# Patient Record
Sex: FEMALE | Race: OTHER | NOT HISPANIC OR LATINO | Employment: FULL TIME | URBAN - METROPOLITAN AREA
[De-identification: names, ages, dates, MRNs, and addresses within clinical notes are randomized per-mention and may not be internally consistent; named-entity substitution may affect disease eponyms.]

---

## 2017-01-19 ENCOUNTER — HOSPITAL ENCOUNTER (EMERGENCY)
Facility: HOSPITAL | Age: 37
Discharge: HOME | End: 2017-01-19
Payer: COMMERCIAL

## 2017-01-19 VITALS
HEIGHT: 60 IN | WEIGHT: 203 LBS | SYSTOLIC BLOOD PRESSURE: 165 MMHG | HEART RATE: 96 BPM | OXYGEN SATURATION: 100 % | BODY MASS INDEX: 39.85 KG/M2 | DIASTOLIC BLOOD PRESSURE: 96 MMHG | RESPIRATION RATE: 18 BRPM | TEMPERATURE: 98 F

## 2017-01-19 DIAGNOSIS — M25.562 ACUTE PAIN OF LEFT KNEE: Primary | ICD-10-CM

## 2017-01-19 PROCEDURE — 25000003 PHARM REV CODE 250: Performed by: PHYSICIAN ASSISTANT

## 2017-01-19 PROCEDURE — 99283 EMERGENCY DEPT VISIT LOW MDM: CPT | Mod: 25

## 2017-01-19 PROCEDURE — 29505 APPLICATION LONG LEG SPLINT: CPT | Mod: LT

## 2017-01-19 RX ORDER — TRAMADOL HYDROCHLORIDE 50 MG/1
50 TABLET ORAL EVERY 6 HOURS PRN
Qty: 15 TABLET | Refills: 0 | Status: SHIPPED | OUTPATIENT
Start: 2017-01-19 | End: 2018-03-01

## 2017-01-19 RX ORDER — TRAMADOL HYDROCHLORIDE 50 MG/1
50 TABLET ORAL
Status: COMPLETED | OUTPATIENT
Start: 2017-01-19 | End: 2017-01-19

## 2017-01-19 RX ORDER — METFORMIN HYDROCHLORIDE 500 MG/1
500 TABLET ORAL 2 TIMES DAILY WITH MEALS
COMMUNITY
End: 2018-03-01

## 2017-01-19 RX ADMIN — TRAMADOL HYDROCHLORIDE 50 MG: 50 TABLET, COATED ORAL at 04:01

## 2017-01-19 NOTE — ED PROVIDER NOTES
"SCRIBE #1 NOTE: I, Eden Ming, am scribing for, and in the presence of, MINA Finney. I have scribed the entire note.      History      Chief Complaint   Patient presents with    Knee Pain     bilateral knee pain       Review of patient's allergies indicates:   Allergen Reactions    Neurontin [gabapentin] Itching    Percocet [oxycodone-acetaminophen] Itching        HPI   HPI    1/19/2017, 3:39 PM   History obtained from the patient      History of Present Illness: Jeanna Rod is a 36 y.o. female patient who presents to the Emergency Department for knee pain which onset gradually 2 days ago. Symptoms are constant and moderate in severity. Pain is located to L knee. Pt reports that she twisted her knee while she was at work two days ago. Pt states that she has hx of chronic knee pain and is s/p ACL and meniscus repair to the same knee years ago. Pt reports that she started feeling a burning sensation in her L knee yesterday. Pt states that she is starting to feel a "pulling" sensation in her L knee. No mitigating or exacerbating factors reported. No associated sxs reported. Patient denies any increased warmth, swelling, back pain, and all other sxs at this time. No further complaints or concerns at this time.         Arrival mode: Personal vehicle    PCP: Jarek Crespo MD       Past Medical History:  Past Medical History   Diagnosis Date    Back pain     Diabetes mellitus     Left knee pain     Miscarriage      x2       Past Surgical History:  Past Surgical History   Procedure Laterality Date    Lumbar fusion      Anterior cruciate ligament repair Left     Tibia fracture surgery Right     Dilation and curettage of uterus       x2         Family History:  History reviewed. No pertinent family history.    Social History:  Social History     Social History Main Topics    Smoking status: Current Every Day Smoker    Smokeless tobacco: Not on file    Alcohol use No    Drug use: No    Sexual " activity: Not on file       ROS   Review of Systems   Constitutional: Negative for fever.   HENT: Negative for sore throat.    Respiratory: Negative for shortness of breath.    Cardiovascular: Negative for chest pain.   Gastrointestinal: Negative for nausea.   Genitourinary: Negative for dysuria.   Musculoskeletal: Positive for arthralgias (knee pain). Negative for back pain.   Skin: Negative for rash.   Neurological: Negative for weakness.   Hematological: Does not bruise/bleed easily.       Physical Exam    Initial Vitals   BP Pulse Resp Temp SpO2   01/19/17 1401 01/19/17 1401 01/19/17 1401 01/19/17 1401 01/19/17 1401   165/96 96 18 98.1 °F (36.7 °C) 100 %      Physical Exam  Nursing Notes and Vital Signs Reviewed.  Constitutional: Patient is in mild distress. Antalgic gait. Awake and alert. Well-developed and well-nourished.  Head:  Normocephalic.  Eyes: PERRL. EOM intact.   ENT: Mucous membranes are moist.   Cardiovascular: Regular rate. Distal pulses are 2+ and symmetric.  Pulmonary/Chest: No respiratory distress.   Abdominal: Soft and non-distended.  There is no tenderness.    Musculoskeletal: Moves all extremities. No obvious deformities. No edema. No calf tenderness.  Left Knee:  No obvious deformity. There is no swelling.  No increased warmth, erythema, induration or fluctuance.Negative anterior/posterior drawer test, normal ACL and PCL. DP and PT pulses are 2+.  Normal capillary refill.  Distal sensation is intact. TTP lateral aspect of L knee. Slight laxity to LCL.  Skin: Warm and dry.  Neurological:  Alert, awake, and appropriate.  Normal speech.  No acute focal neurological deficits are appreciated.  Psychiatric: Normal affect. Good eye contact. Appropriate in content.    ED Course    Splint Application  Date/Time: 1/19/2017 3:57 PM  Performed by: PRITI MATT  Authorized by: JOHN FRAGOSO   Location details: left knee  Splint type: immobilizer.  Post-procedure: The splinted body part  was neurovascularly unchanged following the procedure.  Patient tolerance: Patient tolerated the procedure well with no immediate complications  Comments: Pt will also be given crutches.        ED Vital Signs:  Vitals:    01/19/17 1401   BP: (!) 165/96   Pulse: 96   Resp: 18   Temp: 98.1 °F (36.7 °C)   TempSrc: Oral   SpO2: 100%   Weight: 92.1 kg (203 lb)   Height: 5' (1.524 m)       Abnormal Lab Results:  Labs Reviewed - No data to display     All Lab Results:  None     Imaging Results:  Imaging Results     None                  The Emergency Provider reviewed the vital signs and test results, which are outlined above.    ED Discussion     3:58 PM: Discussed with pt all pertinent ED information and results. Discussed pt dx and plan of tx. Gave pt all f/u and return to the ED instructions. All questions and concerns were addressed at this time. Pt expresses understanding of information and instructions, and is comfortable with plan to discharge. Pt is stable for discharge.    I discussed with patient and/or family/caretaker that evaluation in the ED does not suggest any emergent or life threatening medical conditions requiring immediate intervention beyond what was provided in the ED, and I believe patient is safe for discharge.  Regardless, an unremarkable evaluation in the ED does not preclude the development or presence of a serious of life threatening condition. As such, patient was instructed to return immediately for any worsening or change in current symptoms.      ED Medication(s):  Medications   tramadol tablet 50 mg (not administered)       New Prescriptions    TRAMADOL (ULTRAM) 50 MG TABLET    Take 1 tablet (50 mg total) by mouth every 6 (six) hours as needed for Pain.       Follow-up Information     Follow up with Jarek Crespo MD. Schedule an appointment as soon as possible for a visit in 2 days.    Specialty:  Family Medicine    Why:  Follow up with your primary care physician in the next 2 days for  re-evaluation and further management.     Contact information:    [address] [facility] [address]  [phone]          Follow up with [facility]. Schedule an appointment as soon as possible for a visit in 2 days.    Specialty:  Orthopedics    Why:  Follow up with orthopedic clinic in the next 2-3 days for re-evaluation and further management.     Contact information:    [address]  [facility] [address] [phone]    Additional information:    (off [facility]) 2nd floor        Follow up with [facility] .    Specialty:  Emergency Medicine    Why:  If symptoms worsen    Contact information:    [address]  [facility] [address] [phone]            Medical Decision Making    Medical Decision Making:   Initial Assessment:   Pt here c/o increased pain and swelling to left knee after twisting it 2 days ago. She has had surgery on the knee in the past. She was previously in pain management for her back and prescribed Ultram.   ED Management:  Knee immobilizer  Crutches   Rx for Ultram  Referral to orthopedics            Scribe Attestation:   Scribe #1: I performed the above scribed service and the documentation accurately describes the services I performed. I attest to the accuracy of the note.    Attending:   Physician Attestation Statement for Scribe #1: I, MINA Finney, personally performed the services described in this documentation, as scribed by Eden Lai, in my presence, and it is both accurate and complete.          Clinical Impression       ICD-10-CM ICD-9-CM   1. Acute pain of left knee M25.562 719.46       Disposition:   Disposition: Discharged  Condition: Stable         MINA Loredo-ТАТЬЯНА  01/19/17 8318

## 2017-01-19 NOTE — ED AVS SNAPSHOT
[facility]   [address] [place] [address]               Jeanna Rod   2017  3:09 PM   ED    Description:  Female : 1980   Department:  [facility]            Your Care was Coordinated By:     Provider Role From To    Wade Mishra PA-C Physician Assistant [phone] --      Reason for Visit     Knee Pain           Diagnoses this Visit        Comments    Acute pain of left knee    -  Primary       ED Disposition     ED Disposition Condition Comment    Discharge             To Do List           Follow-up Information     Follow up with Jarek Crespo MD. Schedule an appointment as soon as possible for a visit in 2 days.    Specialty:  Family Medicine    Why:  Follow up with your primary care physician in the next 2 days for re-evaluation and further management.     Contact information:    [address] [facility] [id number] [phone]          Follow up with [facility]. Schedule an appointment as soon as possible for a visit in 2 days.    Specialty:  Orthopedics    Why:  Follow up with orthopedic clinic in the next 2-3 days for re-evaluation and further management.     Contact information:    [address] [facility] [address] [phone]    Additional information:    (off [facility]) 2nd floor        Follow up with [facility].    Specialty:  Emergency Medicine    Why:  If symptoms worsen    Contact information:    [address] [place] [id number] [phone]       These Medications        Disp Refills Start End    tramadol (ULTRAM) 50 mg tablet 15 tablet 0 2017     Take 1 tablet (50 mg total) by mouth every 6 (six) hours as needed for Pain. - Oral      [facility]     [facility] Care [facility] -  Assistance  Registered nurses in the [facility] provide clinical advisement, health education, appointment  booking, and other advisory services.  Call for this free service at [phone].             Medications           Message regarding Medications     Verify the changes and/or additions to your medication regime listed below are the same as discussed with your clinician today.  If any of these changes or additions are incorrect, please notify your healthcare provider.        START taking these NEW medications        Refills    tramadol (ULTRAM) 50 mg tablet 0    Sig: Take 1 tablet (50 mg total) by mouth every 6 (six) hours as needed for Pain.    Class: Print    Route: Oral      These medications were administered today        Dose Freq    tramadol tablet 50 mg 50 mg ED 1 Time    Sig: Take 1 tablet (50 mg total) by mouth ED 1 Time.    Class: Normal    Route: Oral    Cosign for Ordering: Accepted by Nehemias Yao MD on 1/19/2017  3:58 PM           Verify that the below list of medications is an accurate representation of the medications you are currently taking.  If none reported, the list may be blank. If incorrect, please contact your healthcare provider. Carry this list with you in case of emergency.           Current Medications     metformin (GLUCOPHAGE) 500 MG tablet Take 500 mg by mouth 2 (two) times daily with meals.    tramadol (ULTRAM) 50 mg tablet Take 1 tablet (50 mg total) by mouth every 6 (six) hours as needed for Pain.    tramadol tablet 50 mg Take 1 tablet (50 mg total) by mouth ED 1 Time.           Clinical Reference Information           Your Vitals Were     BP Pulse Temp Resp Height Weight    165/96 (BP Location: Right arm, Patient Position: Sitting) 96 98.1 °F (36.7 °C) (Oral) 18 5' (1.524 m) 92.1 kg (203 lb)    Last Period SpO2 BMI          12/23/2016 (Exact Date) 100% 39.65 kg/m2        Allergies as of 1/19/2017        Reactions    Neurontin [Gabapentin] Itching    Percocet [Oxycodone-acetaminophen] Itching      Immunizations Administered on Date of Encounter - 1/19/2017     None       ED Micro, Lab, POCT     None      ED Imaging Orders     None      Discharge References/Attachments     KNEE PAIN AND SWELLING, REDUCING (ENGLISH)    KNEE PAIN OF UNCERTAIN CAUSE (ENGLISH)    KNEE SPRAIN OF THE COLLATERAL LIGAMENTS (ENGLISH)    KNEE IMMOBILIZER (ENGLISH)    CRUTCH WALKING (ENGLISH)      [facility] Sign-Up     Activating your [facility] account is as easy as 1-2-3!     1) Visit [email or web address], select Sign Up Now, enter this activation code and your date of birth, then select Next.  [email or web address]  Expires: 3/5/2017  4:10 PM      2) Create a username and password to use when you visit [facility] in the future and select a security question in case you lose your password and select Next.    3) Enter your e-mail address and click Sign Up!    Additional Information  If you have questions, please e-mail [email or web address] or call [phone] to talk to our [facility] staff. Remember, [facility] is NOT to be used for urgent needs. For medical emergencies, dial 911.         Smoking Cessation     If you would like to quit smoking:   You may be eligible for free services if you are a [place] resident and started smoking cigarettes before September 1, 1988.  Call the Smoking Cessation Trust ([facility]) toll free at (249) 395-5837 or (985) 495-7782.   Call 6-203-QUIT-NOW if you do not meet the above criteria.             [facility] complies with applicable Federal civil rights laws and does not discriminate on the basis of race, color, national origin, age, disability, or sex.        Language Assistance Services     ATTENTION: Language assistance services are available, free of charge. Please call [phone].      ATENCIÓN: Si habla español, tiene a del angel disposición servicios gratuitos de asistencia lingüística. Llame al [phone].     CHÚ Ý: N?u b?n nói Ti?ng Vi?t, có các d?ch v? h? tr? ngôn ng? mi?n phí dành cho b?n. G?i s? [phone].

## 2017-03-27 ENCOUNTER — HOSPITAL ENCOUNTER (EMERGENCY)
Facility: HOSPITAL | Age: 37
Discharge: HOME | End: 2017-03-27
Payer: COMMERCIAL

## 2017-03-27 VITALS
TEMPERATURE: 99 F | BODY MASS INDEX: 39.66 KG/M2 | WEIGHT: 202 LBS | HEIGHT: 60 IN | HEART RATE: 106 BPM | DIASTOLIC BLOOD PRESSURE: 112 MMHG | RESPIRATION RATE: 18 BRPM | SYSTOLIC BLOOD PRESSURE: 160 MMHG | OXYGEN SATURATION: 98 %

## 2017-03-27 DIAGNOSIS — L02.31 ABSCESS OF RIGHT BUTTOCK: Primary | ICD-10-CM

## 2017-03-27 DIAGNOSIS — J01.10 ACUTE NON-RECURRENT FRONTAL SINUSITIS: ICD-10-CM

## 2017-03-27 DIAGNOSIS — Z71.6 TOBACCO ABUSE COUNSELING: ICD-10-CM

## 2017-03-27 PROCEDURE — 99283 EMERGENCY DEPT VISIT LOW MDM: CPT

## 2017-03-27 RX ORDER — FLUTICASONE PROPIONATE 50 MCG
1 SPRAY, SUSPENSION (ML) NASAL 2 TIMES DAILY PRN
Qty: 15 G | Refills: 0 | Status: SHIPPED | OUTPATIENT
Start: 2017-03-27 | End: 2018-03-01

## 2017-03-27 RX ORDER — AMOXICILLIN AND CLAVULANATE POTASSIUM 875; 125 MG/1; MG/1
1 TABLET, FILM COATED ORAL 2 TIMES DAILY
Qty: 14 TABLET | Refills: 0 | Status: SHIPPED | OUTPATIENT
Start: 2017-03-27 | End: 2018-03-01

## 2017-03-27 RX ORDER — DEXAMETHASONE 4 MG/1
4 TABLET ORAL DAILY
Qty: 5 TABLET | Refills: 0 | Status: SHIPPED | OUTPATIENT
Start: 2017-03-27 | End: 2017-04-01

## 2017-03-27 RX ORDER — MUPIROCIN 20 MG/G
OINTMENT TOPICAL 3 TIMES DAILY
Qty: 1 TUBE | Refills: 0 | Status: SHIPPED | OUTPATIENT
Start: 2017-03-27 | End: 2017-04-06

## 2017-03-27 NOTE — ED AVS SNAPSHOT
[facility]  [address]  [place] [address]               Jeanna Rod   3/27/2017  6:53 PM   ED    Description:  Female : 1980   Department:  [facility]            Your Care was Coordinated By:     Provider Role From To    Willie Sotomayor NP Nurse Practitioner [phone] --      Reason for Visit     URI     Abscess           Diagnoses this Visit        Comments    Abscess of right buttock    -  Primary     Acute non-recurrent frontal sinusitis         Tobacco abuse counseling           ED Disposition     None           To Do List           Follow-up Information     Follow up with Jarek Crespo MD In 2 days.    Specialty:  Family Medicine    Contact information:    [address] [facility]  [place] [address]  [phone]         These Medications        Disp Refills Start End    dexamethasone (DECADRON) 4 MG Tab 5 tablet 0 3/27/2017 2017    Take 1 tablet (4 mg total) by mouth once daily. - Oral    amoxicillin-clavulanate 875-125mg (AUGMENTIN) 875-125 mg per tablet 14 tablet 0 3/27/2017     Take 1 tablet by mouth 2 (two) times daily. - Oral    mupirocin (BACTROBAN) 2 % ointment 1 Tube 0 3/27/2017 2017    Apply topically 3 (three) times daily. - Topical (Top)    fluticasone (FLONASE) 50 mcg/actuation nasal spray 15 g 0 3/27/2017     1 spray by Each Nare route 2 (two) times daily as needed. - Each Nare      [facility] On [facility]     [facility] Care [facility] -  Assistance  Registered nurses in the [facility] provide clinical advisement, health education, appointment booking, and other advisory services.  Call for this free service at [phone].             Medications           Message regarding Medications     Verify the changes and/or additions to your medication regime listed below are the same as discussed with your clinician today.  If any of these changes or additions are incorrect, please notify your  healthcare provider.        START taking these NEW medications        Refills    dexamethasone (DECADRON) 4 MG Tab 0    Sig: Take 1 tablet (4 mg total) by mouth once daily.    Class: Print    Route: Oral    amoxicillin-clavulanate 875-125mg (AUGMENTIN) 875-125 mg per tablet 0    Sig: Take 1 tablet by mouth 2 (two) times daily.    Class: Print    Route: Oral    mupirocin (BACTROBAN) 2 % ointment 0    Sig: Apply topically 3 (three) times daily.    Class: Print    Route: Topical (Top)    fluticasone (FLONASE) 50 mcg/actuation nasal spray 0    Si spray by Each Nare route 2 (two) times daily as needed.    Class: Print    Route: Each Nare           Verify that the below list of medications is an accurate representation of the medications you are currently taking.  If none reported, the list may be blank. If incorrect, please contact your healthcare provider. Carry this list with you in case of emergency.           Current Medications     amoxicillin-clavulanate 875-125mg (AUGMENTIN) 875-125 mg per tablet Take 1 tablet by mouth 2 (two) times daily.    dexamethasone (DECADRON) 4 MG Tab Take 1 tablet (4 mg total) by mouth once daily.    fluticasone (FLONASE) 50 mcg/actuation nasal spray 1 spray by Each Nare route 2 (two) times daily as needed.    metformin (GLUCOPHAGE) 500 MG tablet Take 500 mg by mouth 2 (two) times daily with meals.    mupirocin (BACTROBAN) 2 % ointment Apply topically 3 (three) times daily.    tramadol (ULTRAM) 50 mg tablet Take 1 tablet (50 mg total) by mouth every 6 (six) hours as needed for Pain.           Clinical Reference Information           Your Vitals Were     BP Pulse Temp Resp Height Weight    160/112 (BP Location: Left arm, Patient Position: Sitting) 106 98.6 °F (37 °C) (Oral) 18 5' (1.524 m) 91.6 kg (202 lb)    SpO2 BMI             98% 39.45 kg/m2         Allergies as of 3/27/2017        Reactions    Neurontin [Gabapentin] Itching    Percocet [Oxycodone-acetaminophen] Itching       Immunizations Administered on Date of Encounter - 3/27/2017     None      ED Micro, Lab, POCT     None      ED Imaging Orders     None        Discharge Instructions         Abscess (Antibiotic Treatment Only)  An abscess (sometimes called a boil) happens when bacteria get trapped under the skin and start to grow. Pus forms inside the abscess as the body responds to the bacteria. An abscess can happen with an insect bite, ingrown hair, blocked oil gland, pimple, cyst, or puncture wound.  In the early stages, your wound may be red and tender. For this stage, you may get antibiotics. If the abscess does not get better with antibiotics, it will need to be drained with a small cut.  Home care  These tips will help you care for your abscess at home:  · Soak the wound in hot water or apply hot packs (small towel soaked in hot water) to the area for 20 minutes at a time. Do this 3 to 4 times a day.  · Do not cut, squeeze, or pop the boil yourself.  · Apply antibiotic cream or ointment to the skin 3 to 4 times a day, unless something else was prescribed. Some ointments include an antibiotic plus a pain reliever.  · If your doctor prescribed antibiotics, do not stop taking them until you have finished the medicine or the doctor tells you to stop.  · You may use an over-the-counter pain medicine to control pain, unless another pain medicine was prescribed. If you have chronic liver or kidney disease or ever had a stomach ulcer or gastrointestinal bleeding, talk with your doctor before using these any of these.  Follow-up care  Follow up with your healthcare provider, or as advised. Check your wound each day for the signs of worsening infection listed below.  When to seek medical advice  Get prompt medical attention if any of these occur:  · An increase in redness or swelling  · Red streaks in the skin leading away from the abscess  · An increase in local pain or swelling  · Fever of 100.4ºF (38ºC) or higher, or as directed  by your healthcare provider  · Pus or fluid coming from the abscess  · Boil returns after getting better  Date Last Reviewed: 9/1/2016  © 2302-2590 [employer]. [address] [place] [address], [place] [address]. All rights reserved. This information is not intended as a substitute for professional medical care. Always follow your healthcare professional's instructions.          Acute Sinusitis    Acute sinusitis is irritation and swelling of the sinuses. It is usually caused by a viral infection after a common cold. Your doctor can help you find relief.  What is acute sinusitis?  Sinuses are air-filled spaces in the skull behind the face. They are kept moist and clean by a lining of mucosa. Things such as pollen, smoke, and chemical fumes can irritate the mucosa. It can then swell up. As a response to irritation, the mucosa makes more mucus and other fluids. Tiny hairlike cilia cover the mucosa. Cilia help carry mucus toward the opening of the sinus. Too much mucus may cause the cilia to stop working. This blocks the sinus opening. A buildup of fluid in the sinuses then causes pain and pressure. It can also encourage bacteria to grow in the sinuses.  Common symptoms of acute sinusitis  You may have:  · Facial soreness pain  · Headache  · Fever  · Fluid draining in the back of the throat (postnasal drip)  · Congestion  · Drainage that is thick and colored, instead of clear  · Cough  Diagnosing acute sinusitis  Your doctor will ask about your symptoms and health history. He or she will look at your ear, nose, and throat. You usually won't need to have X-rays taken.    The doctor may take a sample of mucus to check for bacteria. If you have sinusitis that keeps coming back, you may need imaging tests such as X-rays or CAT scans. This will help your doctor check for a structural problem that may be causing the infection.  Treating acute sinusitis  Treatment is aimed at unblocking the sinus opening and helping the  cilia work again. You may need to take antihistamine and decongestant medicine. These can reduce inflammation and decrease the amount of fluid your sinuses make. If you have a bacterial infection, you will need to take antibiotic medicine for 10 to 14 days. Take this medicine until it is gone, even if you feel better.  Date Last Reviewed: 10/1/2016  © 8641-8911 [employer]. [address] [place] [address], [place] [address]. All rights reserved. This information is not intended as a substitute for professional medical care. Always follow your healthcare professional's instructions.          [facility] Sign-Up     Activating your [facility] account is as easy as 1-2-3!     1) Visit [email or web address], select Sign Up Now, enter this activation code and your date of birth, then select Next.  [email or web address]  Expires: 5/11/2017  7:11 PM      2) Create a username and password to use when you visit [facility] in the future and select a security question in case you lose your password and select Next.    3) Enter your e-mail address and click Sign Up!    Additional Information  If you have questions, please e-mail [email or web address] or call [phone] to talk to our [facility] staff. Remember, [facility] is NOT to be used for urgent needs. For medical emergencies, dial 911.         Smoking Cessation     If you would like to quit smoking:   You may be eligible for free services if you are a [place] resident and started smoking cigarettes before September 1, 1988.  Call the Smoking Cessation Trust ([facility]) toll free at (330) 302-5163 or (391) 300-0631.   Call 1-529-QUIT-NOW if you do not meet the above criteria.             [facility] complies with applicable Federal civil rights laws and does not discriminate on the basis of race, color, national origin, age, disability, or sex.        Language Assistance Services     ATTENTION: Language assistance services are available, free of charge. Please call  [phone].      ATENCIÓN: Si habla español, tiene a del angel disposición servicios gratuitos de asistencia lingüística. Llame al [phone].     CHÚ Ý: N?u b?n nói Ti?ng Vi?t, có các d?ch v? h? tr? ngôn ng? mi?n phí dành cho b?n. G?i s? [phone].

## 2017-03-28 NOTE — ED PROVIDER NOTES
SCRIBE #1 NOTE: I, Chung Morton, am scribing for, and in the presence of, Willie Sotomayor NP. I have scribed the entire note.      History      Chief Complaint   Patient presents with    URI     cough congestion body aches and runny nose    Abscess     inner buttocks       Review of patient's allergies indicates:   Allergen Reactions    Neurontin [gabapentin] Itching    Percocet [oxycodone-acetaminophen] Itching        HPI   HPI    3/27/2017, 7:09 PM   History obtained from the patient      History of Present Illness: Jeanna Rod is a 37 y.o. female patient who presents to the Emergency Department for an evaluation of an abscess to the inner buttock which onset gradually a few days ago. Symptoms are constant and moderate in severity. Exacerbated by nothing and relieved by nothing. Associated sxs include congestion, cough, body aches, and rhinorrhea. Patient denies any fever, chills, diaphoresis, HA, weakness, ear pain, sore throat, SOB, CP, nausea, vomiting, and all other sxs at this time. Pt reports she takes 44 units of Levemir every night for her DM.  No further complaints or concerns at this time.     Arrival mode: Personal vehicle    PCP: Jarek Crespo MD       Past Medical History:  Past Medical History:   Diagnosis Date    Back pain     Diabetes mellitus     Left knee pain     Miscarriage     x2       Past Surgical History:  Past Surgical History:   Procedure Laterality Date    ANTERIOR CRUCIATE LIGAMENT REPAIR Left     DILATION AND CURETTAGE OF UTERUS      x2    LUMBAR FUSION      TIBIA FRACTURE SURGERY Right        Family History:  Family history reviewed not relevant    Social History:  Social History     Social History Main Topics    Smoking status: Current Every Day Smoker    Smokeless tobacco: Unknown    Alcohol use No    Drug use: No    Sexual activity: Unknown       ROS   Review of Systems   Constitutional: Negative for chills, diaphoresis and fever.   HENT: Positive for congestion  and rhinorrhea. Negative for ear pain, sore throat and trouble swallowing.    Respiratory: Positive for cough. Negative for chest tightness and shortness of breath.    Cardiovascular: Negative for chest pain.   Gastrointestinal: Negative for abdominal pain, nausea and vomiting.   Genitourinary: Negative for decreased urine volume, difficulty urinating, dysuria and hematuria.   Musculoskeletal: Positive for myalgias (Generalized). Negative for back pain, neck pain and neck stiffness.   Skin: Negative for rash.        (+) Abscess to right buttock  (-) warmth  (-) drainage   Neurological: Negative for dizziness, weakness, light-headedness and headaches.   Hematological: Does not bruise/bleed easily.     Physical Exam    Initial Vitals   BP Pulse Resp Temp SpO2   03/27/17 1654 03/27/17 1654 03/27/17 1654 03/27/17 1654 03/27/17 1654   160/112 106 18 98.6 °F (37 °C) 98 %      Physical Exam  Nursing Notes and Vital Signs Reviewed.  Constitutional: Patient is in no acute distress. Awake and alert. Well-developed and well-nourished.  Head: Atraumatic. Normocephalic.  Eyes: PERRL. EOM intact. Conjunctivae are not pale. No scleral icterus.  ENT: Mucous membranes are moist. Oropharynx is clear and symmetric. Frontal sinus tenderness noted. Nasal mucosal edema noted. Bilateral middle ear effusions  Neck: Supple. Full ROM. No lymphadenopathy.  Cardiovascular: Tachycardia.  Pulmonary/Chest: No respiratory distress. Clear to auscultation bilaterally. No wheezing, rales, or rhonchi.  Abdominal: Soft and non-distended.  There is no tenderness.  No rebound, guarding, or rigidity. Good bowel sounds.  Musculoskeletal: Moves all extremities. No obvious deformities. No edema. No calf tenderness.  Skin: Warm and dry. 1cm area of redness and induration to right buttock, no fluctuance felt at this time.   Neurological:  Alert, awake, and appropriate.  Normal speech.  No acute focal neurological deficits are appreciated.  Psychiatric: Normal  affect. Good eye contact. Appropriate in content.    ED Course    Procedures  ED Vital Signs:  Vitals:    03/27/17 1654   BP: (!) 160/112   Pulse: 106   Resp: 18   Temp: 98.6 °F (37 °C)   TempSrc: Oral   SpO2: 98%   Weight: 91.6 kg (202 lb)   Height: 5' (1.524 m)              The Emergency Provider reviewed the vital signs and test results, which are outlined above.    ED Discussion      7:22 PM: Reassessed pt at this time. Pt is awake, alert, and in NAD. Discussed with pt all pertinent ED information. Discussed pt dx of abscess of right buttock and plan of tx. Pt was prescribed abx for her abscess. Gave pt all f/u and return to the ED instructions. All questions and concerns were addressed at this time. Pt expresses understanding of information and instructions, and is comfortable with plan to discharge. Pt is stable for discharge.    I discussed with patient and/or family/caretaker that evaluation in the ED does not suggest any emergent or life threatening medical conditions requiring immediate intervention beyond what was provided in the ED, and I believe patient is safe for discharge.  Regardless, an unremarkable evaluation in the ED does not preclude the development or presence of a serious of life threatening condition. As such, patient was instructed to return immediately for any worsening or change in current symptoms.    Pre-hypertension/Hypertension: The pt has been informed that they may have pre-hypertension or hypertension based on a blood pressure reading in the ED. I recommend that the pt call the PCP listed on their discharge instructions or a physician of their choice this week to arrange f/u for further evaluation of possible pre-hypertension or hypertension.         ED Medication(s):  Medications - No data to display    Discharge Medication List as of 3/27/2017  7:11 PM      START taking these medications    Details   amoxicillin-clavulanate 875-125mg (AUGMENTIN) 875-125 mg per tablet Take 1  tablet by mouth 2 (two) times daily., Starting 3/27/2017, Until Discontinued, Print      dexamethasone (DECADRON) 4 MG Tab Take 1 tablet (4 mg total) by mouth once daily., Starting 3/27/2017, Until Sat 4/1/17, Print      fluticasone (FLONASE) 50 mcg/actuation nasal spray 1 spray by Each Nare route 2 (two) times daily as needed., Starting 3/27/2017, Until Discontinued, Print      mupirocin (BACTROBAN) 2 % ointment Apply topically 3 (three) times daily., Starting 3/27/2017, Until Thu 4/6/17, Print             Follow-up Information     Follow up with Jarek Crespo MD In 2 days.    Specialty:  Family Medicine    Contact information:    [address] [facility] [id number] [phone]              Medical Decision Making          Smoking Cessation: The patient is a smoker. The patient was counseled on smoking cessation for: 3 minutes. The patient was counseled on tobacco related health complications. Appropriate patient literature was given to the patient concerning tobacco cessation.       Scribe Attestation:   Scribe #1: I performed the above scribed service and the documentation accurately describes the services I performed. I attest to the accuracy of the note.    Attending:   Physician Attestation Statement for Scribe #1: I, Willie Sotomayor NP, personally performed the services described in this documentation, as scribed by Chung Morton, in my presence, and it is both accurate and complete.          Clinical Impression       ICD-10-CM ICD-9-CM   1. Abscess of right buttock L02.31 682.5   2. Acute non-recurrent frontal sinusitis J01.10 461.1   3. Tobacco abuse counseling Z71.6 V65.42     305.1       Disposition:   Disposition: Discharged  Condition: Stable         Willie Sotomaoyr NP  03/28/17 4300

## 2017-03-28 NOTE — DISCHARGE INSTRUCTIONS
Abscess (Antibiotic Treatment Only)  An abscess (sometimes called a boil) happens when bacteria get trapped under the skin and start to grow. Pus forms inside the abscess as the body responds to the bacteria. An abscess can happen with an insect bite, ingrown hair, blocked oil gland, pimple, cyst, or puncture wound.  In the early stages, your wound may be red and tender. For this stage, you may get antibiotics. If the abscess does not get better with antibiotics, it will need to be drained with a small cut.  Home care  These tips will help you care for your abscess at home:  · Soak the wound in hot water or apply hot packs (small towel soaked in hot water) to the area for 20 minutes at a time. Do this 3 to 4 times a day.  · Do not cut, squeeze, or pop the boil yourself.  · Apply antibiotic cream or ointment to the skin 3 to 4 times a day, unless something else was prescribed. Some ointments include an antibiotic plus a pain reliever.  · If your doctor prescribed antibiotics, do not stop taking them until you have finished the medicine or the doctor tells you to stop.  · You may use an over-the-counter pain medicine to control pain, unless another pain medicine was prescribed. If you have chronic liver or kidney disease or ever had a stomach ulcer or gastrointestinal bleeding, talk with your doctor before using these any of these.  Follow-up care  Follow up with your healthcare provider, or as advised. Check your wound each day for the signs of worsening infection listed below.  When to seek medical advice  Get prompt medical attention if any of these occur:  · An increase in redness or swelling  · Red streaks in the skin leading away from the abscess  · An increase in local pain or swelling  · Fever of 100.4ºF (38ºC) or higher, or as directed by your healthcare provider  · Pus or fluid coming from the abscess  · Boil returns after getting better  Date Last Reviewed: 9/1/2016  © 1744-7130 [employer] [address] [place] [address]. All rights reserved. This information is not intended as a substitute for professional medical care. Always follow your healthcare professional's instructions.          Acute Sinusitis    Acute sinusitis is irritation and swelling of the sinuses. It is usually caused by a viral infection after a common cold. Your doctor can help you find relief.  What is acute sinusitis?  Sinuses are air-filled spaces in the skull behind the face. They are kept moist and clean by a lining of mucosa. Things such as pollen, smoke, and chemical fumes can irritate the mucosa. It can then swell up. As a response to irritation, the mucosa makes more mucus and other fluids. Tiny hairlike cilia cover the mucosa. Cilia help carry mucus toward the opening of the sinus. Too much mucus may cause the cilia to stop working. This blocks the sinus opening. A buildup of fluid in the sinuses then causes pain and pressure. It can also encourage bacteria to grow in the sinuses.  Common symptoms of acute sinusitis  You may have:  · Facial soreness pain  · Headache  · Fever  · Fluid draining in the back of the throat (postnasal drip)  · Congestion  · Drainage that is thick and colored, instead of clear  · Cough  Diagnosing acute sinusitis  Your doctor will ask about your symptoms and health history. He or she will look at your ear, nose, and throat. You usually won't need to have X-rays taken.    The doctor may take a sample of mucus to check for bacteria. If you have sinusitis that keeps coming back, you may need imaging tests such as X-rays or CAT scans. This will help your doctor check for a structural problem that may be causing the infection.  Treating acute sinusitis  Treatment is aimed at unblocking the sinus opening and helping the cilia work again. You may need to take antihistamine and decongestant medicine. These can reduce inflammation and decrease the amount of fluid your sinuses make. If you  have a bacterial infection, you will need to take antibiotic medicine for 10 to 14 days. Take this medicine until it is gone, even if you feel better.  Date Last Reviewed: 10/1/2016  © 5910-8246 [employer]. [address] [place] [address], [place] [address]. All rights reserved. This information is not intended as a substitute for professional medical care. Always follow your healthcare professional's instructions.

## 2018-03-01 ENCOUNTER — HOSPITAL ENCOUNTER (EMERGENCY)
Facility: HOSPITAL | Age: 38
Discharge: HOME | End: 2018-03-01
Attending: EMERGENCY MEDICINE
Payer: COMMERCIAL

## 2018-03-01 VITALS
SYSTOLIC BLOOD PRESSURE: 169 MMHG | BODY MASS INDEX: 39.66 KG/M2 | OXYGEN SATURATION: 98 % | DIASTOLIC BLOOD PRESSURE: 99 MMHG | WEIGHT: 202 LBS | HEART RATE: 84 BPM | TEMPERATURE: 99 F | RESPIRATION RATE: 20 BRPM | HEIGHT: 60 IN

## 2018-03-01 DIAGNOSIS — N76.2 CELLULITIS OF LABIA MAJORA: Primary | ICD-10-CM

## 2018-03-01 PROCEDURE — 99283 EMERGENCY DEPT VISIT LOW MDM: CPT

## 2018-03-01 RX ORDER — LISINOPRIL 40 MG/1
40 TABLET ORAL DAILY
COMMUNITY
End: 2023-08-24 | Stop reason: SDUPTHER

## 2018-03-01 RX ORDER — NAPROXEN 500 MG/1
500 TABLET ORAL 2 TIMES DAILY
COMMUNITY
End: 2018-03-01 | Stop reason: ALTCHOICE

## 2018-03-01 RX ORDER — SULFAMETHOXAZOLE AND TRIMETHOPRIM 800; 160 MG/1; MG/1
1 TABLET ORAL 2 TIMES DAILY
Qty: 14 TABLET | Refills: 0 | Status: SHIPPED | OUTPATIENT
Start: 2018-03-01 | End: 2018-03-08

## 2018-03-01 RX ORDER — CYCLOBENZAPRINE HCL 5 MG
5 TABLET ORAL 3 TIMES DAILY PRN
COMMUNITY
End: 2022-02-10

## 2018-03-01 RX ORDER — NAPROXEN 500 MG/1
500 TABLET ORAL 2 TIMES DAILY WITH MEALS
Qty: 20 TABLET | Refills: 0 | Status: SHIPPED | OUTPATIENT
Start: 2018-03-01 | End: 2022-02-10

## 2018-03-01 RX ORDER — SERTRALINE HYDROCHLORIDE 50 MG/1
50 TABLET, FILM COATED ORAL DAILY
COMMUNITY
End: 2022-02-10

## 2018-03-01 NOTE — ED PROVIDER NOTES
"SCRIBE #1 NOTE: I, Diego Dominguez, am scribing for, and in the presence of, Jaime Landaverde Jr., MD. I have scribed the entire note.      History      Chief Complaint   Patient presents with    Abscess     Pt states, "I have a boil of something on my left side of my vagina."       Review of patient's allergies indicates:   Allergen Reactions    Neurontin [gabapentin] Itching    Percocet [oxycodone-acetaminophen] Itching        HPI   HPI    3/1/2018, 7:27 AM   History obtained from the patient      History of Present Illness: Jeanna Rod is a 38 y.o. female patient who presents to the Emergency Department for abscess to her L labia majora which onset gradually 3 days ago. Symptoms are constant and moderate in severity. Sx are exacerbated by nothing and relieved by nothing. Associated sxs include pain to abscess and "clear/pink drainage." Pt reports Hx of DM and states she has had abscess' in the past. No other sxs reported. Patient denies any fever, N/V/D, chills, streaking of erythema, genital sore and all other sxs at this time. No further complaints or concerns at this time.     Arrival mode: Personal vehicle    PCP: Jarek Crespo MD       Past Medical History:  Past Medical History:   Diagnosis Date    Back pain     Diabetes mellitus     Left knee pain     Miscarriage     x2       Past Surgical History:  Past Surgical History:   Procedure Laterality Date    ANTERIOR CRUCIATE LIGAMENT REPAIR Left     DILATION AND CURETTAGE OF UTERUS      x2    LUMBAR FUSION      TIBIA FRACTURE SURGERY Right          Family History:  History reviewed. No pertinent family history.    Social History:  Social History     Social History Main Topics    Smoking status: Current Every Day Smoker    Smokeless tobacco: Not on file    Alcohol use No    Drug use: No    Sexual activity: Not on file       ROS   Review of Systems   Constitutional: Negative for chills and fever.   HENT: Negative for congestion and sore throat.  "   Respiratory: Negative for chest tightness and shortness of breath.    Cardiovascular: Negative for chest pain.   Gastrointestinal: Negative for abdominal pain, nausea and vomiting.   Genitourinary: Negative for difficulty urinating and dysuria.   Musculoskeletal: Negative for back pain and neck pain.   Skin: Positive for color change (erythema, no palpable fluctuance). Negative for rash.        (+)pain to pustule with drainage, no palpable fluctuance     Neurological: Negative for dizziness, weakness, light-headedness, numbness and headaches.   Psychiatric/Behavioral: Negative for agitation and confusion.   All other systems reviewed and are negative.      Physical Exam      Initial Vitals [03/01/18 0717]   BP Pulse Resp Temp SpO2   (!) 148/100 80 20 98.8 °F (37.1 °C) 97 %      MAP       116          Physical Exam  Nursing Notes and Vital Signs Reviewed.  Constitutional: Patient is in no apparent distress. Well-developed and well-nourished.  Head: Atraumatic. Normocephalic.  Eyes: PERRL. EOM intact. Conjunctivae are not pale. No scleral icterus.  ENT: Mucous membranes are moist.   Neck: Supple. Full ROM. No lymphadenopathy.  Cardiovascular: Regular rate. Regular rhythm. No murmurs, rubs, or gallops. Distal pulses are 2+ and symmetric.  Pulmonary/Chest: No respiratory distress. Clear to auscultation bilaterally. No wheezing or rales.  Abdominal: Soft and non-distended.    Musculoskeletal: Moves all extremities. No obvious deformities. No edema.   Pelvic: A female chaperone was present for this examination. Erythema and edema noted to the L labia majora, no palpable fluctuance appreciated. There is a small unroofed pustule to area with serosanguinous drainage present.   Skin: Warm and dry.   no plapable fluctuance small area of putstuke unroofed serosangous fluid   Neurological:  Alert, awake, and appropriate.  Normal speech.  No acute focal neurological deficits are appreciated.  Psychiatric: Normal affect. Good  eye contact. Appropriate in content.    ED Course    Procedures  ED Vital Signs:  Vitals:    03/01/18 0717 03/01/18 0730   BP: (!) 148/100 (!) 169/99   Pulse: 80 84   Resp: 20    Temp: 98.8 °F (37.1 °C)    TempSrc: Oral    SpO2: 97% 98%   Weight: 91.6 kg (202 lb)    Height: 5' (1.524 m)        Abnormal Lab Results:  Labs Reviewed - No data to display     All Lab Results:  None    Imaging Results:  Imaging Results    None                 The Emergency Provider reviewed the vital signs and test results, which are outlined above.    ED Discussion     7:50 AM: Pt is laying comfortably in ED bed and in NAD. Pt is awake, alert, and oriented. Discussed with pt all pertinent ED information and results. Discussed pt dx and plan of tx. Gave pt all f/u and return to the ED instructions. All questions and concerns were addressed at this time. Pt expresses understanding of information and instructions, and is comfortable with plan to discharge. Pt is stable for discharge.    For  CELLULITIS, I advised patient to: keep area clean and dry; apply antibiotic ointment as prescribed; refrain from squeezing or irritating site; apply moist heat to help with pain and abscess drainage; and to take antibiotics as prescribed. Patient was instructed to follow up with primary care provider, urgent care facility, or the emergency room if symptoms worsen and they develop a fever greater than 102.5 °F, have pain unrelieved by OTC or prescription medications, and excessive swelling. Also instructed patient to follow up with provider in 24-48 hours for wound re-check.    ED Medication(s):  Medications - No data to display    Discharge Medication List as of 3/1/2018  7:49 AM      START taking these medications    Details   naproxen (NAPROSYN) 500 MG tablet Take 1 tablet (500 mg total) by mouth 2 (two) times daily with meals., Starting Thu 3/1/2018, Print      sulfamethoxazole-trimethoprim 800-160mg (BACTRIM DS) 800-160 mg Tab Take 1 tablet by mouth  2 (two) times daily., Starting Thu 3/1/2018, Until Thu 3/8/2018, Print             Follow-up Information     Jarek Crespo MD In 1 day.    Specialty:  Family Medicine  Why:  For wound re-check  Contact information:  [address] [facility] [address]  [phone]             [facility] In 1 day.    Specialty:  Emergency Medicine  Why:  As needed, If symptoms worsen, For wound re-check  Contact information:  [address] [facility] [address]  [place] [id number] [phone]                   Medical Decision Making              Scribe Attestation:   Scribe #1: I performed the above scribed service and the documentation accurately describes the services I performed. I attest to the accuracy of the note.    Attending:   Physician Attestation Statement for Scribe #1: I, Jaime Landaverde Jr., MD, personally performed the services described in this documentation, as scribed by Diego Dominguez, in my presence, and it is both accurate and complete.          Clinical Impression       ICD-10-CM ICD-9-CM   1. Cellulitis of labia majora N76.2 616.10       Disposition:   Disposition: Discharged  Condition: Stable         Jaime Landaverde Jr., MD  03/01/18 0809

## 2018-03-03 ENCOUNTER — HOSPITAL ENCOUNTER (EMERGENCY)
Facility: HOSPITAL | Age: 38
Discharge: HOME | End: 2018-03-03
Attending: EMERGENCY MEDICINE
Payer: COMMERCIAL

## 2018-03-03 ENCOUNTER — HOSPITAL ENCOUNTER (EMERGENCY)
Facility: HOSPITAL | Age: 38
Discharge: HOME | End: 2018-03-03
Payer: COMMERCIAL

## 2018-03-03 VITALS
DIASTOLIC BLOOD PRESSURE: 104 MMHG | SYSTOLIC BLOOD PRESSURE: 183 MMHG | BODY MASS INDEX: 39.06 KG/M2 | HEART RATE: 86 BPM | RESPIRATION RATE: 18 BRPM | OXYGEN SATURATION: 97 % | WEIGHT: 200 LBS | TEMPERATURE: 99 F

## 2018-03-03 VITALS
TEMPERATURE: 99 F | BODY MASS INDEX: 39.73 KG/M2 | HEIGHT: 60 IN | WEIGHT: 202.38 LBS | RESPIRATION RATE: 18 BRPM | DIASTOLIC BLOOD PRESSURE: 73 MMHG | HEART RATE: 68 BPM | SYSTOLIC BLOOD PRESSURE: 138 MMHG | OXYGEN SATURATION: 97 %

## 2018-03-03 DIAGNOSIS — I10 HYPERTENSION, UNSPECIFIED TYPE: ICD-10-CM

## 2018-03-03 DIAGNOSIS — Z51.89 WOUND CHECK, ABSCESS: Primary | ICD-10-CM

## 2018-03-03 DIAGNOSIS — R07.9 CHEST PAIN: ICD-10-CM

## 2018-03-03 LAB
ALBUMIN SERPL BCP-MCNC: 3.7 G/DL
ALP SERPL-CCNC: 83 U/L
ALT SERPL W/O P-5'-P-CCNC: 14 U/L
ANION GAP SERPL CALC-SCNC: 9 MMOL/L
AST SERPL-CCNC: 10 U/L
BASOPHILS # BLD AUTO: 0.02 K/UL
BASOPHILS NFR BLD: 0.2 %
BILIRUB SERPL-MCNC: 0.2 MG/DL
BNP SERPL-MCNC: 19 PG/ML
BUN SERPL-MCNC: 13 MG/DL
CALCIUM SERPL-MCNC: 9.5 MG/DL
CHLORIDE SERPL-SCNC: 105 MMOL/L
CO2 SERPL-SCNC: 23 MMOL/L
CREAT SERPL-MCNC: 0.8 MG/DL
DIFFERENTIAL METHOD: ABNORMAL
EOSINOPHIL # BLD AUTO: 0.3 K/UL
EOSINOPHIL NFR BLD: 2.2 %
ERYTHROCYTE [DISTWIDTH] IN BLOOD BY AUTOMATED COUNT: 14.4 %
EST. GFR  (AFRICAN AMERICAN): >60 ML/MIN/1.73 M^2
EST. GFR  (NON AFRICAN AMERICAN): >60 ML/MIN/1.73 M^2
GLUCOSE SERPL-MCNC: 132 MG/DL
HCT VFR BLD AUTO: 43.7 %
HGB BLD-MCNC: 14.4 G/DL
LYMPHOCYTES # BLD AUTO: 2.4 K/UL
LYMPHOCYTES NFR BLD: 19 %
MCH RBC QN AUTO: 25.1 PG
MCHC RBC AUTO-ENTMCNC: 33 G/DL
MCV RBC AUTO: 76 FL
MONOCYTES # BLD AUTO: 0.7 K/UL
MONOCYTES NFR BLD: 5.2 %
NEUTROPHILS # BLD AUTO: 9.4 K/UL
NEUTROPHILS NFR BLD: 73.4 %
PLATELET # BLD AUTO: 345 K/UL
PMV BLD AUTO: 9.1 FL
POTASSIUM SERPL-SCNC: 4.4 MMOL/L
PROT SERPL-MCNC: 7.8 G/DL
RBC # BLD AUTO: 5.73 M/UL
SODIUM SERPL-SCNC: 137 MMOL/L
TROPONIN I SERPL DL<=0.01 NG/ML-MCNC: 0.01 NG/ML
TROPONIN I SERPL DL<=0.01 NG/ML-MCNC: <0.006 NG/ML
TROPONIN I SERPL DL<=0.01 NG/ML-MCNC: <0.006 NG/ML
WBC # BLD AUTO: 12.79 K/UL

## 2018-03-03 PROCEDURE — 93010 ELECTROCARDIOGRAM REPORT: CPT | Mod: ,,, | Performed by: INTERNAL MEDICINE

## 2018-03-03 PROCEDURE — 25000003 PHARM REV CODE 250: Performed by: EMERGENCY MEDICINE

## 2018-03-03 PROCEDURE — 87529 HSV DNA AMP PROBE: CPT

## 2018-03-03 PROCEDURE — 99283 EMERGENCY DEPT VISIT LOW MDM: CPT

## 2018-03-03 PROCEDURE — 80053 COMPREHEN METABOLIC PANEL: CPT

## 2018-03-03 PROCEDURE — 84484 ASSAY OF TROPONIN QUANT: CPT

## 2018-03-03 PROCEDURE — 85025 COMPLETE CBC W/AUTO DIFF WBC: CPT

## 2018-03-03 PROCEDURE — 83880 ASSAY OF NATRIURETIC PEPTIDE: CPT

## 2018-03-03 PROCEDURE — 99284 EMERGENCY DEPT VISIT MOD MDM: CPT | Mod: 27

## 2018-03-03 RX ORDER — ASPIRIN 325 MG
325 TABLET ORAL
Status: COMPLETED | OUTPATIENT
Start: 2018-03-03 | End: 2018-03-03

## 2018-03-03 RX ORDER — PREGABALIN 75 MG/1
75 CAPSULE ORAL 2 TIMES DAILY
COMMUNITY
End: 2022-02-10

## 2018-03-03 RX ORDER — OMEPRAZOLE 20 MG/1
20 CAPSULE, DELAYED RELEASE ORAL DAILY
Qty: 30 CAPSULE | Refills: 11 | Status: SHIPPED | OUTPATIENT
Start: 2018-03-03 | End: 2022-02-10

## 2018-03-03 RX ADMIN — ASPIRIN 325 MG ORAL TABLET 325 MG: 325 PILL ORAL at 01:03

## 2018-03-03 NOTE — ED PROVIDER NOTES
SCRIBE #1 NOTE: IEden am scribing for, and in the presence of, Rich Pitts MD. I have scribed the HPI, ROS, and PEx.    SCRIBE #2 NOTE: I, Kathie Valenzuela am scribing for, and in the presence of,  Rich Pitts MD. I have scribed the remaining portions of the note not scribed by Scribe #1.   SCRIBE #3 NOTE: IKathie am scribing for, and in the presence of, Bishop Wright MD.   History      Chief Complaint   Patient presents with    Chest Pain     left arm and left side of face tingling; began one hour ago; denies SOB       Review of patient's allergies indicates:   Allergen Reactions    Neurontin [gabapentin] Itching    Percocet [oxycodone-acetaminophen] Itching        HPI   HPI    3/3/2018, 12:58 PM   History obtained from the patient      History of Present Illness: Jeanna Rod is a 38 y.o. female patient with PMHx of HTN and DM  presents to the Emergency Department for chest discomfort which onset gradually 1 hour ago. Symptoms are waxing/waning and moderate in severity. Pt reports that when she got home from leaving the ED, she began experiencing a tingling sensation to the L side of her chest, LUE, and L side of her face. No mitigating or exacerbating factors reported. No associated sxs reported. Patient denies any n/v, diaphoresis, SOB, HA, visual disturbance, extremity weakness, wheezing, recent strain, and all other sxs at this time. No further complaints or concerns at this time.     Arrival mode: Personal vehicle    PCP: Jarek Crespo MD       Past Medical History:  Past Medical History:   Diagnosis Date    Back pain     Diabetes mellitus     Left knee pain     Miscarriage     x2       Past Surgical History:  Past Surgical History:   Procedure Laterality Date    ANTERIOR CRUCIATE LIGAMENT REPAIR Left     DILATION AND CURETTAGE OF UTERUS      x2    LUMBAR FUSION      TIBIA FRACTURE SURGERY Right          Family History:  No family history given.    Social History:  Social  History     Social History Main Topics    Smoking status: Current Every Day Smoker    Smokeless tobacco: Not on file    Alcohol use No    Drug use: No    Sexual activity: Not given     ROS   Review of Systems   Constitutional: Negative for diaphoresis and fever.   HENT: Negative for sore throat.    Eyes: Negative for visual disturbance.   Respiratory: Negative for cough, shortness of breath and wheezing.    Cardiovascular: Positive for chest pain.   Gastrointestinal: Negative for abdominal pain, nausea and vomiting.   Genitourinary: Negative for dysuria.   Musculoskeletal: Negative for back pain and myalgias.   Skin: Negative for rash.   Neurological: Negative for dizziness, weakness and numbness.   Hematological: Does not bruise/bleed easily.       Physical Exam      Initial Vitals [03/03/18 1222]   BP Pulse Resp Temp SpO2   (!) 163/107 89 16 98.8 °F (37.1 °C) 98 %      MAP       125.67          Physical Exam  Nursing Notes and Vital Signs Reviewed.  Constitutional: Patient is in no apparent distress. Well-developed and well-nourished.  Head: Atraumatic. Normocephalic.  Eyes: PERRL. EOM intact. Conjunctivae are not pale. No scleral icterus.  ENT: Mucous membranes are moist. Oropharynx is clear and symmetric.    Neck: Supple. Full ROM. No lymphadenopathy.  Cardiovascular: Regular rate. Regular rhythm. No murmurs, rubs, or gallops. Distal pulses are 2+ and symmetric.  Pulmonary/Chest: No respiratory distress. Clear to auscultation bilaterally. No wheezing or rales.  Abdominal: Soft and non-distended.  There is no tenderness.  No rebound, guarding, or rigidity. Good bowel sounds.  Genitourinary: No CVA tenderness  Musculoskeletal: Moves all extremities. No obvious deformities. No edema. No calf tenderness.  Skin: Warm and dry.  Neurological:  Alert, awake, and appropriate.  Normal speech.  No acute focal neurological deficits are appreciated.  Psychiatric: Normal affect. Good eye contact. Appropriate in  content.    ED Course    Procedures  ED Vital Signs:  Vitals:    03/03/18 1222 03/03/18 1302 03/03/18 1402 03/03/18 1539   BP: (!) 163/107 137/83 138/81 126/81   Pulse: 89 75 64 60   Resp: 16 18 20 20   Temp: 98.8 °F (37.1 °C)      TempSrc: Oral      SpO2: 98% 96% (!) 94% (!) 73%   Weight: 91.8 kg (202 lb 6.1 oz)      Height: 5' (1.524 m)       03/03/18 1603 03/03/18 1803   BP: (!) 154/94 138/73   Pulse: 62 68   Resp: 16 18   Temp:     TempSrc:     SpO2: 98% 97%   Weight:     Height:         Abnormal Lab Results:  Labs Reviewed   CBC W/ AUTO DIFFERENTIAL - Abnormal; Notable for the following:        Result Value    WBC 12.79 (*)     RBC 5.73 (*)     MCV 76 (*)     MCH 25.1 (*)     MPV 9.1 (*)     Gran # (ANC) 9.4 (*)     Gran% 73.4 (*)     All other components within normal limits   COMPREHENSIVE METABOLIC PANEL - Abnormal; Notable for the following:     Glucose 132 (*)     All other components within normal limits   TROPONIN I   TROPONIN I   B-TYPE NATRIURETIC PEPTIDE   TROPONIN I        All Lab Results:  Results for orders placed or performed during the hospital encounter of 03/03/18   CBC auto differential   Result Value Ref Range    WBC 12.79 (H) 3.90 - 12.70 K/uL    RBC 5.73 (H) 4.00 - 5.40 M/uL    Hemoglobin 14.4 12.0 - 16.0 g/dL    Hematocrit 43.7 37.0 - 48.5 %    MCV 76 (L) 82 - 98 fL    MCH 25.1 (L) 27.0 - 31.0 pg    MCHC 33.0 32.0 - 36.0 g/dL    RDW 14.4 11.5 - 14.5 %    Platelets 345 150 - 350 K/uL    MPV 9.1 (L) 9.2 - 12.9 fL    Gran # (ANC) 9.4 (H) 1.8 - 7.7 K/uL    Lymph # 2.4 1.0 - 4.8 K/uL    Mono # 0.7 0.3 - 1.0 K/uL    Eos # 0.3 0.0 - 0.5 K/uL    Baso # 0.02 0.00 - 0.20 K/uL    Gran% 73.4 (H) 38.0 - 73.0 %    Lymph% 19.0 18.0 - 48.0 %    Mono% 5.2 4.0 - 15.0 %    Eosinophil% 2.2 0.0 - 8.0 %    Basophil% 0.2 0.0 - 1.9 %    Differential Method Automated    Comprehensive metabolic panel   Result Value Ref Range    Sodium 137 136 - 145 mmol/L    Potassium 4.4 3.5 - 5.1 mmol/L    Chloride 105 95 - 110  mmol/L    CO2 23 23 - 29 mmol/L    Glucose 132 (H) 70 - 110 mg/dL    BUN, Bld 13 6 - 20 mg/dL    Creatinine 0.8 0.5 - 1.4 mg/dL    Calcium 9.5 8.7 - 10.5 mg/dL    Total Protein 7.8 6.0 - 8.4 g/dL    Albumin 3.7 3.5 - 5.2 g/dL    Total Bilirubin 0.2 0.1 - 1.0 mg/dL    Alkaline Phosphatase 83 55 - 135 U/L    AST 10 10 - 40 U/L    ALT 14 10 - 44 U/L    Anion Gap 9 8 - 16 mmol/L    eGFR if African American >60 >60 mL/min/1.73 m^2    eGFR if non African American >60 >60 mL/min/1.73 m^2   Troponin I #1   Result Value Ref Range    Troponin I <0.006 0.000 - 0.026 ng/mL   Troponin I #2   Result Value Ref Range    Troponin I 0.014 0.000 - 0.026 ng/mL   B-Type natriuretic peptide (BNP)   Result Value Ref Range    BNP 19 0 - 99 pg/mL   Troponin I   Result Value Ref Range    Troponin I <0.006 0.000 - 0.026 ng/mL       Imaging Results:  Imaging Results          X-Ray Chest AP Portable (Final result)  Result time 03/03/18 13:20:28    Final result by Pete Alvarado MD (03/03/18 13:20:28)                 Impression:       No acute process seen.      Electronically signed by: PETE ALVARADO MD  Date:     03/03/18  Time:    13:20              Narrative:    Clinical Data:Chest pain    Comparison:  none    Findings:  Single view of the chest.      Cardiac silhouette is normal.  The lungs demonstrate no evidence of active disease.  No evidence of pleural effusion or pneumothorax.  Bones appear intact.                             The EKG was ordered, reviewed, and independently interpreted by the ED provider.  Interpretation time: 12:42  Rate: 77 BPM  Rhythm: normal sinus rhythm with sinus arrhythmia   Interpretation: No acute ST changes. No STEMI.             The Emergency Provider reviewed the vital signs and test results, which are outlined above.    ED Discussion   5:57 PM: Dr. Pitts transfers care of pt to Dr. Wright, pending lab results.    6:47 PM: Reassessed pt at this time. Discussed with pt all pertinent ED information and  results. Discussed pt diagnosis and plan of treatment. Gave pt all f/u and return to the ED instructions. All questions and concerns were addressed at this time. Pt expresses understanding of information and instructions, and is comfortable with plan to discharge. Pt is stable for discharge.    I discussed with patient and/or family/caretaker that evaluation in the ED does not suggest any emergent or life threatening medical conditions requiring immediate intervention beyond what was provided in the ED, and I believe patient is safe for discharge.  Regardless, an unremarkable evaluation in the ED does not preclude the development or presence of a serious of life threatening condition. As such, patient was instructed to return immediately for any worsening or change in current symptoms.        ED Medication(s):  Medications   aspirin tablet 325 mg (325 mg Oral Given 3/3/18 1308)       Discharge Medication List as of 3/3/2018  6:47 PM      START taking these medications    Details   omeprazole (PRILOSEC) 20 MG capsule Take 1 capsule (20 mg total) by mouth once daily., Starting Sat 3/3/2018, Until Sun 3/3/2019, Print             Follow-up Information     Jarek Crespo MD In 2 days.    Specialty:  Family Medicine  Contact information:  [id number] [facility] [id number] [phone]                     Medical Decision Making    Medical Decision Making:   Clinical Tests:   Lab Tests: Reviewed and Ordered  Radiological Study: Reviewed and Ordered  Medical Tests: Reviewed and Ordered           Scribe Attestation:   Scribe #1: I performed the above scribed service and the documentation accurately describes the services I performed. I attest to the accuracy of the note.    Attending:   Physician Attestation Statement for Scribe #1: I, Rich Pitts MD, personally performed the services described in this documentation, as scribed by Eden Lai, in my presence, and it is both accurate and complete.       Scribe  Attestation:   Scribe #2: I performed the above scribed service and the documentation accurately describes the services I performed. I attest to the accuracy of the note.    Attending Attestation:           Physician Attestation for Scribe:    Physician Attestation Statement for Scribe #2: I, Rich Pitts MD, reviewed documentation, as scribed by Kathie Valenzuela MD in my presence, and it is both accurate and complete. I also acknowledge and confirm the content of the note done by Scribe #1.          Clinical Impression       ICD-10-CM ICD-9-CM   1. Chest pain R07.9 786.50       Disposition:   Disposition: Discharged  Condition: Stable           Bishop Wright MD  03/03/18 1324

## 2018-03-03 NOTE — ED PROVIDER NOTES
Encounter Date: 3/3/2018       History     Chief Complaint   Patient presents with    Recheck     abscess to groin recheck     The history is provided by the patient.   Abscess    This is a new problem. The current episode started several days ago. The problem occurs occasionally. The problem has been unchanged. The abscess is present on the groin. The pain is at a severity of 2/10. The abscess is characterized by redness, painfulness, swelling and draining. Pertinent negatives include no fever, no diarrhea, no vomiting, no sore throat and no cough.     Review of patient's allergies indicates:   Allergen Reactions    Neurontin [gabapentin] Itching    Percocet [oxycodone-acetaminophen] Itching     Past Medical History:   Diagnosis Date    Back pain     Diabetes mellitus     Left knee pain     Miscarriage     x2     Past Surgical History:   Procedure Laterality Date    ANTERIOR CRUCIATE LIGAMENT REPAIR Left     DILATION AND CURETTAGE OF UTERUS      x2    LUMBAR FUSION      TIBIA FRACTURE SURGERY Right      No family history on file.  Social History   Substance Use Topics    Smoking status: Current Every Day Smoker    Smokeless tobacco: Not on file    Alcohol use No     Review of Systems   Constitutional: Negative for chills and fever.   HENT: Negative for sore throat.    Eyes: Negative for photophobia and redness.   Respiratory: Negative for cough and shortness of breath.    Cardiovascular: Negative for chest pain.   Gastrointestinal: Negative for abdominal pain, diarrhea, nausea and vomiting.   Endocrine: Negative for polydipsia and polyphagia.   Genitourinary: Negative for dysuria.   Musculoskeletal: Negative for arthralgias, back pain and myalgias.   Skin: Negative for rash.        Groin abscess   Neurological: Negative for weakness and headaches.   Hematological: Does not bruise/bleed easily.   Psychiatric/Behavioral: The patient is not nervous/anxious.    All other systems reviewed and are  negative.      Physical Exam     Initial Vitals [03/03/18 0814]   BP Pulse Resp Temp SpO2   (!) 183/104 86 18 99.2 °F (37.3 °C) 97 %      MAP       130.33         Physical Exam    Nursing note and vitals reviewed.  Constitutional: Vital signs are normal. She appears well-developed and well-nourished. No distress.   HENT:   Head: Normocephalic and atraumatic.   Right Ear: External ear normal.   Left Ear: External ear normal.   Nose: Nose normal.   Mouth/Throat: Oropharynx is clear and moist.   Eyes: Conjunctivae, EOM and lids are normal. Pupils are equal, round, and reactive to light.   Neck: Normal range of motion and full passive range of motion without pain. Neck supple.   Cardiovascular: Normal rate, regular rhythm, S1 normal, S2 normal, normal heart sounds, intact distal pulses and normal pulses.   Pulmonary/Chest: Breath sounds normal. No respiratory distress. She has no wheezes. She has no rales.   Abdominal: Soft. Normal appearance and bowel sounds are normal. She exhibits no distension. There is no tenderness.   Genitourinary:       There is lesion (3 mm tender ulcerated lesion to left labia majora;  no induration or fluctuance or drainage noted) on the left labia.   Musculoskeletal: Normal range of motion.   Lymphadenopathy:     She has no cervical adenopathy.   Neurological: She is alert and oriented to person, place, and time. She has normal strength. No cranial nerve deficit or sensory deficit. Coordination and gait normal.   Skin: Skin is warm, dry and intact.   Psychiatric: She has a normal mood and affect. Her speech is normal and behavior is normal. Judgment and thought content normal. Cognition and memory are normal.         ED Course   Procedures  Labs Reviewed   HERPES SIMPLEX (HSV) BY RAPID PCR, NON-BLOOD             Medical Decision Making:   Initial Assessment:   Wound check left labial abscess  Differential Diagnosis:   Abscess  HSV 2  ED Management:  Pt seen two days ago for draining lesion  to left labia majora and was given bactrim.  Today the lesion appears ulcerated.  I will test for HSV to r/o herpetic lesion.    Additional MDM:   Hypertension: The patient has hypertension (no treatment required at this time). The patient's condition was felt to be stable.   Smoking Cessation: The patient was counseled on tobacco related  health complications. Appropriate patient literature was given to the patient concerning tobacco cessation.                    Clinical Impression:   The primary encounter diagnosis was Wound check, abscess. A diagnosis of Hypertension, unspecified type was also pertinent to this visit.    Disposition:   Disposition: Discharged  Condition: Stable                        MINA Bliss  03/03/18 0844       Rich Pitts MD  03/03/18 1128

## 2018-03-06 LAB
HSV1 DNA SPEC QL NAA+PROBE: NEGATIVE
HSV2 DNA SPEC QL NAA+PROBE: POSITIVE
SPECIMEN SOURCE: ABNORMAL

## 2018-03-07 ENCOUNTER — TELEPHONE (OUTPATIENT)
Dept: EMERGENCY MEDICINE | Facility: HOSPITAL | Age: 38
End: 2018-03-07

## 2018-03-07 NOTE — TELEPHONE ENCOUNTER
----- Message from Rich Pitts MD sent at 3/7/2018  5:56 AM CST -----  Call in Valtrex 500 mg BID x 7 days.

## 2022-02-10 ENCOUNTER — HOSPITAL ENCOUNTER (EMERGENCY)
Facility: HOSPITAL | Age: 42
Discharge: HOME | End: 2022-02-10
Attending: EMERGENCY MEDICINE

## 2022-02-10 VITALS
HEIGHT: 60 IN | DIASTOLIC BLOOD PRESSURE: 98 MMHG | SYSTOLIC BLOOD PRESSURE: 176 MMHG | HEART RATE: 75 BPM | OXYGEN SATURATION: 100 % | BODY MASS INDEX: 39.17 KG/M2 | WEIGHT: 199.5 LBS | TEMPERATURE: 99 F | RESPIRATION RATE: 18 BRPM

## 2022-02-10 DIAGNOSIS — N93.8 DYSFUNCTIONAL UTERINE BLEEDING: ICD-10-CM

## 2022-02-10 DIAGNOSIS — N39.0 ACUTE LOWER UTI: Primary | ICD-10-CM

## 2022-02-10 LAB
ALBUMIN SERPL BCP-MCNC: 3.6 G/DL (ref 3.5–5.2)
ALP SERPL-CCNC: 85 U/L (ref 55–135)
ALT SERPL W/O P-5'-P-CCNC: 15 U/L (ref 10–44)
ANION GAP SERPL CALC-SCNC: 8 MMOL/L (ref 8–16)
AST SERPL-CCNC: 11 U/L (ref 10–40)
B-HCG UR QL: NEGATIVE
BACTERIA #/AREA URNS HPF: ABNORMAL /HPF
BASOPHILS # BLD AUTO: 0.03 K/UL (ref 0–0.2)
BASOPHILS NFR BLD: 0.2 % (ref 0–1.9)
BILIRUB SERPL-MCNC: 0.3 MG/DL (ref 0.1–1)
BILIRUB UR QL STRIP: NEGATIVE
BUN SERPL-MCNC: 12 MG/DL (ref 6–20)
CALCIUM SERPL-MCNC: 9.4 MG/DL (ref 8.7–10.5)
CHLORIDE SERPL-SCNC: 106 MMOL/L (ref 95–110)
CLARITY UR: CLEAR
CO2 SERPL-SCNC: 26 MMOL/L (ref 23–29)
COLOR UR: YELLOW
CREAT SERPL-MCNC: 0.7 MG/DL (ref 0.5–1.4)
DIFFERENTIAL METHOD: ABNORMAL
EOSINOPHIL # BLD AUTO: 0.2 K/UL (ref 0–0.5)
EOSINOPHIL NFR BLD: 1.7 % (ref 0–8)
ERYTHROCYTE [DISTWIDTH] IN BLOOD BY AUTOMATED COUNT: 14.3 % (ref 11.5–14.5)
EST. GFR  (AFRICAN AMERICAN): >60 ML/MIN/1.73 M^2
EST. GFR  (NON AFRICAN AMERICAN): >60 ML/MIN/1.73 M^2
GLUCOSE SERPL-MCNC: 145 MG/DL (ref 70–110)
GLUCOSE UR QL STRIP: NEGATIVE
HCT VFR BLD AUTO: 44.2 % (ref 37–48.5)
HGB BLD-MCNC: 13.9 G/DL (ref 12–16)
HGB UR QL STRIP: ABNORMAL
HYALINE CASTS #/AREA URNS LPF: 0 /LPF
IMM GRANULOCYTES # BLD AUTO: 0.12 K/UL (ref 0–0.04)
IMM GRANULOCYTES NFR BLD AUTO: 0.9 % (ref 0–0.5)
KETONES UR QL STRIP: NEGATIVE
LEUKOCYTE ESTERASE UR QL STRIP: NEGATIVE
LYMPHOCYTES # BLD AUTO: 2.8 K/UL (ref 1–4.8)
LYMPHOCYTES NFR BLD: 21.5 % (ref 18–48)
MCH RBC QN AUTO: 24.2 PG (ref 27–31)
MCHC RBC AUTO-ENTMCNC: 31.4 G/DL (ref 32–36)
MCV RBC AUTO: 77 FL (ref 82–98)
MICROSCOPIC COMMENT: ABNORMAL
MONOCYTES # BLD AUTO: 0.6 K/UL (ref 0.3–1)
MONOCYTES NFR BLD: 4.5 % (ref 4–15)
NEUTROPHILS # BLD AUTO: 9.4 K/UL (ref 1.8–7.7)
NEUTROPHILS NFR BLD: 71.2 % (ref 38–73)
NITRITE UR QL STRIP: POSITIVE
NRBC BLD-RTO: 0 /100 WBC
PH UR STRIP: 6 [PH] (ref 5–8)
PLATELET # BLD AUTO: 392 K/UL (ref 150–450)
PMV BLD AUTO: 8.6 FL (ref 9.2–12.9)
POTASSIUM SERPL-SCNC: 4.9 MMOL/L (ref 3.5–5.1)
PROT SERPL-MCNC: 7.6 G/DL (ref 6–8.4)
PROT UR QL STRIP: ABNORMAL
RBC # BLD AUTO: 5.74 M/UL (ref 4–5.4)
RBC #/AREA URNS HPF: >100 /HPF (ref 0–4)
SODIUM SERPL-SCNC: 140 MMOL/L (ref 136–145)
SP GR UR STRIP: >=1.03 (ref 1–1.03)
URN SPEC COLLECT METH UR: ABNORMAL
UROBILINOGEN UR STRIP-ACNC: NEGATIVE EU/DL
WBC # BLD AUTO: 13.19 K/UL (ref 3.9–12.7)
WBC #/AREA URNS HPF: 3 /HPF (ref 0–5)

## 2022-02-10 PROCEDURE — 85025 COMPLETE CBC W/AUTO DIFF WBC: CPT | Performed by: EMERGENCY MEDICINE

## 2022-02-10 PROCEDURE — 81000 URINALYSIS NONAUTO W/SCOPE: CPT | Performed by: EMERGENCY MEDICINE

## 2022-02-10 PROCEDURE — 80053 COMPREHEN METABOLIC PANEL: CPT | Performed by: EMERGENCY MEDICINE

## 2022-02-10 PROCEDURE — 81025 URINE PREGNANCY TEST: CPT | Performed by: EMERGENCY MEDICINE

## 2022-02-10 PROCEDURE — 99284 EMERGENCY DEPT VISIT MOD MDM: CPT

## 2022-02-10 RX ORDER — CEPHALEXIN 500 MG/1
500 CAPSULE ORAL 4 TIMES DAILY
Qty: 28 CAPSULE | Refills: 0 | Status: SHIPPED | OUTPATIENT
Start: 2022-02-10 | End: 2022-02-17

## 2022-02-10 RX ORDER — MEDROXYPROGESTERONE ACETATE 10 MG/1
10 TABLET ORAL DAILY
Qty: 5 TABLET | Refills: 0 | Status: SHIPPED | OUTPATIENT
Start: 2022-02-10 | End: 2022-02-15

## 2022-02-10 NOTE — DISCHARGE INSTRUCTIONS
Keflex as prescribed for her bladder infection.  I would recommend following up with your doctor in the next 2-3 days for re-evaluation.  For.  Continues he may consider Provera to stop her cycle.  Return as needed for any worsening symptoms, problems, questions or concerns.

## 2022-02-10 NOTE — PHARMACY MED REC
"  Admission Medication History     The home medication history was taken by Abhijeet Sanches.    You may go to "Admission" then "Reconcile Home Medications" tabs to review and/or act upon these items.      The home medication list has been updated by the Pharmacy department.    Please read ALL comments highlighted in yellow.    Please address this information as you see fit.     Feel free to contact us if you have any questions or require assistance.      The medications listed below were removed from the home medication list. Please reorder if appropriate:  Patient reports no longer taking the following medication(s):   CYCLOBENZAPRINE 5 MG TABLET   INSULIN DETEMIR U-100 (LEVEMIR) 100 UINT/mL INJECTION   NAPROXEN 500 MG TABLET   OMEPRAZOLE 20 MG CAPSULE   PREGABALIN 75 MG CAPSULE   SERTRALINE 50 MG TABLET    Medications listed below were obtained from: Patient/family, Analytic software- [employer] and Medical records  (Not in a hospital admission)      Potential issues to be addressed PRIOR TO DISCHARGE  Please discuss with the patient barriers to adherence with medication treatment plans  Patient requires education regarding drug therapies     Abhijeet Sanches, CPhT  [employer] [phone]      Current Outpatient Medications on File Prior to Encounter   Medication Sig Dispense Refill Last Dose    lisinopril (PRINIVIL,ZESTRIL) 40 MG tablet Take 40 mg by mouth once daily.   Past Week at Unknown time    [DISCONTINUED] cyclobenzaprine (FLEXERIL) 5 MG tablet Take 5 mg by mouth 3 (three) times daily as needed for Muscle spasms.       [DISCONTINUED] insulin detemir U-100 (LEVEMIR) 100 unit/mL injection Inject 44 Units into the skin once daily.       [DISCONTINUED] naproxen (NAPROSYN) 500 MG tablet Take 1 tablet (500 mg total) by mouth 2 (two) times daily with meals. 20 tablet 0     [DISCONTINUED] omeprazole (PRILOSEC) 20 MG capsule Take 1 capsule (20 mg total) by mouth once daily. 30 capsule 11     " [DISCONTINUED] pregabalin (LYRICA) 75 MG capsule Take 75 mg by mouth 2 (two) times daily.       [DISCONTINUED] sertraline (ZOLOFT) 50 MG tablet Take 50 mg by mouth once daily.                              .

## 2022-02-10 NOTE — ED NOTES
Patient complains of heavy menstrual bleeding x 6 days and low back pain. Pt denies abd pain n/v. Pt reports having chills a few days ago. Denies fever.     Level of Consciousness: The patient is awake, alert, and oriented with appropropriate affect and speech; oriented to person, place and time.  Appearance: Sitting up in ED stretcher with no acute distress noted. Clothing and hygiene are clean and worn appropriately.  Skin: Skin is intact; color consistent with ethnicity.    Musculoskeletal: Moves all extremities well in full range of motion. No obvious deformities or swelling noted.  Respiratory: Airway open and patent, respirations spontaneous, even and unlabored. No accessory muscles in use.   Cardiac: Regular rate, no peripheral edema noted..  Abdomen:  No distention noted.  Neurologic: PERRLA, face exhibits symmetrical expression, reports normal sensation to all extremities and face.    Patient verbalized understanding of status and plan of care.

## 2022-02-10 NOTE — ED PROVIDER NOTES
SCRIBE #1 NOTE: I, Yared Hernandes, am scribing for, and in the presence of, Jonas Lucio Jr., MD. I have scribed the entire note.      History     Chief Complaint   Patient presents with    Vaginal Bleeding     On menstrual cycle since Friday. States it is heavier than normal with back pain and never lasts this long      Review of patient's allergies indicates:   Allergen Reactions    Neurontin [gabapentin] Itching and Blisters    Percocet [oxycodone-acetaminophen] Itching and Blisters         History of Present Illness     HPI    2/10/2022, 8:29 AM  History obtained from the patient      History of Present Illness: Jeanna Helm is a 42 y.o. female patient with a PMHx of diabetes mellitus who presents to the Emergency Department for evaluation of heavy vaginal bleeding which onset gradually yesterday. Patient notes that cycle is heavier than normal and lasted longer than normal. Symptoms are constant and moderate in severity. No mitigating or exacerbating factors reported. Associated sxs include lower back pain. Patient denies any N/V, and all other sxs at this time. No prior Tx. No further complaints or concerns at this time.       Arrival mode: Personal vehicle     PCP: Jarek Crespo MD        Past Medical History:  Past Medical History:   Diagnosis Date    Back pain     Diabetes mellitus     Left knee pain     Miscarriage     x2       Past Surgical History:  Past Surgical History:   Procedure Laterality Date    ANTERIOR CRUCIATE LIGAMENT REPAIR Left     DILATION AND CURETTAGE OF UTERUS      x2    LUMBAR FUSION      TIBIA FRACTURE SURGERY Right          Family History:  No family history on file.    Social History:  Social History     Tobacco Use    Smoking status: Current Every Day Smoker    Smokeless tobacco: Not on file   Substance and Sexual Activity    Alcohol use: No    Drug use: No    Sexual activity: Not on file        Review of Systems     Review of Systems   Constitutional: Negative  for fever.   HENT: Negative for sore throat.    Respiratory: Negative for shortness of breath.    Cardiovascular: Negative for chest pain.   Gastrointestinal: Negative for abdominal pain, nausea and vomiting.   Genitourinary: Positive for vaginal bleeding (heavier cycle than normal). Negative for dysuria.   Musculoskeletal: Positive for back pain (lower).   Skin: Negative for rash.   Neurological: Negative for weakness.   Hematological: Does not bruise/bleed easily.   All other systems reviewed and are negative.     Physical Exam     Initial Vitals   BP Pulse Resp Temp SpO2   02/10/22 0819 02/10/22 0818 02/10/22 0818 02/10/22 0818 02/10/22 0818   (S) (!) 218/112 91 18 98.9 °F (37.2 °C) 96 %      MAP       --                 Physical Exam  Nursing Notes and Vital Signs Reviewed.  Constitutional: Patient is in no acute distress. Well-developed and well-nourished.  Head: Atraumatic. Normocephalic.  Eyes: EOM intact. Conjunctivae are not pale. No scleral icterus.  ENT: Mucous membranes are moist. Oropharynx is clear and symmetric.    Neck: Supple. Full ROM. No lymphadenopathy.  Cardiovascular: Regular rate. Regular rhythm. No murmurs, rubs, or gallops. Distal pulses are 2+ and symmetric.  Pulmonary/Chest: No respiratory distress. Clear to auscultation bilaterally. No wheezing or rales.  Abdominal: Soft and non-distended.  There is no tenderness.  No rebound, guarding, or rigidity.   Genitourinary: No CVA tenderness  Musculoskeletal: Moves all extremities. No obvious deformities. No edema. No calf tenderness.  Skin: Warm and dry.  Neurological:  Alert, awake, and appropriate.  Normal speech.  No acute focal neurological deficits are appreciated.  Psychiatric: Normal affect. Good eye contact. Appropriate in content.     ED Course   Procedures  ED Vital Signs:  Vitals:    02/10/22 0818 02/10/22 0819 02/10/22 0859 02/10/22 0922   BP:  (S) (!) 218/112  (S) (!) 189/100   Pulse: 91   84   Resp: 18   18   Temp: 98.9 °F (37.2  °C)      TempSrc: Oral      SpO2: 96%   100%   Weight: 90.5 kg (199 lb 8.3 oz)      Height:   5' (1.524 m)     02/10/22 1022   BP: (!) 176/98   Pulse: 75   Resp:    Temp:    TempSrc:    SpO2: 100%   Weight:    Height:        Abnormal Lab Results:  Labs Reviewed   COMPREHENSIVE METABOLIC PANEL - Abnormal; Notable for the following components:       Result Value    Glucose 145 (*)     All other components within normal limits   CBC W/ AUTO DIFFERENTIAL - Abnormal; Notable for the following components:    WBC 13.19 (*)     RBC 5.74 (*)     MCV 77 (*)     MCH 24.2 (*)     MCHC 31.4 (*)     MPV 8.6 (*)     Immature Granulocytes 0.9 (*)     Gran # (ANC) 9.4 (*)     Immature Grans (Abs) 0.12 (*)     All other components within normal limits   URINALYSIS, REFLEX TO URINE CULTURE - Abnormal; Notable for the following components:    Specific Gravity, UA >=1.030 (*)     Protein, UA 2+ (*)     Occult Blood UA 3+ (*)     Nitrite, UA Positive (*)     All other components within normal limits    Narrative:     Specimen Source->Urine   URINALYSIS MICROSCOPIC - Abnormal; Notable for the following components:    RBC, UA >100 (*)     All other components within normal limits    Narrative:     Specimen Source->Urine   PREGNANCY TEST, URINE RAPID    Narrative:     Specimen Source->Urine        All Lab Results:  Results for orders placed or performed during the hospital encounter of 02/10/22   Comprehensive metabolic panel   Result Value Ref Range    Sodium 140 136 - 145 mmol/L    Potassium 4.9 3.5 - 5.1 mmol/L    Chloride 106 95 - 110 mmol/L    CO2 26 23 - 29 mmol/L    Glucose 145 (H) 70 - 110 mg/dL    BUN 12 6 - 20 mg/dL    Creatinine 0.7 0.5 - 1.4 mg/dL    Calcium 9.4 8.7 - 10.5 mg/dL    Total Protein 7.6 6.0 - 8.4 g/dL    Albumin 3.6 3.5 - 5.2 g/dL    Total Bilirubin 0.3 0.1 - 1.0 mg/dL    Alkaline Phosphatase 85 55 - 135 U/L    AST 11 10 - 40 U/L    ALT 15 10 - 44 U/L    Anion Gap 8 8 - 16 mmol/L    eGFR if African American >60 >60  mL/min/1.73 m^2    eGFR if non African American >60 >60 mL/min/1.73 m^2   CBC auto differential   Result Value Ref Range    WBC 13.19 (H) 3.90 - 12.70 K/uL    RBC 5.74 (H) 4.00 - 5.40 M/uL    Hemoglobin 13.9 12.0 - 16.0 g/dL    Hematocrit 44.2 37.0 - 48.5 %    MCV 77 (L) 82 - 98 fL    MCH 24.2 (L) 27.0 - 31.0 pg    MCHC 31.4 (L) 32.0 - 36.0 g/dL    RDW 14.3 11.5 - 14.5 %    Platelets 392 150 - 450 K/uL    MPV 8.6 (L) 9.2 - 12.9 fL    Immature Granulocytes 0.9 (H) 0.0 - 0.5 %    Gran # (ANC) 9.4 (H) 1.8 - 7.7 K/uL    Immature Grans (Abs) 0.12 (H) 0.00 - 0.04 K/uL    Lymph # 2.8 1.0 - 4.8 K/uL    Mono # 0.6 0.3 - 1.0 K/uL    Eos # 0.2 0.0 - 0.5 K/uL    Baso # 0.03 0.00 - 0.20 K/uL    nRBC 0 0 /100 WBC    Gran % 71.2 38.0 - 73.0 %    Lymph % 21.5 18.0 - 48.0 %    Mono % 4.5 4.0 - 15.0 %    Eosinophil % 1.7 0.0 - 8.0 %    Basophil % 0.2 0.0 - 1.9 %    Differential Method Automated    Urinalysis, Reflex to Urine Culture Urine, Clean Catch    Specimen: Urine   Result Value Ref Range    Specimen UA Urine, Clean Catch     Color, UA Yellow Yellow, Straw, [place]    Appearance, UA Clear Clear    pH, UA 6.0 5.0 - 8.0    Specific Gravity, UA >=1.030 (A) 1.005 - 1.030    Protein, UA 2+ (A) Negative    Glucose, UA Negative Negative    Ketones, UA Negative Negative    Bilirubin (UA) Negative Negative    Occult Blood UA 3+ (A) Negative    Nitrite, UA Positive (A) Negative    Urobilinogen, UA Negative <2.0 EU/dL    Leukocytes, UA Negative Negative   Pregnancy, urine rapid   Result Value Ref Range    Preg Test, Ur Negative    Urinalysis Microscopic   Result Value Ref Range    RBC, UA >100 (H) 0 - 4 /hpf    WBC, UA 3 0 - 5 /hpf    Bacteria Occasional None-Occ /hpf    Hyaline Casts, UA 0 0-1/lpf /lpf    Microscopic Comment SEE COMMENT             The Emergency Provider reviewed the vital signs and test results, which are outlined above.     ED Discussion     10:43 AM: Reassessed pt at this time. Discussed with pt all pertinent ED  information and results. Discussed pt dx and plan of tx. Gave pt all f/u and return to the ED instructions. All questions and concerns were addressed at this time. Pt expresses understanding of information and instructions, and is comfortable with plan to discharge. Pt is stable for discharge.    I discussed with patient and/or family/caretaker that evaluation in the ED does not suggest any emergent or life threatening medical conditions requiring immediate intervention beyond what was provided in the ED, and I believe patient is safe for discharge.  Regardless, an unremarkable evaluation in the ED does not preclude the development or presence of a serious of life threatening condition. As such, patient was instructed to return immediately for any worsening or change in current symptoms.     10:45 AM  Patient is stable nontoxic.  Patient has verbalized understanding room with all instructions seems reliable.  She is safe for discharge.  She has what appears to be a heavier period than usual lasting a bit longer.  I had not advised Provera at this time recommending she follow up with her doctor in 1-2 days to see if this stops.  If her symptoms worsen she is to return follow up with her doctor for possible Provera.  Will start Keflex for UTI.  She verbalized agreement understanding with all seems reliable.  She is safe for discharge in my opinion.    Medical Decision Making:   Clinical Tests:   Lab Tests: Ordered and Reviewed           ED Medication(s):  Medications - No data to display    New Prescriptions    No medications on file        Follow-up Information     Jarek Crespo MD.    Specialty: Family Medicine  Contact information:  [id number] [facility] [id number] [phone]                             Scribe Attestation:   Scribe #1: I performed the above scribed service and the documentation accurately describes the services I performed. I attest to the accuracy of the note.     Attending:    Physician Attestation Statement for Scribe #1: I, Jonas Lucio Jr., MD, personally performed the services described in this documentation, as scribed by Yared Hernandes, in my presence, and it is both accurate and complete.           Clinical Impression       ICD-10-CM ICD-9-CM   1. Acute lower UTI  N39.0 599.0   2. Dysfunctional uterine bleeding  N93.8 626.8       Disposition:   Disposition: Discharged  Condition: Stable         Jonas Lucio Jr., MD  02/10/22 1045

## 2022-10-03 ENCOUNTER — HOSPITAL ENCOUNTER (EMERGENCY)
Facility: HOSPITAL | Age: 42
Discharge: HOME | End: 2022-10-03
Attending: EMERGENCY MEDICINE

## 2022-10-03 VITALS
DIASTOLIC BLOOD PRESSURE: 106 MMHG | TEMPERATURE: 99 F | SYSTOLIC BLOOD PRESSURE: 202 MMHG | BODY MASS INDEX: 37.63 KG/M2 | HEART RATE: 79 BPM | HEIGHT: 60 IN | OXYGEN SATURATION: 98 % | WEIGHT: 191.69 LBS | RESPIRATION RATE: 17 BRPM

## 2022-10-03 DIAGNOSIS — R20.2 PARESTHESIA OF RIGHT LEG: Primary | ICD-10-CM

## 2022-10-03 DIAGNOSIS — I10 HYPERTENSION: ICD-10-CM

## 2022-10-03 DIAGNOSIS — I16.0 HYPERTENSIVE URGENCY: ICD-10-CM

## 2022-10-03 LAB
ALBUMIN SERPL BCP-MCNC: 3.8 G/DL (ref 3.5–5.2)
ALP SERPL-CCNC: 86 U/L (ref 55–135)
ALT SERPL W/O P-5'-P-CCNC: 10 U/L (ref 10–44)
AMPHET+METHAMPHET UR QL: NEGATIVE
ANION GAP SERPL CALC-SCNC: 8 MMOL/L (ref 8–16)
AST SERPL-CCNC: 9 U/L (ref 10–40)
B-HCG UR QL: NEGATIVE
BACTERIA #/AREA URNS HPF: ABNORMAL /HPF
BARBITURATES UR QL SCN>200 NG/ML: NEGATIVE
BASOPHILS # BLD AUTO: 0.03 K/UL (ref 0–0.2)
BASOPHILS NFR BLD: 0.2 % (ref 0–1.9)
BENZODIAZ UR QL SCN>200 NG/ML: NEGATIVE
BILIRUB SERPL-MCNC: 0.3 MG/DL (ref 0.1–1)
BILIRUB UR QL STRIP: NEGATIVE
BNP SERPL-MCNC: 33 PG/ML (ref 0–99)
BUN SERPL-MCNC: 11 MG/DL (ref 6–20)
BZE UR QL SCN: NEGATIVE
CALCIUM SERPL-MCNC: 9.7 MG/DL (ref 8.7–10.5)
CANNABINOIDS UR QL SCN: NEGATIVE
CHLORIDE SERPL-SCNC: 105 MMOL/L (ref 95–110)
CLARITY UR: CLEAR
CO2 SERPL-SCNC: 24 MMOL/L (ref 23–29)
COLOR UR: YELLOW
CREAT SERPL-MCNC: 0.7 MG/DL (ref 0.5–1.4)
CREAT UR-MCNC: 79.2 MG/DL (ref 15–325)
DIFFERENTIAL METHOD: ABNORMAL
EOSINOPHIL # BLD AUTO: 0.1 K/UL (ref 0–0.5)
EOSINOPHIL NFR BLD: 0.7 % (ref 0–8)
ERYTHROCYTE [DISTWIDTH] IN BLOOD BY AUTOMATED COUNT: 16.2 % (ref 11.5–14.5)
EST. GFR  (NO RACE VARIABLE): >60 ML/MIN/1.73 M^2
GLUCOSE SERPL-MCNC: 107 MG/DL (ref 70–110)
GLUCOSE UR QL STRIP: NEGATIVE
HCT VFR BLD AUTO: 46.8 % (ref 37–48.5)
HGB BLD-MCNC: 14.4 G/DL (ref 12–16)
HGB UR QL STRIP: NEGATIVE
HYALINE CASTS #/AREA URNS LPF: 0 /LPF
IMM GRANULOCYTES # BLD AUTO: 0.05 K/UL (ref 0–0.04)
IMM GRANULOCYTES NFR BLD AUTO: 0.4 % (ref 0–0.5)
KETONES UR QL STRIP: NEGATIVE
LEUKOCYTE ESTERASE UR QL STRIP: NEGATIVE
LYMPHOCYTES # BLD AUTO: 2.3 K/UL (ref 1–4.8)
LYMPHOCYTES NFR BLD: 18.2 % (ref 18–48)
MCH RBC QN AUTO: 23 PG (ref 27–31)
MCHC RBC AUTO-ENTMCNC: 30.8 G/DL (ref 32–36)
MCV RBC AUTO: 75 FL (ref 82–98)
METHADONE UR QL SCN>300 NG/ML: NEGATIVE
MICROSCOPIC COMMENT: ABNORMAL
MONOCYTES # BLD AUTO: 0.5 K/UL (ref 0.3–1)
MONOCYTES NFR BLD: 4.3 % (ref 4–15)
NEUTROPHILS # BLD AUTO: 9.4 K/UL (ref 1.8–7.7)
NEUTROPHILS NFR BLD: 76.2 % (ref 38–73)
NITRITE UR QL STRIP: NEGATIVE
NRBC BLD-RTO: 0 /100 WBC
OPIATES UR QL SCN: NEGATIVE
PCP UR QL SCN>25 NG/ML: NEGATIVE
PH UR STRIP: 8 [PH] (ref 5–8)
PLATELET # BLD AUTO: 376 K/UL (ref 150–450)
PMV BLD AUTO: 8.7 FL (ref 9.2–12.9)
POCT GLUCOSE: 117 MG/DL (ref 70–110)
POTASSIUM SERPL-SCNC: 4.4 MMOL/L (ref 3.5–5.1)
PROT SERPL-MCNC: 8.5 G/DL (ref 6–8.4)
PROT UR QL STRIP: ABNORMAL
RBC # BLD AUTO: 6.25 M/UL (ref 4–5.4)
RBC #/AREA URNS HPF: 3 /HPF (ref 0–4)
SODIUM SERPL-SCNC: 137 MMOL/L (ref 136–145)
SP GR UR STRIP: 1.02 (ref 1–1.03)
TOXICOLOGY INFORMATION: NORMAL
TROPONIN I SERPL DL<=0.01 NG/ML-MCNC: <0.006 NG/ML (ref 0–0.03)
URN SPEC COLLECT METH UR: ABNORMAL
UROBILINOGEN UR STRIP-ACNC: NEGATIVE EU/DL
WBC # BLD AUTO: 12.34 K/UL (ref 3.9–12.7)
WBC #/AREA URNS HPF: 15 /HPF (ref 0–5)

## 2022-10-03 PROCEDURE — 93005 ELECTROCARDIOGRAM TRACING: CPT

## 2022-10-03 PROCEDURE — 96376 TX/PRO/DX INJ SAME DRUG ADON: CPT

## 2022-10-03 PROCEDURE — 87077 CULTURE AEROBIC IDENTIFY: CPT | Performed by: EMERGENCY MEDICINE

## 2022-10-03 PROCEDURE — 87088 URINE BACTERIA CULTURE: CPT | Performed by: EMERGENCY MEDICINE

## 2022-10-03 PROCEDURE — 84484 ASSAY OF TROPONIN QUANT: CPT | Performed by: EMERGENCY MEDICINE

## 2022-10-03 PROCEDURE — 80053 COMPREHEN METABOLIC PANEL: CPT | Performed by: EMERGENCY MEDICINE

## 2022-10-03 PROCEDURE — 81000 URINALYSIS NONAUTO W/SCOPE: CPT | Mod: 59 | Performed by: EMERGENCY MEDICINE

## 2022-10-03 PROCEDURE — 83880 ASSAY OF NATRIURETIC PEPTIDE: CPT | Performed by: EMERGENCY MEDICINE

## 2022-10-03 PROCEDURE — 96374 THER/PROPH/DIAG INJ IV PUSH: CPT

## 2022-10-03 PROCEDURE — 81025 URINE PREGNANCY TEST: CPT | Performed by: EMERGENCY MEDICINE

## 2022-10-03 PROCEDURE — 63600175 PHARM REV CODE 636 W HCPCS: Performed by: EMERGENCY MEDICINE

## 2022-10-03 PROCEDURE — 93010 ELECTROCARDIOGRAM REPORT: CPT | Mod: ,,, | Performed by: INTERNAL MEDICINE

## 2022-10-03 PROCEDURE — 96375 TX/PRO/DX INJ NEW DRUG ADDON: CPT

## 2022-10-03 PROCEDURE — 87186 SC STD MICRODIL/AGAR DIL: CPT | Performed by: EMERGENCY MEDICINE

## 2022-10-03 PROCEDURE — 82962 GLUCOSE BLOOD TEST: CPT

## 2022-10-03 PROCEDURE — 25000003 PHARM REV CODE 250: Performed by: EMERGENCY MEDICINE

## 2022-10-03 PROCEDURE — 87086 URINE CULTURE/COLONY COUNT: CPT | Performed by: EMERGENCY MEDICINE

## 2022-10-03 PROCEDURE — 85025 COMPLETE CBC W/AUTO DIFF WBC: CPT | Performed by: EMERGENCY MEDICINE

## 2022-10-03 PROCEDURE — 80307 DRUG TEST PRSMV CHEM ANLYZR: CPT | Performed by: EMERGENCY MEDICINE

## 2022-10-03 PROCEDURE — 99285 EMERGENCY DEPT VISIT HI MDM: CPT | Mod: 25

## 2022-10-03 PROCEDURE — 93010 EKG 12-LEAD: ICD-10-PCS | Mod: ,,, | Performed by: INTERNAL MEDICINE

## 2022-10-03 RX ORDER — AMLODIPINE BESYLATE 10 MG/1
10 TABLET ORAL DAILY
Qty: 30 TABLET | Refills: 1 | Status: SHIPPED | OUTPATIENT
Start: 2022-10-03 | End: 2023-03-20

## 2022-10-03 RX ORDER — ACETAMINOPHEN 500 MG
500 TABLET ORAL
Status: COMPLETED | OUTPATIENT
Start: 2022-10-03 | End: 2022-10-03

## 2022-10-03 RX ORDER — AMLODIPINE BESYLATE 10 MG/1
10 TABLET ORAL DAILY
Qty: 30 TABLET | Refills: 1 | Status: SHIPPED | OUTPATIENT
Start: 2022-10-03 | End: 2022-10-03 | Stop reason: SDUPTHER

## 2022-10-03 RX ORDER — BUTALBITAL, ACETAMINOPHEN AND CAFFEINE 50; 325; 40 MG/1; MG/1; MG/1
1 TABLET ORAL EVERY 6 HOURS PRN
Qty: 16 TABLET | Refills: 0 | Status: SHIPPED | OUTPATIENT
Start: 2022-10-03 | End: 2022-10-19

## 2022-10-03 RX ORDER — LABETALOL HYDROCHLORIDE 5 MG/ML
10 INJECTION, SOLUTION INTRAVENOUS
Status: COMPLETED | OUTPATIENT
Start: 2022-10-03 | End: 2022-10-03

## 2022-10-03 RX ORDER — LORAZEPAM 2 MG/ML
1 INJECTION INTRAMUSCULAR
Status: COMPLETED | OUTPATIENT
Start: 2022-10-03 | End: 2022-10-03

## 2022-10-03 RX ORDER — HYDRALAZINE HYDROCHLORIDE 20 MG/ML
10 INJECTION INTRAMUSCULAR; INTRAVENOUS
Status: COMPLETED | OUTPATIENT
Start: 2022-10-03 | End: 2022-10-03

## 2022-10-03 RX ORDER — BUTALBITAL, ACETAMINOPHEN AND CAFFEINE 50; 325; 40 MG/1; MG/1; MG/1
1 TABLET ORAL EVERY 6 HOURS PRN
Qty: 16 TABLET | Refills: 0 | Status: SHIPPED | OUTPATIENT
Start: 2022-10-03 | End: 2022-10-03 | Stop reason: SDUPTHER

## 2022-10-03 RX ORDER — BUTALBITAL, ACETAMINOPHEN AND CAFFEINE 50; 325; 40 MG/1; MG/1; MG/1
1 TABLET ORAL
Status: COMPLETED | OUTPATIENT
Start: 2022-10-03 | End: 2022-10-03

## 2022-10-03 RX ORDER — AMLODIPINE BESYLATE 5 MG/1
10 TABLET ORAL
Status: COMPLETED | OUTPATIENT
Start: 2022-10-03 | End: 2022-10-03

## 2022-10-03 RX ADMIN — LORAZEPAM 1 MG: 2 INJECTION INTRAMUSCULAR; INTRAVENOUS at 02:10

## 2022-10-03 RX ADMIN — LABETALOL HYDROCHLORIDE 10 MG: 5 INJECTION INTRAVENOUS at 12:10

## 2022-10-03 RX ADMIN — BUTALBITAL, ACETAMINOPHEN AND CAFFEINE 1 TABLET: 50; 325; 40 TABLET ORAL at 05:10

## 2022-10-03 RX ADMIN — LABETALOL HYDROCHLORIDE 10 MG: 5 INJECTION INTRAVENOUS at 05:10

## 2022-10-03 RX ADMIN — HYDRALAZINE HYDROCHLORIDE 10 MG: 20 INJECTION, SOLUTION INTRAMUSCULAR; INTRAVENOUS at 01:10

## 2022-10-03 RX ADMIN — HYDRALAZINE HYDROCHLORIDE 10 MG: 20 INJECTION, SOLUTION INTRAMUSCULAR; INTRAVENOUS at 04:10

## 2022-10-03 RX ADMIN — AMLODIPINE BESYLATE 10 MG: 5 TABLET ORAL at 06:10

## 2022-10-03 RX ADMIN — ACETAMINOPHEN 500 MG: 500 TABLET ORAL at 01:10

## 2022-10-03 NOTE — ED PROVIDER NOTES
"SCRIBE #1 NOTE: I, Quynh Geronimo, am scribing for, and in the presence of, Violetta Mathis, DO. I have scribed the entire note.      History      Chief Complaint   Patient presents with    Fatigue     Started yesterday morning, worse today, weakness, fatigue, tingling to skin, headache, disoriented.       Review of patient's allergies indicates:   Allergen Reactions    Neurontin [gabapentin] Itching and Blisters    Percocet [oxycodone-acetaminophen] Itching and Blisters        HPI   HPI    10/3/2022, 12:01 PM   History obtained from the patient      History of Present Illness: Jeanna Helm is a 42 y.o. female patient with a PMHx of DM who presents to the Emergency Department for generalized weakness and R-sided paresthesias which onset gradually yesterday morning at 0730. The pt reports that she picked up her son at 0730 yesterday morning and started to feel light-headed, generally weak, off-balanced, and had RLE paresthesias from the mid thigh down to the foot, but the sxs stopped when she got home, so she was not concerned about them. Then, at 1830 yesterday, she started to feel "weird." This morning, the pt started to experience generalized weakness, RLE numbness, and RLE paresthesias again. While she was driving later this morning, she started to have sxs of a panic attack and started to experience RLE paresthesias, R cheek paresthesias, and RUE paresthesias. When she got home, she started to have a headache described as a pressure headache, so she came to the ER for an evaluation. Symptoms are episodic and moderate in severity. No mitigating or exacerbating factors reported. Associated sxs include off-balanced gait, paresthesias to the RLE, RUE, and R cheek, RLE numbness, fatigue, R eye pain, a headache, anxiety, and light-headedness. Patient denies any speech difficulty, SOB, CP, trouble swallowing, abdominal pain, dysuria, N/V/D, blood in stool, constipation, and all other sxs at this time. No prior Tx " reported. Pt reports that she is a diabetic, but is noncompliant with insulin. No further complaints or concerns at this time.         Arrival mode: Personal vehicle      PCP: Jarek Crespo MD       Past Medical History:  Past Medical History:   Diagnosis Date    Back pain     Diabetes mellitus     Left knee pain     Miscarriage     x2       Past Surgical History:  Past Surgical History:   Procedure Laterality Date    ANTERIOR CRUCIATE LIGAMENT REPAIR Left     DILATION AND CURETTAGE OF UTERUS      x2    LUMBAR FUSION      TIBIA FRACTURE SURGERY Right          Family History:  No family history on file.    Social History:  Social History     Tobacco Use    Smoking status: Every Day    Smokeless tobacco: Not on file   Substance and Sexual Activity    Alcohol use: No    Drug use: No    Sexual activity: Not on file       ROS   Review of Systems   Constitutional:  Positive for fatigue. Negative for fever.   HENT:  Negative for sore throat and trouble swallowing.    Eyes:  Positive for pain (R).   Respiratory:  Negative for shortness of breath.    Cardiovascular:  Negative for chest pain.   Gastrointestinal:  Negative for abdominal pain, blood in stool, constipation, diarrhea, nausea and vomiting.   Genitourinary:  Negative for dysuria.   Musculoskeletal:  Positive for gait problem (off-balanced gait). Negative for back pain.   Skin:  Negative for rash.   Neurological:  Positive for weakness (generalized), light-headedness, numbness (RLE) and headaches. Negative for speech difficulty.        (+) paresthesias to the RLE, RUE, and R cheek   Hematological:  Does not bruise/bleed easily.   Psychiatric/Behavioral:  The patient is nervous/anxious.    All other systems reviewed and are negative.    Physical Exam      Initial Vitals [10/03/22 1041]   BP Pulse Resp Temp SpO2   (!) 218/131 88 20 98.6 °F (37 °C) 98 %      MAP       --          Physical Exam  Nursing Notes and Vital Signs Reviewed.  Constitutional: Patient is in  no acute distress. Well-developed and well-nourished.  Head: Atraumatic. Normocephalic.  Eyes: PERRL. EOM intact. Conjunctivae are not pale. No scleral icterus.  ENT: Mucous membranes are moist. Oropharynx is clear and symmetric.    Neck: Supple. Full ROM. No lymphadenopathy.  Cardiovascular: Regular rate. Regular rhythm. No murmurs, rubs, or gallops. Distal pulses are 2+ and symmetric.  Pulmonary/Chest: No respiratory distress. Clear to auscultation bilaterally. No wheezing or rales.  Abdominal: Soft and non-distended.  There is no tenderness.  No rebound, guarding, or rigidity.   Musculoskeletal: Moves all extremities. No obvious deformities. No edema.  Skin: Warm and dry.  Neurological: Patient is alert and oriented to person, place and time. Pupils ERRL and EOM normal. Cranial nerves II-XII are intact. Strength is full bilaterally; it is equal and 5/5 in bilateral upper and lower extremities. There is no pronator drift of outstretched arms. Light touch sense is intact. Speech is clear and normal. No acute focal neurological deficits noted.  Psychiatric: Normal affect. Good eye contact. Appropriate in content.    ED Course    Procedures  ED Vital Signs:  Vitals:    10/03/22 1041 10/03/22 1100 10/03/22 1214 10/03/22 1325   BP: (!) 218/131  (!) 211/123 (!) 190/105   Pulse: 88 76     Resp: 20      Temp: 98.6 °F (37 °C)      TempSrc: Oral      SpO2: 98%      Weight: 87 kg (191 lb 11 oz)      Height: 5' (1.524 m)       10/03/22 1335 10/03/22 1415 10/03/22 1430 10/03/22 1630   BP: (!) 175/92 (!) 160/76 (!) 165/95 (!) 186/107   Pulse: 78 71 75 79   Resp: 20 (!) 24 (!) 21 17   Temp:    98.6 °F (37 °C)   TempSrc:    Oral   SpO2: 97%   98%   Weight:       Height:        10/03/22 1736   BP: (!) 202/106   Pulse:    Resp:    Temp:    TempSrc:    SpO2:    Weight:    Height:        Abnormal Lab Results:  Labs Reviewed   CBC W/ AUTO DIFFERENTIAL - Abnormal; Notable for the following components:       Result Value    RBC 6.25 (*)      MCV 75 (*)     MCH 23.0 (*)     MCHC 30.8 (*)     RDW 16.2 (*)     MPV 8.7 (*)     Gran # (ANC) 9.4 (*)     Immature Grans (Abs) 0.05 (*)     Gran % 76.2 (*)     All other components within normal limits   COMPREHENSIVE METABOLIC PANEL - Abnormal; Notable for the following components:    Total Protein 8.5 (*)     AST 9 (*)     All other components within normal limits   URINALYSIS, REFLEX TO URINE CULTURE - Abnormal; Notable for the following components:    Protein, UA 2+ (*)     All other components within normal limits    Narrative:     Specimen Source->Urine   URINALYSIS MICROSCOPIC - Abnormal; Notable for the following components:    WBC, UA 15 (*)     Bacteria Few (*)     All other components within normal limits    Narrative:     Specimen Source->Urine   POCT GLUCOSE - Abnormal; Notable for the following components:    POCT Glucose 117 (*)     All other components within normal limits   CULTURE, URINE   TROPONIN I   B-TYPE NATRIURETIC PEPTIDE   PREGNANCY TEST, URINE RAPID    Narrative:     Specimen Source->Urine   DRUG SCREEN PANEL, URINE EMERGENCY    Narrative:     Specimen Source->Urine        All Lab Results:  Results for orders placed or performed during the hospital encounter of 10/03/22   CBC auto differential   Result Value Ref Range    WBC 12.34 3.90 - 12.70 K/uL    RBC 6.25 (H) 4.00 - 5.40 M/uL    Hemoglobin 14.4 12.0 - 16.0 g/dL    Hematocrit 46.8 37.0 - 48.5 %    MCV 75 (L) 82 - 98 fL    MCH 23.0 (L) 27.0 - 31.0 pg    MCHC 30.8 (L) 32.0 - 36.0 g/dL    RDW 16.2 (H) 11.5 - 14.5 %    Platelets 376 150 - 450 K/uL    MPV 8.7 (L) 9.2 - 12.9 fL    Immature Granulocytes 0.4 0.0 - 0.5 %    Gran # (ANC) 9.4 (H) 1.8 - 7.7 K/uL    Immature Grans (Abs) 0.05 (H) 0.00 - 0.04 K/uL    Lymph # 2.3 1.0 - 4.8 K/uL    Mono # 0.5 0.3 - 1.0 K/uL    Eos # 0.1 0.0 - 0.5 K/uL    Baso # 0.03 0.00 - 0.20 K/uL    nRBC 0 0 /100 WBC    Gran % 76.2 (H) 38.0 - 73.0 %    Lymph % 18.2 18.0 - 48.0 %    Mono % 4.3 4.0 - 15.0 %     Eosinophil % 0.7 0.0 - 8.0 %    Basophil % 0.2 0.0 - 1.9 %    Differential Method Automated    Comprehensive metabolic panel   Result Value Ref Range    Sodium 137 136 - 145 mmol/L    Potassium 4.4 3.5 - 5.1 mmol/L    Chloride 105 95 - 110 mmol/L    CO2 24 23 - 29 mmol/L    Glucose 107 70 - 110 mg/dL    BUN 11 6 - 20 mg/dL    Creatinine 0.7 0.5 - 1.4 mg/dL    Calcium 9.7 8.7 - 10.5 mg/dL    Total Protein 8.5 (H) 6.0 - 8.4 g/dL    Albumin 3.8 3.5 - 5.2 g/dL    Total Bilirubin 0.3 0.1 - 1.0 mg/dL    Alkaline Phosphatase 86 55 - 135 U/L    AST 9 (L) 10 - 40 U/L    ALT 10 10 - 44 U/L    Anion Gap 8 8 - 16 mmol/L    eGFR >60 >60 mL/min/1.73 m^2   Troponin I #1   Result Value Ref Range    Troponin I <0.006 0.000 - 0.026 ng/mL   BNP   Result Value Ref Range    BNP 33 0 - 99 pg/mL   Urinalysis, Reflex to Urine Culture Urine, Clean Catch    Specimen: Urine   Result Value Ref Range    Specimen UA Urine, Clean Catch     Color, UA Yellow Yellow, Straw, [place]    Appearance, UA Clear Clear    pH, UA 8.0 5.0 - 8.0    Specific Gravity, UA 1.025 1.005 - 1.030    Protein, UA 2+ (A) Negative    Glucose, UA Negative Negative    Ketones, UA Negative Negative    Bilirubin (UA) Negative Negative    Occult Blood UA Negative Negative    Nitrite, UA Negative Negative    Urobilinogen, UA Negative <2.0 EU/dL    Leukocytes, UA Negative Negative   Rapid Pregnancy, Urine   Result Value Ref Range    Preg Test, Ur Negative    Drug screen panel, emergency   Result Value Ref Range    Benzodiazepines Negative Negative    Methadone metabolites Negative Negative    Cocaine (Metab.) Negative Negative    Opiate Scrn, Ur Negative Negative    Barbiturate Screen, Ur Negative Negative    Amphetamine Screen, Ur Negative Negative    THC Negative Negative    Phencyclidine Negative Negative    Creatinine, Urine 79.2 15.0 - 325.0 mg/dL    Toxicology Information SEE COMMENT    Urinalysis Microscopic   Result Value Ref Range    RBC, UA 3 0 - 4 /hpf    WBC, UA 15 (H)  0 - 5 /hpf    Bacteria Few (A) None-Occ /hpf    Hyaline Casts, UA 0 0-1/lpf /lpf    Microscopic Comment SEE COMMENT    POCT glucose   Result Value Ref Range    POCT Glucose 117 (H) 70 - 110 mg/dL         Imaging Results:  Imaging Results              MRI Brain Without Contrast (Final result)  Result time 10/03/22 16:15:52      Final result by Stevan Cruz MD (10/03/22 16:15:52)                   Impression:      No acute findings.  Probable small chronic 4 mm left posterior basal ganglia lacunar infarct.  Otherwise normal.      Electronically signed by: Stevan Cruz MD  Date:    10/03/2022  Time:    16:15               Narrative:    EXAMINATION:  MRI BRAIN WITHOUT CONTRAST    CLINICAL HISTORY:  Transient ischemic attack (TIA);    TECHNIQUE:  Standard multiplanar noncontrast sequences of the brain.    COMPARISON:  CT brain 10/03/2022    FINDINGS:  The ventricles are non-dilated. No definite edema, hemorrhage or mass effect is seen. No extra-axial fluid collection.    There is a small chronic left posterior basal ganglia lacunar type infarct.    Skull base appears normal.    The diffusion sequence is negative.                                       X-Ray Chest AP Portable (Final result)  Result time 10/03/22 13:34:12      Final result by CHAN Cano Sr., MD (10/03/22 13:34:12)                   Impression:      Normal study.      Electronically signed by: Peter Cano MD  Date:    10/03/2022  Time:    13:34               Narrative:    EXAMINATION:  XR CHEST AP PORTABLE    CLINICAL HISTORY:  hypertension;    COMPARISON:  03/03/2018    FINDINGS:  The size of the heart is normal. The lungs are clear. There is no pneumothorax.  The costophrenic angles are sharp.                                       CT Head Without Contrast (Final result)  Result time 10/03/22 11:22:57      Final result by Homero Horvath III, MD (10/03/22 11:22:57)                   Impression:      No acute intracranial disease  identified.      Electronically signed by: Homero Horvath MD  Date:    10/03/2022  Time:    11:22               Narrative:    EXAMINATION:  CT HEAD WITHOUT CONTRAST    CLINICAL HISTORY:  Hypertensive urgencyheadache and high blood pressure;    TECHNIQUE:  Routine noncontrast axial head CT. All CT scans at this facility are performed  using dose modulation techniques as appropriate to performed exam including the following:  automated exposure control; adjustment of mA and/or kV according to the patients size (this includes techniques or standardized protocols for targeted exams where dose is matched to indication/reason for exam: i.e. extremities or head);  iterative reconstruction technique.    COMPARISON:  none    FINDINGS:  There is no CT evidence of acute infarct or cerebral edema. There is no evidence of intracranial hemorrhage, mass-effect, hydrocephalus or other acute disease. The visualized paranasal sinuses and mastoid air cells are clear. No calvarial fracture is identified.                                     The EKG was ordered, reviewed, and independently interpreted by the ED provider.  Interpretation time: 11:27  Rate: 79 BPM  Rhythm: normal sinus rhythm  Interpretation: No acute ST changes. No STEMI.           The Emergency Provider reviewed the vital signs and test results, which are outlined above.    ED Discussion     1:20 PM: Re-evaluated pt. Pt is awake, alert, and oriented. Pt c/o a headache and tingling sensation.  D/w pt all pertinent results. D/w pt any concerns expressed at this time. Answered all questions. Pt expresses understanding at this time.    6:08 PM: Reassessed pt at this time.  Patient is awake, alert, oriented.  Cranial nerves 2-12 intact.  NIH stroke scale is 0.  Patient was provided prescription for blood pressure medication.  We discussed in detail indications to return to the emergency department.  MRI does not show acute stroke.  Pt is awake, alert, and oriented. No focal  deficits. Discussed with pt all pertinent ED information and results. Discussed pt dx and plan of tx. Gave pt all f/u and return to the ED instructions. All questions and concerns were addressed at this time. Pt expresses understanding of information and instructions, and is comfortable with plan to discharge. Pt is stable for discharge.    I discussed with patient and/or family/caretaker that evaluation in the ED does not suggest any emergent or life threatening medical conditions requiring immediate intervention beyond what was provided in the ED, and I believe patient is safe for discharge.  Regardless, an unremarkable evaluation in the ED does not preclude the development or presence of a serious of life threatening condition. As such, patient was instructed to return immediately for any worsening or change in current symptoms.      ED Course as of 10/03/22 2051   Mon Oct 03, 2022   1804 I discussed recommendations for monitoring blood pressure.  She remains awake alert oriented in the emergency department.  Will start blood pressure medication for her.  She was provided a card to help with the costs,  Also discussed the treatment of headache with Fioricet.  Patient denies allergy to the medicine [LB]      ED Course User Index  [LB] Violetta Mathis, DO       ED Medication(s):  Medications   labetaloL injection 10 mg (10 mg Intravenous Given 10/3/22 1214)   hydrALAZINE injection 10 mg (10 mg Intravenous Given 10/3/22 1325)   acetaminophen tablet 500 mg (500 mg Oral Given 10/3/22 1333)   lorazepam injection 1 mg (1 mg Intravenous Given 10/3/22 1458)   hydrALAZINE injection 10 mg (10 mg Intravenous Given 10/3/22 1633)   labetaloL injection 10 mg (10 mg Intravenous Given 10/3/22 1736)   butalbital-acetaminophen-caffeine -40 mg per tablet 1 tablet (1 tablet Oral Given 10/3/22 1737)   amLODIPine tablet 10 mg (10 mg Oral Given 10/3/22 1809)        Follow-up Information       Jarek Crespo MD In 2 days.     Specialty: Family Medicine  Why: Return to emergency department for: Slurred speech, chest pain, chest pressure, difficulty breathing, numbness or weakness to 1 side, loss of vision, or other concerns  Contact information:  [id number] [facility]  [place] [id number] [phone]                             Discharge Medication List as of 10/3/2022  6:18 PM            Medical Decision Making    Medical Decision Making:   Clinical Tests:   Lab Tests: Ordered and Reviewed  Radiological Study: Ordered and Reviewed  Medical Tests: Ordered and Reviewed   Additional MDM:     NIH Stroke Scale:   Interval = baseline (upon arrival/admit)  Level of consciousness = 0 - alert  LOC questions = 0 - answers both correctly  LOC commands = 0 - performs both correctly  Best gaze = 0 - normal  Visual = 0 - no visual loss  Facial palsy = 0 - normal  Motor left arm =  0 - no drift  Motor right arm =  0 - no drift  Motor left leg = 0 - no drift  Motor right leg =  0 - no drift  Limb ataxia = 0 - absent  Sensory = 0 - normal  Best language = 0 - no aphasia  Dysarthria = 0 - normal articulation  Extinction and inattention = 0 - no neglect  NIH Stroke Scale Total = 0     Scribe Attestation:   Scribe #1: I performed the above scribed service and the documentation accurately describes the services I performed. I attest to the accuracy of the note.    Attending:   Physician Attestation Statement for Scribe #1: I, Violetta Mathis DO, personally performed the services described in this documentation, as scribed by Quynh Geronimo, in my presence, and it is both accurate and complete.          Clinical Impression       ICD-10-CM ICD-9-CM   1. Paresthesia of right leg  R20.2 782.0   2. Hypertension  I10 401.9   3. Hypertensive urgency  I16.0 401.9       Disposition:   Disposition: Discharged  Condition: Stable       Violetta Mathis DO  10/03/22 2052

## 2022-10-04 ENCOUNTER — HOSPITAL ENCOUNTER (EMERGENCY)
Facility: HOSPITAL | Age: 42
Discharge: HOME | End: 2022-10-04
Attending: EMERGENCY MEDICINE

## 2022-10-04 VITALS
HEART RATE: 82 BPM | RESPIRATION RATE: 18 BRPM | OXYGEN SATURATION: 98 % | DIASTOLIC BLOOD PRESSURE: 100 MMHG | TEMPERATURE: 98 F | SYSTOLIC BLOOD PRESSURE: 169 MMHG | WEIGHT: 199.63 LBS | BODY MASS INDEX: 38.98 KG/M2

## 2022-10-04 DIAGNOSIS — I10 HYPERTENSION, UNSPECIFIED TYPE: Primary | ICD-10-CM

## 2022-10-04 DIAGNOSIS — R51.9 NONINTRACTABLE HEADACHE, UNSPECIFIED CHRONICITY PATTERN, UNSPECIFIED HEADACHE TYPE: ICD-10-CM

## 2022-10-04 DIAGNOSIS — R53.1 GENERAL WEAKNESS: ICD-10-CM

## 2022-10-04 DIAGNOSIS — R52 PAIN: ICD-10-CM

## 2022-10-04 DIAGNOSIS — R07.9 CHEST PAIN: ICD-10-CM

## 2022-10-04 LAB
ALBUMIN SERPL BCP-MCNC: 3.4 G/DL (ref 3.5–5.2)
ALP SERPL-CCNC: 81 U/L (ref 55–135)
ALT SERPL W/O P-5'-P-CCNC: 10 U/L (ref 10–44)
ANION GAP SERPL CALC-SCNC: 11 MMOL/L (ref 8–16)
AST SERPL-CCNC: 15 U/L (ref 10–40)
BASOPHILS # BLD AUTO: 0.03 K/UL (ref 0–0.2)
BASOPHILS NFR BLD: 0.2 % (ref 0–1.9)
BILIRUB SERPL-MCNC: 0.4 MG/DL (ref 0.1–1)
BUN SERPL-MCNC: 8 MG/DL (ref 6–20)
CALCIUM SERPL-MCNC: 9 MG/DL (ref 8.7–10.5)
CHLORIDE SERPL-SCNC: 107 MMOL/L (ref 95–110)
CO2 SERPL-SCNC: 15 MMOL/L (ref 23–29)
CREAT SERPL-MCNC: 0.7 MG/DL (ref 0.5–1.4)
DIFFERENTIAL METHOD: ABNORMAL
EOSINOPHIL # BLD AUTO: 0.1 K/UL (ref 0–0.5)
EOSINOPHIL NFR BLD: 1 % (ref 0–8)
ERYTHROCYTE [DISTWIDTH] IN BLOOD BY AUTOMATED COUNT: 15.5 % (ref 11.5–14.5)
EST. GFR  (NO RACE VARIABLE): >60 ML/MIN/1.73 M^2
GLUCOSE SERPL-MCNC: 145 MG/DL (ref 70–110)
HCT VFR BLD AUTO: 43.8 % (ref 37–48.5)
HGB BLD-MCNC: 13.5 G/DL (ref 12–16)
IMM GRANULOCYTES # BLD AUTO: 0.04 K/UL (ref 0–0.04)
IMM GRANULOCYTES NFR BLD AUTO: 0.3 % (ref 0–0.5)
INR PPP: 1 (ref 0.8–1.2)
LYMPHOCYTES # BLD AUTO: 1.6 K/UL (ref 1–4.8)
LYMPHOCYTES NFR BLD: 12.7 % (ref 18–48)
MCH RBC QN AUTO: 22.9 PG (ref 27–31)
MCHC RBC AUTO-ENTMCNC: 30.8 G/DL (ref 32–36)
MCV RBC AUTO: 74 FL (ref 82–98)
MONOCYTES # BLD AUTO: 0.6 K/UL (ref 0.3–1)
MONOCYTES NFR BLD: 4.7 % (ref 4–15)
NEUTROPHILS # BLD AUTO: 10.3 K/UL (ref 1.8–7.7)
NEUTROPHILS NFR BLD: 81.1 % (ref 38–73)
NRBC BLD-RTO: 0 /100 WBC
PLATELET # BLD AUTO: 335 K/UL (ref 150–450)
PMV BLD AUTO: 9 FL (ref 9.2–12.9)
POTASSIUM SERPL-SCNC: 5 MMOL/L (ref 3.5–5.1)
PROT SERPL-MCNC: 7.8 G/DL (ref 6–8.4)
PROTHROMBIN TIME: 10.9 SEC (ref 9–12.5)
RBC # BLD AUTO: 5.9 M/UL (ref 4–5.4)
SODIUM SERPL-SCNC: 133 MMOL/L (ref 136–145)
TROPONIN I SERPL DL<=0.01 NG/ML-MCNC: <0.006 NG/ML (ref 0–0.03)
WBC # BLD AUTO: 12.67 K/UL (ref 3.9–12.7)

## 2022-10-04 PROCEDURE — 93005 ELECTROCARDIOGRAM TRACING: CPT

## 2022-10-04 PROCEDURE — 99285 EMERGENCY DEPT VISIT HI MDM: CPT | Mod: 25

## 2022-10-04 PROCEDURE — 93010 EKG 12-LEAD: ICD-10-PCS | Mod: ,,, | Performed by: INTERNAL MEDICINE

## 2022-10-04 PROCEDURE — 25000003 PHARM REV CODE 250: Performed by: EMERGENCY MEDICINE

## 2022-10-04 PROCEDURE — 80053 COMPREHEN METABOLIC PANEL: CPT | Performed by: EMERGENCY MEDICINE

## 2022-10-04 PROCEDURE — 85025 COMPLETE CBC W/AUTO DIFF WBC: CPT | Performed by: EMERGENCY MEDICINE

## 2022-10-04 PROCEDURE — 93010 ELECTROCARDIOGRAM REPORT: CPT | Mod: ,,, | Performed by: INTERNAL MEDICINE

## 2022-10-04 PROCEDURE — 84484 ASSAY OF TROPONIN QUANT: CPT | Performed by: EMERGENCY MEDICINE

## 2022-10-04 PROCEDURE — 85610 PROTHROMBIN TIME: CPT | Performed by: EMERGENCY MEDICINE

## 2022-10-04 RX ORDER — ACETAMINOPHEN 500 MG
1000 TABLET ORAL
Status: COMPLETED | OUTPATIENT
Start: 2022-10-04 | End: 2022-10-04

## 2022-10-04 RX ORDER — HYDROCHLOROTHIAZIDE 25 MG/1
25 TABLET ORAL DAILY
Qty: 30 TABLET | Refills: 0 | Status: SHIPPED | OUTPATIENT
Start: 2022-10-04 | End: 2023-03-20 | Stop reason: CLARIF

## 2022-10-04 RX ORDER — HYDROCHLOROTHIAZIDE 25 MG/1
25 TABLET ORAL DAILY
Status: DISCONTINUED | OUTPATIENT
Start: 2022-10-04 | End: 2022-10-04 | Stop reason: HOSPADM

## 2022-10-04 RX ORDER — AMLODIPINE BESYLATE 5 MG/1
10 TABLET ORAL
Status: COMPLETED | OUTPATIENT
Start: 2022-10-04 | End: 2022-10-04

## 2022-10-04 RX ADMIN — AMLODIPINE BESYLATE 10 MG: 5 TABLET ORAL at 08:10

## 2022-10-04 RX ADMIN — HYDROCHLOROTHIAZIDE 25 MG: 25 TABLET ORAL at 11:10

## 2022-10-04 RX ADMIN — ACETAMINOPHEN 1000 MG: 500 TABLET ORAL at 11:10

## 2022-10-04 NOTE — ED PROVIDER NOTES
SCRIBE #1 NOTE: IManjit, am scribing for, and in the presence of, Inocente Thapa Jr., MD. I have scribed the entire note.      History      Chief Complaint   Patient presents with    unilateral weakness     Pt reports numbness to L. Side of face, L. Arm, and L. Leg. Pt also reports L. Anterior shoulder tightness. LKW was 1 hour ago.        Review of patient's allergies indicates:   Allergen Reactions    Neurontin [gabapentin] Itching and Blisters    Percocet [oxycodone-acetaminophen] Itching and Blisters        HPI   HPI    10/4/2022, 8:22 AM   History obtained from the patient      History of Present Illness: Jeanna Helm is a 42 y.o. female patient with a PMHx of DM who presents to the Emergency Department for L-sided chest pain, onset this morning. Pt presented to the ED yesterday for R-sided weakness/numbness, and was discharged following a negative stroke workup. Symptoms are constant and moderate in severity. No mitigating or exacerbating factors reported. No associated sxs reported. Patient denies any fever, chills, n/v/d, SOB, weakness, numbness, dizziness, headache, and all other sxs at this time. No prior Tx reported. Pt states that she has been dx with HTN in the past, but is not currently on any antihypertensive medication. No further complaints or concerns at this time.     Arrival mode: Personal vehicle    PCP: Jarek Crespo MD       Past Medical History:  Past Medical History:   Diagnosis Date    Back pain     Diabetes mellitus     Left knee pain     Miscarriage     x2       Past Surgical History:  Past Surgical History:   Procedure Laterality Date    ANTERIOR CRUCIATE LIGAMENT REPAIR Left     DILATION AND CURETTAGE OF UTERUS      x2    LUMBAR FUSION      TIBIA FRACTURE SURGERY Right          Family History:  History reviewed. No pertinent family history.    Social History:  Social History     Tobacco Use    Smoking status: Every Day    Smokeless tobacco: Not on file   Substance and Sexual  Activity    Alcohol use: No    Drug use: No    Sexual activity: Not on file       ROS   Review of Systems   Constitutional:  Negative for chills and fever.   HENT:  Negative for sore throat.    Respiratory:  Negative for shortness of breath.    Cardiovascular:  Positive for chest pain (L).   Gastrointestinal:  Negative for diarrhea, nausea and vomiting.   Genitourinary:  Negative for dysuria.   Musculoskeletal:  Negative for back pain.   Skin:  Negative for rash.   Neurological:  Negative for dizziness, weakness, light-headedness, numbness and headaches.   Hematological:  Does not bruise/bleed easily.   All other systems reviewed and are negative.    Physical Exam      Initial Vitals [10/04/22 0805]   BP Pulse Resp Temp SpO2   (!) 183/117 92 20 98.4 °F (36.9 °C) 100 %      MAP       --          Physical Exam  Nursing Notes and Vital Signs Reviewed.  Constitutional: Patient is in no acute distress. Well-developed and well-nourished.  Head: Atraumatic. Normocephalic.  Eyes: PERRL. EOM intact. Conjunctivae are not pale. No scleral icterus.  ENT: Mucous membranes are moist. Oropharynx is clear and symmetric.    Neck: Supple. Full ROM.   Cardiovascular: Regular rate. Regular rhythm. No murmurs, rubs, or gallops. Distal pulses are 2+ and symmetric.  Pulmonary/Chest: No respiratory distress. Clear to auscultation bilaterally. No wheezing or rales.  Abdominal: Soft and non-distended.  There is no tenderness.  No rebound, guarding, or rigidity.   Musculoskeletal: Moves all extremities. No obvious deformities. No edema.  Skin: Warm and dry.  Neurological:  Alert, awake, and appropriate.  Normal speech.  No acute focal neurological deficits are appreciated.  Psychiatric: Normal affect. Good eye contact. Appropriate in content.    ED Course    Procedures  ED Vital Signs:  Vitals:    10/04/22 0805 10/04/22 0823 10/04/22 0832 10/04/22 0900   BP: (!) 183/117  (!) 193/124    Pulse: 92  83    Resp: 20  20 20   Temp: 98.4 °F (36.9  °C)      TempSrc: Oral      SpO2: 100%  98%    Weight:  90.5 kg (199 lb 9.6 oz)      10/04/22 0903 10/04/22 1142 10/04/22 1203   BP: (!) 156/95 (!) 170/96 (!) 169/100   Pulse: 78  82   Resp: 20  18   Temp:   98.4 °F (36.9 °C)   TempSrc:   Oral   SpO2: 98%  98%   Weight:          Abnormal Lab Results:  Labs Reviewed   CBC W/ AUTO DIFFERENTIAL - Abnormal; Notable for the following components:       Result Value    RBC 5.90 (*)     MCV 74 (*)     MCH 22.9 (*)     MCHC 30.8 (*)     RDW 15.5 (*)     MPV 9.0 (*)     Gran # (ANC) 10.3 (*)     Gran % 81.1 (*)     Lymph % 12.7 (*)     All other components within normal limits   COMPREHENSIVE METABOLIC PANEL - Abnormal; Notable for the following components:    Sodium 133 (*)     CO2 15 (*)     Glucose 145 (*)     Albumin 3.4 (*)     All other components within normal limits   PROTIME-INR   TROPONIN I        All Lab Results:  Results for orders placed or performed during the hospital encounter of 10/04/22   CBC auto differential   Result Value Ref Range    WBC 12.67 3.90 - 12.70 K/uL    RBC 5.90 (H) 4.00 - 5.40 M/uL    Hemoglobin 13.5 12.0 - 16.0 g/dL    Hematocrit 43.8 37.0 - 48.5 %    MCV 74 (L) 82 - 98 fL    MCH 22.9 (L) 27.0 - 31.0 pg    MCHC 30.8 (L) 32.0 - 36.0 g/dL    RDW 15.5 (H) 11.5 - 14.5 %    Platelets 335 150 - 450 K/uL    MPV 9.0 (L) 9.2 - 12.9 fL    Immature Granulocytes 0.3 0.0 - 0.5 %    Gran # (ANC) 10.3 (H) 1.8 - 7.7 K/uL    Immature Grans (Abs) 0.04 0.00 - 0.04 K/uL    Lymph # 1.6 1.0 - 4.8 K/uL    Mono # 0.6 0.3 - 1.0 K/uL    Eos # 0.1 0.0 - 0.5 K/uL    Baso # 0.03 0.00 - 0.20 K/uL    nRBC 0 0 /100 WBC    Gran % 81.1 (H) 38.0 - 73.0 %    Lymph % 12.7 (L) 18.0 - 48.0 %    Mono % 4.7 4.0 - 15.0 %    Eosinophil % 1.0 0.0 - 8.0 %    Basophil % 0.2 0.0 - 1.9 %    Differential Method Automated    Comprehensive metabolic panel   Result Value Ref Range    Sodium 133 (L) 136 - 145 mmol/L    Potassium 5.0 3.5 - 5.1 mmol/L    Chloride 107 95 - 110 mmol/L    CO2 15  (L) 23 - 29 mmol/L    Glucose 145 (H) 70 - 110 mg/dL    BUN 8 6 - 20 mg/dL    Creatinine 0.7 0.5 - 1.4 mg/dL    Calcium 9.0 8.7 - 10.5 mg/dL    Total Protein 7.8 6.0 - 8.4 g/dL    Albumin 3.4 (L) 3.5 - 5.2 g/dL    Total Bilirubin 0.4 0.1 - 1.0 mg/dL    Alkaline Phosphatase 81 55 - 135 U/L    AST 15 10 - 40 U/L    ALT 10 10 - 44 U/L    Anion Gap 11 8 - 16 mmol/L    eGFR >60 >60 mL/min/1.73 m^2   Protime-INR   Result Value Ref Range    Prothrombin Time 10.9 9.0 - 12.5 sec    INR 1.0 0.8 - 1.2   Troponin I   Result Value Ref Range    Troponin I <0.006 0.000 - 0.026 ng/mL     Imaging Results:  Imaging Results              CT Head Without Contrast (Final result)  Result time 10/04/22 09:40:23      Final result by Elgin Ta MD (10/04/22 09:40:23)                   Impression:      1.  Negative for acute intracranial process. Negative for hemorrhage, or skull fracture.  Negative for interval change.    All CT scans at this facility are performed  using dose modulation techniques as appropriate to performed exam including the following:  automated exposure control; adjustment of mA and/or kV according to the patients size (this includes techniques or standardized protocols for targeted exams where dose is matched to indication/reason for exam: i.e. extremities or head);  iterative reconstruction technique.      Electronically signed by: Elgin Ta MD  Date:    10/04/2022  Time:    09:40               Narrative:    EXAMINATION:  CT HEAD WITHOUT CONTRAST    CLINICAL HISTORY:  Neuro deficit, acute, stroke suspected;    TECHNIQUE:  Axial images through the brain and posterior fossa were obtained without the use of IV contrast.  Sagittal and coronal reconstructions are provided for review.    COMPARISON:  October 3, 2022    FINDINGS:  The ventricles are midline and the CSF spaces are normal for age.  The gray-white matter junction is well preserved. Negative for intracranial vascular abnormalities. Negative for mass,  mass effect, cerebral edema, hemorrhage or abnormal fluid collections.  The possible left lateral basal ganglia lacunar infarct is unchanged in appearance.    The skull and scalp are  intact.    The   paranasal sinuses, mastoid air cells, middle ears and ear canals are clear. The globes are intact.                                       X-Ray Chest 1 View (Final result)  Result time 10/04/22 09:08:26      Final result by Elgin Ta MD (10/04/22 09:08:26)                   Impression:      1.  Negative for acute process involving the chest.    2.  Stable findings as noted above.      Electronically signed by: Elgin Ta MD  Date:    10/04/2022  Time:    09:08               Narrative:    EXAMINATION:  XR CHEST 1 VIEW    CLINICAL HISTORY:  Pain, unspecified    COMPARISON:  Studies dating back to March 3, 2018    FINDINGS:  Two frontal views were obtained.  Both are lordotic in position.  EKG leads overlie.  The lungs are clear. The cardiac silhouette size is on the upper limits of normal.  The trachea is midline and the mediastinal width is normal. Negative for focal infiltrate, effusion or pneumothorax. Pulmonary vasculature is normal. Negative for osseous abnormalities. Tortuous aorta.  Cardiophrenic fat pads.                                     The EKG was ordered, reviewed, and independently interpreted by the ED provider.  Interpretation time: 08:38  Rate: 77 BPM  Rhythm: normal sinus rhythm  Interpretation: Right atrial enlargement. No STEMI.           The Emergency Provider reviewed the vital signs and test results, which are outlined above.    ED Discussion     11:29 AM: Reassessed pt at this time. Discussed with pt all pertinent ED information and results. Discussed pt dx and plan of tx. Gave pt all f/u and return to the ED instructions. All questions and concerns were addressed at this time. Pt expresses understanding of information and instructions, and is comfortable with plan to discharge. Pt is  stable for discharge.    I discussed with patient and/or family/caretaker that evaluation in the ED does not suggest any emergent or life threatening medical conditions requiring immediate intervention beyond what was provided in the ED, and I believe patient is safe for discharge.  Regardless, an unremarkable evaluation in the ED does not preclude the development or presence of a serious of life threatening condition. As such, patient was instructed to return immediately for any worsening or change in current symptoms.         ED Medication(s):  Medications   amLODIPine tablet 10 mg (10 mg Oral Given 10/4/22 0834)   acetaminophen tablet 1,000 mg (1,000 mg Oral Given 10/4/22 1142)        Follow-up Information       Jarek Crespo MD.    Specialty: Family Medicine  Contact information:  [address] [facility] [address]  [phone]               [facility]    Specialty: Emergency Medicine  Why: If symptoms worsen  Contact information:  [address]  [facility] [id number] [phone]                         Discharge Medication List as of 10/4/2022 11:31 AM        START taking these medications    Details   hydroCHLOROthiazide (HYDRODIURIL) 25 MG tablet Take 1 tablet (25 mg total) by mouth once daily., Starting Tue 10/4/2022, Until Wed 10/4/2023, Print               Medical Decision Making    Medical Decision Making:   Clinical Tests:   Lab Tests: Ordered and Reviewed  Radiological Study: Ordered and Reviewed  Medical Tests: Ordered and Reviewed         Scribe Attestation:   Scribe #1: I performed the above scribed service and the documentation accurately describes the services I performed. I attest to the accuracy of the note.    Attending:   Physician Attestation Statement for Scribe #1: I, Inocente Thapa Jr., MD, personally performed the services described in this documentation, as scribed by Manjit Torres, in my presence, and it is both accurate and complete.           Clinical Impression       ICD-10-CM ICD-9-CM   1. Hypertension, unspecified type  I10 401.9   2. Chest pain  R07.9 786.50   3. Pain  R52 780.96   4. Nonintractable headache, unspecified chronicity pattern, unspecified headache type  R51.9 784.0   5. General weakness  R53.1 780.79       Disposition:   Disposition: Discharged  Condition: Stable       Inocente Thapa Jr., MD  10/04/22 1438

## 2022-10-05 LAB — BACTERIA UR CULT: ABNORMAL

## 2022-10-10 ENCOUNTER — TELEPHONE (OUTPATIENT)
Dept: EMERGENCY MEDICINE | Facility: HOSPITAL | Age: 42
End: 2022-10-10

## 2022-10-10 NOTE — TELEPHONE ENCOUNTER
----- Message from Bishop Wright MD sent at 10/8/2022  6:34 AM CDT -----  Patient with UTI.  Don't see any antibiotics.  Please call in ceftin 500 mg po bid for 7 days, if not on any abx.  Thank you,

## 2022-10-11 ENCOUNTER — TELEPHONE (OUTPATIENT)
Dept: EMERGENCY MEDICINE | Facility: HOSPITAL | Age: 42
End: 2022-10-11

## 2022-12-28 ENCOUNTER — HOSPITAL ENCOUNTER (EMERGENCY)
Facility: HOSPITAL | Age: 42
Discharge: LEFT BEFORE DISCHARGE | End: 2022-12-28

## 2022-12-28 VITALS
DIASTOLIC BLOOD PRESSURE: 81 MMHG | SYSTOLIC BLOOD PRESSURE: 125 MMHG | WEIGHT: 197.06 LBS | BODY MASS INDEX: 38.49 KG/M2 | HEART RATE: 83 BPM | OXYGEN SATURATION: 100 % | RESPIRATION RATE: 20 BRPM | TEMPERATURE: 98 F

## 2022-12-28 DIAGNOSIS — R07.9 CHEST PAIN: ICD-10-CM

## 2022-12-28 PROCEDURE — 93010 ELECTROCARDIOGRAM REPORT: CPT | Mod: ,,, | Performed by: INTERNAL MEDICINE

## 2022-12-28 PROCEDURE — 93005 ELECTROCARDIOGRAM TRACING: CPT

## 2022-12-28 PROCEDURE — 99284 EMERGENCY DEPT VISIT MOD MDM: CPT | Mod: 25

## 2022-12-28 PROCEDURE — 93010 EKG 12-LEAD: ICD-10-PCS | Mod: ,,, | Performed by: INTERNAL MEDICINE

## 2022-12-28 NOTE — FIRST PROVIDER EVALUATION
Medical screening examination initiated.  I have conducted a focused provider triage encounter, findings are as follows:    Brief history of present illness:  Patient presents to ER for chest pain with left arm tingling/numbness.    There were no vitals filed for this visit.    Pertinent physical exam:  no acute distress  Brief workup plan:  workup    Preliminary workup initiated; this workup will be continued and followed by the physician or advanced practice provider that is assigned to the patient when roomed.

## 2023-01-01 ENCOUNTER — HOSPITAL ENCOUNTER (EMERGENCY)
Facility: HOSPITAL | Age: 43
Discharge: HOME | End: 2023-01-01
Attending: EMERGENCY MEDICINE

## 2023-01-01 VITALS
RESPIRATION RATE: 18 BRPM | SYSTOLIC BLOOD PRESSURE: 145 MMHG | DIASTOLIC BLOOD PRESSURE: 80 MMHG | BODY MASS INDEX: 38.09 KG/M2 | OXYGEN SATURATION: 100 % | HEART RATE: 96 BPM | WEIGHT: 194 LBS | TEMPERATURE: 99 F | HEIGHT: 60 IN

## 2023-01-01 DIAGNOSIS — M54.10 RADICULOPATHY, UNSPECIFIED SPINAL REGION: Primary | ICD-10-CM

## 2023-01-01 PROCEDURE — 99282 EMERGENCY DEPT VISIT SF MDM: CPT

## 2023-01-01 NOTE — ED PROVIDER NOTES
Encounter Date: 1/1/2023       History     Chief Complaint   Patient presents with    Neck Pain       Complains of pain to neck with left arm numbness for the past few days. Also complains of numbness to right foot.      42-year-old female with complaint of left-sided neck pain with radiation left arm for the past week.  Patient reports pain worsening movement.  Patient also reports pain in right lower back with radiation right leg.  Patient also reports that her skin color has turned orange over the past couple days.      Review of patient's allergies indicates:   Allergen Reactions    Neurontin [gabapentin] Itching and Blisters    Percocet [oxycodone-acetaminophen] Itching and Blisters     Past Medical History:   Diagnosis Date    Back pain     Diabetes mellitus     Left knee pain     Miscarriage     x2     Past Surgical History:   Procedure Laterality Date    ANTERIOR CRUCIATE LIGAMENT REPAIR Left     DILATION AND CURETTAGE OF UTERUS      x2    LUMBAR FUSION      TIBIA FRACTURE SURGERY Right      History reviewed. No pertinent family history.  Social History     Tobacco Use    Smoking status: Every Day   Substance Use Topics    Alcohol use: No    Drug use: No     Review of Systems   Constitutional:  Negative for fever.   HENT:  Negative for sore throat.    Respiratory:  Negative for shortness of breath.    Cardiovascular:  Negative for chest pain.   Gastrointestinal:  Negative for nausea.   Genitourinary:  Negative for dysuria.   Musculoskeletal:  Positive for back pain.        Neck pain    Skin:  Negative for rash.   Neurological:  Negative for weakness.   Hematological:  Does not bruise/bleed easily.     Physical Exam     Initial Vitals [01/01/23 1015]   BP Pulse Resp Temp SpO2   (!) 145/80 96 18 98.6 °F (37 °C) 100 %      MAP       --         Physical Exam    Nursing note and vitals reviewed.  Constitutional: She appears well-developed and well-nourished.   HENT:   Head: Normocephalic and atraumatic.   Eyes:  Conjunctivae and EOM are normal. Pupils are equal, round, and reactive to light.   Neck: Neck supple.   Normal range of motion.  Cardiovascular:  Normal rate, regular rhythm, normal heart sounds and intact distal pulses.           Pulmonary/Chest: Breath sounds normal.   Abdominal: Abdomen is soft. There is no abdominal tenderness. There is no rebound and no guarding.   Musculoskeletal:         General: Normal range of motion.      Cervical back: Normal range of motion and neck supple.      Comments: Pain with ROM of neck, full ROM X 4, 5/5 X 4     Neurological: She is alert and oriented to person, place, and time. She has normal strength and normal reflexes.   Skin: Skin is warm and dry.   Psychiatric: She has a normal mood and affect. Her behavior is normal. Thought content normal.       ED Course   Procedures  Labs Reviewed   HIV 1 / 2 ANTIBODY   HEPATITIS C ANTIBODY   HEP C VIRUS HOLD SPECIMEN   CBC W/ AUTO DIFFERENTIAL   COMPREHENSIVE METABOLIC PANEL          Imaging Results    None          Medications - No data to display                  12:31 PM  Pt eloped from treatment area         Clinical Impression:   Final diagnoses:  [M54.10] Radiculopathy, unspecified spinal region (Primary)        ED Disposition Condition    Eloped Stable                Clayton Mishra NP  01/01/23 1231

## 2023-01-03 NOTE — ED NOTES
1/3/2023 0940 - Eloped Status Noted - chart and provider note reviewed. No further actions taken.

## 2023-03-20 ENCOUNTER — HOSPITAL ENCOUNTER (EMERGENCY)
Facility: HOSPITAL | Age: 43
Discharge: HOME | End: 2023-03-20
Attending: EMERGENCY MEDICINE

## 2023-03-20 VITALS
HEART RATE: 62 BPM | TEMPERATURE: 98 F | DIASTOLIC BLOOD PRESSURE: 74 MMHG | BODY MASS INDEX: 38.94 KG/M2 | RESPIRATION RATE: 20 BRPM | SYSTOLIC BLOOD PRESSURE: 148 MMHG | WEIGHT: 199.44 LBS | OXYGEN SATURATION: 98 %

## 2023-03-20 DIAGNOSIS — N30.01 ACUTE CYSTITIS WITH HEMATURIA: ICD-10-CM

## 2023-03-20 DIAGNOSIS — R73.9 HYPERGLYCEMIA: Primary | ICD-10-CM

## 2023-03-20 DIAGNOSIS — R42 DIZZY: ICD-10-CM

## 2023-03-20 LAB
ALBUMIN SERPL BCP-MCNC: 3.8 G/DL (ref 3.5–5.2)
ALP SERPL-CCNC: 91 U/L (ref 55–135)
ALT SERPL W/O P-5'-P-CCNC: 16 U/L (ref 10–44)
ANION GAP SERPL CALC-SCNC: 9 MMOL/L (ref 8–16)
AST SERPL-CCNC: 8 U/L (ref 10–40)
BACTERIA #/AREA URNS HPF: ABNORMAL /HPF
BASOPHILS # BLD AUTO: 0.04 K/UL (ref 0–0.2)
BASOPHILS NFR BLD: 0.3 % (ref 0–1.9)
BILIRUB SERPL-MCNC: 0.2 MG/DL (ref 0.1–1)
BILIRUB UR QL STRIP: NEGATIVE
BNP SERPL-MCNC: 18 PG/ML (ref 0–99)
BUN SERPL-MCNC: 10 MG/DL (ref 6–20)
CALCIUM SERPL-MCNC: 9.8 MG/DL (ref 8.7–10.5)
CHLORIDE SERPL-SCNC: 104 MMOL/L (ref 95–110)
CK SERPL-CCNC: 49 U/L (ref 20–180)
CLARITY UR: ABNORMAL
CO2 SERPL-SCNC: 20 MMOL/L (ref 23–29)
COLOR UR: YELLOW
CREAT SERPL-MCNC: 0.8 MG/DL (ref 0.5–1.4)
DIFFERENTIAL METHOD: ABNORMAL
EOSINOPHIL # BLD AUTO: 0.1 K/UL (ref 0–0.5)
EOSINOPHIL NFR BLD: 0.6 % (ref 0–8)
ERYTHROCYTE [DISTWIDTH] IN BLOOD BY AUTOMATED COUNT: 14.5 % (ref 11.5–14.5)
EST. GFR  (NO RACE VARIABLE): >60 ML/MIN/1.73 M^2
GLUCOSE SERPL-MCNC: 229 MG/DL (ref 70–110)
GLUCOSE UR QL STRIP: NEGATIVE
HCT VFR BLD AUTO: 46.8 % (ref 37–48.5)
HCV AB SERPL QL IA: NEGATIVE
HEP C VIRUS HOLD SPECIMEN: NORMAL
HGB BLD-MCNC: 14.4 G/DL (ref 12–16)
HGB UR QL STRIP: NEGATIVE
HIV 1+2 AB+HIV1 P24 AG SERPL QL IA: NEGATIVE
HYALINE CASTS #/AREA URNS LPF: 0 /LPF
IMM GRANULOCYTES # BLD AUTO: 0.12 K/UL (ref 0–0.04)
IMM GRANULOCYTES NFR BLD AUTO: 0.8 % (ref 0–0.5)
KETONES UR QL STRIP: NEGATIVE
LEUKOCYTE ESTERASE UR QL STRIP: ABNORMAL
LYMPHOCYTES # BLD AUTO: 2.4 K/UL (ref 1–4.8)
LYMPHOCYTES NFR BLD: 15.8 % (ref 18–48)
MCH RBC QN AUTO: 23.6 PG (ref 27–31)
MCHC RBC AUTO-ENTMCNC: 30.8 G/DL (ref 32–36)
MCV RBC AUTO: 77 FL (ref 82–98)
MICROSCOPIC COMMENT: ABNORMAL
MONOCYTES # BLD AUTO: 0.6 K/UL (ref 0.3–1)
MONOCYTES NFR BLD: 3.7 % (ref 4–15)
NEUTROPHILS # BLD AUTO: 11.9 K/UL (ref 1.8–7.7)
NEUTROPHILS NFR BLD: 78.8 % (ref 38–73)
NITRITE UR QL STRIP: POSITIVE
NRBC BLD-RTO: 0 /100 WBC
PH UR STRIP: 6 [PH] (ref 5–8)
PLATELET # BLD AUTO: 402 K/UL (ref 150–450)
PMV BLD AUTO: 9 FL (ref 9.2–12.9)
POTASSIUM SERPL-SCNC: 4.2 MMOL/L (ref 3.5–5.1)
PROT SERPL-MCNC: 8.7 G/DL (ref 6–8.4)
PROT UR QL STRIP: ABNORMAL
RBC # BLD AUTO: 6.09 M/UL (ref 4–5.4)
RBC #/AREA URNS HPF: 6 /HPF (ref 0–4)
SODIUM SERPL-SCNC: 133 MMOL/L (ref 136–145)
SP GR UR STRIP: 1.01 (ref 1–1.03)
SQUAMOUS #/AREA URNS HPF: 6 /HPF
TROPONIN I SERPL DL<=0.01 NG/ML-MCNC: <0.006 NG/ML (ref 0–0.03)
UNIDENT CRYS URNS QL MICRO: ABNORMAL
URN SPEC COLLECT METH UR: ABNORMAL
UROBILINOGEN UR STRIP-ACNC: NEGATIVE EU/DL
WBC # BLD AUTO: 15.03 K/UL (ref 3.9–12.7)
WBC #/AREA URNS HPF: >100 /HPF (ref 0–5)
WBC CLUMPS URNS QL MICRO: ABNORMAL

## 2023-03-20 PROCEDURE — 93010 EKG 12-LEAD: ICD-10-PCS | Mod: ,,, | Performed by: INTERNAL MEDICINE

## 2023-03-20 PROCEDURE — 82550 ASSAY OF CK (CPK): CPT | Performed by: EMERGENCY MEDICINE

## 2023-03-20 PROCEDURE — 87389 HIV-1 AG W/HIV-1&-2 AB AG IA: CPT | Performed by: EMERGENCY MEDICINE

## 2023-03-20 PROCEDURE — 80053 COMPREHEN METABOLIC PANEL: CPT | Performed by: EMERGENCY MEDICINE

## 2023-03-20 PROCEDURE — 85025 COMPLETE CBC W/AUTO DIFF WBC: CPT | Performed by: EMERGENCY MEDICINE

## 2023-03-20 PROCEDURE — 93010 ELECTROCARDIOGRAM REPORT: CPT | Mod: ,,, | Performed by: INTERNAL MEDICINE

## 2023-03-20 PROCEDURE — 96365 THER/PROPH/DIAG IV INF INIT: CPT

## 2023-03-20 PROCEDURE — 84484 ASSAY OF TROPONIN QUANT: CPT | Performed by: EMERGENCY MEDICINE

## 2023-03-20 PROCEDURE — 93005 ELECTROCARDIOGRAM TRACING: CPT

## 2023-03-20 PROCEDURE — 83880 ASSAY OF NATRIURETIC PEPTIDE: CPT | Performed by: EMERGENCY MEDICINE

## 2023-03-20 PROCEDURE — 81000 URINALYSIS NONAUTO W/SCOPE: CPT | Performed by: EMERGENCY MEDICINE

## 2023-03-20 PROCEDURE — 99285 EMERGENCY DEPT VISIT HI MDM: CPT | Mod: 25

## 2023-03-20 PROCEDURE — 63600175 PHARM REV CODE 636 W HCPCS: Performed by: EMERGENCY MEDICINE

## 2023-03-20 PROCEDURE — 87186 SC STD MICRODIL/AGAR DIL: CPT | Performed by: EMERGENCY MEDICINE

## 2023-03-20 PROCEDURE — 86803 HEPATITIS C AB TEST: CPT | Performed by: EMERGENCY MEDICINE

## 2023-03-20 PROCEDURE — 25000003 PHARM REV CODE 250: Performed by: EMERGENCY MEDICINE

## 2023-03-20 PROCEDURE — 87077 CULTURE AEROBIC IDENTIFY: CPT | Performed by: EMERGENCY MEDICINE

## 2023-03-20 PROCEDURE — 87086 URINE CULTURE/COLONY COUNT: CPT | Performed by: EMERGENCY MEDICINE

## 2023-03-20 PROCEDURE — 87088 URINE BACTERIA CULTURE: CPT | Performed by: EMERGENCY MEDICINE

## 2023-03-20 RX ORDER — BUTALBITAL, ACETAMINOPHEN AND CAFFEINE 50; 325; 40 MG/1; MG/1; MG/1
1 TABLET ORAL EVERY 4 HOURS PRN
Qty: 11 TABLET | Refills: 0 | Status: SHIPPED | OUTPATIENT
Start: 2023-03-20 | End: 2023-03-20 | Stop reason: SDUPTHER

## 2023-03-20 RX ORDER — MECLIZINE HYDROCHLORIDE 25 MG/1
25 TABLET ORAL 3 TIMES DAILY PRN
Qty: 20 TABLET | Refills: 0 | Status: SHIPPED | OUTPATIENT
Start: 2023-03-20

## 2023-03-20 RX ORDER — ATORVASTATIN CALCIUM 10 MG/1
10 TABLET, FILM COATED ORAL NIGHTLY
COMMUNITY
Start: 2022-10-07 | End: 2023-03-20 | Stop reason: CLARIF

## 2023-03-20 RX ORDER — TIZANIDINE 2 MG/1
2-4 TABLET ORAL
COMMUNITY
Start: 2022-10-10 | End: 2023-03-20 | Stop reason: CLARIF

## 2023-03-20 RX ORDER — CEFUROXIME AXETIL 500 MG/1
500 TABLET ORAL 2 TIMES DAILY
Qty: 20 TABLET | Refills: 0 | Status: SHIPPED | OUTPATIENT
Start: 2023-03-20 | End: 2023-03-20 | Stop reason: SDUPTHER

## 2023-03-20 RX ORDER — METFORMIN HYDROCHLORIDE 500 MG/1
500 TABLET ORAL 2 TIMES DAILY WITH MEALS
Qty: 180 TABLET | Refills: 3 | OUTPATIENT
Start: 2023-03-20 | End: 2024-01-24

## 2023-03-20 RX ORDER — METFORMIN HYDROCHLORIDE 500 MG/1
500 TABLET ORAL 2 TIMES DAILY WITH MEALS
Qty: 180 TABLET | Refills: 3 | Status: SHIPPED | OUTPATIENT
Start: 2023-03-20 | End: 2023-03-20 | Stop reason: SDUPTHER

## 2023-03-20 RX ORDER — CEFUROXIME AXETIL 500 MG/1
500 TABLET ORAL 2 TIMES DAILY
Qty: 20 TABLET | Refills: 0 | Status: SHIPPED | OUTPATIENT
Start: 2023-03-20 | End: 2023-03-30

## 2023-03-20 RX ORDER — MECLIZINE HYDROCHLORIDE 25 MG/1
25 TABLET ORAL
Status: COMPLETED | OUTPATIENT
Start: 2023-03-20 | End: 2023-03-20

## 2023-03-20 RX ORDER — BUTALBITAL, ACETAMINOPHEN AND CAFFEINE 50; 325; 40 MG/1; MG/1; MG/1
1 TABLET ORAL EVERY 4 HOURS PRN
Qty: 11 TABLET | Refills: 0 | Status: SHIPPED | OUTPATIENT
Start: 2023-03-20 | End: 2023-04-19

## 2023-03-20 RX ORDER — MECLIZINE HYDROCHLORIDE 25 MG/1
25 TABLET ORAL 3 TIMES DAILY PRN
Qty: 20 TABLET | Refills: 0 | Status: SHIPPED | OUTPATIENT
Start: 2023-03-20 | End: 2023-03-20 | Stop reason: SDUPTHER

## 2023-03-20 RX ORDER — ACETAMINOPHEN 325 MG/1
325 TABLET ORAL EVERY 6 HOURS PRN
COMMUNITY

## 2023-03-20 RX ORDER — SERTRALINE HYDROCHLORIDE 100 MG/1
100 TABLET, FILM COATED ORAL
COMMUNITY
Start: 2022-10-10 | End: 2023-03-20 | Stop reason: CLARIF

## 2023-03-20 RX ORDER — METOPROLOL SUCCINATE 50 MG/1
50 TABLET, EXTENDED RELEASE ORAL
COMMUNITY
Start: 2023-01-12

## 2023-03-20 RX ADMIN — MECLIZINE HYDROCHLORIDE 25 MG: 25 TABLET ORAL at 10:03

## 2023-03-20 RX ADMIN — CEFTRIAXONE 1 G: 1 INJECTION, POWDER, FOR SOLUTION INTRAMUSCULAR; INTRAVENOUS at 12:03

## 2023-03-20 NOTE — ED PROVIDER NOTES
"SCRIBE #1 NOTE: I, Manjit Torres, am scribing for, and in the presence of, Bishop Wright MD. I have scribed the entire note.      History      Chief Complaint   Patient presents with    Dizziness     Dizziness started 6 days ago, sensitivity to light, states "off balance", also reports neck and head pain, "I hear cracking when I move my head"       Review of patient's allergies indicates:   Allergen Reactions    Neurontin [gabapentin] Itching and Blisters    Percocet [oxycodone-acetaminophen] Itching and Blisters        HPI   HPI    3/20/2023, 9:45 AM   History obtained from the patient      History of Present Illness: Jeanna Helm is a 43 y.o. female patient with a PMHx of DM2 who presents to the Emergency Department for dizziness, onset 6 days PTA. Symptoms are constant and moderate in severity. No mitigating or exacerbating factors reported. Associated sxs include photophobia, headache, and neck pain. Patient denies any fever, chills, n/v/d, SOB, CP, weakness, numbness, and all other sxs at this time. No prior Tx reported. No further complaints or concerns at this time.     Arrival mode: Personal vehicle    PCP: Jarek Crespo MD       Past Medical History:  Past Medical History:   Diagnosis Date    Back pain     Diabetes mellitus     Left knee pain     Miscarriage     x2       Past Surgical History:  Past Surgical History:   Procedure Laterality Date    ANTERIOR CRUCIATE LIGAMENT REPAIR Left     DILATION AND CURETTAGE OF UTERUS      x2    LUMBAR FUSION      TIBIA FRACTURE SURGERY Right          Family History:  No family history on file.    Social History:  Social History     Tobacco Use    Smoking status: Every Day    Smokeless tobacco: Not on file   Substance and Sexual Activity    Alcohol use: No    Drug use: No    Sexual activity: Not on file       ROS   Review of Systems   Constitutional:  Negative for chills and fever.   HENT:  Negative for sore throat.    Eyes:  Positive for photophobia. "   Respiratory:  Negative for shortness of breath.    Cardiovascular:  Negative for chest pain.   Gastrointestinal:  Negative for diarrhea, nausea and vomiting.   Genitourinary:  Negative for dysuria.   Musculoskeletal:  Positive for neck pain. Negative for back pain.   Skin:  Negative for rash.   Neurological:  Positive for dizziness and headaches. Negative for weakness, light-headedness and numbness.   Hematological:  Does not bruise/bleed easily.   All other systems reviewed and are negative.    Physical Exam      Initial Vitals [03/20/23 0943]   BP Pulse Resp Temp SpO2   (!) 148/74 73 20 98.3 °F (36.8 °C) 98 %      MAP       --          Physical Exam  Nursing Notes and Vital Signs Reviewed.  Constitutional: Patient is in no acute distress. Well-developed and well-nourished.  Head: Atraumatic. Normocephalic.  Eyes: PERRL. EOM intact. Conjunctivae are not pale. No scleral icterus.  ENT: Mucous membranes are moist. Oropharynx is clear and symmetric.    Neck: Supple. Full ROM.   Cardiovascular: Regular rate. Regular rhythm. No murmurs, rubs, or gallops. Distal pulses are 2+ and symmetric.  Pulmonary/Chest: No respiratory distress. Clear to auscultation bilaterally. No wheezing or rales.  Abdominal: Soft and non-distended.  There is no tenderness.  No rebound, guarding, or rigidity.   Musculoskeletal: Moves all extremities. No obvious deformities. No edema.  Skin: Warm and dry.  Neurological:  Alert, awake, and appropriate.  Normal speech.  No acute focal neurological deficits are appreciated.  Psychiatric: Normal affect. Good eye contact. Appropriate in content.    ED Course    Procedures  ED Vital Signs:  Vitals:    03/20/23 0943 03/20/23 1050   BP: (!) 148/74    Pulse: 73 62   Resp: 20    Temp: 98.3 °F (36.8 °C)    TempSrc: Oral    SpO2: 98%    Weight: 90.5 kg (199 lb 6.5 oz)        Abnormal Lab Results:  Labs Reviewed   CBC W/ AUTO DIFFERENTIAL - Abnormal; Notable for the following components:       Result Value     WBC 15.03 (*)     RBC 6.09 (*)     MCV 77 (*)     MCH 23.6 (*)     MCHC 30.8 (*)     MPV 9.0 (*)     Immature Granulocytes 0.8 (*)     Gran # (ANC) 11.9 (*)     Immature Grans (Abs) 0.12 (*)     Gran % 78.8 (*)     Lymph % 15.8 (*)     Mono % 3.7 (*)     All other components within normal limits    Narrative:     Release to patient->Immediate   COMPREHENSIVE METABOLIC PANEL - Abnormal; Notable for the following components:    Sodium 133 (*)     CO2 20 (*)     Glucose 229 (*)     Total Protein 8.7 (*)     AST 8 (*)     All other components within normal limits    Narrative:     Release to patient->Immediate   URINALYSIS, REFLEX TO URINE CULTURE - Abnormal; Notable for the following components:    Appearance, UA Hazy (*)     Protein, UA 1+ (*)     Nitrite, UA Positive (*)     Leukocytes, UA 3+ (*)     All other components within normal limits    Narrative:     Specimen Source->Urine   URINALYSIS MICROSCOPIC - Abnormal; Notable for the following components:    RBC, UA 6 (*)     WBC, UA >100 (*)     WBC Clumps, UA Many (*)     Bacteria Many (*)     All other components within normal limits    Narrative:     Specimen Source->Urine   CULTURE, URINE   HIV 1 / 2 ANTIBODY    Narrative:     Release to patient->Immediate   HEPATITIS C ANTIBODY    Narrative:     Release to patient->Immediate   HEP C VIRUS HOLD SPECIMEN    Narrative:     Release to patient->Immediate   B-TYPE NATRIURETIC PEPTIDE    Narrative:     Release to patient->Immediate   CK    Narrative:     Release to patient->Immediate   TROPONIN I    Narrative:     Release to patient->Immediate        All Lab Results:  Results for orders placed or performed during the hospital encounter of 03/20/23   HIV 1/2 Ag/Ab (4th Gen)   Result Value Ref Range    HIV 1/2 Ag/Ab Negative Negative   Hepatitis C Antibody   Result Value Ref Range    Hepatitis C Ab Negative Negative   HCV Virus Hold Specimen   Result Value Ref Range    HEP C Virus Hold Specimen Hold for HCV sendout     CBC Auto Differential   Result Value Ref Range    WBC 15.03 (H) 3.90 - 12.70 K/uL    RBC 6.09 (H) 4.00 - 5.40 M/uL    Hemoglobin 14.4 12.0 - 16.0 g/dL    Hematocrit 46.8 37.0 - 48.5 %    MCV 77 (L) 82 - 98 fL    MCH 23.6 (L) 27.0 - 31.0 pg    MCHC 30.8 (L) 32.0 - 36.0 g/dL    RDW 14.5 11.5 - 14.5 %    Platelets 402 150 - 450 K/uL    MPV 9.0 (L) 9.2 - 12.9 fL    Immature Granulocytes 0.8 (H) 0.0 - 0.5 %    Gran # (ANC) 11.9 (H) 1.8 - 7.7 K/uL    Immature Grans (Abs) 0.12 (H) 0.00 - 0.04 K/uL    Lymph # 2.4 1.0 - 4.8 K/uL    Mono # 0.6 0.3 - 1.0 K/uL    Eos # 0.1 0.0 - 0.5 K/uL    Baso # 0.04 0.00 - 0.20 K/uL    nRBC 0 0 /100 WBC    Gran % 78.8 (H) 38.0 - 73.0 %    Lymph % 15.8 (L) 18.0 - 48.0 %    Mono % 3.7 (L) 4.0 - 15.0 %    Eosinophil % 0.6 0.0 - 8.0 %    Basophil % 0.3 0.0 - 1.9 %    Differential Method Automated    Comprehensive Metabolic Panel   Result Value Ref Range    Sodium 133 (L) 136 - 145 mmol/L    Potassium 4.2 3.5 - 5.1 mmol/L    Chloride 104 95 - 110 mmol/L    CO2 20 (L) 23 - 29 mmol/L    Glucose 229 (H) 70 - 110 mg/dL    BUN 10 6 - 20 mg/dL    Creatinine 0.8 0.5 - 1.4 mg/dL    Calcium 9.8 8.7 - 10.5 mg/dL    Total Protein 8.7 (H) 6.0 - 8.4 g/dL    Albumin 3.8 3.5 - 5.2 g/dL    Total Bilirubin 0.2 0.1 - 1.0 mg/dL    Alkaline Phosphatase 91 55 - 135 U/L    AST 8 (L) 10 - 40 U/L    ALT 16 10 - 44 U/L    Anion Gap 9 8 - 16 mmol/L    eGFR >60 >60 mL/min/1.73 m^2   Urinalysis, Reflex to Urine Culture Urine, Clean Catch    Specimen: Urine   Result Value Ref Range    Specimen UA Urine, Clean Catch     Color, UA Yellow Yellow, Straw, [place]    Appearance, UA Hazy (A) Clear    pH, UA 6.0 5.0 - 8.0    Specific Gravity, UA 1.015 1.005 - 1.030    Protein, UA 1+ (A) Negative    Glucose, UA Negative Negative    Ketones, UA Negative Negative    Bilirubin (UA) Negative Negative    Occult Blood UA Negative Negative    Nitrite, UA Positive (A) Negative    Urobilinogen, UA Negative <2.0 EU/dL    Leukocytes, UA 3+ (A)  Negative   BNP   Result Value Ref Range    BNP 18 0 - 99 pg/mL   CK   Result Value Ref Range    CPK 49 20 - 180 U/L   Troponin I   Result Value Ref Range    Troponin I <0.006 0.000 - 0.026 ng/mL   Urinalysis Microscopic   Result Value Ref Range    RBC, UA 6 (H) 0 - 4 /hpf    WBC, UA >100 (H) 0 - 5 /hpf    WBC Clumps, UA Many (A) None-Rare    Bacteria Many (A) None-Occ /hpf    Squam Epithel, UA 6 /hpf    Hyaline Casts, UA 0 0-1/lpf /lpf    Unclass Kiki UA Occasional None-Moderate    Microscopic Comment SEE COMMENT      Imaging Results:  Imaging Results              X-Ray Chest AP Portable (Final result)  Result time 03/20/23 10:58:00      Final result by Peter Black MD (03/20/23 10:58:00)                   Impression:      No acute abnormality.      Electronically signed by: Peter Black  Date:    03/20/2023  Time:    10:58               Narrative:    EXAMINATION:  XR CHEST AP PORTABLE    CLINICAL HISTORY:  dizzy;    TECHNIQUE:  Single frontal view of the chest was performed.    COMPARISON:  Chest radiograph 12/28/2022    FINDINGS:  The lungs are clear, with normal appearance of pulmonary vasculature and no pleural effusion or pneumothorax.    The cardiac silhouette is normal in size. The hilar and mediastinal contours are unremarkable.    Bones are intact.                                       CT Head Without Contrast (Final result)  Result time 03/20/23 10:25:52      Final result by CHAN Cano Sr., MD (03/20/23 10:25:52)                   Impression:      1. No intracranial abnormality  2. There is mild deviation to the right of the nasal septum.  All CT scans at this facility use dose modulation, iterative reconstruction, and/or weight base dosing when appropriate to reduce radiation dose when appropriate to reduce radiation dose to as low as reasonably achievable.      Electronically signed by: Peter Cano MD  Date:    03/20/2023  Time:    10:25               Narrative:    EXAMINATION:  CT HEAD  WITHOUT CONTRAST    CLINICAL HISTORY:  Syncope, recurrent;    TECHNIQUE:  Standard brain CT protocol without IV contrast was performed.    COMPARISON:  10/04/2022    FINDINGS:  The ventricles have a normal size, position, and appearance. There is no abnormal intracranial mass or intracranial hemorrhage. There is no skull fracture.  The visualized portion of the paranasal sinuses is clear.  There is mild deviation to the right of the nasal septum.                                     The EKG was ordered, reviewed, and independently interpreted by the ED provider.  Interpretation time: 10:33  Rate: 55 BPM  Rhythm: Sinus bradycardia with sinus arrhythmia  Interpretation: Rightward axis. No STEMI.           The Emergency Provider reviewed the vital signs and test results, which are outlined above.    ED Discussion     11:35 AM: Reassessed pt at this time. Discussed with pt all pertinent ED information and results. Discussed pt dx and plan of tx. Gave pt all f/u and return to the ED instructions. All questions and concerns were addressed at this time. Pt expresses understanding of information and instructions, and is comfortable with plan to discharge. Pt is stable for discharge.    I discussed with patient and/or family/caretaker that evaluation in the ED does not suggest any emergent or life threatening medical conditions requiring immediate intervention beyond what was provided in the ED, and I believe patient is safe for discharge.  Regardless, an unremarkable evaluation in the ED does not preclude the development or presence of a serious of life threatening condition. As such, patient was instructed to return immediately for any worsening or change in current symptoms.         ED Medication(s):  Medications   cefTRIAXone (ROCEPHIN) 1 g in dextrose 5 % in water (D5W) 5 % 50 mL IVPB (MB+) (has no administration in time range)   meclizine tablet 25 mg (25 mg Oral Given 3/20/23 1046)     New Prescriptions    CEFUROXIME  (CEFTIN) 500 MG TABLET    Take 1 tablet (500 mg total) by mouth 2 (two) times daily. for 10 days    METFORMIN (GLUCOPHAGE) 500 MG TABLET    Take 1 tablet (500 mg total) by mouth 2 (two) times daily with meals.     Medical Decision Making    Medical Decision Making:   Initial Assessment:   Dizziness and headache for the last week.    Differential Diagnosis:   HA, electrolyte  Clinical Tests:   Lab Tests: Ordered and Reviewed  Radiological Study: Ordered and Reviewed  Medical Tests: Ordered and Reviewed  ED Management:  Labs and imaging reviewed by me.  No acute findings except for elevated blood sugar and a UTI.  Considered admission, but patient is feeling better.  Will refill metformin and prescribe abx for uti..           Scribe Attestation:   Scribe #1: I performed the above scribed service and the documentation accurately describes the services I performed. I attest to the accuracy of the note.    Attending:   Physician Attestation Statement for Scribe #1: I, Bishop Wright MD, personally performed the services described in this documentation, as scribed by Manjit Torres, in my presence, and it is both accurate and complete.          Clinical Impression       ICD-10-CM ICD-9-CM   1. Hyperglycemia  R73.9 790.29   2. Dizzy  R42 780.4   3. Acute cystitis with hematuria  N30.01 595.0       Disposition:   Disposition: Discharged  Condition: Stable       Bishop Wright MD  03/20/23 1200

## 2023-03-22 LAB — BACTERIA UR CULT: ABNORMAL

## 2023-04-02 ENCOUNTER — HOSPITAL ENCOUNTER (EMERGENCY)
Facility: HOSPITAL | Age: 43
Discharge: HOME | End: 2023-04-02
Attending: EMERGENCY MEDICINE

## 2023-04-02 VITALS
SYSTOLIC BLOOD PRESSURE: 139 MMHG | WEIGHT: 200.31 LBS | TEMPERATURE: 98 F | RESPIRATION RATE: 20 BRPM | HEIGHT: 60 IN | HEART RATE: 63 BPM | OXYGEN SATURATION: 100 % | BODY MASS INDEX: 39.33 KG/M2 | DIASTOLIC BLOOD PRESSURE: 76 MMHG

## 2023-04-02 DIAGNOSIS — R42 DIZZINESS: ICD-10-CM

## 2023-04-02 DIAGNOSIS — G43.809 OTHER MIGRAINE WITHOUT STATUS MIGRAINOSUS, NOT INTRACTABLE: Primary | ICD-10-CM

## 2023-04-02 DIAGNOSIS — R42 VERTIGO: ICD-10-CM

## 2023-04-02 LAB
ALBUMIN SERPL BCP-MCNC: 3.7 G/DL (ref 3.5–5.2)
ALP SERPL-CCNC: 82 U/L (ref 55–135)
ALT SERPL W/O P-5'-P-CCNC: 16 U/L (ref 10–44)
ANION GAP SERPL CALC-SCNC: 12 MMOL/L (ref 8–16)
AST SERPL-CCNC: 10 U/L (ref 10–40)
BASOPHILS # BLD AUTO: 0.03 K/UL (ref 0–0.2)
BASOPHILS NFR BLD: 0.3 % (ref 0–1.9)
BILIRUB SERPL-MCNC: 0.1 MG/DL (ref 0.1–1)
BUN SERPL-MCNC: 12 MG/DL (ref 6–20)
CALCIUM SERPL-MCNC: 9.2 MG/DL (ref 8.7–10.5)
CHLORIDE SERPL-SCNC: 107 MMOL/L (ref 95–110)
CO2 SERPL-SCNC: 19 MMOL/L (ref 23–29)
CREAT SERPL-MCNC: 0.7 MG/DL (ref 0.5–1.4)
DIFFERENTIAL METHOD: ABNORMAL
EOSINOPHIL # BLD AUTO: 0.2 K/UL (ref 0–0.5)
EOSINOPHIL NFR BLD: 1.6 % (ref 0–8)
ERYTHROCYTE [DISTWIDTH] IN BLOOD BY AUTOMATED COUNT: 14.7 % (ref 11.5–14.5)
EST. GFR  (NO RACE VARIABLE): >60 ML/MIN/1.73 M^2
GLUCOSE SERPL-MCNC: 144 MG/DL (ref 70–110)
HCT VFR BLD AUTO: 42.4 % (ref 37–48.5)
HGB BLD-MCNC: 13.5 G/DL (ref 12–16)
IMM GRANULOCYTES # BLD AUTO: 0.09 K/UL (ref 0–0.04)
IMM GRANULOCYTES NFR BLD AUTO: 0.8 % (ref 0–0.5)
LYMPHOCYTES # BLD AUTO: 2.3 K/UL (ref 1–4.8)
LYMPHOCYTES NFR BLD: 19.4 % (ref 18–48)
MCH RBC QN AUTO: 24.2 PG (ref 27–31)
MCHC RBC AUTO-ENTMCNC: 31.8 G/DL (ref 32–36)
MCV RBC AUTO: 76 FL (ref 82–98)
MONOCYTES # BLD AUTO: 0.6 K/UL (ref 0.3–1)
MONOCYTES NFR BLD: 4.8 % (ref 4–15)
NEUTROPHILS # BLD AUTO: 8.7 K/UL (ref 1.8–7.7)
NEUTROPHILS NFR BLD: 73.1 % (ref 38–73)
NRBC BLD-RTO: 0 /100 WBC
PLATELET # BLD AUTO: 423 K/UL (ref 150–450)
PMV BLD AUTO: 8.6 FL (ref 9.2–12.9)
POTASSIUM SERPL-SCNC: 4.1 MMOL/L (ref 3.5–5.1)
PROT SERPL-MCNC: 8.2 G/DL (ref 6–8.4)
RBC # BLD AUTO: 5.58 M/UL (ref 4–5.4)
SODIUM SERPL-SCNC: 138 MMOL/L (ref 136–145)
TROPONIN I SERPL DL<=0.01 NG/ML-MCNC: <0.006 NG/ML (ref 0–0.03)
WBC # BLD AUTO: 11.85 K/UL (ref 3.9–12.7)

## 2023-04-02 PROCEDURE — 99284 EMERGENCY DEPT VISIT MOD MDM: CPT | Mod: 25

## 2023-04-02 PROCEDURE — 25000003 PHARM REV CODE 250: Performed by: EMERGENCY MEDICINE

## 2023-04-02 PROCEDURE — 96374 THER/PROPH/DIAG INJ IV PUSH: CPT

## 2023-04-02 PROCEDURE — 93010 ELECTROCARDIOGRAM REPORT: CPT | Mod: ,,, | Performed by: INTERNAL MEDICINE

## 2023-04-02 PROCEDURE — 96375 TX/PRO/DX INJ NEW DRUG ADDON: CPT

## 2023-04-02 PROCEDURE — 96361 HYDRATE IV INFUSION ADD-ON: CPT

## 2023-04-02 PROCEDURE — 84484 ASSAY OF TROPONIN QUANT: CPT | Performed by: EMERGENCY MEDICINE

## 2023-04-02 PROCEDURE — 93010 EKG 12-LEAD: ICD-10-PCS | Mod: ,,, | Performed by: INTERNAL MEDICINE

## 2023-04-02 PROCEDURE — 63600175 PHARM REV CODE 636 W HCPCS: Performed by: EMERGENCY MEDICINE

## 2023-04-02 PROCEDURE — 93005 ELECTROCARDIOGRAM TRACING: CPT

## 2023-04-02 PROCEDURE — 85025 COMPLETE CBC W/AUTO DIFF WBC: CPT | Performed by: EMERGENCY MEDICINE

## 2023-04-02 PROCEDURE — 80053 COMPREHEN METABOLIC PANEL: CPT | Performed by: EMERGENCY MEDICINE

## 2023-04-02 RX ORDER — BUTALBITAL, ACETAMINOPHEN AND CAFFEINE 50; 325; 40 MG/1; MG/1; MG/1
1 TABLET ORAL EVERY 6 HOURS PRN
Qty: 7 TABLET | Refills: 0 | Status: SHIPPED | OUTPATIENT
Start: 2023-04-02

## 2023-04-02 RX ORDER — KETOROLAC TROMETHAMINE 30 MG/ML
30 INJECTION, SOLUTION INTRAMUSCULAR; INTRAVENOUS
Status: COMPLETED | OUTPATIENT
Start: 2023-04-02 | End: 2023-04-02

## 2023-04-02 RX ORDER — MECLIZINE HYDROCHLORIDE 25 MG/1
25 TABLET ORAL 3 TIMES DAILY PRN
Qty: 20 TABLET | Refills: 0 | Status: SHIPPED | OUTPATIENT
Start: 2023-04-02

## 2023-04-02 RX ORDER — METOCLOPRAMIDE HYDROCHLORIDE 5 MG/ML
10 INJECTION INTRAMUSCULAR; INTRAVENOUS
Status: COMPLETED | OUTPATIENT
Start: 2023-04-02 | End: 2023-04-02

## 2023-04-02 RX ADMIN — METOCLOPRAMIDE 10 MG: 5 INJECTION, SOLUTION INTRAMUSCULAR; INTRAVENOUS at 08:04

## 2023-04-02 RX ADMIN — SODIUM CHLORIDE 1000 ML: 9 INJECTION, SOLUTION INTRAVENOUS at 08:04

## 2023-04-02 RX ADMIN — KETOROLAC TROMETHAMINE 30 MG: 30 INJECTION, SOLUTION INTRAMUSCULAR at 08:04

## 2023-04-02 NOTE — ED PROVIDER NOTES
SCRIBE #1 NOTE: I, Prasanna Ferro, am scribing for, and in the presence of, Jonas Lucio Jr., MD. I have scribed the entire note.       History     Chief Complaint   Patient presents with    Dizziness     Headache and dizziness x3 weeks, states she was seen here previously for same reason without relief, +photophobia, and having neck stiffness. States her equilibrium is off and has been taking abx. Hx of stroke.      Review of patient's allergies indicates:   Allergen Reactions    Neurontin [gabapentin] Itching and Blisters    Percocet [oxycodone-acetaminophen] Itching and Blisters         History of Present Illness     HPI    4/2/2023, 8:06 AM  History obtained from the patient      History of Present Illness: Jeanna Helm is a 43 y.o. female patient with a PMHx of DM who presents to the Emergency Department for evaluation of dizziness which onset gradually 3 weeks PTA. Pt was seen in this ED for similar symptoms on 3/20/2023 and reports her symptoms have persisted. Pt reports HA, dizziness, neck stiffness and photophobia. Pt was diagnosed with UTI at her visit on 3/20 and reports being compliant with her antibiotics.  Symptoms are constant and moderate in severity. No mitigating factors reported. Exacerbating factors include turning her head. Associated sxs include cough, HA, neck stiffness and photophobia. Patient denies any sore throat, congestion, rhinorrhea, syncope, weakness, light-headedness, and all other sxs at this time. No further complaints or concerns at this time.       Arrival mode: Personal vehicle      PCP: Jarek Crespo MD        Past Medical History:  Past Medical History:   Diagnosis Date    Back pain     Diabetes mellitus     Left knee pain     Miscarriage     x2       Past Surgical History:  Past Surgical History:   Procedure Laterality Date    ANTERIOR CRUCIATE LIGAMENT REPAIR Left     DILATION AND CURETTAGE OF UTERUS      x2    LUMBAR FUSION      TIBIA FRACTURE SURGERY Right           Family History:  No family history on file.    Social History:  Social History     Tobacco Use    Smoking status: Every Day    Smokeless tobacco: Not on file   Substance and Sexual Activity    Alcohol use: No    Drug use: No    Sexual activity: Not on file        Review of Systems     Review of Systems   Constitutional:  Negative for fever.   HENT:  Negative for congestion, rhinorrhea and sore throat.    Eyes:  Positive for photophobia.   Respiratory:  Positive for cough. Negative for shortness of breath.    Cardiovascular:  Negative for chest pain.   Gastrointestinal:  Negative for nausea.   Genitourinary:  Negative for dysuria.   Musculoskeletal:  Positive for neck stiffness. Negative for back pain.   Skin:  Negative for rash.   Neurological:  Positive for dizziness and headaches. Negative for syncope, weakness and light-headedness.   Hematological:  Does not bruise/bleed easily.   All other systems reviewed and are negative.     Physical Exam     Initial Vitals   BP Pulse Resp Temp SpO2   04/02/23 0639 04/02/23 0639 04/02/23 0639 04/02/23 0639 04/02/23 0640   (!) 147/86 75 20 98 °F (36.7 °C) 100 %      MAP       --                 Physical Exam   Nursing Notes and Vital Signs Reviewed.  Constitutional: Patient is in no acute distress. Well-developed and well-nourished.  Head: Atraumatic. Normocephalic.  Eyes:  EOM intact.  No scleral icterus. Bilateral horizontal fatiguing nystagmus.  ENT: Mucous membranes are moist.  Nares clear   Neck:  Full ROM. No JVD.  Cardiovascular: Regular rate. Regular rhythm No murmurs, rubs, or gallops. Distal pulses are 2+ and symmetric  Pulmonary/Chest: No respiratory distress. Clear to auscultation bilaterally. No wheezing or rales.  Equal chest wall rise bilaterally  Abdominal: Soft and non-distended.  There is no tenderness.  No rebound, guarding, or rigidity. Good bowel sounds.  Genitourinary: No CVA tenderness.  No suprapubic tenderness  Musculoskeletal: Moves all  extremities. No obvious deformities.  5 x 5 strength in all extremities   Skin: Warm and dry.  Neurological:  Alert, awake, and appropriate.  Normal speech.  No acute focal neurological deficits are appreciated.  Two through 12 intact bilaterally.  Psychiatric: Normal affect. Good eye contact. Appropriate in content.       ED Course   Procedures  ED Vital Signs:  Vitals:    04/02/23 0639 04/02/23 0640 04/02/23 0812   BP: (!) 147/86  139/76   Pulse: 75  63   Resp: 20  20   Temp: 98 °F (36.7 °C)     TempSrc: Oral     SpO2:  100% 100%   Weight:  90.9 kg (200 lb 4.6 oz)    Height:  5' (1.524 m)        Abnormal Lab Results:  Labs Reviewed   CBC W/ AUTO DIFFERENTIAL - Abnormal; Notable for the following components:       Result Value    RBC 5.58 (*)     MCV 76 (*)     MCH 24.2 (*)     MCHC 31.8 (*)     RDW 14.7 (*)     MPV 8.6 (*)     Immature Granulocytes 0.8 (*)     Gran # (ANC) 8.7 (*)     Immature Grans (Abs) 0.09 (*)     Gran % 73.1 (*)     All other components within normal limits   COMPREHENSIVE METABOLIC PANEL - Abnormal; Notable for the following components:    CO2 19 (*)     Glucose 144 (*)     All other components within normal limits   TROPONIN I        All Lab Results:  Results for orders placed or performed during the hospital encounter of 04/02/23   CBC auto differential   Result Value Ref Range    WBC 11.85 3.90 - 12.70 K/uL    RBC 5.58 (H) 4.00 - 5.40 M/uL    Hemoglobin 13.5 12.0 - 16.0 g/dL    Hematocrit 42.4 37.0 - 48.5 %    MCV 76 (L) 82 - 98 fL    MCH 24.2 (L) 27.0 - 31.0 pg    MCHC 31.8 (L) 32.0 - 36.0 g/dL    RDW 14.7 (H) 11.5 - 14.5 %    Platelets 423 150 - 450 K/uL    MPV 8.6 (L) 9.2 - 12.9 fL    Immature Granulocytes 0.8 (H) 0.0 - 0.5 %    Gran # (ANC) 8.7 (H) 1.8 - 7.7 K/uL    Immature Grans (Abs) 0.09 (H) 0.00 - 0.04 K/uL    Lymph # 2.3 1.0 - 4.8 K/uL    Mono # 0.6 0.3 - 1.0 K/uL    Eos # 0.2 0.0 - 0.5 K/uL    Baso # 0.03 0.00 - 0.20 K/uL    nRBC 0 0 /100 WBC    Gran % 73.1 (H) 38.0 - 73.0 %     Lymph % 19.4 18.0 - 48.0 %    Mono % 4.8 4.0 - 15.0 %    Eosinophil % 1.6 0.0 - 8.0 %    Basophil % 0.3 0.0 - 1.9 %    Differential Method Automated    Comprehensive metabolic panel   Result Value Ref Range    Sodium 138 136 - 145 mmol/L    Potassium 4.1 3.5 - 5.1 mmol/L    Chloride 107 95 - 110 mmol/L    CO2 19 (L) 23 - 29 mmol/L    Glucose 144 (H) 70 - 110 mg/dL    BUN 12 6 - 20 mg/dL    Creatinine 0.7 0.5 - 1.4 mg/dL    Calcium 9.2 8.7 - 10.5 mg/dL    Total Protein 8.2 6.0 - 8.4 g/dL    Albumin 3.7 3.5 - 5.2 g/dL    Total Bilirubin 0.1 0.1 - 1.0 mg/dL    Alkaline Phosphatase 82 55 - 135 U/L    AST 10 10 - 40 U/L    ALT 16 10 - 44 U/L    Anion Gap 12 8 - 16 mmol/L    eGFR >60 >60 mL/min/1.73 m^2   Troponin I   Result Value Ref Range    Troponin I <0.006 0.000 - 0.026 ng/mL         Imaging Results:  Imaging Results    None        CT Head Without Contrast  Order: [id number]  Status: Final result     Visible to patient: Yes (not seen)     Next appt: None     0 Result Notes  Details    Reading Physician Reading Date Result Priority   CHAN Cano Sr., MD  [phone] 3/20/2023 STAT     Narrative & Impression  EXAMINATION:  CT HEAD WITHOUT CONTRAST     CLINICAL HISTORY:  Syncope, recurrent;     TECHNIQUE:  Standard brain CT protocol without IV contrast was performed.     COMPARISON:  10/04/2022     FINDINGS:  The ventricles have a normal size, position, and appearance. There is no abnormal intracranial mass or intracranial hemorrhage. There is no skull fracture.  The visualized portion of the paranasal sinuses is clear.  There is mild deviation to the right of the nasal septum.     Impression:     1. No intracranial abnormality  2. There is mild deviation to the right of the nasal septum.  All CT scans at this facility use dose modulation, iterative reconstruction, and/or weight base dosing when appropriate to reduce radiation dose when appropriate to reduce radiation dose to as low as reasonably achievable.         Electronically signed by: Peter Cano MD  Date:                                            03/20/2023  Time:                                           10:25           Exam Ended: 03/20/23 10:21 Last Resulted: 03/20/23 10:25             The EKG was ordered, reviewed, and independently interpreted by the ED provider.  Interpretation time: 08:13:37  Rate: 63 BPM  Rhythm: normal sinus rhythm  Interpretation: No acute ST changes. No STEMI.           The Emergency Provider reviewed the vital signs and test results, which are outlined above.     ED Discussion       9:35 AM: Reassessed pt at this time.   Discussed with pt all pertinent ED information and results. Discussed pt dx and plan of tx. Gave pt all f/u and return to the ED instructions. All questions and concerns were addressed at this time. Pt expresses understanding of information and instructions, and is comfortable with plan to discharge. Pt is stable for discharge.    I discussed with patient and/or family/caretaker that evaluation in the ED does not suggest any emergent or life threatening medical conditions requiring immediate intervention beyond what was provided in the ED, and I believe patient is safe for discharge.  Regardless, an unremarkable evaluation in the ED does not preclude the development or presence of a serious of life threatening condition. As such, patient was instructed to return immediately for any worsening or change in current symptoms.         Medical Decision Making:   History:   Old Medical Records: I decided to obtain old medical records.  Old Records Summarized: records from clinic visits and records from previous admission(s).       <> Summary of Records: I reviewed the patient's prior records.  I have reviewed her prior CT scan which was normal this is been included in the chart.  Dated 03/20.  Initial Assessment:   Patient presents complaining of migraine.  She is a history of migraines and notes pain to the left side of her  head to the back of her neck.  She notes that this is typical of her usual migraines but was refractory to her home medications.  She notes when the symptoms occur that she becomes dizzy.  She does have some mild nystagmus on exam.  Her exam is otherwise benign.  Differential Diagnosis:   Migraine, atypical migraine, vertigo.  Clinical Tests:   Lab Tests: Reviewed and Ordered  Radiological Study: Reviewed  Medical Tests: Ordered and Reviewed  ED Management:  History physical examination was obtained.  The patient notes that she has no ride home so she was dosed with Toradol and Reglan.  She did not desire any further strong pain medications.  I ordered an EKG and independently interpreted as normal sinus rhythm.  I reviewed her prior CT scan and noted is normal.  This is from a week ago.  A ordered reviewed her laboratory studies.  She has a benign CMP normal troponin and a benign CBC.  Patient was treated with Reglan and Toradol with marked improvement in her symptoms.  She declined any IV narcotics.  She states she would like to go home which is reasonable.  Workup is benign.  This appears to be a migraine causing her symptoms.  Will treat with Fioricet at home as well as Antivert advised her not to drive or operate machinery or equipment while taking either of these medications as it would put her on others at risk.  Patient does have good follow-up.  Discussed all findings with the patient is well as plan of care.  She verbalized agreement understanding with all and seems reliable.  She is safe for discharge in my opinion.         ED Medication(s):  Medications   sodium chloride 0.9% bolus 1,000 mL 1,000 mL (0 mLs Intravenous Stopped 4/2/23 0576)   ketorolac injection 30 mg (30 mg Intravenous Given 4/2/23 8806)   metoclopramide HCl injection 10 mg (10 mg Intravenous Given 4/2/23 5481)       New Prescriptions    BUTALBITAL-ACETAMINOPHEN-CAFFEINE -40 MG (FIORICET, ESGIC) -40 MG PER TABLET    Take 1  tablet by mouth every 6 (six) hours as needed for Pain.    MECLIZINE (ANTIVERT) 25 MG TABLET    Take 1 tablet (25 mg total) by mouth 3 (three) times daily as needed.        Follow-up Information       Jarek Crespo MD.    Specialty: Family Medicine  Contact information:  [address] [facility]  [place] [address]  [phone]                                 Scribe Attestation:   Scribe #1: I performed the above scribed service and the documentation accurately describes the services I performed. I attest to the accuracy of the note.     Attending:   Physician Attestation Statement for Scribe #1: I, Jonas Lucio Jr., MD, personally performed the services described in this documentation, as scribed by Prasanna Ferro, in my presence, and it is both accurate and complete.           Clinical Impression       ICD-10-CM ICD-9-CM   1. Other migraine without status migrainosus, not intractable  G43.809 346.80   2. Dizziness  R42 780.4   3. Vertigo  R42 780.4       Disposition:   Disposition: Discharged  Condition: Stable       Jonas Lucio Jr., MD  04/02/23 0956

## 2023-04-02 NOTE — DISCHARGE INSTRUCTIONS
Her dizziness appears be vertigo.  This is likely related to her migraines.  Take Fioricet for headaches use Antivert for dizziness.  Do not drive or operate machinery until cleared by your doctor.  Follow-up with Dr. Crespo in 1-2 days for re-evaluation.  Return as needed for any worsening symptoms, problems, questions or concerns.

## 2023-04-02 NOTE — ED NOTES
Called to room for pt pulling out her iv. Found it intact on bed, blood oozing from insertions site on left hand. Carleen dressing applied with pressure , bleeding stopped.

## 2023-04-19 ENCOUNTER — HOSPITAL ENCOUNTER (EMERGENCY)
Facility: HOSPITAL | Age: 43
Discharge: HOME | End: 2023-04-19
Attending: EMERGENCY MEDICINE

## 2023-04-19 VITALS
OXYGEN SATURATION: 98 % | DIASTOLIC BLOOD PRESSURE: 106 MMHG | TEMPERATURE: 98 F | BODY MASS INDEX: 39.13 KG/M2 | HEIGHT: 60 IN | SYSTOLIC BLOOD PRESSURE: 177 MMHG | WEIGHT: 199.31 LBS | RESPIRATION RATE: 18 BRPM | HEART RATE: 101 BPM

## 2023-04-19 DIAGNOSIS — M79.675 PAIN OF TOE OF LEFT FOOT: Primary | ICD-10-CM

## 2023-04-19 PROCEDURE — 99284 EMERGENCY DEPT VISIT MOD MDM: CPT

## 2023-04-19 RX ORDER — NAPROXEN 500 MG/1
500 TABLET ORAL 2 TIMES DAILY WITH MEALS
Qty: 10 TABLET | Refills: 0 | Status: SHIPPED | OUTPATIENT
Start: 2023-04-19 | End: 2023-04-24

## 2023-04-19 RX ORDER — HYDROCODONE BITARTRATE AND ACETAMINOPHEN 5; 325 MG/1; MG/1
1 TABLET ORAL EVERY 6 HOURS PRN
Qty: 12 TABLET | Refills: 0 | Status: SHIPPED | OUTPATIENT
Start: 2023-04-19 | End: 2023-04-22

## 2023-04-19 NOTE — ED PROVIDER NOTES
Encounter Date: 4/19/2023       History     Chief Complaint   Patient presents with    Toe Pain     L pinky toe pain x 1 week- pt reports burning, throbbing, stinging sensation since last night     The history is provided by the patient.   Toe Pain  This is a new problem. Episode onset: One week ago.  She denies any known injury. The problem occurs constantly. The problem has not changed since onset.Pertinent negatives include no chest pain, no abdominal pain and no shortness of breath. The symptoms are aggravated by walking. Nothing relieves the symptoms. She has tried acetaminophen for the symptoms. The treatment provided no relief.   Review of patient's allergies indicates:   Allergen Reactions    Neurontin [gabapentin] Itching and Blisters    Percocet [oxycodone-acetaminophen] Itching and Blisters     Past Medical History:   Diagnosis Date    Back pain     Diabetes mellitus     Left knee pain     Miscarriage     x2     Past Surgical History:   Procedure Laterality Date    ANTERIOR CRUCIATE LIGAMENT REPAIR Left     DILATION AND CURETTAGE OF UTERUS      x2    LUMBAR FUSION      TIBIA FRACTURE SURGERY Right      No family history on file.  Social History     Tobacco Use    Smoking status: Every Day   Substance Use Topics    Alcohol use: No    Drug use: No     Review of Systems   Constitutional:  Negative for chills, fatigue and fever.   HENT:  Negative for ear pain.    Eyes:  Negative for pain.   Respiratory:  Negative for cough and shortness of breath.    Cardiovascular:  Negative for chest pain and palpitations.   Gastrointestinal:  Negative for abdominal pain, nausea and vomiting.   Musculoskeletal:  Negative for back pain, myalgias and neck pain.        Left 5th toe pain   Skin:  Negative for rash.   Neurological:  Negative for weakness and numbness.   All other systems reviewed and are negative.    Physical Exam     Initial Vitals [04/19/23 1443]   BP Pulse Resp Temp SpO2   (!) 177/106 101 18 98.1 °F (36.7 °C)  98 %      MAP       --         Physical Exam    Nursing note and vitals reviewed.  Constitutional: She appears well-developed and well-nourished. She is not diaphoretic. No distress.   HENT:   Head: Normocephalic and atraumatic.   Eyes: EOM are normal.   Neck: Neck supple.   Normal range of motion.  Cardiovascular:  Normal rate, regular rhythm and intact distal pulses.           Pulmonary/Chest: Breath sounds normal. No respiratory distress.   Musculoskeletal:         General: Normal range of motion.      Cervical back: Normal range of motion and neck supple.      Comments: +localized tenderness to left 5th toe.  There is no erythema or edema.  Tenderness is reproduced upon palpation.  Distal sensation is intact.  No open wound is noted.     Neurological: She is alert and oriented to person, place, and time. She has normal strength. No sensory deficit. GCS score is 15. GCS eye subscore is 4. GCS verbal subscore is 5. GCS motor subscore is 6.   Skin: Skin is warm and dry. Capillary refill takes less than 2 seconds.       ED Course   Procedures  Labs Reviewed - No data to display       Imaging Results              X-Ray Toe 2 or More Views Left (Final result)  Result time 04/19/23 15:39:22      Final result by Elgin Ta MD (04/19/23 15:39:22)                   Impression:      1.  Negative for acute process.      Electronically signed by: Elgin Ta MD  Date:    04/19/2023  Time:    15:39               Narrative:    EXAMINATION:  XR TOE 2 OR MORE VIEWS LEFT    CLINICAL HISTORY:  left 5th toe pain;    TECHNIQUE:  Three views of the left toes were performed    COMPARISON:  None.    FINDINGS:  The joint spaces are well maintained.  Negative for fracture or dislocation.  Negative for soft tissue abnormalities.                                       Medications - No data to display                  Discussed patient results with patient and she verbalized understanding with no further questions or concerns.  I  discussed with patient that evaluation in the ED does not suggest any emergent or life threatening medical conditions requiring immediate intervention beyond what was provided in the ED, and I believe patient is safe for discharge. Regardless, an unremarkable evaluation in the ED does not preclude the development or presence of a serious of life threatening condition. As such, patient was instructed to return immediately for any worsening or change in current symptoms.           Clinical Impression:   Final diagnoses:  [M79.675] Pain of toe of left foot (Primary)        ED Disposition Condition    Discharge Stable          ED Prescriptions       Medication Sig Dispense Start Date End Date Auth. Provider    naproxen (NAPROSYN) 500 MG tablet Take 1 tablet (500 mg total) by mouth 2 (two) times daily with meals. for 5 days 10 tablet 4/19/2023 4/24/2023 Jeffery Lim NP    HYDROcodone-acetaminophen (NORCO) 5-325 mg per tablet Take 1 tablet by mouth every 6 (six) hours as needed for Pain. 12 tablet 4/19/2023 4/22/2023 Jeffery Lim NP          Follow-up Information       Follow up With Specialties Details Why Contact Info    Jarek Crespo MD Family Medicine In 2 days  [address] [facility] [address] [phone]      [facility] Dept. Emergency Medicine  As needed, If symptoms worsen [address]  [facility] [address] [phone]             Jeffery Lim NP  04/19/23 7928

## 2023-05-08 ENCOUNTER — HOSPITAL ENCOUNTER (EMERGENCY)
Facility: HOSPITAL | Age: 43
Discharge: HOME | End: 2023-05-08
Attending: EMERGENCY MEDICINE

## 2023-05-08 VITALS
DIASTOLIC BLOOD PRESSURE: 99 MMHG | HEART RATE: 98 BPM | RESPIRATION RATE: 20 BRPM | TEMPERATURE: 98 F | SYSTOLIC BLOOD PRESSURE: 202 MMHG | WEIGHT: 200.06 LBS | HEIGHT: 60 IN | OXYGEN SATURATION: 97 % | BODY MASS INDEX: 39.28 KG/M2

## 2023-05-08 DIAGNOSIS — S99.922A INJURY OF TOE ON LEFT FOOT, INITIAL ENCOUNTER: Primary | ICD-10-CM

## 2023-05-08 DIAGNOSIS — M79.675 TOE PAIN, LEFT: ICD-10-CM

## 2023-05-08 PROCEDURE — 25000003 PHARM REV CODE 250: Performed by: NURSE PRACTITIONER

## 2023-05-08 PROCEDURE — 99284 EMERGENCY DEPT VISIT MOD MDM: CPT

## 2023-05-08 RX ORDER — IBUPROFEN 800 MG/1
800 TABLET ORAL EVERY 6 HOURS PRN
Qty: 20 TABLET | Refills: 0 | Status: SHIPPED | OUTPATIENT
Start: 2023-05-08

## 2023-05-08 RX ORDER — HYDROCODONE BITARTRATE AND ACETAMINOPHEN 5; 325 MG/1; MG/1
1 TABLET ORAL
Status: COMPLETED | OUTPATIENT
Start: 2023-05-08 | End: 2023-05-08

## 2023-05-08 RX ORDER — HYDROCODONE BITARTRATE AND ACETAMINOPHEN 5; 325 MG/1; MG/1
1 TABLET ORAL EVERY 4 HOURS PRN
Qty: 18 TABLET | Refills: 0 | Status: SHIPPED | OUTPATIENT
Start: 2023-05-08 | End: 2023-05-19 | Stop reason: SDUPTHER

## 2023-05-08 RX ADMIN — HYDROCODONE BITARTRATE AND ACETAMINOPHEN 1 TABLET: 5; 325 TABLET ORAL at 09:05

## 2023-05-09 NOTE — ED PROVIDER NOTES
Encounter Date: 5/8/2023       History     Chief Complaint   Patient presents with    Toe Injury     Left pinky toe pain, pt states thinks its broken     Patient is a 43-year-old female who presents with pain to the left 5th toe.  Patient reports injuring her toe a couple of weeks ago and was seen in the ER.  She states since that visit she is injured her toe multiple times.  Reports mild relief with the naproxen.  Patient shows no signs distress at this time.    Review of patient's allergies indicates:   Allergen Reactions    Neurontin [gabapentin] Itching and Blisters    Percocet [oxycodone-acetaminophen] Itching and Blisters     Past Medical History:   Diagnosis Date    Back pain     Diabetes mellitus     Left knee pain     Miscarriage     x2     Past Surgical History:   Procedure Laterality Date    ANTERIOR CRUCIATE LIGAMENT REPAIR Left     DILATION AND CURETTAGE OF UTERUS      x2    LUMBAR FUSION      TIBIA FRACTURE SURGERY Right      No family history on file.  Social History     Tobacco Use    Smoking status: Every Day   Substance Use Topics    Alcohol use: No    Drug use: No     Review of Systems   Constitutional:  Negative for fever.   HENT:  Negative for sore throat.    Respiratory:  Negative for shortness of breath.    Cardiovascular:  Negative for chest pain.   Gastrointestinal:  Negative for nausea.   Genitourinary:  Negative for dysuria.   Musculoskeletal:  Positive for arthralgias (left 5th toe). Negative for back pain.   Skin:  Negative for rash.   Neurological:  Negative for weakness.   Hematological:  Does not bruise/bleed easily.     Physical Exam     Initial Vitals [05/08/23 1842]   BP Pulse Resp Temp SpO2   (!) 202/99 98 16 97.9 °F (36.6 °C) 97 %      MAP       --         Physical Exam    Nursing note and vitals reviewed.  Constitutional: She appears well-developed and well-nourished.   HENT:   Head: Normocephalic and atraumatic.   Eyes: EOM are normal. Pupils are equal, round, and reactive to  light.   Neck: Neck supple.   Normal range of motion.  Cardiovascular:  Normal rate, regular rhythm, normal heart sounds and intact distal pulses.           Pulmonary/Chest: Breath sounds normal.   Abdominal: Abdomen is soft. Bowel sounds are normal.   Musculoskeletal:         General: Normal range of motion.      Cervical back: Normal range of motion and neck supple.        Feet:      Neurological: She is alert and oriented to person, place, and time. She has normal strength and normal reflexes.   Skin: Skin is warm and dry.       ED Course   Procedures  Labs Reviewed - No data to display       Imaging Results              X-Ray Toe 2 or More Views Left (Final result)  Result time 05/08/23 20:10:36      Final result by Claudy Tay MD (05/08/23 20:10:36)                   Impression:      No acute abnormality.      Electronically signed by: Claudy Tay  Date:    05/08/2023  Time:    20:10               Narrative:    EXAMINATION:  XR TOE 2 OR MORE VIEWS LEFT    CLINICAL HISTORY:  XR TOE 2 OR MORE VIEWS LEFT    COMPARISON:  None    FINDINGS:  Multiple radiographic views  were obtained.    No evidence of acute fracture or dislocation.  Bony mineralization is normal.  Soft tissues are unremarkable.                                       Medications   HYDROcodone-acetaminophen 5-325 mg per tablet 1 tablet (1 tablet Oral Given 5/8/23 2133)     Medical Decision Making:   ED Management:  I discussed with patient and/or family/caretaker that evaluation in the ED does not suggest any emergent or life threatening medical conditions requiring immediate intervention beyond what was provided in the ED, and I believe patient is safe for discharge. Regardless, an unremarkable evaluation in the ED does not preclude the development or presence of a serious of life threatening condition. As such, patient was instructed to return immediately for any worsening or change in current symptoms.                          Clinical  Impression:   Final diagnoses:  [S99.922A] Injury of toe on left foot, initial encounter (Primary)  [Q37.086] Toe pain, left        ED Disposition Condition    Discharge Stable          ED Prescriptions       Medication Sig Dispense Start Date End Date Auth. Provider    HYDROcodone-acetaminophen (NORCO) 5-325 mg per tablet Take 1 tablet by mouth every 4 (four) hours as needed for Pain. 18 tablet 5/8/2023 -- Maulik Read NP    ibuprofen (ADVIL,MOTRIN) 800 MG tablet Take 1 tablet (800 mg total) by mouth every 6 (six) hours as needed for Pain. 20 tablet 5/8/2023 -- Maulik Read NP          Follow-up Information       Follow up With Specialties Details Why Contact Info Additional Information    [facility] 2nd Floor Podiatry Schedule an appointment as soon as possible for a visit   [address] [facility] [id number] [phone] Please park on the [address] side and use the Clinic entrance. Check in on the 2nd floor.             Maulik Read NP  05/09/23 3698

## 2023-05-15 ENCOUNTER — HOSPITAL ENCOUNTER (EMERGENCY)
Facility: HOSPITAL | Age: 43
Discharge: HOME | End: 2023-05-15
Attending: EMERGENCY MEDICINE

## 2023-05-15 VITALS
SYSTOLIC BLOOD PRESSURE: 183 MMHG | OXYGEN SATURATION: 100 % | RESPIRATION RATE: 18 BRPM | WEIGHT: 201.75 LBS | HEART RATE: 73 BPM | TEMPERATURE: 98 F | DIASTOLIC BLOOD PRESSURE: 95 MMHG | BODY MASS INDEX: 39.4 KG/M2

## 2023-05-15 DIAGNOSIS — M79.673 FOOT PAIN: ICD-10-CM

## 2023-05-15 DIAGNOSIS — T14.8XXA HEMATOMA: Primary | ICD-10-CM

## 2023-05-15 PROCEDURE — 10060 I&D ABSCESS SIMPLE/SINGLE: CPT

## 2023-05-15 PROCEDURE — 99284 EMERGENCY DEPT VISIT MOD MDM: CPT | Mod: 25

## 2023-05-15 RX ORDER — SULFAMETHOXAZOLE AND TRIMETHOPRIM 800; 160 MG/1; MG/1
1 TABLET ORAL 2 TIMES DAILY
Qty: 14 TABLET | Refills: 0 | Status: SHIPPED | OUTPATIENT
Start: 2023-05-15 | End: 2023-05-22

## 2023-05-15 RX ORDER — TRAMADOL HYDROCHLORIDE 50 MG/1
50 TABLET ORAL EVERY 6 HOURS PRN
Qty: 12 TABLET | Refills: 0 | OUTPATIENT
Start: 2023-05-15 | End: 2023-07-21

## 2023-05-15 NOTE — ED PROVIDER NOTES
Encounter Date: 5/15/2023       History     Chief Complaint   Patient presents with    Toe Pain     Multiple visits for toe pain. Left 5th toe. Discoloration and swollen. Pain is worse today     43-year-old female with complaint of left little toe pain and swelling over the past several days.  Patient denies injury.  Patient denies trauma.  Patient reports pain is worsening.  No fever or chills.      Review of patient's allergies indicates:   Allergen Reactions    Neurontin [gabapentin] Itching and Blisters    Percocet [oxycodone-acetaminophen] Itching and Blisters     Past Medical History:   Diagnosis Date    Back pain     Diabetes mellitus     Left knee pain     Miscarriage     x2     Past Surgical History:   Procedure Laterality Date    ANTERIOR CRUCIATE LIGAMENT REPAIR Left     DILATION AND CURETTAGE OF UTERUS      x2    LUMBAR FUSION      TIBIA FRACTURE SURGERY Right      History reviewed. No pertinent family history.  Social History     Tobacco Use    Smoking status: Every Day   Substance Use Topics    Alcohol use: No    Drug use: No     Review of Systems   Constitutional:  Negative for fever.   HENT:  Negative for sore throat.    Respiratory:  Negative for shortness of breath.    Cardiovascular:  Negative for chest pain.   Gastrointestinal:  Negative for nausea.   Genitourinary:  Negative for dysuria.   Musculoskeletal:  Negative for back pain.        Left little toe pain    Skin:  Negative for rash.   Neurological:  Negative for weakness.   Hematological:  Does not bruise/bleed easily.     Physical Exam     Initial Vitals [05/15/23 0914]   BP Pulse Resp Temp SpO2   (!) 183/95 73 18 98.2 °F (36.8 °C) 100 %      MAP       --         Physical Exam    Nursing note and vitals reviewed.  Constitutional: She appears well-developed and well-nourished.   HENT:   Head: Normocephalic and atraumatic.   Eyes: Conjunctivae and EOM are normal. Pupils are equal, round, and reactive to light.   Neck: Neck supple.   Normal  range of motion.  Cardiovascular:  Normal rate, regular rhythm, normal heart sounds and intact distal pulses.           Pulmonary/Chest: Breath sounds normal.   Abdominal: Abdomen is soft. There is no abdominal tenderness. There is no rebound and no guarding.   Musculoskeletal:         General: Normal range of motion.      Cervical back: Normal range of motion and neck supple.      Comments: Tenderness and swelling distal volar aspect of left little toe with surrounding ecchymosis     Neurological: She is alert and oriented to person, place, and time. She has normal strength and normal reflexes.   Skin: Skin is warm and dry.   Psychiatric: She has a normal mood and affect. Her behavior is normal. Thought content normal.       ED Course   Procedures  Labs Reviewed - No data to display       Imaging Results    None          Medications - No data to display  Medical Decision Makin:30 AM  Patient refusing x-ray because she had 1 a few days ago.       Procedure  Left little to in injected with 5 cc plain lidocaine via digital block after left toe prepped with Betadine, small incision made at area of swelling and fluctuance on left little toe, nothing but blood expressed, devitalized tissue debrided            Clinical Impression:   Final diagnoses:  [M79.673] Foot pain  [T14.8XXA] Hematoma (Primary)        ED Disposition Condition    Discharge Stable          ED Prescriptions       Medication Sig Dispense Start Date End Date Auth. Provider    sulfamethoxazole-trimethoprim 800-160mg (BACTRIM DS) 800-160 mg Tab Take 1 tablet by mouth 2 (two) times daily. for 7 days 14 tablet 5/15/2023 2023 Clayton Mishra NP    traMADoL (ULTRAM) 50 mg tablet Take 1 tablet (50 mg total) by mouth every 6 (six) hours as needed for Pain. 12 tablet 5/15/2023 -- Clayton Mishra NP          Follow-up Information       Follow up With Specialties Details Why Contact Info    [facility]  Schedule an appointment as soon as  possible for a visit in 2 days  [phone]             Clayton Mishra, NP  05/15/23 1002

## 2023-05-19 ENCOUNTER — HOSPITAL ENCOUNTER (EMERGENCY)
Facility: HOSPITAL | Age: 43
Discharge: HOME | End: 2023-05-19
Attending: EMERGENCY MEDICINE

## 2023-05-19 VITALS
HEIGHT: 60 IN | DIASTOLIC BLOOD PRESSURE: 101 MMHG | TEMPERATURE: 98 F | BODY MASS INDEX: 38.74 KG/M2 | RESPIRATION RATE: 18 BRPM | SYSTOLIC BLOOD PRESSURE: 146 MMHG | WEIGHT: 197.31 LBS | HEART RATE: 67 BPM | OXYGEN SATURATION: 100 %

## 2023-05-19 DIAGNOSIS — M79.675 PAIN OF TOE OF LEFT FOOT: Primary | ICD-10-CM

## 2023-05-19 LAB
ALBUMIN SERPL BCP-MCNC: 4 G/DL (ref 3.5–5.2)
ALP SERPL-CCNC: 88 U/L (ref 55–135)
ALT SERPL W/O P-5'-P-CCNC: 21 U/L (ref 10–44)
ANION GAP SERPL CALC-SCNC: 10 MMOL/L (ref 8–16)
AST SERPL-CCNC: 13 U/L (ref 10–40)
BASOPHILS # BLD AUTO: 0.04 K/UL (ref 0–0.2)
BASOPHILS NFR BLD: 0.3 % (ref 0–1.9)
BILIRUB SERPL-MCNC: 0.4 MG/DL (ref 0.1–1)
BUN SERPL-MCNC: 21 MG/DL (ref 6–20)
CALCIUM SERPL-MCNC: 9.7 MG/DL (ref 8.7–10.5)
CHLORIDE SERPL-SCNC: 104 MMOL/L (ref 95–110)
CO2 SERPL-SCNC: 18 MMOL/L (ref 23–29)
CREAT SERPL-MCNC: 1.1 MG/DL (ref 0.5–1.4)
DIFFERENTIAL METHOD: ABNORMAL
EOSINOPHIL # BLD AUTO: 0.1 K/UL (ref 0–0.5)
EOSINOPHIL NFR BLD: 0.8 % (ref 0–8)
ERYTHROCYTE [DISTWIDTH] IN BLOOD BY AUTOMATED COUNT: 15.1 % (ref 11.5–14.5)
EST. GFR  (NO RACE VARIABLE): >60 ML/MIN/1.73 M^2
GLUCOSE SERPL-MCNC: 176 MG/DL (ref 70–110)
HCT VFR BLD AUTO: 46.9 % (ref 37–48.5)
HGB BLD-MCNC: 14.8 G/DL (ref 12–16)
IMM GRANULOCYTES # BLD AUTO: 0.11 K/UL (ref 0–0.04)
IMM GRANULOCYTES NFR BLD AUTO: 0.8 % (ref 0–0.5)
LYMPHOCYTES # BLD AUTO: 2.7 K/UL (ref 1–4.8)
LYMPHOCYTES NFR BLD: 19.6 % (ref 18–48)
MCH RBC QN AUTO: 24.1 PG (ref 27–31)
MCHC RBC AUTO-ENTMCNC: 31.6 G/DL (ref 32–36)
MCV RBC AUTO: 76 FL (ref 82–98)
MONOCYTES # BLD AUTO: 0.9 K/UL (ref 0.3–1)
MONOCYTES NFR BLD: 6.7 % (ref 4–15)
NEUTROPHILS # BLD AUTO: 9.8 K/UL (ref 1.8–7.7)
NEUTROPHILS NFR BLD: 71.8 % (ref 38–73)
NRBC BLD-RTO: 0 /100 WBC
PLATELET # BLD AUTO: 361 K/UL (ref 150–450)
PMV BLD AUTO: 8.5 FL (ref 9.2–12.9)
POTASSIUM SERPL-SCNC: 5 MMOL/L (ref 3.5–5.1)
PROT SERPL-MCNC: 9 G/DL (ref 6–8.4)
RBC # BLD AUTO: 6.15 M/UL (ref 4–5.4)
SODIUM SERPL-SCNC: 132 MMOL/L (ref 136–145)
WBC # BLD AUTO: 13.65 K/UL (ref 3.9–12.7)

## 2023-05-19 PROCEDURE — 80053 COMPREHEN METABOLIC PANEL: CPT | Performed by: NURSE PRACTITIONER

## 2023-05-19 PROCEDURE — 85025 COMPLETE CBC W/AUTO DIFF WBC: CPT | Performed by: NURSE PRACTITIONER

## 2023-05-19 PROCEDURE — 99284 EMERGENCY DEPT VISIT MOD MDM: CPT

## 2023-05-19 RX ORDER — KETOROLAC TROMETHAMINE 10 MG/1
10 TABLET, FILM COATED ORAL 3 TIMES DAILY
Qty: 15 TABLET | Refills: 0 | Status: SHIPPED | OUTPATIENT
Start: 2023-05-19 | End: 2023-05-24

## 2023-05-19 RX ORDER — HYDROCODONE BITARTRATE AND ACETAMINOPHEN 5; 325 MG/1; MG/1
1 TABLET ORAL EVERY 4 HOURS PRN
Qty: 10 TABLET | Refills: 0 | Status: SHIPPED | OUTPATIENT
Start: 2023-05-19 | End: 2023-07-21

## 2023-05-20 NOTE — ED PROVIDER NOTES
Encounter Date: 5/19/2023       History     Chief Complaint   Patient presents with    Toe Pain     Pt c/o L toe pain (pinky toe), pt had an abscess drained on Mon and given bactrim. Hx of diabetes        Patient complains of left 5th toe pain states that recently she had part of an hematoma removed in the ED and put on Bactrim.  States that the pain is not worse but is just not getting better..      Review of patient's allergies indicates:   Allergen Reactions    Neurontin [gabapentin] Itching and Blisters    Percocet [oxycodone-acetaminophen] Itching and Blisters     Past Medical History:   Diagnosis Date    Back pain     Diabetes mellitus     Left knee pain     Miscarriage     x2     Past Surgical History:   Procedure Laterality Date    ANTERIOR CRUCIATE LIGAMENT REPAIR Left     DILATION AND CURETTAGE OF UTERUS      x2    LUMBAR FUSION      TIBIA FRACTURE SURGERY Right      No family history on file.  Social History     Tobacco Use    Smoking status: Every Day   Substance Use Topics    Alcohol use: No    Drug use: No     Review of Systems   Constitutional:  Negative for fever.   HENT:  Negative for sore throat.    Respiratory:  Negative for shortness of breath.    Cardiovascular:  Negative for chest pain.   Gastrointestinal:  Negative for nausea.   Genitourinary:  Negative for dysuria.   Musculoskeletal:  Negative for back pain.   Skin:  Negative for rash.   Neurological:  Negative for weakness.   Hematological:  Does not bruise/bleed easily.     Physical Exam     Initial Vitals [05/19/23 1256]   BP Pulse Resp Temp SpO2   (!) 146/101 67 18 98.1 °F (36.7 °C) 100 %      MAP       --         Physical Exam    Nursing note and vitals reviewed.  Constitutional: She appears well-developed and well-nourished. She is not diaphoretic. She is active.  Non-toxic appearance. No distress.   HENT:   Head: Normocephalic and atraumatic.   Eyes: Conjunctivae are normal. Right eye exhibits no discharge. Left eye exhibits no  discharge. No scleral icterus.   Neck:   Normal range of motion.  Cardiovascular:  Normal rate, regular rhythm and intact distal pulses.           No murmur heard.  Pulmonary/Chest: Breath sounds normal. No respiratory distress. She has no wheezes.   Abdominal: She exhibits no distension.   Musculoskeletal:         General: No tenderness. Normal range of motion.      Cervical back: Normal range of motion.      Comments: Left 5th toe appears to have a small ulceration to the plantar aspect in light erythematous and bluish discoloration.  Image attached to the chart     Neurological: She is alert and oriented to person, place, and time. No cranial nerve deficit. GCS score is 15. GCS eye subscore is 4. GCS verbal subscore is 5. GCS motor subscore is 6.   Skin: Skin is warm and dry. Capillary refill takes less than 2 seconds. No rash noted.   Psychiatric: She has a normal mood and affect. Her behavior is normal. Judgment and thought content normal.       ED Course   Procedures  Labs Reviewed   CBC W/ AUTO DIFFERENTIAL - Abnormal; Notable for the following components:       Result Value    WBC 13.65 (*)     RBC 6.15 (*)     MCV 76 (*)     MCH 24.1 (*)     MCHC 31.6 (*)     RDW 15.1 (*)     MPV 8.5 (*)     Immature Granulocytes 0.8 (*)     Gran # (ANC) 9.8 (*)     Immature Grans (Abs) 0.11 (*)     All other components within normal limits   COMPREHENSIVE METABOLIC PANEL - Abnormal; Notable for the following components:    Sodium 132 (*)     CO2 18 (*)     Glucose 176 (*)     BUN 21 (*)     Total Protein 9.0 (*)     All other components within normal limits          Imaging Results              X-Ray Toe 2 or More Views Left (Final result)  Result time 05/19/23 13:23:08      Final result by Prasanna Cruz MD (05/19/23 13:23:08)                   Impression:      Negative exam.      Electronically signed by: Prasanna Cruz  Date:    05/19/2023  Time:    13:23               Narrative:    EXAMINATION:  XR TOE 2 OR MORE VIEWS  LEFT    CLINICAL HISTORY:  Left toe pain.    TECHNIQUE:  Standard radiography performed.    COMPARISON:  05/08/2023.    FINDINGS:  Bone density and architecture are normal.  No acute findings.                                       Medications - No data to display  Medical Decision Making:   Secure chat with Podiatry okay to follow-up with them in the clinic                        Clinical Impression:   Final diagnoses:  [M79.675] Pain of toe of left foot (Primary)        ED Disposition Condition    Discharge Stable          ED Prescriptions       Medication Sig Dispense Start Date End Date Auth. Provider    HYDROcodone-acetaminophen (NORCO) 5-325 mg per tablet Take 1 tablet by mouth every 4 (four) hours as needed for Pain. 10 tablet 5/19/2023 -- Getachew Crespo NP    ketorolac (TORADOL) 10 mg tablet Take 1 tablet (10 mg total) by mouth 3 (three) times daily. for 5 days 15 tablet 5/19/2023 5/24/2023 Getachew Crespo NP          Follow-up Information       Follow up With Specialties Details Why Contact Info    N[facility] Watson DPM Podiatry Schedule an appointment as soon as possible for a visit today  [address] [place] [address] Dr Ninfa [place]DERON [id number]  [phone]               Getachew Crespo NP  05/19/23 7474

## 2023-07-21 ENCOUNTER — HOSPITAL ENCOUNTER (EMERGENCY)
Facility: HOSPITAL | Age: 43
Discharge: HOME | End: 2023-07-21
Attending: EMERGENCY MEDICINE

## 2023-07-21 VITALS
HEART RATE: 56 BPM | RESPIRATION RATE: 22 BRPM | TEMPERATURE: 98 F | BODY MASS INDEX: 37.3 KG/M2 | SYSTOLIC BLOOD PRESSURE: 154 MMHG | WEIGHT: 190 LBS | HEIGHT: 60 IN | DIASTOLIC BLOOD PRESSURE: 93 MMHG | OXYGEN SATURATION: 100 %

## 2023-07-21 DIAGNOSIS — R07.9 CHEST PAIN: ICD-10-CM

## 2023-07-21 DIAGNOSIS — R07.9 CHEST PAIN, UNSPECIFIED TYPE: ICD-10-CM

## 2023-07-21 DIAGNOSIS — R20.2 PARESTHESIAS: ICD-10-CM

## 2023-07-21 DIAGNOSIS — R51.9 ACUTE NONINTRACTABLE HEADACHE, UNSPECIFIED HEADACHE TYPE: Primary | ICD-10-CM

## 2023-07-21 LAB
ALBUMIN SERPL BCP-MCNC: 3.3 G/DL (ref 3.5–5.2)
ALP SERPL-CCNC: 79 U/L (ref 55–135)
ALT SERPL W/O P-5'-P-CCNC: 11 U/L (ref 10–44)
ANION GAP SERPL CALC-SCNC: 9 MMOL/L (ref 8–16)
AST SERPL-CCNC: 10 U/L (ref 10–40)
BASOPHILS # BLD AUTO: 0.03 K/UL (ref 0–0.2)
BASOPHILS NFR BLD: 0.2 % (ref 0–1.9)
BILIRUB SERPL-MCNC: 0.2 MG/DL (ref 0.1–1)
BNP SERPL-MCNC: 13 PG/ML (ref 0–99)
BUN SERPL-MCNC: 12 MG/DL (ref 6–20)
CALCIUM SERPL-MCNC: 9.4 MG/DL (ref 8.7–10.5)
CHLORIDE SERPL-SCNC: 109 MMOL/L (ref 95–110)
CO2 SERPL-SCNC: 21 MMOL/L (ref 23–29)
CREAT SERPL-MCNC: 0.8 MG/DL (ref 0.5–1.4)
DIFFERENTIAL METHOD: ABNORMAL
EOSINOPHIL # BLD AUTO: 0.2 K/UL (ref 0–0.5)
EOSINOPHIL NFR BLD: 1.3 % (ref 0–8)
ERYTHROCYTE [DISTWIDTH] IN BLOOD BY AUTOMATED COUNT: 14.6 % (ref 11.5–14.5)
EST. GFR  (NO RACE VARIABLE): >60 ML/MIN/1.73 M^2
GLUCOSE SERPL-MCNC: 110 MG/DL (ref 70–110)
HCT VFR BLD AUTO: 41.1 % (ref 37–48.5)
HGB BLD-MCNC: 13 G/DL (ref 12–16)
IMM GRANULOCYTES # BLD AUTO: 0.08 K/UL (ref 0–0.04)
IMM GRANULOCYTES NFR BLD AUTO: 0.5 % (ref 0–0.5)
LYMPHOCYTES # BLD AUTO: 3.5 K/UL (ref 1–4.8)
LYMPHOCYTES NFR BLD: 23.8 % (ref 18–48)
MCH RBC QN AUTO: 24 PG (ref 27–31)
MCHC RBC AUTO-ENTMCNC: 31.6 G/DL (ref 32–36)
MCV RBC AUTO: 76 FL (ref 82–98)
MONOCYTES # BLD AUTO: 0.9 K/UL (ref 0.3–1)
MONOCYTES NFR BLD: 6 % (ref 4–15)
NEUTROPHILS # BLD AUTO: 10 K/UL (ref 1.8–7.7)
NEUTROPHILS NFR BLD: 68.2 % (ref 38–73)
NRBC BLD-RTO: 0 /100 WBC
PLATELET # BLD AUTO: 388 K/UL (ref 150–450)
PMV BLD AUTO: 8.9 FL (ref 9.2–12.9)
POTASSIUM SERPL-SCNC: 4.3 MMOL/L (ref 3.5–5.1)
PROT SERPL-MCNC: 7.3 G/DL (ref 6–8.4)
RBC # BLD AUTO: 5.42 M/UL (ref 4–5.4)
SODIUM SERPL-SCNC: 139 MMOL/L (ref 136–145)
TROPONIN I SERPL DL<=0.01 NG/ML-MCNC: 0.01 NG/ML (ref 0–0.03)
WBC # BLD AUTO: 14.7 K/UL (ref 3.9–12.7)

## 2023-07-21 PROCEDURE — 63600175 PHARM REV CODE 636 W HCPCS: Performed by: EMERGENCY MEDICINE

## 2023-07-21 PROCEDURE — 84484 ASSAY OF TROPONIN QUANT: CPT | Performed by: EMERGENCY MEDICINE

## 2023-07-21 PROCEDURE — 25000003 PHARM REV CODE 250: Performed by: EMERGENCY MEDICINE

## 2023-07-21 PROCEDURE — 83880 ASSAY OF NATRIURETIC PEPTIDE: CPT | Performed by: EMERGENCY MEDICINE

## 2023-07-21 PROCEDURE — 96375 TX/PRO/DX INJ NEW DRUG ADDON: CPT

## 2023-07-21 PROCEDURE — 93010 EKG 12-LEAD: ICD-10-PCS | Mod: ,,, | Performed by: INTERNAL MEDICINE

## 2023-07-21 PROCEDURE — 93010 ELECTROCARDIOGRAM REPORT: CPT | Mod: ,,, | Performed by: INTERNAL MEDICINE

## 2023-07-21 PROCEDURE — 99285 EMERGENCY DEPT VISIT HI MDM: CPT | Mod: 25

## 2023-07-21 PROCEDURE — 96361 HYDRATE IV INFUSION ADD-ON: CPT

## 2023-07-21 PROCEDURE — 85025 COMPLETE CBC W/AUTO DIFF WBC: CPT | Performed by: EMERGENCY MEDICINE

## 2023-07-21 PROCEDURE — 93005 ELECTROCARDIOGRAM TRACING: CPT

## 2023-07-21 PROCEDURE — 80053 COMPREHEN METABOLIC PANEL: CPT | Performed by: EMERGENCY MEDICINE

## 2023-07-21 PROCEDURE — 96374 THER/PROPH/DIAG INJ IV PUSH: CPT

## 2023-07-21 RX ORDER — MORPHINE SULFATE 4 MG/ML
4 INJECTION, SOLUTION INTRAMUSCULAR; INTRAVENOUS
Status: COMPLETED | OUTPATIENT
Start: 2023-07-21 | End: 2023-07-21

## 2023-07-21 RX ORDER — KETOROLAC TROMETHAMINE 30 MG/ML
15 INJECTION, SOLUTION INTRAMUSCULAR; INTRAVENOUS
Status: COMPLETED | OUTPATIENT
Start: 2023-07-21 | End: 2023-07-21

## 2023-07-21 RX ORDER — NAPROXEN 500 MG/1
500 TABLET ORAL 2 TIMES DAILY WITH MEALS
Qty: 20 TABLET | Refills: 0 | Status: SHIPPED | OUTPATIENT
Start: 2023-07-21

## 2023-07-21 RX ORDER — METOCLOPRAMIDE HYDROCHLORIDE 5 MG/ML
10 INJECTION INTRAMUSCULAR; INTRAVENOUS
Status: COMPLETED | OUTPATIENT
Start: 2023-07-21 | End: 2023-07-21

## 2023-07-21 RX ORDER — DIPHENHYDRAMINE HYDROCHLORIDE 50 MG/ML
12.5 INJECTION INTRAMUSCULAR; INTRAVENOUS
Status: COMPLETED | OUTPATIENT
Start: 2023-07-21 | End: 2023-07-21

## 2023-07-21 RX ORDER — TRAMADOL HYDROCHLORIDE 50 MG/1
50 TABLET ORAL EVERY 6 HOURS PRN
Qty: 10 TABLET | Refills: 0 | Status: SHIPPED | OUTPATIENT
Start: 2023-07-21

## 2023-07-21 RX ORDER — METOCLOPRAMIDE 10 MG/1
10 TABLET ORAL EVERY 6 HOURS PRN
Qty: 16 TABLET | Refills: 0 | Status: SHIPPED | OUTPATIENT
Start: 2023-07-21

## 2023-07-21 RX ADMIN — KETOROLAC TROMETHAMINE 15 MG: 30 INJECTION, SOLUTION INTRAMUSCULAR; INTRAVENOUS at 05:07

## 2023-07-21 RX ADMIN — SODIUM CHLORIDE 1000 ML: 9 INJECTION, SOLUTION INTRAVENOUS at 05:07

## 2023-07-21 RX ADMIN — DIPHENHYDRAMINE HYDROCHLORIDE 12.5 MG: 50 INJECTION, SOLUTION INTRAMUSCULAR; INTRAVENOUS at 05:07

## 2023-07-21 RX ADMIN — MORPHINE SULFATE 4 MG: 4 INJECTION INTRAVENOUS at 05:07

## 2023-07-21 RX ADMIN — METOCLOPRAMIDE 10 MG: 5 INJECTION, SOLUTION INTRAMUSCULAR; INTRAVENOUS at 05:07

## 2023-07-21 NOTE — ED PROVIDER NOTES
SCRIBE #1 NOTE: I, My Brian, am scribing for, and in the presence of, Lucien Alva MD. I have scribed the entire note.       History     Chief Complaint   Patient presents with    Chest Pain     Pt presents to ed via ems c/o chest tightness that started about 2 hours ago, also c/o HA that started yesterday, denies SOB and N/V. Pt also states the L side of her face is tingling.      Review of patient's allergies indicates:   Allergen Reactions    Neurontin [gabapentin] Itching and Blisters    Percocet [oxycodone-acetaminophen] Itching and Blisters         History of Present Illness     HPI    7/21/2023, 5:04 PM  History obtained from the patient      History of Present Illness: Jeanna Helm is a 43 y.o. female patient with a PMHx of DM and stroke in the past who presents to the Emergency Department for evaluation of headaches which onset 2 hours ago. Pt was dx with a possible stroke and vertigo in October last year. Pt had tingling in her right leg, right arm and cheeks and then also had sharp CP. Pt could not drive herself to the ER, so she called EMS. Pt smoke a pack per day for 18 years. Symptoms are constant and moderate in severity. No mitigating or exacerbating factors reported. Associated sxs include nausea, dizziness, and CP. Patient denies any fever, v/d, SOB, and all other sxs at this time. Prior Tx includes aspirin en route. No further complaints or concerns at this time.       Arrival mode: EMS    PCP: Jarek Crespo MD        Past Medical History:  Past Medical History:   Diagnosis Date    Back pain     Diabetes mellitus     Left knee pain     Miscarriage     x2    Stroke        Past Surgical History:  Past Surgical History:   Procedure Laterality Date    ANTERIOR CRUCIATE LIGAMENT REPAIR Left     DILATION AND CURETTAGE OF UTERUS      x2    LUMBAR FUSION      TIBIA FRACTURE SURGERY Right          Family History:  Family History   Problem Relation Age of Onset    Hypertension Mother     Heart disease  Mother     Fibromyalgia Mother     Diabetes Father        Social History:  Social History     Tobacco Use    Smoking status: Every Day     Packs/day: 1.00     Years: 20.00     Pack years: 20.00     Types: Cigarettes    Smokeless tobacco: Not on file   Substance and Sexual Activity    Alcohol use: No    Drug use: No    Sexual activity: Not on file        Review of Systems     Review of Systems   Constitutional:  Negative for fever.   Respiratory:  Negative for shortness of breath.    Cardiovascular:  Positive for chest pain.   Gastrointestinal:  Positive for nausea. Negative for diarrhea and vomiting.   Neurological:  Positive for dizziness and headaches.      Physical Exam     Initial Vitals [07/21/23 1518]   BP Pulse Resp Temp SpO2   122/77 98 18 98.2 °F (36.8 °C) 97 %      MAP       --          Physical Exam  Neurological:      Mental Status: She is alert and oriented to person, place, and time.      GCS: GCS eye subscore is 4. GCS verbal subscore is 5. GCS motor subscore is 6.      Cranial Nerves: Cranial nerves 2-12 are intact.      Sensory: Sensation is intact.      Motor: Motor function is intact.      Coordination: Coordination is intact.      Gait: Gait is intact.      Deep Tendon Reflexes: Reflexes are normal and symmetric.     Nursing Notes and Vital Signs Reviewed.  Constitutional: Patient is in no acute distress. Well-developed and well-nourished.  Head: Atraumatic. Normocephalic.  Eyes: PERRL. EOM intact. Conjunctivae are not pale. No scleral icterus.  ENT: Mucous membranes are moist.  Neck: Supple. Full ROM.   Cardiovascular: Regular rate. Regular rhythm. No murmurs, rubs, or gallops. Distal pulses are 2+ and symmetric.  Pulmonary/Chest: No respiratory distress. Clear to auscultation bilaterally. No wheezing or rales.  Abdominal: Soft and non-distended.  There is no tenderness.  No rebound, guarding, or rigidity.  Genitourinary: No CVA tenderness  Musculoskeletal: Moves all extremities. No obvious  deformities. No edema.   Skin: Warm and dry.  Neurological:  Alert, awake, and appropriate.  Normal speech.  No acute focal neurological deficits are appreciated.  Psychiatric: Normal affect. Good eye contact. Appropriate in content.     ED Course   Procedures  ED Vital Signs:  Vitals:    07/21/23 1518 07/21/23 1712 07/21/23 1717 07/21/23 1745   BP: 122/77   (!) 154/93   Pulse: 98 67  (!) 56   Resp: 18  20 (!) 22   Temp: 98.2 °F (36.8 °C)      TempSrc: Oral      SpO2: 97%   100%   Weight: 86.2 kg (190 lb)      Height: 5' (1.524 m)          Abnormal Lab Results:  Labs Reviewed   CBC W/ AUTO DIFFERENTIAL - Abnormal; Notable for the following components:       Result Value    WBC 14.70 (*)     RBC 5.42 (*)     MCV 76 (*)     MCH 24.0 (*)     MCHC 31.6 (*)     RDW 14.6 (*)     MPV 8.9 (*)     Gran # (ANC) 10.0 (*)     Immature Grans (Abs) 0.08 (*)     All other components within normal limits   COMPREHENSIVE METABOLIC PANEL - Abnormal; Notable for the following components:    CO2 21 (*)     Albumin 3.3 (*)     All other components within normal limits   TROPONIN I   B-TYPE NATRIURETIC PEPTIDE        All Lab Results:  Results for orders placed or performed during the hospital encounter of 07/21/23   CBC auto differential   Result Value Ref Range    WBC 14.70 (H) 3.90 - 12.70 K/uL    RBC 5.42 (H) 4.00 - 5.40 M/uL    Hemoglobin 13.0 12.0 - 16.0 g/dL    Hematocrit 41.1 37.0 - 48.5 %    MCV 76 (L) 82 - 98 fL    MCH 24.0 (L) 27.0 - 31.0 pg    MCHC 31.6 (L) 32.0 - 36.0 g/dL    RDW 14.6 (H) 11.5 - 14.5 %    Platelets 388 150 - 450 K/uL    MPV 8.9 (L) 9.2 - 12.9 fL    Immature Granulocytes 0.5 0.0 - 0.5 %    Gran # (ANC) 10.0 (H) 1.8 - 7.7 K/uL    Immature Grans (Abs) 0.08 (H) 0.00 - 0.04 K/uL    Lymph # 3.5 1.0 - 4.8 K/uL    Mono # 0.9 0.3 - 1.0 K/uL    Eos # 0.2 0.0 - 0.5 K/uL    Baso # 0.03 0.00 - 0.20 K/uL    nRBC 0 0 /100 WBC    Gran % 68.2 38.0 - 73.0 %    Lymph % 23.8 18.0 - 48.0 %    Mono % 6.0 4.0 - 15.0 %    Eosinophil  % 1.3 0.0 - 8.0 %    Basophil % 0.2 0.0 - 1.9 %    Differential Method Automated    Comprehensive metabolic panel   Result Value Ref Range    Sodium 139 136 - 145 mmol/L    Potassium 4.3 3.5 - 5.1 mmol/L    Chloride 109 95 - 110 mmol/L    CO2 21 (L) 23 - 29 mmol/L    Glucose 110 70 - 110 mg/dL    BUN 12 6 - 20 mg/dL    Creatinine 0.8 0.5 - 1.4 mg/dL    Calcium 9.4 8.7 - 10.5 mg/dL    Total Protein 7.3 6.0 - 8.4 g/dL    Albumin 3.3 (L) 3.5 - 5.2 g/dL    Total Bilirubin 0.2 0.1 - 1.0 mg/dL    Alkaline Phosphatase 79 55 - 135 U/L    AST 10 10 - 40 U/L    ALT 11 10 - 44 U/L    eGFR >60 >60 mL/min/1.73 m^2    Anion Gap 9 8 - 16 mmol/L   Troponin I #1   Result Value Ref Range    Troponin I 0.011 0.000 - 0.026 ng/mL   BNP   Result Value Ref Range    BNP 13 0 - 99 pg/mL        Imaging Results:  Imaging Results              X-Ray Chest PA And Lateral (Final result)  Result time 07/21/23 18:07:11      Final result by Claudy Tay MD (07/21/23 18:07:11)                   Impression:      No acute process seen      Electronically signed by: Claudy Tay  Date:    07/21/2023  Time:    18:07               Narrative:    EXAMINATION:  XR CHEST PA AND LATERAL    CLINICAL HISTORY:  Chest Pain;    TECHNIQUE:  PA and lateral views of the chest were performed.    COMPARISON:  None    FINDINGS:  Lungs are clear.  No acute osseous injury.  Cardiac silhouette within normal limits.                                       CT Head Without Contrast (Final result)  Result time 07/21/23 17:43:28      Final result by Claudy Tay MD (07/21/23 17:43:28)                   Impression:      No acute abnormality.    All CT scans   are performed using dose optimization techniques including the following: automated exposure control; adjustment of the mA and/or kV; use of iterative reconstruction technique.  Dose modulation was employed for ALARA by means of: Automated exposure control; adjustment of the mA and/or kV according to patient size (this  includes techniques or standardized protocols for targeted exams where dose is matched to indication/reason for exam; i.e. extremities or head); and/or use of iterative reconstructive technique.      Electronically signed by: Claudy Tay  Date:    07/21/2023  Time:    17:43               Narrative:    EXAMINATION:  CT HEAD WITHOUT CONTRAST    CLINICAL HISTORY:  Neuro deficit, acute, stroke suspected;    TECHNIQUE:  Low dose axial CT images obtained throughout the head without intravenous contrast. Sagittal and coronal reconstructions were performed.    COMPARISON:  None.    FINDINGS:  Intracranial compartment:    Ventricles and sulci are normal in size for age without evidence of hydrocephalus. No extra-axial blood or fluid collections.    No parenchymal mass, hemorrhage, edema or major vascular distribution infarct.    Skull/extracranial contents (limited evaluation): No fracture. Mastoid air cells and paranasal sinuses are essentially clear.                                       The EKG was ordered, reviewed, and independently interpreted by the ED provider.  Interpretation time: 17:12  Rate: 61 BPM  Rhythm: normal sinus rhythm with sinus arrhythmia.  Interpretation: No acute ST changes. No STEMI.             The Emergency Provider reviewed the vital signs and test results, which are outlined above.     ED Discussion     Medical Decision Making  Problems Addressed:  Acute nonintractable headache, unspecified headache type: acute illness or injury that poses a threat to life or bodily functions  Chest pain: acute illness or injury that poses a threat to life or bodily functions  Paresthesias: undiagnosed new problem with uncertain prognosis    Amount and/or Complexity of Data Reviewed  External Data Reviewed: notes.     Details: Medical hx: DM and stroke. Surgical Hx: anterior cruciate ligament repair, tibia fracture surgery.  Labs: ordered. Decision-making details documented in ED Course.     Details:  Trop-0.011, WBC-14.7, H/H-13/41.1, Creat=0.8  Radiology: ordered and independent interpretation performed.     Details: CT HEAD without contrast: no ICH noted.  ECG/medicine tests: ordered and independent interpretation performed.     Details: 61 BPM. Nomal sinus rhythm. No acute ST changes. No STEMI.    Risk  Prescription drug management.  Parenteral controlled substances.  Decision regarding hospitalization.  Diagnosis or treatment significantly limited by social determinants of health.  Risk Details: Patient's headache is either consistent with previous headache and/or lacks features concerning for emergent or life threatening condition.  I do not suspect SAH, meningitis, increased IC pressure, infectious, toxic, vascular, CNS, or other EMC.  I have discussed this at length with patient.     I have discussed with patient chest pain precautions, specifically to return for worsening chest pain, shortness of breath, fever, or any concern.  I have low suspicion for cardiopulmonary, vascular, infectious, respiratory, or other emergent medical condition based on my evaluation in the ED.        6:06 PM: Reassessed pt at this time. Discussed with pt all pertinent ED information and results. Discussed pt dx and plan of tx. Gave pt all f/u and return to the ED instructions. All questions and concerns were addressed at this time. Pt expresses understanding of information and instructions, and is comfortable with plan to discharge. Pt is stable for discharge.    I discussed with patient and/or family/caretaker that evaluation in the ED does not suggest any emergent or life threatening medical conditions requiring immediate intervention beyond what was provided in the ED, and I believe patient is safe for discharge.  Regardless, an unremarkable evaluation in the ED does not preclude the development or presence of a serious of life threatening condition. As such, patient was instructed to return immediately for any worsening  or change in current symptoms.            Additional MDM:     NIH Stroke Scale:   Interval = baseline (upon arrival/admit)  Level of consciousness = 0 - alert  LOC questions = 0 - answers both correctly  LOC commands = 0 - performs both correctly  Best gaze = 0 - normal  Visual = 0 - no visual loss  Facial palsy = 0 - normal  Motor left arm =  0 - no drift  Motor right arm =  0 - no drift  Motor left leg = 0 - no drift  Motor right leg =  0 - no drift  Limb ataxia = 0 - absent  Sensory = 0 - normal  Best language = 0 - no aphasia  Dysarthria = 0 - normal articulation  Extinction and inattention = 0 - no neglect  NIH Stroke Scale Total = 0     ED Medication(s):  Medications   ketorolac injection 15 mg (15 mg Intravenous Given 7/21/23 1718)   metoclopramide HCl injection 10 mg (10 mg Intravenous Given 7/21/23 1717)   morphine injection 4 mg (4 mg Intravenous Given 7/21/23 1717)   sodium chloride 0.9% bolus 1,000 mL 1,000 mL (0 mLs Intravenous Stopped 7/21/23 1814)   diphenhydrAMINE injection 12.5 mg (12.5 mg Intravenous Given 7/21/23 1716)       Discharge Medication List as of 7/21/2023  6:06 PM        START taking these medications    Details   metoclopramide HCl (REGLAN) 10 MG tablet Take 1 tablet (10 mg total) by mouth every 6 (six) hours as needed (headache or nausea)., Starting Fri 7/21/2023, Normal      naproxen (NAPROSYN) 500 MG tablet Take 1 tablet (500 mg total) by mouth 2 (two) times daily with meals., Starting Fri 7/21/2023, Normal              Follow-up Information       Jarek Crespo MD. Schedule an appointment as soon as possible for a visit in 3 days.    Specialty: Family Medicine  Contact information:  [address] [facility] [address] [phone]               Schedule an appointment as soon as possible for a visit  with [facility].    Why: As needed  Contact information:  [address] [facility] [address] [phone]                                 Scribe  Attestation:   Scribe #1: I performed the above scribed service and the documentation accurately describes the services I performed. I attest to the accuracy of the note.     Attending:   Physician Attestation Statement for Scribe #1: I, Lucien Alva MD, personally performed the services described in this documentation, as scribed by My Brian, in my presence, and it is both accurate and complete.           Clinical Impression       ICD-10-CM ICD-9-CM   1. Acute nonintractable headache, unspecified headache type  R51.9 784.0   2. Chest pain  R07.9 786.50   3. Chest pain, unspecified type  R07.9 786.50   4. Paresthesias  R20.2 782.0       Disposition:   Disposition: Discharged  Condition: Stable      Lucien Alva MD  07/22/23 1122

## 2023-08-24 ENCOUNTER — HOSPITAL ENCOUNTER (EMERGENCY)
Facility: HOSPITAL | Age: 43
Discharge: HOME | End: 2023-08-24
Attending: EMERGENCY MEDICINE

## 2023-08-24 VITALS
SYSTOLIC BLOOD PRESSURE: 166 MMHG | BODY MASS INDEX: 40.08 KG/M2 | RESPIRATION RATE: 20 BRPM | DIASTOLIC BLOOD PRESSURE: 97 MMHG | HEART RATE: 72 BPM | WEIGHT: 204.13 LBS | OXYGEN SATURATION: 100 % | TEMPERATURE: 98 F | HEIGHT: 60 IN

## 2023-08-24 DIAGNOSIS — R51.9 ACUTE NONINTRACTABLE HEADACHE, UNSPECIFIED HEADACHE TYPE: ICD-10-CM

## 2023-08-24 DIAGNOSIS — I16.0 HYPERTENSIVE URGENCY: Primary | ICD-10-CM

## 2023-08-24 DIAGNOSIS — I10 PRIMARY HYPERTENSION: ICD-10-CM

## 2023-08-24 LAB — POCT GLUCOSE: 163 MG/DL (ref 70–110)

## 2023-08-24 PROCEDURE — 82962 GLUCOSE BLOOD TEST: CPT

## 2023-08-24 PROCEDURE — 99283 EMERGENCY DEPT VISIT LOW MDM: CPT

## 2023-08-24 PROCEDURE — 25000003 PHARM REV CODE 250: Performed by: EMERGENCY MEDICINE

## 2023-08-24 RX ORDER — AMLODIPINE BESYLATE 5 MG/1
10 TABLET ORAL
Status: COMPLETED | OUTPATIENT
Start: 2023-08-24 | End: 2023-08-24

## 2023-08-24 RX ORDER — IBUPROFEN 800 MG/1
800 TABLET ORAL
Status: COMPLETED | OUTPATIENT
Start: 2023-08-24 | End: 2023-08-24

## 2023-08-24 RX ORDER — LISINOPRIL 40 MG/1
40 TABLET ORAL DAILY
Qty: 30 TABLET | Refills: 0 | Status: SHIPPED | OUTPATIENT
Start: 2023-08-24 | End: 2024-01-27

## 2023-08-24 RX ORDER — LISINOPRIL 20 MG/1
40 TABLET ORAL
Status: COMPLETED | OUTPATIENT
Start: 2023-08-24 | End: 2023-08-24

## 2023-08-24 RX ORDER — CLONIDINE HYDROCHLORIDE 0.1 MG/1
0.1 TABLET ORAL
Status: COMPLETED | OUTPATIENT
Start: 2023-08-24 | End: 2023-08-24

## 2023-08-24 RX ADMIN — LISINOPRIL 40 MG: 20 TABLET ORAL at 09:08

## 2023-08-24 RX ADMIN — AMLODIPINE BESYLATE 10 MG: 5 TABLET ORAL at 09:08

## 2023-08-24 RX ADMIN — IBUPROFEN 800 MG: 800 TABLET, FILM COATED ORAL at 09:08

## 2023-08-24 RX ADMIN — CLONIDINE HYDROCHLORIDE 0.1 MG: 0.1 TABLET ORAL at 09:08

## 2023-08-24 NOTE — ED PROVIDER NOTES
SCRIBE #1 NOTE: IManjit, am scribing for, and in the presence of, Lucien Alva MD. I have scribed the entire note.      History      Chief Complaint   Patient presents with    Headache     Pt states she has been out of her BP meds x 2 days     Dizziness     Began last night       Review of patient's allergies indicates:   Allergen Reactions    Neurontin [gabapentin] Itching and Blisters    Percocet [oxycodone-acetaminophen] Itching and Blisters        HPI   HPI    8/24/2023, 9:56 AM   History obtained from the patient      History of Present Illness: Jeanna Helm is a 43 y.o. female patient with a PMHx of DM, HTN who presents to the Emergency Department for headache, onset this morning. Symptoms are constant and moderate in severity. No mitigating or exacerbating factors reported. No associated sxs reported. Patient denies any fever, chills, n/v/d, SOB, CP, weakness, numbness, and all other sxs at this time. Pt also notes that she has been out of her BP meds x 2 days; pt is not currently established with a PCP due to insurance issues. No further complaints or concerns at this time.     Arrival mode: Personal vehicle    PCP: Jarek Crespo MD       Past Medical History:  Past Medical History:   Diagnosis Date    Back pain     Diabetes mellitus     Hypertension     Left knee pain     Miscarriage     x2    Stroke        Past Surgical History:  Past Surgical History:   Procedure Laterality Date    ANTERIOR CRUCIATE LIGAMENT REPAIR Left     DILATION AND CURETTAGE OF UTERUS      x2    LUMBAR FUSION      TIBIA FRACTURE SURGERY Right          Family History:  Family History   Problem Relation Age of Onset    Hypertension Mother     Heart disease Mother     Fibromyalgia Mother     Diabetes Father        Social History:  Social History     Tobacco Use    Smoking status: Every Day     Current packs/day: 1.00     Average packs/day: 1 pack/day for 20.0 years (20.0 ttl pk-yrs)     Types: Cigarettes    Smokeless  tobacco: Not on file   Substance and Sexual Activity    Alcohol use: No    Drug use: No    Sexual activity: Not on file       ROS   Review of Systems   Constitutional:  Negative for chills and fever.   HENT:  Negative for sore throat.    Respiratory:  Negative for shortness of breath.    Cardiovascular:  Negative for chest pain.   Gastrointestinal:  Negative for diarrhea, nausea and vomiting.   Genitourinary:  Negative for dysuria.   Musculoskeletal:  Negative for back pain.   Skin:  Negative for rash.   Neurological:  Positive for headaches. Negative for dizziness, weakness and numbness.   Hematological:  Does not bruise/bleed easily.   All other systems reviewed and are negative.    Physical Exam      Initial Vitals [08/24/23 0834]   BP Pulse Resp Temp SpO2   (!) 203/118 86 18 98.4 °F (36.9 °C) 100 %      MAP       --          Physical Exam  Nursing Notes and Vital Signs Reviewed.  Constitutional: Patient is in no acute distress. Well-developed and well-nourished.  Head: Atraumatic. Normocephalic.  Eyes: PERRL. EOM intact. Conjunctivae are not pale. No scleral icterus.  ENT: Mucous membranes are moist. Oropharynx is clear and symmetric.    Neck: Supple. Full ROM.  Cardiovascular: Regular rate. Regular rhythm. No murmurs, rubs, or gallops. Distal pulses are 2+ and symmetric.  Pulmonary/Chest: No respiratory distress. Clear to auscultation bilaterally. No wheezing or rales.  Abdominal: Soft and non-distended.  There is no tenderness.  No rebound, guarding, or rigidity.   Musculoskeletal: Moves all extremities. No obvious deformities. No edema.  Skin: Warm and dry.  Neurological:  Alert, awake, and appropriate.  Normal speech.  No acute focal neurological deficits are appreciated.  Psychiatric: Normal affect. Good eye contact. Appropriate in content.    ED Course    Procedures  ED Vital Signs:  Vitals:    08/24/23 0834 08/24/23 0845 08/24/23 0857 08/24/23 0900   BP: (!) 203/118 (!) 185/109  (!) 195/98   Pulse: 86 82  84 79   Resp: 18   (!) 23   Temp: 98.4 °F (36.9 °C)      TempSrc: Oral      SpO2: 100% 100%  100%   Weight: 92.6 kg (204 lb 2.3 oz)      Height: 5' (1.524 m)       08/24/23 0919 08/24/23 0934 08/24/23 0949 08/24/23 1004   BP: (!) 187/108 (!) 187/112 (!) 172/92 (!) 215/136   Pulse: 78 74 74 87   Resp: (!) 21 (!) 27 (!) 26 (!) 27   Temp:       TempSrc:       SpO2: 100% 100% 100% 100%   Weight:       Height:        08/24/23 1019 08/24/23 1034 08/24/23 1045 08/24/23 1100   BP: (!) 191/110 (!) 186/105 (!) 163/99 (!) 166/97   Pulse: 68 65 64 72   Resp: (!) 21 (!) 23 (!) 23 20   Temp:       TempSrc:       SpO2: 100% 100% 100% 100%   Weight:       Height:           Abnormal Lab Results:  Labs Reviewed   POCT GLUCOSE - Abnormal; Notable for the following components:       Result Value    POCT Glucose 163 (*)     All other components within normal limits        All Lab Results:  Results for orders placed or performed during the hospital encounter of 08/24/23   POCT glucose   Result Value Ref Range    POCT Glucose 163 (H) 70 - 110 mg/dL     Imaging Results:  Imaging Results    None                 The Emergency Provider reviewed the vital signs and test results, which are outlined above.    ED Discussion     10:59 AM: Reassessed pt at this time. Discussed with pt all pertinent ED information and results. Discussed pt dx and plan of tx. Gave pt all f/u and return to the ED instructions. All questions and concerns were addressed at this time. Pt expresses understanding of information and instructions, and is comfortable with plan to discharge. Pt is stable for discharge.    I discussed with patient and/or family/caretaker that evaluation in the ED does not suggest any emergent or life threatening medical conditions requiring immediate intervention beyond what was provided in the ED, and I believe patient is safe for discharge.  Regardless, an unremarkable evaluation in the ED does not preclude the development or presence of a  serious of life threatening condition. As such, patient was instructed to return immediately for any worsening or change in current symptoms.         ED Medication(s):  Medications   amLODIPine tablet 10 mg (10 mg Oral Given 8/24/23 0958)   lisinopriL tablet 40 mg (40 mg Oral Given 8/24/23 0959)   cloNIDine tablet 0.1 mg (0.1 mg Oral Given 8/24/23 0959)   ibuprofen tablet 800 mg (800 mg Oral Given 8/24/23 0959)     Discharge Medication List as of 8/24/2023 10:58 AM        Medical Decision Making    Medical Decision Making  Problems Addressed:  Acute nonintractable headache, unspecified headache type: acute illness or injury that poses a threat to life or bodily functions  Hypertensive urgency: acute illness or injury that poses a threat to life or bodily functions  Primary hypertension: chronic illness or injury with exacerbation, progression, or side effects of treatment    Amount and/or Complexity of Data Reviewed  Labs: ordered. Decision-making details documented in ED Course.     Details: Considered/discussed labs c the pt but no indication due to previous w/u noted (nml bun/creat)  Radiology:      Details: Considered CT but no indication at this time    Risk  Prescription drug management.  Decision regarding hospitalization.  Diagnosis or treatment significantly limited by social determinants of health.  Risk Details: Patient's headache is either consistent with previous headache and/or lacks features concerning for emergent or life threatening condition.  I do not suspect SAH, meningitis, increased IC pressure, infectious, toxic, vascular, CNS, or other EMC.  I have discussed this at length with patient.                 Scribe Attestation:   Scribe #1: I performed the above scribed service and the documentation accurately describes the services I performed. I attest to the accuracy of the note.    Attending:   Physician Attestation Statement for Scribe #1: Artie GARCIA Jon M., MD, personally performed the services  described in this documentation, as scribed by Manjit Torres, in my presence, and it is both accurate and complete.          Clinical Impression       ICD-10-CM ICD-9-CM   1. Hypertensive urgency  I16.0 401.9   2. Primary hypertension  I10 401.9   3. Acute nonintractable headache, unspecified headache type  R51.9 784.0       Disposition:   Disposition: Discharged  Condition: Stable         Lucien Alva MD  08/25/23 1212

## 2024-01-24 ENCOUNTER — HOSPITAL ENCOUNTER (EMERGENCY)
Facility: HOSPITAL | Age: 44
Discharge: HOME | End: 2024-01-24
Attending: EMERGENCY MEDICINE

## 2024-01-24 VITALS
HEART RATE: 86 BPM | RESPIRATION RATE: 20 BRPM | DIASTOLIC BLOOD PRESSURE: 81 MMHG | TEMPERATURE: 98 F | OXYGEN SATURATION: 100 % | SYSTOLIC BLOOD PRESSURE: 135 MMHG

## 2024-01-24 DIAGNOSIS — R73.9 HYPERGLYCEMIA: ICD-10-CM

## 2024-01-24 DIAGNOSIS — I10 HYPERTENSION, UNSPECIFIED TYPE: ICD-10-CM

## 2024-01-24 DIAGNOSIS — R20.2 PARESTHESIAS: Primary | ICD-10-CM

## 2024-01-24 LAB
ALBUMIN SERPL BCP-MCNC: 3.2 G/DL (ref 3.5–5.2)
ALP SERPL-CCNC: 84 U/L (ref 55–135)
ALT SERPL W/O P-5'-P-CCNC: 11 U/L (ref 10–44)
ANION GAP SERPL CALC-SCNC: 9 MMOL/L (ref 8–16)
AST SERPL-CCNC: 9 U/L (ref 10–40)
B-OH-BUTYR BLD STRIP-SCNC: 0 MMOL/L (ref 0–0.5)
BACTERIA #/AREA URNS HPF: ABNORMAL /HPF
BASOPHILS # BLD AUTO: 0.04 K/UL (ref 0–0.2)
BASOPHILS NFR BLD: 0.2 % (ref 0–1.9)
BILIRUB SERPL-MCNC: 0.2 MG/DL (ref 0.1–1)
BILIRUB UR QL STRIP: NEGATIVE
BUN SERPL-MCNC: 13 MG/DL (ref 6–20)
CALCIUM SERPL-MCNC: 8.6 MG/DL (ref 8.7–10.5)
CHLORIDE SERPL-SCNC: 104 MMOL/L (ref 95–110)
CLARITY UR: CLEAR
CO2 SERPL-SCNC: 20 MMOL/L (ref 23–29)
COLOR UR: YELLOW
CREAT SERPL-MCNC: 1.2 MG/DL (ref 0.5–1.4)
DIFFERENTIAL METHOD BLD: ABNORMAL
EOSINOPHIL # BLD AUTO: 0.2 K/UL (ref 0–0.5)
EOSINOPHIL NFR BLD: 1.2 % (ref 0–8)
ERYTHROCYTE [DISTWIDTH] IN BLOOD BY AUTOMATED COUNT: 14.6 % (ref 11.5–14.5)
EST. GFR  (NO RACE VARIABLE): 58 ML/MIN/1.73 M^2
GLUCOSE SERPL-MCNC: 382 MG/DL (ref 70–110)
GLUCOSE UR QL STRIP: ABNORMAL
HCT VFR BLD AUTO: 40 % (ref 37–48.5)
HGB BLD-MCNC: 12.9 G/DL (ref 12–16)
HGB UR QL STRIP: NEGATIVE
HYALINE CASTS #/AREA URNS LPF: 3 /LPF
IMM GRANULOCYTES # BLD AUTO: 0.13 K/UL (ref 0–0.04)
IMM GRANULOCYTES NFR BLD AUTO: 0.7 % (ref 0–0.5)
KETONES UR QL STRIP: NEGATIVE
LEUKOCYTE ESTERASE UR QL STRIP: ABNORMAL
LYMPHOCYTES # BLD AUTO: 3.4 K/UL (ref 1–4.8)
LYMPHOCYTES NFR BLD: 19.7 % (ref 18–48)
MCH RBC QN AUTO: 24.2 PG (ref 27–31)
MCHC RBC AUTO-ENTMCNC: 32.3 G/DL (ref 32–36)
MCV RBC AUTO: 75 FL (ref 82–98)
MICROSCOPIC COMMENT: ABNORMAL
MONOCYTES # BLD AUTO: 0.8 K/UL (ref 0.3–1)
MONOCYTES NFR BLD: 4.4 % (ref 4–15)
NEUTROPHILS # BLD AUTO: 12.8 K/UL (ref 1.8–7.7)
NEUTROPHILS NFR BLD: 73.8 % (ref 38–73)
NITRITE UR QL STRIP: NEGATIVE
NRBC BLD-RTO: 0 /100 WBC
PH UR STRIP: 7 [PH] (ref 5–8)
PLATELET # BLD AUTO: 422 K/UL (ref 150–450)
PMV BLD AUTO: 9 FL (ref 9.2–12.9)
POCT GLUCOSE: 268 MG/DL (ref 70–110)
POCT GLUCOSE: 323 MG/DL (ref 70–110)
POTASSIUM SERPL-SCNC: 4.3 MMOL/L (ref 3.5–5.1)
PROT SERPL-MCNC: 7.5 G/DL (ref 6–8.4)
PROT UR QL STRIP: ABNORMAL
RBC # BLD AUTO: 5.33 M/UL (ref 4–5.4)
RBC #/AREA URNS HPF: 11 /HPF (ref 0–4)
SODIUM SERPL-SCNC: 133 MMOL/L (ref 136–145)
SP GR UR STRIP: 1.03 (ref 1–1.03)
SQUAMOUS #/AREA URNS HPF: 7 /HPF
URN SPEC COLLECT METH UR: ABNORMAL
UROBILINOGEN UR STRIP-ACNC: NEGATIVE EU/DL
WBC # BLD AUTO: 17.38 K/UL (ref 3.9–12.7)
WBC #/AREA URNS HPF: 20 /HPF (ref 0–5)
YEAST URNS QL MICRO: ABNORMAL

## 2024-01-24 PROCEDURE — 87088 URINE BACTERIA CULTURE: CPT | Performed by: NURSE PRACTITIONER

## 2024-01-24 PROCEDURE — 93005 ELECTROCARDIOGRAM TRACING: CPT

## 2024-01-24 PROCEDURE — 96360 HYDRATION IV INFUSION INIT: CPT

## 2024-01-24 PROCEDURE — 82962 GLUCOSE BLOOD TEST: CPT

## 2024-01-24 PROCEDURE — 63600175 PHARM REV CODE 636 W HCPCS: Performed by: EMERGENCY MEDICINE

## 2024-01-24 PROCEDURE — 80053 COMPREHEN METABOLIC PANEL: CPT | Performed by: NURSE PRACTITIONER

## 2024-01-24 PROCEDURE — 25000003 PHARM REV CODE 250: Performed by: NURSE PRACTITIONER

## 2024-01-24 PROCEDURE — 85025 COMPLETE CBC W/AUTO DIFF WBC: CPT | Performed by: NURSE PRACTITIONER

## 2024-01-24 PROCEDURE — 87186 SC STD MICRODIL/AGAR DIL: CPT | Performed by: NURSE PRACTITIONER

## 2024-01-24 PROCEDURE — 81000 URINALYSIS NONAUTO W/SCOPE: CPT | Performed by: NURSE PRACTITIONER

## 2024-01-24 PROCEDURE — 87077 CULTURE AEROBIC IDENTIFY: CPT | Performed by: NURSE PRACTITIONER

## 2024-01-24 PROCEDURE — 99285 EMERGENCY DEPT VISIT HI MDM: CPT | Mod: 25

## 2024-01-24 PROCEDURE — 93010 ELECTROCARDIOGRAM REPORT: CPT | Mod: ,,, | Performed by: INTERNAL MEDICINE

## 2024-01-24 PROCEDURE — 96372 THER/PROPH/DIAG INJ SC/IM: CPT | Performed by: EMERGENCY MEDICINE

## 2024-01-24 PROCEDURE — 82010 KETONE BODYS QUAN: CPT | Performed by: NURSE PRACTITIONER

## 2024-01-24 PROCEDURE — 87086 URINE CULTURE/COLONY COUNT: CPT | Performed by: NURSE PRACTITIONER

## 2024-01-24 RX ORDER — INSULIN ASPART 100 [IU]/ML
8 INJECTION, SOLUTION INTRAVENOUS; SUBCUTANEOUS
Status: COMPLETED | OUTPATIENT
Start: 2024-01-24 | End: 2024-01-24

## 2024-01-24 RX ORDER — GLYBURIDE 5 MG/1
5 TABLET ORAL
Qty: 90 TABLET | Refills: 3 | Status: SHIPPED | OUTPATIENT
Start: 2024-01-24 | End: 2025-01-23

## 2024-01-24 RX ADMIN — SODIUM CHLORIDE 1000 ML: 9 INJECTION, SOLUTION INTRAVENOUS at 09:01

## 2024-01-24 RX ADMIN — INSULIN ASPART 8 UNITS: 100 INJECTION, SOLUTION INTRAVENOUS; SUBCUTANEOUS at 10:01

## 2024-01-25 NOTE — ED PROVIDER NOTES
"SCRIBE #1 NOTE: I, Macie Ding, am scribing for, and in the presence of, Rich Pitts MD. I have scribed the entire note.       History     Chief Complaint   Patient presents with    Numbness     Hx of CVA October 2022 and vertigo. Numbness and tingling on L face with headache. LKW two days ago. +dizziness . Hx of DM. Ambulatory to triage     Review of patient's allergies indicates:   Allergen Reactions    Neurontin [gabapentin] Itching and Blisters    Percocet [oxycodone-acetaminophen] Itching and Blisters         History of Present Illness     HPI    1/24/2024, 9:43 PM  History obtained from the patient      History of Present Illness: Jeanna Helm is a 43 y.o. female patient with a PMHx of DM, HTN, and vertigo who presents to the Emergency Department for evaluation of numbness and paresthesia to the LUE and the the left side of the face which onset 2 days PTA, worse tonight. The patient reports a similar episode in 2022 and states that she had a stroke. She also reports headache described as "ear pressure" and dizziness. She took x2 25 mg Meclizine with no relief. Symptoms are constant and moderate in severity. No mitigating or exacerbating factors reported. Patient denies any speech difficulty, dysphagia, fever, CP, SOB, and all other sxs at this time. No further complaints or concerns at this time.     Medical records reviewed:   MRI Brain at [facility] on 10/3/2022: "Probable small chronic 4 mm left posterior basal ganglia lacunar infarct.  Otherwise normal."    Arrival mode: Personal vehicle    PCP: Jarek Crespo MD        Past Medical History:  Past Medical History:   Diagnosis Date    Back pain     Diabetes mellitus     Hypertension     Left knee pain     Miscarriage     x2    Stroke        Past Surgical History:  Past Surgical History:   Procedure Laterality Date    ANTERIOR CRUCIATE LIGAMENT REPAIR Left     DILATION AND CURETTAGE OF UTERUS      x2    LUMBAR FUSION      TIBIA FRACTURE SURGERY " Right          Family History:  Family History   Problem Relation Age of Onset    Hypertension Mother     Heart disease Mother     Fibromyalgia Mother     Diabetes Father        Social History:  Social History     Tobacco Use    Smoking status: Every Day     Current packs/day: 1.00     Average packs/day: 1 pack/day for 20.0 years (20.0 ttl pk-yrs)     Types: Cigarettes    Smokeless tobacco: Not on file   Substance and Sexual Activity    Alcohol use: No    Drug use: No    Sexual activity: Not on file        Review of Systems     Review of Systems   Constitutional:  Negative for fever.   HENT:  Negative for sore throat and trouble swallowing.    Respiratory:  Negative for shortness of breath.    Cardiovascular:  Negative for chest pain.   Gastrointestinal:  Negative for nausea.   Genitourinary:  Negative for dysuria.   Musculoskeletal:  Negative for back pain.   Skin:  Negative for rash.   Neurological:  Positive for dizziness, numbness (LUE, left side of face) and headaches. Negative for speech difficulty and weakness.        [+] paresthesia (LUE, left side of face)   Hematological:  Does not bruise/bleed easily.   All other systems reviewed and are negative.     Physical Exam     Initial Vitals [01/24/24 2047]   BP Pulse Resp Temp SpO2   (!) 159/99 103 20 98.4 °F (36.9 °C) 98 %      MAP       --          Physical Exam  Nursing Notes and Vital Signs Reviewed.  Constitutional: Patient is in no acute distress. Well-developed and well-nourished.  Head: Atraumatic. Normocephalic.  Eyes: PERRL. EOM intact. Conjunctivae are not pale. No scleral icterus.  ENT: Mucous membranes are moist. Oropharynx is clear and symmetric.    Neck: Supple. Full ROM. No lymphadenopathy.  Cardiovascular: Regular rate. Regular rhythm. No murmurs, rubs, or gallops. Distal pulses are 2+ and symmetric.  Pulmonary/Chest: No respiratory distress. Clear to auscultation bilaterally. No wheezing or rales.  Abdominal: Soft and non-distended.  There is  no tenderness.  No rebound, guarding, or rigidity. Good bowel sounds.  Genitourinary: No CVA tenderness  Musculoskeletal: Moves all extremities. No obvious deformities. No edema. No calf tenderness.  Skin: Warm and dry.  Neurological:  Alert, awake, and appropriate.  Normal speech.  No acute focal neurological deficits are appreciated.  Psychiatric: Normal affect. Good eye contact. Appropriate in content.     ED Course   Procedures  ED Vital Signs:  Vitals:    01/24/24 2047 01/24/24 2300   BP: (!) 159/99 135/81   Pulse: 103 86   Resp: 20    Temp: 98.4 °F (36.9 °C)    TempSrc: Oral    SpO2: 98% 100%       Abnormal Lab Results:  Labs Reviewed   CBC W/ AUTO DIFFERENTIAL - Abnormal; Notable for the following components:       Result Value    WBC 17.38 (*)     MCV 75 (*)     MCH 24.2 (*)     RDW 14.6 (*)     MPV 9.0 (*)     Immature Granulocytes 0.7 (*)     Gran # (ANC) 12.8 (*)     Immature Grans (Abs) 0.13 (*)     Gran % 73.8 (*)     All other components within normal limits   COMPREHENSIVE METABOLIC PANEL - Abnormal; Notable for the following components:    Sodium 133 (*)     CO2 20 (*)     Glucose 382 (*)     Calcium 8.6 (*)     Albumin 3.2 (*)     AST 9 (*)     eGFR 58 (*)     All other components within normal limits   URINALYSIS, REFLEX TO URINE CULTURE - Abnormal; Notable for the following components:    Protein, UA 1+ (*)     Glucose, UA 4+ (*)     Leukocytes, UA Trace (*)     All other components within normal limits    Narrative:     Specimen Source->Urine   URINALYSIS MICROSCOPIC - Abnormal; Notable for the following components:    RBC, UA 11 (*)     WBC, UA 20 (*)     Hyaline Casts, UA 3 (*)     All other components within normal limits    Narrative:     Specimen Source->Urine   POCT GLUCOSE - Abnormal; Notable for the following components:    POCT Glucose 323 (*)     All other components within normal limits   POCT GLUCOSE - Abnormal; Notable for the following components:    POCT Glucose 268 (*)     All  other components within normal limits   CULTURE, URINE   BETA - HYDROXYBUTYRATE, SERUM        All Lab Results:  Results for orders placed or performed during the hospital encounter of 01/24/24   CBC auto differential   Result Value Ref Range    WBC 17.38 (H) 3.90 - 12.70 K/uL    RBC 5.33 4.00 - 5.40 M/uL    Hemoglobin 12.9 12.0 - 16.0 g/dL    Hematocrit 40.0 37.0 - 48.5 %    MCV 75 (L) 82 - 98 fL    MCH 24.2 (L) 27.0 - 31.0 pg    MCHC 32.3 32.0 - 36.0 g/dL    RDW 14.6 (H) 11.5 - 14.5 %    Platelets 422 150 - 450 K/uL    MPV 9.0 (L) 9.2 - 12.9 fL    Immature Granulocytes 0.7 (H) 0.0 - 0.5 %    Gran # (ANC) 12.8 (H) 1.8 - 7.7 K/uL    Immature Grans (Abs) 0.13 (H) 0.00 - 0.04 K/uL    Lymph # 3.4 1.0 - 4.8 K/uL    Mono # 0.8 0.3 - 1.0 K/uL    Eos # 0.2 0.0 - 0.5 K/uL    Baso # 0.04 0.00 - 0.20 K/uL    nRBC 0 0 /100 WBC    Gran % 73.8 (H) 38.0 - 73.0 %    Lymph % 19.7 18.0 - 48.0 %    Mono % 4.4 4.0 - 15.0 %    Eosinophil % 1.2 0.0 - 8.0 %    Basophil % 0.2 0.0 - 1.9 %    Differential Method Automated    Comprehensive metabolic panel   Result Value Ref Range    Sodium 133 (L) 136 - 145 mmol/L    Potassium 4.3 3.5 - 5.1 mmol/L    Chloride 104 95 - 110 mmol/L    CO2 20 (L) 23 - 29 mmol/L    Glucose 382 (H) 70 - 110 mg/dL    BUN 13 6 - 20 mg/dL    Creatinine 1.2 0.5 - 1.4 mg/dL    Calcium 8.6 (L) 8.7 - 10.5 mg/dL    Total Protein 7.5 6.0 - 8.4 g/dL    Albumin 3.2 (L) 3.5 - 5.2 g/dL    Total Bilirubin 0.2 0.1 - 1.0 mg/dL    Alkaline Phosphatase 84 55 - 135 U/L    AST 9 (L) 10 - 40 U/L    ALT 11 10 - 44 U/L    eGFR 58 (A) >60 mL/min/1.73 m^2    Anion Gap 9 8 - 16 mmol/L   Beta - Hydroxybutyrate, Serum   Result Value Ref Range    Beta-Hydroxybutyrate 0.0 0.0 - 0.5 mmol/L   Urinalysis, Reflex to Urine Culture Urine, Clean Catch    Specimen: Urine   Result Value Ref Range    Specimen UA Urine, Clean Catch     Color, UA Yellow Yellow, Straw, [place]    Appearance, UA Clear Clear    pH, UA 7.0 5.0 - 8.0    Specific [place], UA 1.030  1.005 - 1.030    Protein, UA 1+ (A) Negative    Glucose, UA 4+ (A) Negative    Ketones, UA Negative Negative    Bilirubin (UA) Negative Negative    Occult Blood UA Negative Negative    Nitrite, UA Negative Negative    Urobilinogen, UA Negative <2.0 EU/dL    Leukocytes, UA Trace (A) Negative   Urinalysis Microscopic   Result Value Ref Range    RBC, UA 11 (H) 0 - 4 /hpf    WBC, UA 20 (H) 0 - 5 /hpf    Bacteria Rare None-Occ /hpf    Yeast, UA None None    Squam Epithel, UA 7 /hpf    Hyaline Casts, UA 3 (A) 0-1/lpf /lpf    Microscopic Comment SEE COMMENT    POCT glucose   Result Value Ref Range    POCT Glucose 323 (H) 70 - 110 mg/dL   POCT glucose   Result Value Ref Range    POCT Glucose 268 (H) 70 - 110 mg/dL         Imaging Results:  Imaging Results              X-Ray Chest AP Portable (Final result)  Result time 01/24/24 23:10:44      Final result by Gisella Vences MD (01/24/24 23:10:44)                   Impression:      No acute or adverse finding      Electronically signed by: Gisella Vences  Date:    01/24/2024  Time:    23:10               Narrative:    EXAMINATION:  XR CHEST AP PORTABLE    CLINICAL HISTORY:  hyperglycemia;.    TECHNIQUE:  Single frontal portable view of the chest was performed.    COMPARISON:  None    07/21/2020    FINDINGS:  Support devices: None    The lungs are clear, with normal appearance of pulmonary vasculature and no pleural effusion or pneumothorax.    The cardiac silhouette is normal in size. The hilar and mediastinal contours are unremarkable.    Bones are intact.                                       CT Head Without Contrast (Final result)  Result time 01/24/24 21:11:34      Final result by Gisella Vences MD (01/24/24 21:11:34)                   Impression:      No acute or adverse finding.  Aspect score 10      Electronically signed by: Gisella Vences  Date:    01/24/2024  Time:    21:11               Narrative:    EXAMINATION:  CT HEAD WITHOUT  CONTRAST    CLINICAL HISTORY:  Headache, new or worsening, neuro deficit (Age 19-49y);    TECHNIQUE:  Low dose axial CT images obtained throughout the head without intravenous contrast. Sagittal and coronal reconstructions were performed.    COMPARISON:  07/21/2023    FINDINGS:  Intracranial compartment:    Ventricles and sulci are normal in size for age without evidence of hydrocephalus. No extra-axial blood or fluid collections.    The brain parenchyma appears normal. No parenchymal mass, hemorrhage, edema or major vascular distribution infarct.    Skull/extracranial contents (limited evaluation): No fracture. Mastoid air cells and paranasal sinuses are essentially clear.                                       The EKG was ordered, reviewed, and independently interpreted by the ED provider.  Interpretation time: 21:29  Rate: 93 BPM  Rhythm: normal sinus rhythm  Interpretation: No acute ST changes. No STEMI.           The Emergency Provider reviewed the vital signs and test results, which are outlined above.     ED Discussion     11:11 PM: Reassessed pt at this time.  Discussed with pt all pertinent ED information and results. Discussed pt dx and plan of tx. Gave pt all f/u and return to the ED instructions. All questions and concerns were addressed at this time. Pt expresses understanding of information and instructions, and is comfortable with plan to discharge. Pt is stable for discharge.    I discussed with patient and/or family/caretaker that evaluation in the ED does not suggest any emergent or life threatening medical conditions requiring immediate intervention beyond what was provided in the ED, and I believe patient is safe for discharge.  Regardless, an unremarkable evaluation in the ED does not preclude the development or presence of a serious of life threatening condition. As such, patient was instructed to return immediately for any worsening or change in current symptoms.         Medical Decision  Making  Amount and/or Complexity of Data Reviewed  Labs: ordered. Decision-making details documented in ED Course.  Radiology: ordered. Decision-making details documented in ED Course.  ECG/medicine tests: ordered and independent interpretation performed. Decision-making details documented in ED Course.    Risk  Prescription drug management.                ED Medication(s):  Medications   sodium chloride 0.9% bolus 1,000 mL 1,000 mL (0 mLs Intravenous Stopped 1/24/24 2259)   insulin aspart U-100 pen 8 Units (8 Units Subcutaneous Given 1/24/24 2257)       Discharge Medication List as of 1/24/2024 11:11 PM        START taking these medications    Details   glyBURIDE (DIABETA) 5 MG tablet Take 1 tablet (5 mg total) by mouth daily with breakfast., Starting Wed 1/24/2024, Until Thu 1/23/2025, Print              Follow-up Information       Jarek Crespo MD In 2 days.    Specialty: Family Medicine  Contact information:  [address] [facility] [id number] [phone]               [facility] - Emergency Dept..    Specialty: Emergency Medicine  Why: As needed, If symptoms worsen  Contact information:  [address] [facility] [address] [phone]                               Scribe Attestation:   Scribe #1: I performed the above scribed service and the documentation accurately describes the services I performed. I attest to the accuracy of the note.     Attending:   Physician Attestation Statement for Scribe #1: I, Rich Pitts MD, personally performed the services described in this documentation, as scribed by Macie Ding, in my presence, and it is both accurate and complete.           Clinical Impression       ICD-10-CM ICD-9-CM   1. Paresthesias  R20.2 782.0   2. Hyperglycemia  R73.9 790.29   3. Hypertension, unspecified type  I10 401.9       Disposition:   Disposition: Discharged  Condition: Stable         Rich Pitts MD  01/26/24 4390

## 2024-01-25 NOTE — ED NOTES
Jeanna said she thinks her anxiety is causing her somatic complaints of numbness. She said she has increased anxiety  since having the stroke and its beginning to interfere with her daily life. She avoids driving because she has excessive fears that she will have another stroke while driving.

## 2024-01-27 ENCOUNTER — ON-DEMAND VIRTUAL (OUTPATIENT)
Dept: URGENT CARE | Facility: CLINIC | Age: 44
End: 2024-01-27

## 2024-01-27 DIAGNOSIS — R45.89 ANXIETY ABOUT HEALTH: ICD-10-CM

## 2024-01-27 DIAGNOSIS — H69.93 EUSTACHIAN TUBE DYSFUNCTION, BILATERAL: ICD-10-CM

## 2024-01-27 DIAGNOSIS — R42 VERTIGO: ICD-10-CM

## 2024-01-27 DIAGNOSIS — Z76.0 ENCOUNTER FOR MEDICATION REFILL: Primary | ICD-10-CM

## 2024-01-27 DIAGNOSIS — I10 ESSENTIAL HYPERTENSION: ICD-10-CM

## 2024-01-27 LAB — BACTERIA UR CULT: ABNORMAL

## 2024-01-27 PROCEDURE — 99213 OFFICE O/P EST LOW 20 MIN: CPT | Mod: 95,,, | Performed by: NURSE PRACTITIONER

## 2024-01-27 RX ORDER — LISINOPRIL 40 MG/1
40 TABLET ORAL DAILY
Qty: 30 TABLET | Refills: 0 | Status: SHIPPED | OUTPATIENT
Start: 2024-01-27 | End: 2024-01-27

## 2024-01-27 RX ORDER — LISINOPRIL 40 MG/1
40 TABLET ORAL DAILY
Qty: 30 TABLET | Refills: 0 | Status: ON HOLD | OUTPATIENT
Start: 2024-01-27 | End: 2024-06-05

## 2024-01-27 NOTE — PROGRESS NOTES
Subjective:      Patient ID: Jeanna Helm is a 43 y.o. female.    Vitals:  vitals were not taken for this visit.     Chief Complaint: Dizziness and Medication Refill      Visit Type: TELE AUDIOVISUAL    Present with the patient at the time of consultation: TELEMED PRESENT WITH PATIENT: None    Past Medical History:   Diagnosis Date    Back pain     Diabetes mellitus     Hypertension     Left knee pain     Miscarriage     x2    Stroke      Past Surgical History:   Procedure Laterality Date    ANTERIOR CRUCIATE LIGAMENT REPAIR Left     DILATION AND CURETTAGE OF UTERUS      x2    LUMBAR FUSION      TIBIA FRACTURE SURGERY Right      Review of patient's allergies indicates:   Allergen Reactions    Neurontin [gabapentin] Itching and Blisters    Percocet [oxycodone-acetaminophen] Itching and Blisters     Current Outpatient Medications on File Prior to Visit   Medication Sig Dispense Refill    acetaminophen (TYLENOL) 325 MG tablet Take 325 mg by mouth every 6 (six) hours as needed for Pain.      amLODIPine (NORVASC) 10 MG tablet Take 1 tablet (10 mg total) by mouth once daily. 30 tablet 1    butalbital-acetaminophen-caffeine -40 mg (FIORICET, ESGIC) -40 mg per tablet Take 1 tablet by mouth every 6 (six) hours as needed for Pain. 7 tablet 0    glyBURIDE (DIABETA) 5 MG tablet Take 1 tablet (5 mg total) by mouth daily with breakfast. 90 tablet 3    ibuprofen (ADVIL,MOTRIN) 800 MG tablet Take 1 tablet (800 mg total) by mouth every 6 (six) hours as needed for Pain. 20 tablet 0    meclizine (ANTIVERT) 25 mg tablet Take 1 tablet (25 mg total) by mouth 3 (three) times daily as needed. 20 tablet 0    meclizine (ANTIVERT) 25 mg tablet Take 1 tablet (25 mg total) by mouth 3 (three) times daily as needed. 20 tablet 0    medroxyPROGESTERone (PROVERA) 10 MG tablet Take 1 tablet (10 mg total) by mouth once daily. for 5 days 5 tablet 0    metoclopramide HCl (REGLAN) 10 MG tablet Take 1 tablet (10 mg total) by mouth every 6  (six) hours as needed (headache or nausea). 16 tablet 0    metoprolol succinate (TOPROL-XL) 50 MG 24 hr tablet Take 50 mg by mouth.      naproxen (NAPROSYN) 500 MG tablet Take 1 tablet (500 mg total) by mouth 2 (two) times daily with meals. 20 tablet 0    traMADoL (ULTRAM) 50 mg tablet Take 1 tablet (50 mg total) by mouth every 6 (six) hours as needed for Pain. 10 tablet 0     No current facility-administered medications on file prior to visit.     Family History   Problem Relation Age of Onset    Hypertension Mother     Heart disease Mother     Fibromyalgia Mother     Diabetes Father        Medications Ordered                [id number] - [place] - [address] [facility]   [address] [address]    Telephone: [phone]   Fax: [phone]   Hours: Not open 24 hours                         E-Prescribed (1 of 1)              lisinopriL (PRINIVIL,ZESTRIL) 40 MG tablet    Sig: Take 1 tablet (40 mg total) by mouth once daily.       Start: 1/27/24     Quantity: 30 tablet Refills: 0                           Ohs Peq Odvv Intake    1/27/2024  2:40 PM CST - Filed by Patient   What is your current physical address in the event of a medical emergency? [address] [place] [address]   Are you able to take your vital signs? No   Please attach any relevant images or files          43 year old female with significant past medical hx calls in via telemedicine today. She states she was in ER on Wednesday.   Does not have a PCP and does not have insurance. She thought she had refills on her BP medication, but she does not.   Needs to get Medicaid for her meds. Forgot to mention when she was at the ER a couple days ago that she was out of her meds.   Vertigo attacks for the past month. Has been taking meclizine tablets 50 mg with mild relief.       ROS     Objective:   The physical exam was conducted  virtually.  Physical Exam   Constitutional: She is oriented to person, place, and time. No distress.   HENT:   Head: Normocephalic and atraumatic.   Mouth/Throat: Oropharynx is clear and moist and mucous membranes are normal.   Eyes: Conjunctivae are normal. No scleral icterus.   Pulmonary/Chest: Effort normal. No respiratory distress.   Musculoskeletal: Normal range of motion.         General: Normal range of motion.   Neurological: She is alert and oriented to person, place, and time.   Skin: Skin is not diaphoretic.   Psychiatric: Her behavior is normal. Judgment and thought content normal.   Vitals reviewed.      Assessment:     1. Encounter for medication refill    2. Eustachian tube dysfunction, bilateral    3. Vertigo    4. Anxiety about health    5. Essential hypertension        Plan:   Discussed with patient that we can go ahead and give her one time med refill on lisinopril and that she needs to establish care with a PCP.   She verb an understanding. She states she is currently on lisinopril 40 mg daily. She states she is no longer on amlodipine.     Encounter for medication refill  -     Discontinue: lisinopriL (PRINIVIL,ZESTRIL) 40 MG tablet; Take 1 tablet (40 mg total) by mouth once daily.  Dispense: 30 tablet; Refill: 0  -     lisinopriL (PRINIVIL,ZESTRIL) 40 MG tablet; Take 1 tablet (40 mg total) by mouth once daily.  Dispense: 30 tablet; Refill: 0    Eustachian tube dysfunction, bilateral    Vertigo    Anxiety about health    Essential hypertension                  Thank you for choosing [facility]!    Our goal in the [facility] is to always provide outstanding medical care. You may receive a survey by mail or e-mail in the next week regarding your experience today. We would greatly appreciate you completing and returning the survey. Your feedback provides us with a way to recognize our staff who provide very good care, and it helps us learn how to improve when  your experience was below our aspiration of excellence.         We appreciate you trusting us with your medical care. We hope you feel better soon. We will be happy to take care of you for all of your future medical needs.    You must understand that you've received an Urgent Care treatment only and that you may be released before all your medical problems are known or treated. You, the patient, will arrange for follow up care as instructed.    Follow up with your PCP or specialty clinic as directed in the next 1-2 weeks if not improved or as needed.  You can call [phone] to schedule an appointment with the appropriate provider.    If your condition worsens we recommend that you receive another evaluation in person, with your primary care provider, urgent care or at the emergency room immediately or contact your primary medical clinics after hours call service to discuss your concerns.

## 2024-01-28 NOTE — PROGRESS NOTES
Please call in a prescription of cefdinir 300 mg, 1 tablet by mouth  twice a day for 7 days.  Dispense 14.

## 2024-01-29 ENCOUNTER — TELEPHONE (OUTPATIENT)
Dept: EMERGENCY MEDICINE | Facility: HOSPITAL | Age: 44
End: 2024-01-29

## 2024-02-23 ENCOUNTER — HOSPITAL ENCOUNTER (EMERGENCY)
Facility: HOSPITAL | Age: 44
Discharge: HOME | End: 2024-02-23
Attending: EMERGENCY MEDICINE
Payer: MEDICAID

## 2024-02-23 VITALS
DIASTOLIC BLOOD PRESSURE: 87 MMHG | TEMPERATURE: 99 F | HEART RATE: 101 BPM | SYSTOLIC BLOOD PRESSURE: 125 MMHG | WEIGHT: 207.31 LBS | OXYGEN SATURATION: 100 % | BODY MASS INDEX: 40.49 KG/M2 | RESPIRATION RATE: 18 BRPM

## 2024-02-23 DIAGNOSIS — U07.1 COVID-19 VIRUS INFECTION: Primary | ICD-10-CM

## 2024-02-23 DIAGNOSIS — R05.9 COUGH, UNSPECIFIED TYPE: ICD-10-CM

## 2024-02-23 LAB
INFLUENZA A, MOLECULAR: NEGATIVE
INFLUENZA B, MOLECULAR: NEGATIVE
SARS-COV-2 RDRP RESP QL NAA+PROBE: POSITIVE
SPECIMEN SOURCE: NORMAL

## 2024-02-23 PROCEDURE — 99283 EMERGENCY DEPT VISIT LOW MDM: CPT

## 2024-02-23 PROCEDURE — 87502 INFLUENZA DNA AMP PROBE: CPT | Performed by: EMERGENCY MEDICINE

## 2024-02-23 PROCEDURE — U0002 COVID-19 LAB TEST NON-CDC: HCPCS | Performed by: EMERGENCY MEDICINE

## 2024-02-23 RX ORDER — PROMETHAZINE HYDROCHLORIDE AND DEXTROMETHORPHAN HYDROBROMIDE 6.25; 15 MG/5ML; MG/5ML
5 SYRUP ORAL EVERY 6 HOURS PRN
Qty: 120 ML | Refills: 0 | Status: SHIPPED | OUTPATIENT
Start: 2024-02-23 | End: 2024-03-04

## 2024-02-23 RX ORDER — PROMETHAZINE HYDROCHLORIDE AND DEXTROMETHORPHAN HYDROBROMIDE 6.25; 15 MG/5ML; MG/5ML
5 SYRUP ORAL EVERY 6 HOURS PRN
Qty: 120 ML | Refills: 0 | Status: SHIPPED | OUTPATIENT
Start: 2024-02-23 | End: 2024-02-23

## 2024-02-23 NOTE — ED PROVIDER NOTES
SCRIBE #1 NOTE: I, Manjit Torres, am scribing for, and in the presence of, Bishop Wright MD. I have scribed the entire note.      History      Chief Complaint   Patient presents with    Cough     Pt c/o cough x 4 days with chills and body aches.       Review of patient's allergies indicates:   Allergen Reactions    Neurontin [gabapentin] Itching and Blisters    Percocet [oxycodone-acetaminophen] Itching and Blisters        HPI   HPI    2/23/2024, 9:26 AM   History obtained from the patient      History of Present Illness: Jeanna Helm is a 44 y.o. female patient who presents to the Emergency Department for cough, onset 4 days PTA. Symptoms are episodic and moderate in severity. No mitigating or exacerbating factors reported. Associated sxs include chills and generalized body aches. Patient denies any fever, n/v/d, SOB, CP, weakness, numbness, dizziness, headache, and all other sxs at this time. No prior Tx reported. No further complaints or concerns at this time.     Arrival mode: Personal vehicle    PCP: Jarek Crespo MD       Past Medical History:  Past Medical History:   Diagnosis Date    Back pain     Diabetes mellitus     Hypertension     Left knee pain     Miscarriage     x2    Stroke        Past Surgical History:  Past Surgical History:   Procedure Laterality Date    ANTERIOR CRUCIATE LIGAMENT REPAIR Left     DILATION AND CURETTAGE OF UTERUS      x2    LUMBAR FUSION      TIBIA FRACTURE SURGERY Right          Family History:  Family History   Problem Relation Age of Onset    Hypertension Mother     Heart disease Mother     Fibromyalgia Mother     Diabetes Father        Social History:  Social History     Tobacco Use    Smoking status: Every Day     Current packs/day: 1.00     Average packs/day: 1 pack/day for 20.0 years (20.0 ttl pk-yrs)     Types: Cigarettes    Smokeless tobacco: Not on file   Substance and Sexual Activity    Alcohol use: No    Drug use: No    Sexual activity: Not on file       ROS    Review of Systems   Constitutional:  Positive for chills. Negative for fever.   HENT:  Negative for sore throat.    Respiratory:  Positive for cough. Negative for shortness of breath.    Cardiovascular:  Negative for chest pain.   Gastrointestinal:  Negative for diarrhea, nausea and vomiting.   Genitourinary:  Negative for dysuria.   Musculoskeletal:  Positive for myalgias (generalized). Negative for back pain.   Skin:  Negative for rash.   Neurological:  Negative for dizziness, weakness, numbness and headaches.   Hematological:  Does not bruise/bleed easily.   All other systems reviewed and are negative.    Physical Exam      Initial Vitals [02/23/24 0918]   BP Pulse Resp Temp SpO2   125/87 101 18 99.1 °F (37.3 °C) 100 %      MAP       --          Physical Exam  Nursing Notes and Vital Signs Reviewed.  Constitutional: Patient is in no acute distress. Well-developed and well-nourished.  Head: Atraumatic. Normocephalic.  Eyes: PERRL. EOM intact. Conjunctivae are not pale. No scleral icterus.  ENT: Mucous membranes are moist. Oropharynx is clear and symmetric.    Neck: Supple. Full ROM.   Cardiovascular: Regular rate. Regular rhythm. No murmurs, rubs, or gallops. Distal pulses are 2+ and symmetric.  Pulmonary/Chest: No respiratory distress. Clear to auscultation bilaterally. No wheezing or rales.  Abdominal: Soft and non-distended.  There is no tenderness.  No rebound, guarding, or rigidity.   Musculoskeletal: Moves all extremities. No obvious deformities. No edema.  Skin: Warm and dry.  Neurological:  Alert, awake, and appropriate.  Normal speech.  No acute focal neurological deficits are appreciated.  Psychiatric: Normal affect. Good eye contact. Appropriate in content.    ED Course    Procedures  ED Vital Signs:  Vitals:    02/23/24 0918   BP: 125/87   Pulse: 101   Resp: 18   Temp: 99.1 °F (37.3 °C)   TempSrc: Oral   SpO2: 100%   Weight: 94 kg (207 lb 5.5 oz)       Abnormal Lab Results:  Labs Reviewed    SARS-COV-2 RNA AMPLIFICATION, QUAL - Abnormal; Notable for the following components:       Result Value    SARS-CoV-2 RNA, Amplification, Qual Positive (*)     All other components within normal limits   INFLUENZA A & B BY MOLECULAR        All Lab Results:  Results for orders placed or performed during the hospital encounter of 02/23/24   Influenza A & B by Molecular    Specimen: Nasopharyngeal Swab   Result Value Ref Range    Influenza A, Molecular Negative Negative    Influenza B, Molecular Negative Negative    Flu A & B Source Nasal swab    COVID-19 Rapid Screening   Result Value Ref Range    SARS-CoV-2 RNA, Amplification, Qual Positive (A) Negative     Imaging Results:  Imaging Results    None                 The Emergency Provider reviewed the vital signs and test results, which are outlined above.    ED Discussion     10:12 AM: Reassessed pt at this time. Discussed with pt all pertinent ED information and results. Discussed pt dx and plan of tx. Gave pt all f/u and return to the ED instructions. All questions and concerns were addressed at this time. Pt expresses understanding of information and instructions, and is comfortable with plan to discharge. Pt is stable for discharge.    I discussed with patient and/or family/caretaker that evaluation in the ED does not suggest any emergent or life threatening medical conditions requiring immediate intervention beyond what was provided in the ED, and I believe patient is safe for discharge.  Regardless, an unremarkable evaluation in the ED does not preclude the development or presence of a serious of life threatening condition. As such, patient was instructed to return immediately for any worsening or change in current symptoms.         ED Medication(s):  Medications - No data to display     Follow-up Information       Jarek Crespo MD.    Specialty: Family Medicine  Contact information:  [address] [facility] [address] [phone]                            Current Discharge Medication List        START taking these medications    Details   promethazine-dextromethorphan (PROMETHAZINE-DM) 6.25-15 mg/5 mL Syrp Take 5 mLs by mouth every 6 (six) hours as needed.  Qty: 120 mL, Refills: 0               Medical Decision Making    Medical Decision Making  Cough, congestion, body aches  DDx: COVID, FLu    Amount and/or Complexity of Data Reviewed  Labs: ordered. Decision-making details documented in ED Course.     Details: COVID +    Risk  Prescription drug management.                Scribe Attestation:   Scribe #1: I performed the above scribed service and the documentation accurately describes the services I performed. I attest to the accuracy of the note.    Attending:   Physician Attestation Statement for Scribe #1: I, Bishop Wright MD, personally performed the services described in this documentation, as scribed by Manjit Torres, in my presence, and it is both accurate and complete.          Clinical Impression       ICD-10-CM ICD-9-CM   1. COVID-19 virus infection  U07.1 079.89   2. Cough, unspecified type  R05.9 786.2       Disposition:   Disposition: Discharged  Condition: Stable         Bishop Wright MD  02/23/24 1033

## 2024-02-25 ENCOUNTER — ON-DEMAND VIRTUAL (OUTPATIENT)
Dept: URGENT CARE | Facility: CLINIC | Age: 44
End: 2024-02-25

## 2024-02-25 DIAGNOSIS — U07.1 COVID-19: Primary | ICD-10-CM

## 2024-02-25 PROCEDURE — 99213 OFFICE O/P EST LOW 20 MIN: CPT | Mod: 95,,, | Performed by: NURSE PRACTITIONER

## 2024-02-25 RX ORDER — BENZONATATE 100 MG/1
100 CAPSULE ORAL 3 TIMES DAILY PRN
Qty: 30 CAPSULE | Refills: 0 | Status: SHIPPED | OUTPATIENT
Start: 2024-02-25 | End: 2024-03-06

## 2024-02-25 NOTE — PROGRESS NOTES
Subjective:      Patient ID: Jeanna Helm is a 44 y.o. female.    Vitals:  vitals were not taken for this visit.     Chief Complaint: URI and Cough      Visit Type: TELE AUDIOVISUAL    Present with the patient at the time of consultation: TELEMED PRESENT WITH PATIENT: None    Past Medical History:   Diagnosis Date    Back pain     Diabetes mellitus     Hypertension     Left knee pain     Miscarriage     x2    Stroke      Past Surgical History:   Procedure Laterality Date    ANTERIOR CRUCIATE LIGAMENT REPAIR Left     DILATION AND CURETTAGE OF UTERUS      x2    LUMBAR FUSION      TIBIA FRACTURE SURGERY Right      Review of patient's allergies indicates:   Allergen Reactions    Neurontin [gabapentin] Itching and Blisters    Percocet [oxycodone-acetaminophen] Itching and Blisters     Current Outpatient Medications on File Prior to Visit   Medication Sig Dispense Refill    acetaminophen (TYLENOL) 325 MG tablet Take 325 mg by mouth every 6 (six) hours as needed for Pain.      amLODIPine (NORVASC) 10 MG tablet Take 1 tablet (10 mg total) by mouth once daily. 30 tablet 1    butalbital-acetaminophen-caffeine -40 mg (FIORICET, ESGIC) -40 mg per tablet Take 1 tablet by mouth every 6 (six) hours as needed for Pain. 7 tablet 0    glyBURIDE (DIABETA) 5 MG tablet Take 1 tablet (5 mg total) by mouth daily with breakfast. 90 tablet 3    ibuprofen (ADVIL,MOTRIN) 800 MG tablet Take 1 tablet (800 mg total) by mouth every 6 (six) hours as needed for Pain. 20 tablet 0    lisinopriL (PRINIVIL,ZESTRIL) 40 MG tablet Take 1 tablet (40 mg total) by mouth once daily. 30 tablet 0    meclizine (ANTIVERT) 25 mg tablet Take 1 tablet (25 mg total) by mouth 3 (three) times daily as needed. 20 tablet 0    meclizine (ANTIVERT) 25 mg tablet Take 1 tablet (25 mg total) by mouth 3 (three) times daily as needed. 20 tablet 0    medroxyPROGESTERone (PROVERA) 10 MG tablet Take 1 tablet (10 mg total) by mouth once daily. for 5 days 5 tablet 0     metoclopramide HCl (REGLAN) 10 MG tablet Take 1 tablet (10 mg total) by mouth every 6 (six) hours as needed (headache or nausea). 16 tablet 0    metoprolol succinate (TOPROL-XL) 50 MG 24 hr tablet Take 50 mg by mouth.      naproxen (NAPROSYN) 500 MG tablet Take 1 tablet (500 mg total) by mouth 2 (two) times daily with meals. 20 tablet 0    promethazine-dextromethorphan (PROMETHAZINE-DM) 6.25-15 mg/5 mL Syrp Take 5 mLs by mouth every 6 (six) hours as needed. 120 mL 0    traMADoL (ULTRAM) 50 mg tablet Take 1 tablet (50 mg total) by mouth every 6 (six) hours as needed for Pain. 10 tablet 0     No current facility-administered medications on file prior to visit.     Family History   Problem Relation Age of Onset    Hypertension Mother     Heart disease Mother     Fibromyalgia Mother     Diabetes Father        Medications Ordered                [id number] - [place], [place] - [address] [facility]   [address] [address]    Telephone: [phone]   Fax: [phone]   Hours: Not open 24 hours                         E-Prescribed (1 of 1)              benzonatate (TESSALON) 100 MG capsule    Sig: Take 1 capsule (100 mg total) by mouth 3 (three) times daily as needed for Cough.       Start: 2/25/24     Quantity: 30 capsule Refills: 0                           Ohs Peq Odvv Intake    2/25/2024 11:29 AM CST - Filed by Patient   What is your current physical address in the event of a medical emergency? [address] [facility] [facility] [address]   Are you able to take your vital signs? No   Please attach any relevant images or files          45 yo female with c/o positive covid test last week and still having some cough. She still having congestion and cough. She was given cough syrup by md but still coughing        Constitution: Positive for fever.   HENT:  Positive for congestion and postnasal drip. Negative for  sinus pain.    Cardiovascular: Negative.    Respiratory:  Positive for cough. Negative for shortness of breath and wheezing.    Gastrointestinal: Negative.    Endocrine: negative.   Genitourinary: Negative.  Negative for frequency and urgency.   Musculoskeletal: Negative.    Skin: Negative.    Allergic/Immunologic: Negative.    Neurological: Negative.    Hematologic/Lymphatic: Negative.    Psychiatric/Behavioral: Negative.          Objective:   The physical exam was conducted virtually.  Physical Exam   Constitutional: She is oriented to person, place, and time. She appears well-developed.   HENT:   Head: Normocephalic and atraumatic.   Ears:   Right Ear: Hearing, tympanic membrane and external ear normal.   Left Ear: Hearing, tympanic membrane and external ear normal.   Nose: Rhinorrhea and congestion present.   Mouth/Throat: Uvula is midline, oropharynx is clear and moist and mucous membranes are normal.   Eyes: Conjunctivae and EOM are normal. Pupils are equal, round, and reactive to light.   Neck: Neck supple.   Cardiovascular: Normal rate.   Pulmonary/Chest: Effort normal and breath sounds normal.   Musculoskeletal: Normal range of motion.         General: Normal range of motion.   Neurological: She is alert and oriented to person, place, and time.   Skin: Skin is warm.   Psychiatric: Her behavior is normal. Thought content normal.   Nursing note and vitals reviewed.      Assessment:     1. COVID-19        Plan:       COVID-19  -     benzonatate (TESSALON) 100 MG capsule; Take 1 capsule (100 mg total) by mouth 3 (three) times daily as needed for Cough.  Dispense: 30 capsule; Refill: 0

## 2024-04-23 ENCOUNTER — ON-DEMAND VIRTUAL (OUTPATIENT)
Dept: URGENT CARE | Facility: CLINIC | Age: 44
End: 2024-04-23

## 2024-04-23 DIAGNOSIS — M79.674 GREAT TOE PAIN, RIGHT: Primary | ICD-10-CM

## 2024-04-23 DIAGNOSIS — M79.674 PAIN IN RIGHT TOE(S): ICD-10-CM

## 2024-04-23 PROCEDURE — 99202 OFFICE O/P NEW SF 15 MIN: CPT | Mod: 95,,, | Performed by: NURSE PRACTITIONER

## 2024-04-23 NOTE — PROGRESS NOTES
Subjective:      Patient ID: Jeanna Helm is a 44 y.o. female.    Vitals:  vitals were not taken for this visit.     Chief Complaint: Toe Injury      Visit Type: TELE AUDIOVISUAL    Present with the patient at the time of consultation: TELEMED PRESENT WITH PATIENT: None, at home    Past Medical History:   Diagnosis Date    Back pain     Diabetes mellitus     Hypertension     Left knee pain     Miscarriage     x2    Stroke      Past Surgical History:   Procedure Laterality Date    ANTERIOR CRUCIATE LIGAMENT REPAIR Left     DILATION AND CURETTAGE OF UTERUS      x2    LUMBAR FUSION      TIBIA FRACTURE SURGERY Right      Review of patient's allergies indicates:   Allergen Reactions    Neurontin [gabapentin] Itching and Blisters    Percocet [oxycodone-acetaminophen] Itching and Blisters     Current Outpatient Medications on File Prior to Visit   Medication Sig Dispense Refill    acetaminophen (TYLENOL) 325 MG tablet Take 325 mg by mouth every 6 (six) hours as needed for Pain.      amLODIPine (NORVASC) 10 MG tablet Take 1 tablet (10 mg total) by mouth once daily. 30 tablet 1    butalbital-acetaminophen-caffeine -40 mg (FIORICET, ESGIC) -40 mg per tablet Take 1 tablet by mouth every 6 (six) hours as needed for Pain. 7 tablet 0    glyBURIDE (DIABETA) 5 MG tablet Take 1 tablet (5 mg total) by mouth daily with breakfast. 90 tablet 3    ibuprofen (ADVIL,MOTRIN) 800 MG tablet Take 1 tablet (800 mg total) by mouth every 6 (six) hours as needed for Pain. 20 tablet 0    lisinopriL (PRINIVIL,ZESTRIL) 40 MG tablet Take 1 tablet (40 mg total) by mouth once daily. 30 tablet 0    meclizine (ANTIVERT) 25 mg tablet Take 1 tablet (25 mg total) by mouth 3 (three) times daily as needed. 20 tablet 0    meclizine (ANTIVERT) 25 mg tablet Take 1 tablet (25 mg total) by mouth 3 (three) times daily as needed. 20 tablet 0    medroxyPROGESTERone (PROVERA) 10 MG tablet Take 1 tablet (10 mg total) by mouth once daily. for 5 days 5 tablet  0    metoclopramide HCl (REGLAN) 10 MG tablet Take 1 tablet (10 mg total) by mouth every 6 (six) hours as needed (headache or nausea). 16 tablet 0    metoprolol succinate (TOPROL-XL) 50 MG 24 hr tablet Take 50 mg by mouth.      naproxen (NAPROSYN) 500 MG tablet Take 1 tablet (500 mg total) by mouth 2 (two) times daily with meals. 20 tablet 0    traMADoL (ULTRAM) 50 mg tablet Take 1 tablet (50 mg total) by mouth every 6 (six) hours as needed for Pain. 10 tablet 0     No current facility-administered medications on file prior to visit.     Family History   Problem Relation Name Age of Onset    Hypertension Mother      Heart disease Mother      Fibromyalgia Mother      Diabetes Father             Ohs Peq Odvv Intake    4/23/2024  9:05 AM CDT - Filed by Patient   What is your current physical address in the event of a medical emergency? [address] [place],[address]   Are you able to take your vital signs? No   Please attach any relevant images or files          Right big toe discoloration for 1 week. Tender to touch. No known trauma. Hx of diabetes, unsure of neuropathy. Alos reports pain to right little toe.        Musculoskeletal:  Positive for joint pain.   Skin:  Positive for color change and bruising.        Objective:   The physical exam was conducted virtually.  Physical Exam   Constitutional: She is oriented to person, place, and time. She does not appear ill. No distress.   HENT:   Head: Normocephalic and atraumatic.   Nose: Nose normal.   Eyes: Extraocular movement intact   Pulmonary/Chest: Effort normal.   Abdominal: Normal appearance.   Musculoskeletal: Normal range of motion.         General: Normal range of motion.      Right foot: Right great toe: Exhibits ecchymosis and tenderness. Right little toe: Exhibits tenderness.   Neurological: no focal deficit. She is alert and oriented to person, place, and time.   Psychiatric: Her behavior is normal. Mood normal.   Vitals  reviewed.      Assessment:     1. Great toe pain, right    2. Pain in right toe(s)        Plan:   Given history and symptoms further in-person evaluation is needed for a diagnosis and treatment plan.    Patient encouraged to monitor symptoms closely and instructed to follow-up for new or worsening symptoms. Further, in-person, evaluation is necessary for continued treatment. Please follow up in Urgent Care, ER, or  with your primary care doctor or specialist as needed. Verbally discussed plan. Patient confirms understanding and is in agreement with treatment and plan.     You must understand that you've received a [facility] Care evaluation only and that you may be released before all your medical problems are known or treated. You, the patient, will arrange for follow-up care as instructed.      Great toe pain, right    Pain in right toe(s)

## 2024-04-28 ENCOUNTER — HOSPITAL ENCOUNTER (EMERGENCY)
Facility: HOSPITAL | Age: 44
Discharge: HOME | End: 2024-04-28
Attending: EMERGENCY MEDICINE
Payer: MEDICAID

## 2024-04-28 VITALS
SYSTOLIC BLOOD PRESSURE: 179 MMHG | OXYGEN SATURATION: 99 % | BODY MASS INDEX: 41.68 KG/M2 | WEIGHT: 212.31 LBS | TEMPERATURE: 99 F | HEIGHT: 60 IN | HEART RATE: 81 BPM | DIASTOLIC BLOOD PRESSURE: 104 MMHG | RESPIRATION RATE: 20 BRPM

## 2024-04-28 DIAGNOSIS — M79.674 PAIN OF TOE OF RIGHT FOOT: Primary | ICD-10-CM

## 2024-04-28 LAB — POCT GLUCOSE: 83 MG/DL (ref 70–110)

## 2024-04-28 PROCEDURE — 99284 EMERGENCY DEPT VISIT MOD MDM: CPT | Mod: 25

## 2024-04-28 PROCEDURE — 82962 GLUCOSE BLOOD TEST: CPT

## 2024-04-28 RX ORDER — CEPHALEXIN 500 MG/1
500 CAPSULE ORAL EVERY 8 HOURS
Qty: 21 CAPSULE | Refills: 0 | Status: SHIPPED | OUTPATIENT
Start: 2024-04-28 | End: 2024-05-05

## 2024-04-28 RX ORDER — HYDROCODONE BITARTRATE AND ACETAMINOPHEN 5; 325 MG/1; MG/1
0.5 TABLET ORAL EVERY 4 HOURS PRN
Qty: 5 TABLET | Refills: 0 | OUTPATIENT
Start: 2024-04-28 | End: 2024-05-10

## 2024-04-28 NOTE — ED PROVIDER NOTES
History      Chief Complaint   Patient presents with    Toe Pain     Pt c/o R great toe bruising/pain/neuropathy x 1 week, states taking glyberide Q PM, atraumatic       Review of patient's allergies indicates:   Allergen Reactions    Neurontin [gabapentin] Itching and Blisters    Percocet [oxycodone-acetaminophen] Itching and Blisters        HPI   HPI    4/28/2024, 4:33 PM   History obtained from the patient       History of Present Illness: Jeanna Helm is a 44 y.o. female patient who presents to the Emergency Department for right great toe pain and discoloration for a week.    No further complaints or concerns at this time.           PCP: Jarek Crespo MD       Past Medical History:  Past Medical History:   Diagnosis Date    Back pain     Diabetes mellitus     Hypertension     Left knee pain     Miscarriage     x2    Stroke          Past Surgical History:  Past Surgical History:   Procedure Laterality Date    ANTERIOR CRUCIATE LIGAMENT REPAIR Left     DILATION AND CURETTAGE OF UTERUS      x2    LUMBAR FUSION      TIBIA FRACTURE SURGERY Right            Family History:  Family History   Problem Relation Name Age of Onset    Hypertension Mother      Heart disease Mother      Fibromyalgia Mother      Diabetes Father             Social History:  Social History     Tobacco Use    Smoking status: Every Day     Current packs/day: 1.00     Average packs/day: 1 pack/day for 20.0 years (20.0 ttl pk-yrs)     Types: Cigarettes    Smokeless tobacco: Not on file   Substance and Sexual Activity    Alcohol use: No    Drug use: No    Sexual activity: Not on file       ROS     Review of Systems   Musculoskeletal:  Positive for arthralgias.   Skin:  Positive for color change. Negative for wound.       Physical Exam      Initial Vitals [04/28/24 1511]   BP Pulse Resp Temp SpO2   (!) 179/104 81 20 99.3 °F (37.4 °C) 99 %      MAP       --         Physical Exam  Vital signs and nursing notes reviewed.  Constitutional: Patient  "is in NAD. Awake and alert. Well-developed and well-nourished.  Head: Atraumatic. Normocephalic.  Eyes:  EOM intact. Conjunctivae nl. No scleral icterus.  ENT: Mucous membranes are moist.   Neck: Supple.  No meningismus  Cardiovascular: Regular rate and rhythm. No murmurs, rubs, or gallops.   Pulmonary/Chest: No respiratory distress. Clear to auscultation bilaterally. No wheezing, rales, or rhonchi.  Musculoskeletal: Moves all extremities. Right great toe with distal tenderness, slight dusky color change. No visible wound. 2+dp pulse  Neurological: Awake and alert. No acute focal neurological deficits are appreciated.  Psychiatric: Normal affect. Good eye contact. Appropriate in content.      ED Course          Procedures  ED Vital Signs:  Vitals:    04/28/24 1511   BP: (!) 179/104   Pulse: 81   Resp: 20   Temp: 99.3 °F (37.4 °C)   TempSrc: Oral   SpO2: 99%   Weight: 96.3 kg (212 lb 4.9 oz)   Height: 4' 11.84" (1.52 m)         Results for orders placed or performed during the hospital encounter of 04/28/24   POCT glucose   Result Value Ref Range    POCT Glucose 83 70 - 110 mg/dL             Imaging Results:  Imaging Results              X-Ray Toe 2 or More Views Right (Final result)  Result time 04/28/24 16:25:41      Final result by Claudy Tay MD (04/28/24 16:25:41)                   Impression:      No acute abnormality.      Electronically signed by: Claudy Tay  Date:    04/28/2024  Time:    16:25               Narrative:    EXAMINATION:  XR TOE 2 OR MORE VIEWS RIGHT    CLINICAL HISTORY:  XR TOE 2 OR MORE VIEWS RIGHT    COMPARISON:  None    FINDINGS:  Multiple radiographic views  were obtained.    No evidence of acute fracture or dislocation.  Bony mineralization is normal.  Soft tissues are unremarkable. Mild degenerative joint disease.  Postsurgical changes of the hindfoot.                                         The Emergency Provider reviewed the vital signs and test results, which are outlined " above.    ED Discussion             Medication(s) given in the ER:  Medications - No data to display         Follow-up Information       Jarek Crespo MD In 2 days.    Specialty: Family Medicine  Contact information:  [address] [facility] [address] [facility] [address]  [phone]               [facility] Podiatry 2nd Floor. Schedule an appointment as soon as possible for a visit in 2 days.    Specialty: Podiatry  Contact information:  [address] [facility] [address] [phone]  Additional information:  Please park on the [address] side and use the Clinic entrance. Check in on the 2nd floor.                                  Medication List        START taking these medications      cephALEXin 500 MG capsule  Commonly known as: KEFLEX  Take 1 capsule (500 mg total) by mouth every 8 (eight) hours. for 7 days     HYDROcodone-acetaminophen 5-325 mg per tablet  Commonly known as: NORCO  Take 0.5 tablets by mouth every 4 (four) hours as needed for Pain.            ASK your doctor about these medications      acetaminophen 325 MG tablet  Commonly known as: TYLENOL     amLODIPine 10 MG tablet  Commonly known as: NORVASC  Take 1 tablet (10 mg total) by mouth once daily.     butalbital-acetaminophen-caffeine -40 mg -40 mg per tablet  Commonly known as: FIORICET, ESGIC  Take 1 tablet by mouth every 6 (six) hours as needed for Pain.     glyBURIDE 5 MG tablet  Commonly known as: DIABETA  Take 1 tablet (5 mg total) by mouth daily with breakfast.     ibuprofen 800 MG tablet  Commonly known as: ADVIL,MOTRIN  Take 1 tablet (800 mg total) by mouth every 6 (six) hours as needed for Pain.     lisinopriL 40 MG tablet  Commonly known as: PRINIVIL,ZESTRIL  Take 1 tablet (40 mg total) by mouth once daily.     * meclizine 25 mg tablet  Commonly known as: ANTIVERT  Take 1 tablet (25 mg total) by mouth 3 (three) times daily as needed.     * meclizine 25 mg tablet  Commonly known as: ANTIVERT  Take 1  tablet (25 mg total) by mouth 3 (three) times daily as needed.     medroxyPROGESTERone 10 MG tablet  Commonly known as: PROVERA  Take 1 tablet (10 mg total) by mouth once daily. for 5 days     metoclopramide HCl 10 MG tablet  Commonly known as: REGLAN  Take 1 tablet (10 mg total) by mouth every 6 (six) hours as needed (headache or nausea).     metoprolol succinate 50 MG 24 hr tablet  Commonly known as: TOPROL-XL     naproxen 500 MG tablet  Commonly known as: NAPROSYN  Take 1 tablet (500 mg total) by mouth 2 (two) times daily with meals.     traMADoL 50 mg tablet  Commonly known as: ULTRAM  Take 1 tablet (50 mg total) by mouth every 6 (six) hours as needed for Pain.           * This list has 2 medication(s) that are the same as other medications prescribed for you. Read the directions carefully, and ask your doctor or other care provider to review them with you.                   Where to Get Your Medications        These medications were sent to [id number] - [place] - [address] AT [facility]  [address], [place] [address]      Phone: [phone]   cephALEXin 500 MG capsule  HYDROcodone-acetaminophen 5-325 mg per tablet             Medical Decision Making        All findings were reviewed with the patient/family in detail.   All remaining questions and concerns were addressed at that time.  Patient/family has been counseled regarding the need for follow-up as well as the indication to return to the emergency room should new or worrisome developments occur.        MDM     Amount and/or Complexity of Data Reviewed  Clinical lab tests: ordered and reviewed  Tests in the radiology section of CPT®: ordered and reviewed                   Clinical Impression:        ICD-10-CM ICD-9-CM   1. Pain of toe of right foot  M79.674 729.5               Iris [place], JIM  04/28/24 1637

## 2024-04-30 ENCOUNTER — ON-DEMAND VIRTUAL (OUTPATIENT)
Dept: URGENT CARE | Facility: CLINIC | Age: 44
End: 2024-04-30

## 2024-04-30 ENCOUNTER — NURSE TRIAGE (OUTPATIENT)
Dept: ADMINISTRATIVE | Facility: CLINIC | Age: 44
End: 2024-04-30

## 2024-04-30 VITALS
BODY MASS INDEX: 41.72 KG/M2 | DIASTOLIC BLOOD PRESSURE: 99 MMHG | TEMPERATURE: 99 F | WEIGHT: 212.5 LBS | OXYGEN SATURATION: 100 % | SYSTOLIC BLOOD PRESSURE: 146 MMHG | HEART RATE: 105 BPM | RESPIRATION RATE: 20 BRPM

## 2024-04-30 DIAGNOSIS — M79.674 PAIN IN RIGHT TOE(S): Primary | ICD-10-CM

## 2024-04-30 LAB
ALBUMIN SERPL BCP-MCNC: 3.7 G/DL (ref 3.5–5.2)
ALP SERPL-CCNC: 82 U/L (ref 55–135)
ALT SERPL W/O P-5'-P-CCNC: 17 U/L (ref 10–44)
ANION GAP SERPL CALC-SCNC: 10 MMOL/L (ref 8–16)
AST SERPL-CCNC: 10 U/L (ref 10–40)
BASOPHILS # BLD AUTO: 0.04 K/UL (ref 0–0.2)
BASOPHILS NFR BLD: 0.2 % (ref 0–1.9)
BILIRUB SERPL-MCNC: 0.3 MG/DL (ref 0.1–1)
BUN SERPL-MCNC: 11 MG/DL (ref 6–20)
CALCIUM SERPL-MCNC: 10.1 MG/DL (ref 8.7–10.5)
CHLORIDE SERPL-SCNC: 105 MMOL/L (ref 95–110)
CO2 SERPL-SCNC: 22 MMOL/L (ref 23–29)
CREAT SERPL-MCNC: 0.9 MG/DL (ref 0.5–1.4)
CRP SERPL-MCNC: 19.5 MG/L (ref 0–8.2)
DIFFERENTIAL METHOD BLD: ABNORMAL
EOSINOPHIL # BLD AUTO: 0.3 K/UL (ref 0–0.5)
EOSINOPHIL NFR BLD: 1.7 % (ref 0–8)
ERYTHROCYTE [DISTWIDTH] IN BLOOD BY AUTOMATED COUNT: 15.5 % (ref 11.5–14.5)
EST. GFR  (NO RACE VARIABLE): >60 ML/MIN/1.73 M^2
GLUCOSE SERPL-MCNC: 106 MG/DL (ref 70–110)
HCT VFR BLD AUTO: 43.5 % (ref 37–48.5)
HGB BLD-MCNC: 13.9 G/DL (ref 12–16)
IMM GRANULOCYTES # BLD AUTO: 0.09 K/UL (ref 0–0.04)
IMM GRANULOCYTES NFR BLD AUTO: 0.6 % (ref 0–0.5)
LYMPHOCYTES # BLD AUTO: 3.6 K/UL (ref 1–4.8)
LYMPHOCYTES NFR BLD: 22.6 % (ref 18–48)
MCH RBC QN AUTO: 24.4 PG (ref 27–31)
MCHC RBC AUTO-ENTMCNC: 32 G/DL (ref 32–36)
MCV RBC AUTO: 76 FL (ref 82–98)
MONOCYTES # BLD AUTO: 0.9 K/UL (ref 0.3–1)
MONOCYTES NFR BLD: 5.5 % (ref 4–15)
NEUTROPHILS # BLD AUTO: 11.2 K/UL (ref 1.8–7.7)
NEUTROPHILS NFR BLD: 69.4 % (ref 38–73)
NRBC BLD-RTO: 0 /100 WBC
PLATELET # BLD AUTO: 373 K/UL (ref 150–450)
PMV BLD AUTO: 8.6 FL (ref 9.2–12.9)
POTASSIUM SERPL-SCNC: 4.3 MMOL/L (ref 3.5–5.1)
PROT SERPL-MCNC: 8.3 G/DL (ref 6–8.4)
RBC # BLD AUTO: 5.69 M/UL (ref 4–5.4)
SODIUM SERPL-SCNC: 137 MMOL/L (ref 136–145)
URATE SERPL-MCNC: 6.8 MG/DL (ref 2.4–5.7)
WBC # BLD AUTO: 16.08 K/UL (ref 3.9–12.7)

## 2024-04-30 PROCEDURE — 84550 ASSAY OF BLOOD/URIC ACID: CPT | Performed by: NURSE PRACTITIONER

## 2024-04-30 PROCEDURE — 85025 COMPLETE CBC W/AUTO DIFF WBC: CPT | Performed by: NURSE PRACTITIONER

## 2024-04-30 PROCEDURE — 80053 COMPREHEN METABOLIC PANEL: CPT | Performed by: NURSE PRACTITIONER

## 2024-04-30 PROCEDURE — 99499 UNLISTED E&M SERVICE: CPT | Mod: 95,,, | Performed by: NURSE PRACTITIONER

## 2024-04-30 PROCEDURE — 99283 EMERGENCY DEPT VISIT LOW MDM: CPT

## 2024-04-30 PROCEDURE — 86140 C-REACTIVE PROTEIN: CPT | Performed by: NURSE PRACTITIONER

## 2024-05-01 ENCOUNTER — HOSPITAL ENCOUNTER (EMERGENCY)
Facility: HOSPITAL | Age: 44
Discharge: HOME | End: 2024-05-01
Attending: EMERGENCY MEDICINE
Payer: MEDICAID

## 2024-05-01 ENCOUNTER — HOSPITAL ENCOUNTER (EMERGENCY)
Facility: HOSPITAL | Age: 44
Discharge: LEFT BEFORE DISCHARGE | End: 2024-05-01
Payer: MEDICAID

## 2024-05-01 VITALS
OXYGEN SATURATION: 100 % | TEMPERATURE: 99 F | SYSTOLIC BLOOD PRESSURE: 164 MMHG | WEIGHT: 212.5 LBS | HEART RATE: 84 BPM | RESPIRATION RATE: 16 BRPM | DIASTOLIC BLOOD PRESSURE: 89 MMHG | BODY MASS INDEX: 41.72 KG/M2

## 2024-05-01 DIAGNOSIS — M79.674 GREAT TOE PAIN, RIGHT: Primary | ICD-10-CM

## 2024-05-01 PROCEDURE — 99284 EMERGENCY DEPT VISIT MOD MDM: CPT | Mod: 25

## 2024-05-01 PROCEDURE — 96372 THER/PROPH/DIAG INJ SC/IM: CPT | Performed by: NURSE PRACTITIONER

## 2024-05-01 PROCEDURE — 63600175 PHARM REV CODE 636 W HCPCS: Performed by: NURSE PRACTITIONER

## 2024-05-01 RX ORDER — TRAMADOL HYDROCHLORIDE 50 MG/1
50 TABLET ORAL EVERY 8 HOURS PRN
Qty: 12 TABLET | Refills: 0 | Status: SHIPPED | OUTPATIENT
Start: 2024-05-01 | End: 2024-05-01

## 2024-05-01 RX ORDER — DICLOFENAC SODIUM 50 MG/1
50 TABLET, DELAYED RELEASE ORAL 2 TIMES DAILY PRN
Qty: 20 TABLET | Refills: 0 | Status: ON HOLD | OUTPATIENT
Start: 2024-05-01 | End: 2024-06-03 | Stop reason: CLARIF

## 2024-05-01 RX ORDER — KETOROLAC TROMETHAMINE 30 MG/ML
30 INJECTION, SOLUTION INTRAMUSCULAR; INTRAVENOUS
Status: COMPLETED | OUTPATIENT
Start: 2024-05-01 | End: 2024-05-01

## 2024-05-01 RX ORDER — DICLOFENAC SODIUM 50 MG/1
50 TABLET, DELAYED RELEASE ORAL 2 TIMES DAILY PRN
Qty: 20 TABLET | Refills: 0 | Status: SHIPPED | OUTPATIENT
Start: 2024-05-01 | End: 2024-05-01

## 2024-05-01 RX ORDER — TRAMADOL HYDROCHLORIDE 50 MG/1
50 TABLET ORAL EVERY 8 HOURS PRN
Qty: 12 TABLET | Refills: 0 | OUTPATIENT
Start: 2024-05-01 | End: 2024-05-22

## 2024-05-01 RX ADMIN — KETOROLAC TROMETHAMINE 30 MG: 30 INJECTION, SOLUTION INTRAMUSCULAR at 10:05

## 2024-05-01 NOTE — ED PROVIDER NOTES
Encounter Date: 5/1/2024       History     Chief Complaint   Patient presents with    Toe Pain     Right great toe numbness not  relied by abx therapy. Previously evaluated for the same was here last pm but left      44-year-old female with complaint of right great toe pain over the past several days.  Patient was evaluated yesterday in the emergency room and had blood work obtained.  White blood cell count was mildly elevated with elevated C-reactive protein and mildly elevated uric acid level.  Patient denies fever or chills.  Patient reports she is currently taking antibiotics.        Review of patient's allergies indicates:   Allergen Reactions    Neurontin [gabapentin] Itching and Blisters    Percocet [oxycodone-acetaminophen] Itching and Blisters     Past Medical History:   Diagnosis Date    Back pain     Diabetes mellitus     Hypertension     Left knee pain     Miscarriage     x2    Stroke      Past Surgical History:   Procedure Laterality Date    ANTERIOR CRUCIATE LIGAMENT REPAIR Left     DILATION AND CURETTAGE OF UTERUS      x2    LUMBAR FUSION      TIBIA FRACTURE SURGERY Right      Family History   Problem Relation Name Age of Onset    Hypertension Mother      Heart disease Mother      Fibromyalgia Mother      Diabetes Father       Social History     Tobacco Use    Smoking status: Every Day     Current packs/day: 1.00     Average packs/day: 1 pack/day for 20.0 years (20.0 ttl pk-yrs)     Types: Cigarettes   Substance Use Topics    Alcohol use: No    Drug use: No     Review of Systems   Constitutional:  Negative for fever.   HENT:  Negative for sore throat.    Respiratory:  Negative for shortness of breath.    Cardiovascular:  Negative for chest pain.   Gastrointestinal:  Negative for nausea.   Genitourinary:  Negative for dysuria.   Musculoskeletal:  Negative for back pain.        Right great toe pain    Skin:  Negative for rash.   Neurological:  Negative for weakness.   Hematological:  Does not  bruise/bleed easily.       Physical Exam     Initial Vitals [05/01/24 0914]   BP Pulse Resp Temp SpO2   (!) 146/85 90 18 98.8 °F (37.1 °C) 96 %      MAP       --         Physical Exam    Nursing note and vitals reviewed.  Constitutional: She appears well-developed and well-nourished.   HENT:   Head: Normocephalic and atraumatic.   Eyes: Conjunctivae and EOM are normal. Pupils are equal, round, and reactive to light.   Neck: Neck supple.   Normal range of motion.  Cardiovascular:  Normal rate, regular rhythm, normal heart sounds and intact distal pulses.           Pulmonary/Chest: Breath sounds normal.   Abdominal: Abdomen is soft. There is no abdominal tenderness. There is no rebound and no guarding.   Musculoskeletal:         General: Normal range of motion.      Cervical back: Normal range of motion and neck supple.      Comments: Tenderness right great toe MTP joint, no erythema, no swelling, 2+ right dorsalis pedis pulse     Neurological: She is alert and oriented to person, place, and time. She has normal strength and normal reflexes.   Skin: Skin is warm and dry.   Psychiatric: She has a normal mood and affect. Her behavior is normal. Thought content normal.         ED Course   Procedures  Labs Reviewed - No data to display       Imaging Results    None          Medications   ketorolac injection 30 mg (has no administration in time range)     Medical Decision Making  9:38 AM  Patient has no fever or chills.  Patient has no erythema to suggest infectious process.  Patient has tenderness in the joint of right great toe, with elevated uric acid and C-reactive protein and tenderness in joint, suspect gouty arthritis.  Will treat with NSAIDs and pain medication have patient follow up with Rheumatology    Risk  Prescription drug management.                                      Clinical Impression:  Final diagnoses:  [M79.674] Great toe pain, right (Primary)          ED Disposition Condition    Discharge Stable           ED Prescriptions       Medication Sig Dispense Start Date End Date Auth. Provider    diclofenac (VOLTAREN) 50 MG EC tablet Take 1 tablet (50 mg total) by mouth 2 (two) times daily as needed (pain). 20 tablet 5/1/2024 -- Clayton Mishra NP    traMADoL (ULTRAM) 50 mg tablet Take 1 tablet (50 mg total) by mouth every 8 (eight) hours as needed for Pain. 12 tablet 5/1/2024 -- Clayton Mishra NP          Follow-up Information       Follow up With Specialties Details Why Contact Info    [facility]  Schedule an appointment as soon as possible for a visit in 2 days  [phone]             Clayton Mishra NP  05/01/24 3060

## 2024-05-01 NOTE — FIRST PROVIDER EVALUATION
Medical screening examination initiated.  I have conducted a focused provider triage encounter, findings are as follows:    Brief history of present illness:  44-year-old female presents emergency room with pain to her toe.  Patient has failed outpatient treatment    Vitals:    04/30/24 1911   BP: (!) 146/99   BP Location: Right arm   Patient Position: Sitting   Pulse: 105   Resp: 20   Temp: 98.8 °F (37.1 °C)   TempSrc: Oral   SpO2: 100%   Weight: 96.4 kg (212 lb 8.4 oz)       Pertinent physical exam:  Appears in pain vital signs stable    Brief workup plan:  Workup    Preliminary workup initiated; this workup will be continued and followed by the physician or advanced practice provider that is assigned to the patient when roomed.

## 2024-05-06 ENCOUNTER — HOSPITAL ENCOUNTER (EMERGENCY)
Facility: HOSPITAL | Age: 44
Discharge: HOME | End: 2024-05-06
Attending: EMERGENCY MEDICINE
Payer: MEDICAID

## 2024-05-06 VITALS
TEMPERATURE: 98 F | RESPIRATION RATE: 16 BRPM | BODY MASS INDEX: 41.68 KG/M2 | HEART RATE: 85 BPM | SYSTOLIC BLOOD PRESSURE: 174 MMHG | WEIGHT: 212.31 LBS | DIASTOLIC BLOOD PRESSURE: 119 MMHG | OXYGEN SATURATION: 100 %

## 2024-05-06 DIAGNOSIS — M79.674 PAIN OF TOE OF RIGHT FOOT: Primary | ICD-10-CM

## 2024-05-06 DIAGNOSIS — M10.9 GOUT OF BIG TOE: ICD-10-CM

## 2024-05-06 DIAGNOSIS — L03.031 CELLULITIS OF TOE OF RIGHT FOOT: ICD-10-CM

## 2024-05-06 PROCEDURE — 63600175 PHARM REV CODE 636 W HCPCS: Performed by: REGISTERED NURSE

## 2024-05-06 PROCEDURE — 96372 THER/PROPH/DIAG INJ SC/IM: CPT | Performed by: REGISTERED NURSE

## 2024-05-06 PROCEDURE — 99284 EMERGENCY DEPT VISIT MOD MDM: CPT | Mod: 25

## 2024-05-06 RX ORDER — CLINDAMYCIN HYDROCHLORIDE 300 MG/1
300 CAPSULE ORAL EVERY 6 HOURS
Qty: 28 CAPSULE | Refills: 0 | Status: SHIPPED | OUTPATIENT
Start: 2024-05-06 | End: 2024-05-06

## 2024-05-06 RX ORDER — TRAMADOL HYDROCHLORIDE 50 MG/1
50 TABLET ORAL EVERY 6 HOURS PRN
Qty: 12 TABLET | Refills: 0 | Status: SHIPPED | OUTPATIENT
Start: 2024-05-06 | End: 2024-05-06

## 2024-05-06 RX ORDER — DICLOFENAC SODIUM 50 MG/1
50 TABLET, DELAYED RELEASE ORAL 2 TIMES DAILY
Qty: 20 TABLET | Refills: 0 | Status: SHIPPED | OUTPATIENT
Start: 2024-05-06 | End: 2024-05-06

## 2024-05-06 RX ORDER — DICLOFENAC SODIUM 50 MG/1
50 TABLET, DELAYED RELEASE ORAL 2 TIMES DAILY
Qty: 20 TABLET | Refills: 0 | Status: ON HOLD | OUTPATIENT
Start: 2024-05-06 | End: 2024-06-05 | Stop reason: HOSPADM

## 2024-05-06 RX ORDER — TRAMADOL HYDROCHLORIDE 50 MG/1
50 TABLET ORAL EVERY 6 HOURS PRN
Qty: 12 TABLET | Refills: 0 | OUTPATIENT
Start: 2024-05-06 | End: 2024-05-22

## 2024-05-06 RX ORDER — KETOROLAC TROMETHAMINE 30 MG/ML
30 INJECTION, SOLUTION INTRAMUSCULAR; INTRAVENOUS
Status: COMPLETED | OUTPATIENT
Start: 2024-05-06 | End: 2024-05-06

## 2024-05-06 RX ORDER — CLINDAMYCIN HYDROCHLORIDE 300 MG/1
300 CAPSULE ORAL EVERY 6 HOURS
Qty: 28 CAPSULE | Refills: 0 | Status: SHIPPED | OUTPATIENT
Start: 2024-05-06 | End: 2024-05-13

## 2024-05-06 RX ADMIN — KETOROLAC TROMETHAMINE 30 MG: 30 INJECTION, SOLUTION INTRAMUSCULAR at 03:05

## 2024-05-06 NOTE — ED PROVIDER NOTES
Encounter Date: 5/6/2024       History     Chief Complaint   Patient presents with    Toe Pain     Right big toe pain, swelling x 1 week. Was seen a week ago and given antibiotics, was also treated for gout.      44-year-old female presents emergency department complaints of right great toe pain.  Patient states symptoms began approximately 1 week ago.  Patient was initially seen and prescribed antibiotics.  Patient states she stopped antibiotics 3 days later after being told she had gout.  Patient reports no improvement in symptoms.  Patient denies any fever chills.    The history is provided by the patient.     Review of patient's allergies indicates:   Allergen Reactions    Neurontin [gabapentin] Itching and Blisters    Percocet [oxycodone-acetaminophen] Itching and Blisters     Past Medical History:   Diagnosis Date    Back pain     Diabetes mellitus     Hypertension     Left knee pain     Miscarriage     x2    Stroke      Past Surgical History:   Procedure Laterality Date    ANTERIOR CRUCIATE LIGAMENT REPAIR Left     DILATION AND CURETTAGE OF UTERUS      x2    LUMBAR FUSION      TIBIA FRACTURE SURGERY Right      Family History   Problem Relation Name Age of Onset    Hypertension Mother      Heart disease Mother      Fibromyalgia Mother      Diabetes Father       Social History     Tobacco Use    Smoking status: Every Day     Current packs/day: 1.00     Average packs/day: 1 pack/day for 20.0 years (20.0 ttl pk-yrs)     Types: Cigarettes   Substance Use Topics    Alcohol use: No    Drug use: No     Review of Systems   Constitutional:  Negative for fever.   HENT:  Negative for sore throat.    Respiratory:  Negative for shortness of breath.    Cardiovascular:  Negative for chest pain.   Gastrointestinal:  Negative for nausea.   Genitourinary:  Negative for dysuria.   Musculoskeletal:  Negative for back pain.        +right great toe pain   Skin:  Negative for rash.   Neurological:  Negative for weakness.    Hematological:  Does not bruise/bleed easily.   All other systems reviewed and are negative.      Physical Exam     Initial Vitals [05/06/24 1501]   BP Pulse Resp Temp SpO2   (!) 174/119 85 16 98.1 °F (36.7 °C) 100 %      MAP       --         Physical Exam    Constitutional: She appears well-developed and well-nourished. She is not diaphoretic. No distress.   HENT:   Head: Normocephalic and atraumatic.   Eyes: Conjunctivae and EOM are normal. Pupils are equal, round, and reactive to light.   Neck: Neck supple.   Normal range of motion.  Cardiovascular:  Normal rate, regular rhythm and normal heart sounds.           No murmur heard.  Pulmonary/Chest: Breath sounds normal. No respiratory distress. She has no wheezes. She has no rales.   Abdominal: Abdomen is soft. Bowel sounds are normal. There is no abdominal tenderness. There is no rebound and no guarding.   Musculoskeletal:         General: No tenderness or edema. Normal range of motion.      Cervical back: Normal range of motion and neck supple.      Right foot: Swelling present.      Comments: Swelling and erythema noted to R great toe pain     Neurological: She is alert and oriented to person, place, and time. No cranial nerve deficit. GCS score is 15. GCS eye subscore is 4. GCS verbal subscore is 5. GCS motor subscore is 6.   Skin: Skin is warm and dry. Capillary refill takes less than 2 seconds.   Psychiatric: She has a normal mood and affect. Thought content normal.         ED Course   Procedures  Labs Reviewed - No data to display       Imaging Results    None          Medications   ketorolac injection 30 mg (30 mg Intramuscular Given 5/6/24 1526)     Medical Decision Making  Risk  Risk Details: I discussed with patient and/or family/caretaker that evaluation in the ED does not suggest any emergent or life threatening medical conditions requiring immediate intervention beyond what was provided in the ED, and I believe patient is safe for discharge.   Regardless, an unremarkable evaluation in the ED does not preclude the development or presence of a serious of life threatening condition. As such, patient was instructed to return immediately for any worsening or change in current symptoms.                                        Clinical Impression:  Final diagnoses:  [M79.674] Pain of toe of right foot (Primary)  [L03.031] Cellulitis of toe of right foot  [M10.9] Gout of big toe          ED Disposition Condition    Discharge Stable          ED Prescriptions       Medication Sig Dispense Start Date End Date Auth. Provider    clindamycin (CLEOCIN) 300 MG capsule  (Status: Discontinued) Take 1 capsule (300 mg total) by mouth every 6 (six) hours. for 7 days 28 capsule 5/6/2024 5/6/2024 Elkin Navarro Jr., FNP    diclofenac (VOLTAREN) 50 MG EC tablet  (Status: Discontinued) Take 1 tablet (50 mg total) by mouth 2 (two) times daily. 20 tablet 5/6/2024 5/6/2024 Elkin Navarro Jr., FNP    traMADoL (ULTRAM) 50 mg tablet  (Status: Discontinued) Take 1 tablet (50 mg total) by mouth every 6 (six) hours as needed for Pain. 12 tablet 5/6/2024 5/6/2024 Elkin Navarro Jr., FNP    clindamycin (CLEOCIN) 300 MG capsule Take 1 capsule (300 mg total) by mouth every 6 (six) hours. for 7 days 28 capsule 5/6/2024 5/13/2024 Elkin Navarro Jr., FNP    diclofenac (VOLTAREN) 50 MG EC tablet Take 1 tablet (50 mg total) by mouth 2 (two) times daily. 20 tablet 5/6/2024 -- Elkin Navarro Jr., FNP    traMADoL (ULTRAM) 50 mg tablet Take 1 tablet (50 mg total) by mouth every 6 (six) hours as needed for Pain. 12 tablet 5/6/2024 -- Elkin Navarro Jr., FNP          Follow-up Information       Follow up With Specialties Details Why Contact Info    Jarek Crespo MD Family Medicine In 1 week  [address] [facility] [id number] [phone]               Elkin Navarro Jr., FNP  05/06/24 5828

## 2024-05-10 ENCOUNTER — HOSPITAL ENCOUNTER (EMERGENCY)
Facility: HOSPITAL | Age: 44
Discharge: HOME | End: 2024-05-10
Attending: EMERGENCY MEDICINE
Payer: MEDICAID

## 2024-05-10 VITALS
TEMPERATURE: 98 F | OXYGEN SATURATION: 100 % | DIASTOLIC BLOOD PRESSURE: 89 MMHG | HEART RATE: 66 BPM | RESPIRATION RATE: 15 BRPM | SYSTOLIC BLOOD PRESSURE: 158 MMHG

## 2024-05-10 DIAGNOSIS — M10.9 ACUTE GOUT OF RIGHT FOOT, UNSPECIFIED CAUSE: Primary | ICD-10-CM

## 2024-05-10 LAB
ALBUMIN SERPL BCP-MCNC: 3.9 G/DL (ref 3.5–5.2)
ALP SERPL-CCNC: 86 U/L (ref 55–135)
ALT SERPL W/O P-5'-P-CCNC: 18 U/L (ref 10–44)
ANION GAP SERPL CALC-SCNC: 11 MMOL/L (ref 8–16)
AST SERPL-CCNC: 14 U/L (ref 10–40)
BASOPHILS # BLD AUTO: 0.04 K/UL (ref 0–0.2)
BASOPHILS NFR BLD: 0.3 % (ref 0–1.9)
BILIRUB SERPL-MCNC: 0.4 MG/DL (ref 0.1–1)
BUN SERPL-MCNC: 10 MG/DL (ref 6–20)
CALCIUM SERPL-MCNC: 10.1 MG/DL (ref 8.7–10.5)
CHLORIDE SERPL-SCNC: 102 MMOL/L (ref 95–110)
CO2 SERPL-SCNC: 23 MMOL/L (ref 23–29)
CREAT SERPL-MCNC: 0.9 MG/DL (ref 0.5–1.4)
CRP SERPL-MCNC: 15.5 MG/L (ref 0–8.2)
DIFFERENTIAL METHOD BLD: ABNORMAL
EOSINOPHIL # BLD AUTO: 0.3 K/UL (ref 0–0.5)
EOSINOPHIL NFR BLD: 2 % (ref 0–8)
ERYTHROCYTE [DISTWIDTH] IN BLOOD BY AUTOMATED COUNT: 15.4 % (ref 11.5–14.5)
EST. GFR  (NO RACE VARIABLE): >60 ML/MIN/1.73 M^2
GLUCOSE SERPL-MCNC: 106 MG/DL (ref 70–110)
HCT VFR BLD AUTO: 44.5 % (ref 37–48.5)
HGB BLD-MCNC: 14.1 G/DL (ref 12–16)
IMM GRANULOCYTES # BLD AUTO: 0.1 K/UL (ref 0–0.04)
IMM GRANULOCYTES NFR BLD AUTO: 0.7 % (ref 0–0.5)
LYMPHOCYTES # BLD AUTO: 3.2 K/UL (ref 1–4.8)
LYMPHOCYTES NFR BLD: 20.5 % (ref 18–48)
MCH RBC QN AUTO: 24.4 PG (ref 27–31)
MCHC RBC AUTO-ENTMCNC: 31.7 G/DL (ref 32–36)
MCV RBC AUTO: 77 FL (ref 82–98)
MONOCYTES # BLD AUTO: 0.8 K/UL (ref 0.3–1)
MONOCYTES NFR BLD: 5.1 % (ref 4–15)
NEUTROPHILS # BLD AUTO: 11 K/UL (ref 1.8–7.7)
NEUTROPHILS NFR BLD: 71.4 % (ref 38–73)
NRBC BLD-RTO: 0 /100 WBC
PLATELET # BLD AUTO: 429 K/UL (ref 150–450)
PMV BLD AUTO: 8.6 FL (ref 9.2–12.9)
POTASSIUM SERPL-SCNC: 4.3 MMOL/L (ref 3.5–5.1)
PROT SERPL-MCNC: 8.7 G/DL (ref 6–8.4)
RBC # BLD AUTO: 5.77 M/UL (ref 4–5.4)
SODIUM SERPL-SCNC: 136 MMOL/L (ref 136–145)
URATE SERPL-MCNC: 7 MG/DL (ref 2.4–5.7)
WBC # BLD AUTO: 15.35 K/UL (ref 3.9–12.7)

## 2024-05-10 PROCEDURE — 96375 TX/PRO/DX INJ NEW DRUG ADDON: CPT

## 2024-05-10 PROCEDURE — 63600175 PHARM REV CODE 636 W HCPCS: Performed by: EMERGENCY MEDICINE

## 2024-05-10 PROCEDURE — 99284 EMERGENCY DEPT VISIT MOD MDM: CPT | Mod: 25

## 2024-05-10 PROCEDURE — 80053 COMPREHEN METABOLIC PANEL: CPT | Performed by: NURSE PRACTITIONER

## 2024-05-10 PROCEDURE — 86140 C-REACTIVE PROTEIN: CPT | Performed by: NURSE PRACTITIONER

## 2024-05-10 PROCEDURE — 84550 ASSAY OF BLOOD/URIC ACID: CPT | Performed by: NURSE PRACTITIONER

## 2024-05-10 PROCEDURE — 96374 THER/PROPH/DIAG INJ IV PUSH: CPT

## 2024-05-10 PROCEDURE — 85025 COMPLETE CBC W/AUTO DIFF WBC: CPT | Performed by: NURSE PRACTITIONER

## 2024-05-10 RX ORDER — HYDROCODONE BITARTRATE AND ACETAMINOPHEN 10; 325 MG/1; MG/1
1 TABLET ORAL EVERY 6 HOURS PRN
Qty: 12 TABLET | Refills: 0 | OUTPATIENT
Start: 2024-05-10 | End: 2024-05-19

## 2024-05-10 RX ORDER — HYDROMORPHONE HYDROCHLORIDE 2 MG/ML
1 INJECTION, SOLUTION INTRAMUSCULAR; INTRAVENOUS; SUBCUTANEOUS
Status: COMPLETED | OUTPATIENT
Start: 2024-05-10 | End: 2024-05-10

## 2024-05-10 RX ORDER — ONDANSETRON HYDROCHLORIDE 2 MG/ML
4 INJECTION, SOLUTION INTRAVENOUS
Status: COMPLETED | OUTPATIENT
Start: 2024-05-10 | End: 2024-05-10

## 2024-05-10 RX ORDER — COLCHICINE 0.6 MG/1
TABLET ORAL
Qty: 11 TABLET | Refills: 0 | Status: ON HOLD | OUTPATIENT
Start: 2024-05-10 | End: 2024-06-05 | Stop reason: HOSPADM

## 2024-05-10 RX ADMIN — ONDANSETRON 4 MG: 2 INJECTION INTRAMUSCULAR; INTRAVENOUS at 11:05

## 2024-05-10 RX ADMIN — HYDROMORPHONE HYDROCHLORIDE 1 MG: 2 INJECTION INTRAMUSCULAR; INTRAVENOUS; SUBCUTANEOUS at 11:05

## 2024-05-11 NOTE — ED PROVIDER NOTES
SCRIBE #1 NOTE: I, Page Chacon, am scribing for, and in the presence of, Jonas Lucio Jr., MD. I have scribed the entire note.       History     Chief Complaint   Patient presents with    Foot Pain     Pt states today she is dealing with gout pain and simply is wanting some pain relief. Pt pain is on her right foot at her toes.      Review of patient's allergies indicates:   Allergen Reactions    Neurontin [gabapentin] Itching and Blisters    Percocet [oxycodone-acetaminophen] Itching and Blisters         History of Present Illness     HPI    5/10/2024, 11:02 PM  History obtained from the patient      History of Present Illness: Jeanna Helm is a 44 y.o. female patient with a PMHx of gout, DM, stroke, and HTN who presents to the Emergency Department for evaluation of pain to right great toe which onset gradually 2 weeks ago. Pt states she is currently on anti-inflammatories and abx with no improvement of sxs. Pt states she has been taking tramadol with no improvement of sxs.  Pt states her neighbor is picking her up from ED. Symptoms are constant and moderate in severity. No associated sxs reported. Patient denies any fever, chills, and all other sxs at this time.  No further complaints or concerns at this time.       Arrival mode: Personal vehicle      PCP: Jarek Crespo MD        Past Medical History:  Past Medical History:   Diagnosis Date    Back pain     Diabetes mellitus     Hypertension     Left knee pain     Miscarriage     x2    Stroke        Past Surgical History:  Past Surgical History:   Procedure Laterality Date    ANTERIOR CRUCIATE LIGAMENT REPAIR Left     DILATION AND CURETTAGE OF UTERUS      x2    LUMBAR FUSION      TIBIA FRACTURE SURGERY Right          Family History:  Family History   Problem Relation Name Age of Onset    Hypertension Mother      Heart disease Mother      Fibromyalgia Mother      Diabetes Father         Social History:  Social History     Tobacco Use    Smoking status:  Every Day     Current packs/day: 1.00     Average packs/day: 1 pack/day for 20.0 years (20.0 ttl pk-yrs)     Types: Cigarettes    Smokeless tobacco: Not on file   Substance and Sexual Activity    Alcohol use: No    Drug use: No    Sexual activity: Not Currently        Review of Systems     Review of Systems   Musculoskeletal:         (+) R great toe pain        Physical Exam     Initial Vitals [05/10/24 2015]   BP Pulse Resp Temp SpO2   (!) 199/122 89 18 98.4 °F (36.9 °C) 100 %      MAP       --          Physical Exam  Nursing Notes and Vital Signs Reviewed.  Constitutional: Patient is in no acute distress. Well-developed and well-nourished.  Head: Atraumatic. Normocephalic.  Eyes:  EOM intact.  No scleral icterus.  ENT: Mucous membranes are moist.  Nares clear   Neck:  Full ROM. No JVD.  Cardiovascular: Regular rate. Regular rhythm No murmurs, rubs, or gallops. Distal pulses are 2+ and symmetric  Musculoskeletal: Moves all extremities. No obvious deformities.  5 x 5 strength in all extremities.  There is mild swelling to the lateral aspect of the right great toe.  There is mild Redness along outside of right great toe.  Normal cap refill.  He was no bony deformity of the foot.  No tenderness to the proximal forefoot hindfoot or ankle.  Skin: Warm and dry.  Mild cellulitis to the big toe  Neurological:  Alert, awake, and appropriate.  Normal speech.  No acute focal neurological deficits are appreciated.  Two through 12 intact bilaterally.  Psychiatric: Normal affect. Good eye contact. Appropriate in content.     ED Course   Procedures  ED Vital Signs:  Vitals:    05/10/24 2015 05/10/24 2254 05/10/24 2310 05/10/24 2327   BP: (!) 199/122 (!) 158/89     Pulse: 89 75  66   Resp: 18  16 15   Temp: 98.4 °F (36.9 °C)      TempSrc: Oral      SpO2: 100% 100%  100%       Abnormal Lab Results:  Labs Reviewed   CBC W/ AUTO DIFFERENTIAL - Abnormal; Notable for the following components:       Result Value    WBC 15.35 (*)     RBC  5.77 (*)     MCV 77 (*)     MCH 24.4 (*)     MCHC 31.7 (*)     RDW 15.4 (*)     MPV 8.6 (*)     Immature Granulocytes 0.7 (*)     Gran # (ANC) 11.0 (*)     Immature Grans (Abs) 0.10 (*)     All other components within normal limits   COMPREHENSIVE METABOLIC PANEL - Abnormal; Notable for the following components:    Total Protein 8.7 (*)     All other components within normal limits   C-REACTIVE PROTEIN - Abnormal; Notable for the following components:    CRP 15.5 (*)     All other components within normal limits   URIC ACID - Abnormal; Notable for the following components:    Uric Acid 7.0 (*)     All other components within normal limits        All Lab Results:  Results for orders placed or performed during the hospital encounter of 05/10/24   CBC auto differential   Result Value Ref Range    WBC 15.35 (H) 3.90 - 12.70 K/uL    RBC 5.77 (H) 4.00 - 5.40 M/uL    Hemoglobin 14.1 12.0 - 16.0 g/dL    Hematocrit 44.5 37.0 - 48.5 %    MCV 77 (L) 82 - 98 fL    MCH 24.4 (L) 27.0 - 31.0 pg    MCHC 31.7 (L) 32.0 - 36.0 g/dL    RDW 15.4 (H) 11.5 - 14.5 %    Platelets 429 150 - 450 K/uL    MPV 8.6 (L) 9.2 - 12.9 fL    Immature Granulocytes 0.7 (H) 0.0 - 0.5 %    Gran # (ANC) 11.0 (H) 1.8 - 7.7 K/uL    Immature Grans (Abs) 0.10 (H) 0.00 - 0.04 K/uL    Lymph # 3.2 1.0 - 4.8 K/uL    Mono # 0.8 0.3 - 1.0 K/uL    Eos # 0.3 0.0 - 0.5 K/uL    Baso # 0.04 0.00 - 0.20 K/uL    nRBC 0 0 /100 WBC    Gran % 71.4 38.0 - 73.0 %    Lymph % 20.5 18.0 - 48.0 %    Mono % 5.1 4.0 - 15.0 %    Eosinophil % 2.0 0.0 - 8.0 %    Basophil % 0.3 0.0 - 1.9 %    Differential Method Automated    Comprehensive metabolic panel   Result Value Ref Range    Sodium 136 136 - 145 mmol/L    Potassium 4.3 3.5 - 5.1 mmol/L    Chloride 102 95 - 110 mmol/L    CO2 23 23 - 29 mmol/L    Glucose 106 70 - 110 mg/dL    BUN 10 6 - 20 mg/dL    Creatinine 0.9 0.5 - 1.4 mg/dL    Calcium 10.1 8.7 - 10.5 mg/dL    Total Protein 8.7 (H) 6.0 - 8.4 g/dL    Albumin 3.9 3.5 - 5.2 g/dL     Total Bilirubin 0.4 0.1 - 1.0 mg/dL    Alkaline Phosphatase 86 55 - 135 U/L    AST 14 10 - 40 U/L    ALT 18 10 - 44 U/L    eGFR >60 >60 mL/min/1.73 m^2    Anion Gap 11 8 - 16 mmol/L   C-reactive protein   Result Value Ref Range    CRP 15.5 (H) 0.0 - 8.2 mg/L   Uric acid   Result Value Ref Range    Uric Acid 7.0 (H) 2.4 - 5.7 mg/dL         Imaging Results:  Imaging Results              X-Ray Toe 2 or More Views Right (Final result)  Result time 05/10/24 20:49:49      Final result by Claudy Tay MD (05/10/24 20:49:49)                   Impression:      No acute abnormality.      Electronically signed by: Claudy Tay  Date:    05/10/2024  Time:    20:49               Narrative:    EXAMINATION:  XR TOE 2 OR MORE VIEWS RIGHT    CLINICAL HISTORY:  XR TOE 2 OR MORE VIEWS RIGHT    COMPARISON:  None    FINDINGS:  Multiple radiographic views  were obtained.    No evidence of acute fracture or dislocation.  Bony mineralization is normal.  Soft tissues are unremarkable. Question minimal degenerative joint disease at the 1st metacarpal phalangeal joint.                                                The Emergency Provider reviewed the vital signs and test results, which are outlined above.     ED Discussion     11:05 PM: Reassessed pt at this time. Discussed with pt all pertinent ED information and results. Discussed pt dx and plan of tx. Gave pt all f/u and return to the ED instructions. All questions and concerns were addressed at this time. Pt expresses understanding of information and instructions, and is comfortable with plan to discharge. Pt is stable for discharge.    I discussed with patient and/or family/caretaker that evaluation in the ED does not suggest any emergent or life threatening medical conditions requiring immediate intervention beyond what was provided in the ED, and I believe patient is safe for discharge.  Regardless, an unremarkable evaluation in the ED does not preclude the development or presence  of a serious of life threatening condition. As such, patient was instructed to return immediately for any worsening or change in current symptoms.         Medical Decision Making  Differential diagnosis:  Gout, cellulitis, toe pain, abscess    Patient with a history of gout with recent diagnosis.  He was currently clindamycin for cellulitis as well.  She notes continued pain to the big toe in his requesting medications for pain.  She was no fevers.  Workup shows an elevated CRP at 3:15 p.m. 1000 white count mild left shift and normal CMP with normal renal function.  Uric acid is elevated at 7.  X-ray showed no acute findings.  This consistent with gout.  She was currently being treated for possible cellulitis will continue with the clindamycin I will add colchicine along with pain control.  She was currently on nonsteroidals as well.  Patient was follow up with the primary care provider.  She was stable safe for this in my opinion.  Discussed all findings with the patient was well as plan of care.  She verbalized agreement understanding with all instructions seems reliable.    Amount and/or Complexity of Data Reviewed  External Data Reviewed: labs and notes.  Labs: ordered. Decision-making details documented in ED Course.  Radiology: ordered. Decision-making details documented in ED Course.    Risk  OTC drugs.  Prescription drug management.  Parenteral controlled substances.  Decision regarding hospitalization.                ED Medication(s):  Medications   ondansetron injection 4 mg (4 mg Intravenous Given 5/10/24 2310)   HYDROmorphone (PF) injection 1 mg (1 mg Intravenous Given 5/10/24 2310)       Discharge Medication List as of 5/10/2024 11:06 PM        START taking these medications    Details   colchicine (COLCRYS) 0.6 mg tablet Take 2 tablets for your 1st dose.  You may then take 1 tablet every 2 hours x3.  Do not use more than 3 days., Normal      HYDROcodone-acetaminophen (NORCO)  mg per tablet Take 1  tablet by mouth every 6 (six) hours as needed., Starting Fri 5/10/2024, Normal              Follow-up Information       Jarek Crespo MD.    Specialty: Family Medicine  Contact information:  [id number] [facility] [address] [facility] [address] [place] [address] [phone]                                 Scribe Attestation:   Scribe #1: I performed the above scribed service and the documentation accurately describes the services I performed. I attest to the accuracy of the note.     Attending:   Physician Attestation Statement for Scribe #1: I, Jonas Lucio Jr., MD, personally performed the services described in this documentation, as scribed by Page Chacon, in my presence, and it is both accurate and complete.           Clinical Impression       ICD-10-CM ICD-9-CM   1. Acute gout of right foot, unspecified cause  M10.9 274.01       Disposition:   Disposition: Discharged  Condition: Stable         Jonas Lucio Jr., MD  05/10/24 [id number]

## 2024-05-11 NOTE — DISCHARGE INSTRUCTIONS
Continue all your home medications.  Add colchicine as prescribed.  Use Norco for breakthrough pain in lieu of Ultram.  Follow up with her doctor in 1-2 days for re-evaluation return as needed

## 2024-05-14 ENCOUNTER — HOSPITAL ENCOUNTER (EMERGENCY)
Facility: HOSPITAL | Age: 44
Discharge: HOME | End: 2024-05-14
Attending: EMERGENCY MEDICINE
Payer: MEDICAID

## 2024-05-14 VITALS
DIASTOLIC BLOOD PRESSURE: 91 MMHG | BODY MASS INDEX: 40.99 KG/M2 | RESPIRATION RATE: 18 BRPM | TEMPERATURE: 98 F | WEIGHT: 208.75 LBS | SYSTOLIC BLOOD PRESSURE: 162 MMHG | OXYGEN SATURATION: 100 % | HEART RATE: 83 BPM

## 2024-05-14 DIAGNOSIS — M79.672 LEFT FOOT PAIN: ICD-10-CM

## 2024-05-14 DIAGNOSIS — M79.671 RIGHT FOOT PAIN: ICD-10-CM

## 2024-05-14 DIAGNOSIS — M10.9 GOUTY ARTHRITIS OF RIGHT GREAT TOE: Primary | ICD-10-CM

## 2024-05-14 LAB
ALBUMIN SERPL BCP-MCNC: 3.9 G/DL (ref 3.5–5.2)
ALP SERPL-CCNC: 82 U/L (ref 55–135)
ALT SERPL W/O P-5'-P-CCNC: 30 U/L (ref 10–44)
ANION GAP SERPL CALC-SCNC: 12 MMOL/L (ref 8–16)
AST SERPL-CCNC: 27 U/L (ref 10–40)
B-HCG UR QL: NEGATIVE
BACTERIA #/AREA URNS HPF: NORMAL /HPF
BASOPHILS # BLD AUTO: 0.03 K/UL (ref 0–0.2)
BASOPHILS NFR BLD: 0.3 % (ref 0–1.9)
BILIRUB SERPL-MCNC: 0.3 MG/DL (ref 0.1–1)
BILIRUB UR QL STRIP: NEGATIVE
BUN SERPL-MCNC: 11 MG/DL (ref 6–20)
CALCIUM SERPL-MCNC: 9.7 MG/DL (ref 8.7–10.5)
CHLORIDE SERPL-SCNC: 106 MMOL/L (ref 95–110)
CLARITY UR: CLEAR
CO2 SERPL-SCNC: 19 MMOL/L (ref 23–29)
COLOR UR: YELLOW
CREAT SERPL-MCNC: 0.9 MG/DL (ref 0.5–1.4)
CRP SERPL-MCNC: 5 MG/L (ref 0–8.2)
DIFFERENTIAL METHOD BLD: ABNORMAL
EOSINOPHIL # BLD AUTO: 0.2 K/UL (ref 0–0.5)
EOSINOPHIL NFR BLD: 1.5 % (ref 0–8)
ERYTHROCYTE [DISTWIDTH] IN BLOOD BY AUTOMATED COUNT: 15.2 % (ref 11.5–14.5)
EST. GFR  (NO RACE VARIABLE): >60 ML/MIN/1.73 M^2
GLUCOSE SERPL-MCNC: 106 MG/DL (ref 70–110)
GLUCOSE UR QL STRIP: NEGATIVE
HCT VFR BLD AUTO: 43.9 % (ref 37–48.5)
HGB BLD-MCNC: 13.9 G/DL (ref 12–16)
HGB UR QL STRIP: ABNORMAL
HYALINE CASTS #/AREA URNS LPF: 1 /LPF
IMM GRANULOCYTES # BLD AUTO: 0.04 K/UL (ref 0–0.04)
IMM GRANULOCYTES NFR BLD AUTO: 0.3 % (ref 0–0.5)
KETONES UR QL STRIP: NEGATIVE
LACTATE SERPL-SCNC: 0.9 MMOL/L (ref 0.5–2.2)
LACTATE SERPL-SCNC: 1.1 MMOL/L (ref 0.5–2.2)
LEUKOCYTE ESTERASE UR QL STRIP: ABNORMAL
LYMPHOCYTES # BLD AUTO: 2.3 K/UL (ref 1–4.8)
LYMPHOCYTES NFR BLD: 20 % (ref 18–48)
MCH RBC QN AUTO: 24.2 PG (ref 27–31)
MCHC RBC AUTO-ENTMCNC: 31.7 G/DL (ref 32–36)
MCV RBC AUTO: 76 FL (ref 82–98)
MICROSCOPIC COMMENT: NORMAL
MONOCYTES # BLD AUTO: 0.7 K/UL (ref 0.3–1)
MONOCYTES NFR BLD: 6 % (ref 4–15)
NEUTROPHILS # BLD AUTO: 8.3 K/UL (ref 1.8–7.7)
NEUTROPHILS NFR BLD: 71.9 % (ref 38–73)
NITRITE UR QL STRIP: NEGATIVE
NRBC BLD-RTO: 0 /100 WBC
PH UR STRIP: 5 [PH] (ref 5–8)
PLATELET # BLD AUTO: 406 K/UL (ref 150–450)
PMV BLD AUTO: 8.8 FL (ref 9.2–12.9)
POTASSIUM SERPL-SCNC: 4.5 MMOL/L (ref 3.5–5.1)
PROT SERPL-MCNC: 8.4 G/DL (ref 6–8.4)
PROT UR QL STRIP: ABNORMAL
RBC # BLD AUTO: 5.75 M/UL (ref 4–5.4)
RBC #/AREA URNS HPF: 1 /HPF (ref 0–4)
SODIUM SERPL-SCNC: 137 MMOL/L (ref 136–145)
SP GR UR STRIP: 1.01 (ref 1–1.03)
SQUAMOUS #/AREA URNS HPF: 5 /HPF
URATE SERPL-MCNC: 8.2 MG/DL (ref 2.4–5.7)
URN SPEC COLLECT METH UR: ABNORMAL
UROBILINOGEN UR STRIP-ACNC: NEGATIVE EU/DL
WBC # BLD AUTO: 11.58 K/UL (ref 3.9–12.7)
WBC #/AREA URNS HPF: 4 /HPF (ref 0–5)

## 2024-05-14 PROCEDURE — 83605 ASSAY OF LACTIC ACID: CPT | Mod: 91 | Performed by: NURSE PRACTITIONER

## 2024-05-14 PROCEDURE — 81025 URINE PREGNANCY TEST: CPT | Performed by: NURSE PRACTITIONER

## 2024-05-14 PROCEDURE — 96375 TX/PRO/DX INJ NEW DRUG ADDON: CPT

## 2024-05-14 PROCEDURE — 63600175 PHARM REV CODE 636 W HCPCS: Performed by: EMERGENCY MEDICINE

## 2024-05-14 PROCEDURE — 96374 THER/PROPH/DIAG INJ IV PUSH: CPT

## 2024-05-14 PROCEDURE — 84550 ASSAY OF BLOOD/URIC ACID: CPT | Performed by: EMERGENCY MEDICINE

## 2024-05-14 PROCEDURE — 86140 C-REACTIVE PROTEIN: CPT | Performed by: EMERGENCY MEDICINE

## 2024-05-14 PROCEDURE — 85025 COMPLETE CBC W/AUTO DIFF WBC: CPT | Performed by: NURSE PRACTITIONER

## 2024-05-14 PROCEDURE — 99284 EMERGENCY DEPT VISIT MOD MDM: CPT | Mod: 25

## 2024-05-14 PROCEDURE — 80053 COMPREHEN METABOLIC PANEL: CPT | Performed by: EMERGENCY MEDICINE

## 2024-05-14 PROCEDURE — 81000 URINALYSIS NONAUTO W/SCOPE: CPT | Performed by: NURSE PRACTITIONER

## 2024-05-14 PROCEDURE — 87040 BLOOD CULTURE FOR BACTERIA: CPT | Performed by: NURSE PRACTITIONER

## 2024-05-14 RX ORDER — ONDANSETRON HYDROCHLORIDE 2 MG/ML
4 INJECTION, SOLUTION INTRAVENOUS
Status: COMPLETED | OUTPATIENT
Start: 2024-05-14 | End: 2024-05-14

## 2024-05-14 RX ORDER — COLCHICINE 0.6 MG/1
0.6 TABLET ORAL DAILY
Qty: 10 TABLET | Refills: 0 | Status: ON HOLD | OUTPATIENT
Start: 2024-05-14 | End: 2024-06-03

## 2024-05-14 RX ORDER — HYDROCODONE BITARTRATE AND ACETAMINOPHEN 10; 325 MG/1; MG/1
1 TABLET ORAL EVERY 6 HOURS PRN
Qty: 12 TABLET | Refills: 0 | OUTPATIENT
Start: 2024-05-14 | End: 2024-05-19

## 2024-05-14 RX ORDER — HYDROMORPHONE HYDROCHLORIDE 2 MG/ML
1 INJECTION, SOLUTION INTRAMUSCULAR; INTRAVENOUS; SUBCUTANEOUS
Status: COMPLETED | OUTPATIENT
Start: 2024-05-14 | End: 2024-05-14

## 2024-05-14 RX ORDER — KETOROLAC TROMETHAMINE 30 MG/ML
30 INJECTION, SOLUTION INTRAMUSCULAR; INTRAVENOUS
Status: COMPLETED | OUTPATIENT
Start: 2024-05-14 | End: 2024-05-14

## 2024-05-14 RX ORDER — ALLOPURINOL 100 MG/1
100 TABLET ORAL DAILY
Qty: 10 TABLET | Refills: 0 | Status: ON HOLD | OUTPATIENT
Start: 2024-05-14 | End: 2024-06-05

## 2024-05-14 RX ORDER — METHYLPREDNISOLONE 4 MG/1
TABLET ORAL
Qty: 1 EACH | Refills: 0 | Status: ON HOLD | OUTPATIENT
Start: 2024-05-14 | End: 2024-06-03

## 2024-05-14 RX ADMIN — KETOROLAC TROMETHAMINE 30 MG: 30 INJECTION, SOLUTION INTRAMUSCULAR; INTRAVENOUS at 06:05

## 2024-05-14 RX ADMIN — ONDANSETRON 4 MG: 2 INJECTION INTRAMUSCULAR; INTRAVENOUS at 04:05

## 2024-05-14 RX ADMIN — HYDROMORPHONE HYDROCHLORIDE 1 MG: 2 INJECTION INTRAMUSCULAR; INTRAVENOUS; SUBCUTANEOUS at 04:05

## 2024-05-14 NOTE — ED PROVIDER NOTES
SCRIBE #1 NOTE: I, Basim Pineda, am scribing for, and in the presence of, Jonas Lucio Jr., MD. I have scribed the entire note.       History     Chief Complaint   Patient presents with    Toe Pain     Right great toe pain and pressure. Evaluated for the same recently dx with gout      Review of patient's allergies indicates:   Allergen Reactions    Neurontin [gabapentin] Itching and Blisters    Percocet [oxycodone-acetaminophen] Itching and Blisters         History of Present Illness     HPI    5/14/2024, 4:59 PM  History obtained from the patient       History of Present Illness: Jeanna Helm is a 44 y.o. female patient with a PMHx of DM, HTN who presents to the Emergency Department for evaluation of Right great toe pain and pressure which onset PTA. Patient was last seen on 5/10/24 for reoccurring acute gout of right foot. Symptoms are constant and moderate in severity. No mitigating or exacerbating factors reported. Patient denies any fever, chill, n/v/d, numbness, HA, and all other sxs at this time. No further complaints or concerns at this time.  Patient was released with colchicine at last visit however she was now developed diarrhea.  There no fevers or chills.  She denies any new trauma.    Arrival mode: Personal vehicle    PCP: Jarek Crespo MD        Past Medical History:  Past Medical History:   Diagnosis Date    Back pain     Diabetes mellitus     Hypertension     Left knee pain     Miscarriage     x2    Stroke        Past Surgical History:  Past Surgical History:   Procedure Laterality Date    ANTERIOR CRUCIATE LIGAMENT REPAIR Left     DILATION AND CURETTAGE OF UTERUS      x2    LUMBAR FUSION      TIBIA FRACTURE SURGERY Right          Family History:  Family History   Problem Relation Name Age of Onset    Hypertension Mother      Heart disease Mother      Fibromyalgia Mother      Diabetes Father         Social History:  Social History     Tobacco Use    Smoking status: Every Day     Current  packs/day: 1.00     Average packs/day: 1 pack/day for 20.0 years (20.0 ttl pk-yrs)     Types: Cigarettes    Smokeless tobacco: Not on file   Substance and Sexual Activity    Alcohol use: No    Drug use: No    Sexual activity: Not Currently        Review of Systems     Review of Systems   Constitutional:  Negative for chills and fever.   HENT:  Negative for sore throat.    Respiratory:  Negative for shortness of breath.    Cardiovascular:  Negative for chest pain.   Gastrointestinal:  Negative for diarrhea, nausea and vomiting.   Genitourinary:  Negative for dysuria.   Musculoskeletal:  Positive for joint swelling. Negative for back pain.        (+) Right great toe   Skin:  Negative for rash.   Neurological:  Negative for weakness, numbness and headaches.   Hematological:  Does not bruise/bleed easily.   All other systems reviewed and are negative.       Physical Exam     Initial Vitals   BP Pulse Resp Temp SpO2   05/14/24 1618 05/14/24 1342 05/14/24 1342 05/14/24 1342 05/14/24 1342   (!) 162/91 97 18 98 °F (36.7 °C) 100 %      MAP       --                 Physical Exam  Nursing Notes and Vital Signs Reviewed.  Constitutional: Patient is in no acute distress. Well-developed and well-nourished.  Head: Atraumatic. Normocephalic.  Eyes:  EOM intact.  No scleral icterus.  ENT: Mucous membranes are moist.  Nares clear   Neck:  Full ROM. No JVD.  Cardiovascular: Regular rate. Regular rhythm No murmurs, rubs, or gallops. Distal pulses are 2+ and symmetric  Pulmonary/Chest: No respiratory distress. Clear to auscultation bilaterally. No wheezing or rales.  Equal chest wall rise bilaterally  Abdominal: Soft and non-distended.  There is no tenderness.  No rebound, guarding, or rigidity. Good bowel sounds.  Musculoskeletal: Moves all extremities. No obvious deformities.  5 x 5 strength in all extremities.  Mild swelling of the IP of the right big toe.  There is some tenderness with palpation with movement of the toe consistent  with gout   Skin: Warm and dry.  It was mild erythema to the medial aspect of the right big toe.  Neurological:  Alert, awake, and appropriate.  Normal speech.  No acute focal neurological deficits are appreciated.  Two through 12 intact bilaterally.  Psychiatric: Normal affect. Good eye contact. Appropriate in content.       ED Course   Procedures  ED Vital Signs:  Vitals:    05/14/24 1342 05/14/24 1618 05/14/24 1632   BP:  (!) 162/91    Pulse: 97 83    Resp: 18 (!) 25 18   Temp: 98 °F (36.7 °C)     TempSrc: Oral     SpO2: 100% 100%    Weight: 94.7 kg (208 lb 12.4 oz)         Abnormal Lab Results:  Labs Reviewed   CBC W/ AUTO DIFFERENTIAL - Abnormal; Notable for the following components:       Result Value    RBC 5.75 (*)     MCV 76 (*)     MCH 24.2 (*)     MCHC 31.7 (*)     RDW 15.2 (*)     MPV 8.8 (*)     Gran # (ANC) 8.3 (*)     All other components within normal limits   URINALYSIS, REFLEX TO URINE CULTURE - Abnormal; Notable for the following components:    Protein, UA 1+ (*)     Occult Blood UA Trace (*)     Leukocytes, UA Trace (*)     All other components within normal limits    Narrative:     Specimen Source->Urine   COMPREHENSIVE METABOLIC PANEL - Abnormal; Notable for the following components:    CO2 19 (*)     All other components within normal limits   URIC ACID - Abnormal; Notable for the following components:    Uric Acid 8.2 (*)     All other components within normal limits   CULTURE, BLOOD   CULTURE, BLOOD   LACTIC ACID, PLASMA   PREGNANCY TEST, URINE RAPID    Narrative:     Specimen Source->Urine   URINALYSIS MICROSCOPIC    Narrative:     Specimen Source->Urine   C-REACTIVE PROTEIN   LACTIC ACID, PLASMA        All Lab Results:  Results for orders placed or performed during the hospital encounter of 05/14/24   CBC auto differential   Result Value Ref Range    WBC 11.58 3.90 - 12.70 K/uL    RBC 5.75 (H) 4.00 - 5.40 M/uL    Hemoglobin 13.9 12.0 - 16.0 g/dL    Hematocrit 43.9 37.0 - 48.5 %    MCV 76  (L) 82 - 98 fL    MCH 24.2 (L) 27.0 - 31.0 pg    MCHC 31.7 (L) 32.0 - 36.0 g/dL    RDW 15.2 (H) 11.5 - 14.5 %    Platelets 406 150 - 450 K/uL    MPV 8.8 (L) 9.2 - 12.9 fL    Immature Granulocytes 0.3 0.0 - 0.5 %    Gran # (ANC) 8.3 (H) 1.8 - 7.7 K/uL    Immature Grans (Abs) 0.04 0.00 - 0.04 K/uL    Lymph # 2.3 1.0 - 4.8 K/uL    Mono # 0.7 0.3 - 1.0 K/uL    Eos # 0.2 0.0 - 0.5 K/uL    Baso # 0.03 0.00 - 0.20 K/uL    nRBC 0 0 /100 WBC    Gran % 71.9 38.0 - 73.0 %    Lymph % 20.0 18.0 - 48.0 %    Mono % 6.0 4.0 - 15.0 %    Eosinophil % 1.5 0.0 - 8.0 %    Basophil % 0.3 0.0 - 1.9 %    Differential Method Automated    Lactic acid, plasma #1   Result Value Ref Range    Lactate (Lactic Acid) 1.1 0.5 - 2.2 mmol/L   Urinalysis, Reflex to Urine Culture Urine, Clean Catch    Specimen: Urine   Result Value Ref Range    Specimen UA Urine, Clean Catch     Color, UA Yellow Yellow, Straw, [place]    Appearance, UA Clear Clear    pH, UA 5.0 5.0 - 8.0    Specific Gravity, UA 1.015 1.005 - 1.030    Protein, UA 1+ (A) Negative    Glucose, UA Negative Negative    Ketones, UA Negative Negative    Bilirubin (UA) Negative Negative    Occult Blood UA Trace (A) Negative    Nitrite, UA Negative Negative    Urobilinogen, UA Negative <2.0 EU/dL    Leukocytes, UA Trace (A) Negative   Pregnancy, urine rapid (UPT)   Result Value Ref Range    Preg Test, Ur Negative    Urinalysis Microscopic   Result Value Ref Range    RBC, UA 1 0 - 4 /hpf    WBC, UA 4 0 - 5 /hpf    Bacteria Rare None-Occ /hpf    Squam Epithel, UA 5 /hpf    Hyaline Casts, UA 1 0-1/lpf /lpf    Microscopic Comment SEE COMMENT    Comprehensive metabolic panel   Result Value Ref Range    Sodium 137 136 - 145 mmol/L    Potassium 4.5 3.5 - 5.1 mmol/L    Chloride 106 95 - 110 mmol/L    CO2 19 (L) 23 - 29 mmol/L    Glucose 106 70 - 110 mg/dL    BUN 11 6 - 20 mg/dL    Creatinine 0.9 0.5 - 1.4 mg/dL    Calcium 9.7 8.7 - 10.5 mg/dL    Total Protein 8.4 6.0 - 8.4 g/dL    Albumin 3.9 3.5 - 5.2  g/dL    Total Bilirubin 0.3 0.1 - 1.0 mg/dL    Alkaline Phosphatase 82 55 - 135 U/L    AST 27 10 - 40 U/L    ALT 30 10 - 44 U/L    eGFR >60 >60 mL/min/1.73 m^2    Anion Gap 12 8 - 16 mmol/L   C-reactive protein   Result Value Ref Range    CRP 5.0 0.0 - 8.2 mg/L   Uric acid   Result Value Ref Range    Uric Acid 8.2 (H) 2.4 - 5.7 mg/dL   Lactic acid, plasma #2   Result Value Ref Range    Lactate (Lactic Acid) 0.9 0.5 - 2.2 mmol/L         Imaging Results:  Imaging Results              X-Ray Foot Complete Left (Final result)  Result time 05/14/24 17:00:41      Final result by Gisella Vences MD (05/14/24 17:00:41)                   Impression:    FINDINGS/  Three-view exam is labeled right foot.  Correlate for laterality.  No acute fracture or dislocation seen.  No destructive bony findings.  Plantar spur and Achilles enthesophyte.  Distal fibular and tibial hardware present.      Electronically signed by: Giselal Vences  Date:    05/14/2024  Time:    17:00               Narrative:    EXAMINATION:  XR FOOT COMPLETE 3 VIEW LEFT    CLINICAL HISTORY:  Pain in left foot    COMPARISON:  05/10/2024                                            ED Discussion       6:27 PM: Reassessed pt at this time. Discussed with pt all pertinent ED information and results. Discussed pt dx and plan of tx. Gave pt all f/u and return to the ED instructions. All questions and concerns were addressed at this time. Pt expresses understanding of information and instructions, and is comfortable with plan to discharge. Pt is stable for discharge.    I discussed with patient and/or family/caretaker that evaluation in the ED does not suggest any emergent or life threatening medical conditions requiring immediate intervention beyond what was provided in the ED, and I believe patient is safe for discharge.  Regardless, an unremarkable evaluation in the ED does not preclude the development or presence of a serious of life threatening condition. As  such, patient was instructed to return immediately for any worsening or change in current symptoms.         Medical Decision Making  Differential diagnosis: Cellulitis, gout, osteomyelitis, toe pain    Patient with a history of gout.  Recently treated for gouty flare.  She received colchicine and notes that she received started having diarrhea with this.  Workup was undertaken today with a normal lactate elevated uric acid at 8.2 normal CRP which is down CMP that is normal UA that is negative UPT that is negative and normal white count and very minimal left shift.  X-ray imaging shows no acute findings.  I discussed the case with Dr. Guzman with Podiatry.  She will see in follow-up.  She recommended we start a Medrol Dosepak allopurinol and decrease the colchicine dosing to 1 tablet daily.  Pain control.  They will call in the morning for an appointment with the patient.  Patient verbalized understanding agreement with the plan of care seems reliable.  She was safe for discharge in my opinion    Amount and/or Complexity of Data Reviewed  External Data Reviewed: labs and notes.  Labs: ordered. Decision-making details documented in ED Course.  Radiology: ordered. Decision-making details documented in ED Course.  Discussion of management or test interpretation with external provider(s): Discussed the case with Dr. Guzman with Podiatry.  They will see in follow-up.  Recommends starting allopurinol 100 mg daily along with decreasing colchicine dosed once a day.  Pain control and Medrol Dosepak and close follow-up.    Risk  OTC drugs.  Prescription drug management.  Parenteral controlled substances.  Drug therapy requiring intensive monitoring for toxicity.  Decision regarding hospitalization.  Risk Details: Drug therapy is colchicine/allopurinol                ED Medication(s):  Medications   ondansetron injection 4 mg (4 mg Intravenous Given 5/14/24 1632)   HYDROmorphone (PF) injection 1 mg (1 mg Intravenous Given 5/14/24  1632)   ketorolac injection 30 mg (30 mg Intravenous Given 5/14/24 1830)       New Prescriptions    ALLOPURINOL (ZYLOPRIM) 100 MG TABLET    Take 1 tablet (100 mg total) by mouth once daily.    COLCHICINE (COLCRYS) 0.6 MG TABLET    Take 1 tablet (0.6 mg total) by mouth once daily. Take 2 tablets for your 1st dose.  You may then take 1 tablet every 2 hours x3.  Do not use more than 3 days.    HYDROCODONE-ACETAMINOPHEN (NORCO)  MG PER TABLET    Take 1 tablet by mouth every 6 (six) hours as needed.    METHYLPREDNISOLONE (MEDROL DOSEPACK) 4 MG TABLET    Take as directed on the package.        Follow-up Information       Nieves Watson DPM In 1 day.    Specialty: Podiatry  Contact information:  [address] [place] [address] Dr Ninfa CALDERON [id number] [phone]                                 Scribe Attestation:   Scribe #1: I performed the above scribed service and the documentation accurately describes the services I performed. I attest to the accuracy of the note.     Attending:   Physician Attestation Statement for Scribe #1: I, Jonas Lucio Jr., MD, personally performed the services described in this documentation, as scribed by Basim Pineda, in my presence, and it is both accurate and complete.           Clinical Impression       ICD-10-CM ICD-9-CM   1. Gouty arthritis of right great toe  M10.9 274.00   2. Right foot pain  M79.671 729.5   3. Left foot pain  M79.672 729.5       Disposition:   Disposition: Discharged  Condition: Stable         Jonas Lucio Jr., MD  05/14/24 1911

## 2024-05-14 NOTE — DISCHARGE INSTRUCTIONS
Take colchicine and allopurinol daily.  Medrol Dosepak as prescribed.  Take Norco for breakthrough pain.  You should be contacted tomorrow morning by Dr. Guzman office for an appointment.  Return as needed

## 2024-05-14 NOTE — FIRST PROVIDER EVALUATION
Medical screening examination initiated.  I have conducted a focused provider triage encounter, findings are as follows:    Brief history of present illness:  Patient reports pain and swelling to right great toe.  Patient reports no improvement with home medications including anti-inflammatories and antibiotics.    Vitals:    05/14/24 1342   Pulse: 97   Resp: 18   Temp: 98 °F (36.7 °C)   TempSrc: Oral   SpO2: 100%   Weight: 94.7 kg (208 lb 12.4 oz)       Pertinent physical exam:  Uncomfortable    Brief workup plan:  Labs, further eval    Preliminary workup initiated; this workup will be continued and followed by the physician or advanced practice provider that is assigned to the patient when roomed.

## 2024-05-14 NOTE — ED PROVIDER NOTES
SCRIBE #1 NOTE: I, Basim Pineda, am scribing for, and in the presence of, No att. providers found. I have scribed the entire note.       History     Chief Complaint   Patient presents with    Toe Pain     Right great toe pain and pressure. Evaluated for the same recently dx with gout      Review of patient's allergies indicates:   Allergen Reactions    Neurontin [gabapentin] Itching and Blisters    Percocet [oxycodone-acetaminophen] Itching and Blisters         History of Present Illness     HPI    5/14/2024, 4:04 PM  History obtained from the patient      History of Present Illness: Jeanna Helm is a 44 y.o. female patient with a PMHx of DM, HTN who presents to the Emergency Department for evaluation of Right great toe pain and pressure which onset PTA. Patient was last seen on 5/10/24 for reoccurring acute gout of right foot. Symptoms are constant and moderate in severity. No mitigating or exacerbating factors reported. Patient denies any fever, chill, n/v/d, numbness, HA, and all other sxs at this time. No further complaints or concerns at this time.       Arrival mode: Personal vehicle    PCP: Jarek Crespo MD        Past Medical History:  Past Medical History:   Diagnosis Date    Back pain     Diabetes mellitus     Hypertension     Left knee pain     Miscarriage     x2    Stroke        Past Surgical History:  Past Surgical History:   Procedure Laterality Date    ANTERIOR CRUCIATE LIGAMENT REPAIR Left     DILATION AND CURETTAGE OF UTERUS      x2    LUMBAR FUSION      TIBIA FRACTURE SURGERY Right          Family History:  Family History   Problem Relation Name Age of Onset    Hypertension Mother      Heart disease Mother      Fibromyalgia Mother      Diabetes Father         Social History:  Social History     Tobacco Use    Smoking status: Every Day     Current packs/day: 1.00     Average packs/day: 1 pack/day for 20.0 years (20.0 ttl pk-yrs)     Types: Cigarettes    Smokeless tobacco: Not on file    Substance and Sexual Activity    Alcohol use: No    Drug use: No    Sexual activity: Not Currently        Review of Systems     Review of Systems   Constitutional:  Negative for chills and fever.   HENT:  Negative for sore throat.    Respiratory:  Negative for shortness of breath.    Cardiovascular:  Negative for chest pain.   Gastrointestinal:  Negative for diarrhea, nausea and vomiting.   Genitourinary:  Negative for dysuria.   Musculoskeletal:  Positive for joint swelling. Negative for back pain.        (+) Right great toe pain (Gout)   Skin:  Negative for rash.   Neurological:  Negative for weakness, numbness and headaches.   Hematological:  Does not bruise/bleed easily.   All other systems reviewed and are negative.    ***   Physical Exam     Initial Vitals [05/14/24 1342]   BP Pulse Resp Temp SpO2   -- 97 18 98 °F (36.7 °C) 100 %      MAP       --          Physical Exam  Nursing Notes and Vital Signs Reviewed.  Constitutional: Patient is in no acute distress. Well-developed and well-nourished.  Head: Atraumatic. Normocephalic.  Eyes:  EOM intact.  No scleral icterus.  ENT: Mucous membranes are moist.  Nares clear   Neck:  Full ROM. No JVD.  Cardiovascular: Regular rate. Regular rhythm No murmurs, rubs, or gallops. Distal pulses are 2+ and symmetric  Pulmonary/Chest: No respiratory distress. Clear to auscultation bilaterally. No wheezing or rales.  Equal chest wall rise bilaterally  Abdominal: Soft and non-distended.  There is no tenderness.  No rebound, guarding, or rigidity. Good bowel sounds.  Genitourinary: No CVA tenderness.  No suprapubic tenderness  Musculoskeletal: Moves all extremities. No obvious deformities.  5 x 5 strength in all extremities   Skin: Warm and dry.  Neurological:  Alert, awake, and appropriate.  Normal speech.  No acute focal neurological deficits are appreciated.  Two through 12 intact bilaterally.  Psychiatric: Normal affect. Good eye contact. Appropriate in content.       ED  Course   Procedures  ED Vital Signs:  Vitals:    05/14/24 1342   Pulse: 97   Resp: 18   Temp: 98 °F (36.7 °C)   TempSrc: Oral   SpO2: 100%   Weight: 94.7 kg (208 lb 12.4 oz)       Abnormal Lab Results:  Labs Reviewed   CBC W/ AUTO DIFFERENTIAL - Abnormal; Notable for the following components:       Result Value    RBC 5.75 (*)     MCV 76 (*)     MCH 24.2 (*)     MCHC 31.7 (*)     RDW 15.2 (*)     MPV 8.8 (*)     Gran # (ANC) 8.3 (*)     All other components within normal limits   URINALYSIS, REFLEX TO URINE CULTURE - Abnormal; Notable for the following components:    Protein, UA 1+ (*)     Occult Blood UA Trace (*)     Leukocytes, UA Trace (*)     All other components within normal limits    Narrative:     Specimen Source->Urine   COMPREHENSIVE METABOLIC PANEL - Abnormal; Notable for the following components:    CO2 19 (*)     All other components within normal limits   URIC ACID - Abnormal; Notable for the following components:    Uric Acid 8.2 (*)     All other components within normal limits   CULTURE, BLOOD   CULTURE, BLOOD   LACTIC ACID, PLASMA   PREGNANCY TEST, URINE RAPID    Narrative:     Specimen Source->Urine   URINALYSIS MICROSCOPIC    Narrative:     Specimen Source->Urine   C-REACTIVE PROTEIN   LACTIC ACID, PLASMA        All Lab Results:  Results for orders placed or performed during the hospital encounter of 05/14/24   CBC auto differential   Result Value Ref Range    WBC 11.58 3.90 - 12.70 K/uL    RBC 5.75 (H) 4.00 - 5.40 M/uL    Hemoglobin 13.9 12.0 - 16.0 g/dL    Hematocrit 43.9 37.0 - 48.5 %    MCV 76 (L) 82 - 98 fL    MCH 24.2 (L) 27.0 - 31.0 pg    MCHC 31.7 (L) 32.0 - 36.0 g/dL    RDW 15.2 (H) 11.5 - 14.5 %    Platelets 406 150 - 450 K/uL    MPV 8.8 (L) 9.2 - 12.9 fL    Immature Granulocytes 0.3 0.0 - 0.5 %    Gran # (ANC) 8.3 (H) 1.8 - 7.7 K/uL    Immature Grans (Abs) 0.04 0.00 - 0.04 K/uL    Lymph # 2.3 1.0 - 4.8 K/uL    Mono # 0.7 0.3 - 1.0 K/uL    Eos # 0.2 0.0 - 0.5 K/uL    Baso # 0.03 0.00 -  0.20 K/uL    nRBC 0 0 /100 WBC    Gran % 71.9 38.0 - 73.0 %    Lymph % 20.0 18.0 - 48.0 %    Mono % 6.0 4.0 - 15.0 %    Eosinophil % 1.5 0.0 - 8.0 %    Basophil % 0.3 0.0 - 1.9 %    Differential Method Automated    Lactic acid, plasma #1   Result Value Ref Range    Lactate (Lactic Acid) 1.1 0.5 - 2.2 mmol/L   Urinalysis, Reflex to Urine Culture Urine, Clean Catch    Specimen: Urine   Result Value Ref Range    Specimen UA Urine, Clean Catch     Color, UA Yellow Yellow, Straw, [place]    Appearance, UA Clear Clear    pH, UA 5.0 5.0 - 8.0    Specific Gravity, UA 1.015 1.005 - 1.030    Protein, UA 1+ (A) Negative    Glucose, UA Negative Negative    Ketones, UA Negative Negative    Bilirubin (UA) Negative Negative    Occult Blood UA Trace (A) Negative    Nitrite, UA Negative Negative    Urobilinogen, UA Negative <2.0 EU/dL    Leukocytes, UA Trace (A) Negative   Pregnancy, urine rapid (UPT)   Result Value Ref Range    Preg Test, Ur Negative    Urinalysis Microscopic   Result Value Ref Range    RBC, UA 1 0 - 4 /hpf    WBC, UA 4 0 - 5 /hpf    Bacteria Rare None-Occ /hpf    Squam Epithel, UA 5 /hpf    Hyaline Casts, UA 1 0-1/lpf /lpf    Microscopic Comment SEE COMMENT    Comprehensive metabolic panel   Result Value Ref Range    Sodium 137 136 - 145 mmol/L    Potassium 4.5 3.5 - 5.1 mmol/L    Chloride 106 95 - 110 mmol/L    CO2 19 (L) 23 - 29 mmol/L    Glucose 106 70 - 110 mg/dL    BUN 11 6 - 20 mg/dL    Creatinine 0.9 0.5 - 1.4 mg/dL    Calcium 9.7 8.7 - 10.5 mg/dL    Total Protein 8.4 6.0 - 8.4 g/dL    Albumin 3.9 3.5 - 5.2 g/dL    Total Bilirubin 0.3 0.1 - 1.0 mg/dL    Alkaline Phosphatase 82 55 - 135 U/L    AST 27 10 - 40 U/L    ALT 30 10 - 44 U/L    eGFR >60 >60 mL/min/1.73 m^2    Anion Gap 12 8 - 16 mmol/L   C-reactive protein   Result Value Ref Range    CRP 5.0 0.0 - 8.2 mg/L   Uric acid   Result Value Ref Range    Uric Acid 8.2 (H) 2.4 - 5.7 mg/dL         Imaging Results:  Imaging Results    None          The EKG was  ordered, reviewed, and independently interpreted by the ED provider.  Interpretation time: ***  Rate: *** BPM  Rhythm: {rhythm:43382}  Interpretation: ***. No STEMI.  When compared to EKG performed ***, there are no significant changes.           The Emergency Provider reviewed the vital signs and test results, which are outlined above.     ED Discussion       ***       Medical Decision Making              ED Medication(s):  Medications - No data to display    New Prescriptions    No medications on file               Scribe Attestation:   Scribe #1: I performed the above scribed service and the documentation accurately describes the services I performed. I attest to the accuracy of the note.     Attending:   Physician Attestation Statement for Scribe #1: I, No att. providers found, personally performed the services described in this documentation, as scribed by Basim Pineda, in my presence, and it is both accurate and complete.           Clinical Impression       ICD-10-CM ICD-9-CM   1. Right foot pain  M79.671 729.5

## 2024-05-14 NOTE — ED NOTES
Bed: 11  Expected date:   Expected time:   Means of arrival: Personal Transportation  Comments:     Rolando Thomason, NP  05/14/24 [id number]

## 2024-05-15 ENCOUNTER — ON-DEMAND VIRTUAL (OUTPATIENT)
Dept: URGENT CARE | Facility: CLINIC | Age: 44
End: 2024-05-15
Payer: MEDICAID

## 2024-05-15 DIAGNOSIS — T36.95XA ANTIBIOTIC-INDUCED YEAST INFECTION: Primary | ICD-10-CM

## 2024-05-15 DIAGNOSIS — B37.9 ANTIBIOTIC-INDUCED YEAST INFECTION: Primary | ICD-10-CM

## 2024-05-15 PROCEDURE — 99213 OFFICE O/P EST LOW 20 MIN: CPT | Mod: 95,,, | Performed by: NURSE PRACTITIONER

## 2024-05-15 RX ORDER — FLUCONAZOLE 150 MG/1
150 TABLET ORAL DAILY
Qty: 2 TABLET | Refills: 0 | Status: SHIPPED | OUTPATIENT
Start: 2024-05-15 | End: 2024-05-17

## 2024-05-15 NOTE — PROGRESS NOTES
Subjective:      Patient ID: Jeanna Helm is a 44 y.o. female.    Vitals:  vitals were not taken for this visit.     Chief Complaint: Vaginitis      Visit Type: TELE AUDIOVISUAL    Present with the patient at the time of consultation: TELEMED PRESENT WITH PATIENT: None        Past Medical History:   Diagnosis Date    Back pain     Diabetes mellitus     Hypertension     Left knee pain     Miscarriage     x2    Stroke      Past Surgical History:   Procedure Laterality Date    ANTERIOR CRUCIATE LIGAMENT REPAIR Left     DILATION AND CURETTAGE OF UTERUS      x2    LUMBAR FUSION      TIBIA FRACTURE SURGERY Right      Review of patient's allergies indicates:   Allergen Reactions    Neurontin [gabapentin] Itching and Blisters    Percocet [oxycodone-acetaminophen] Itching and Blisters     Current Outpatient Medications on File Prior to Visit   Medication Sig Dispense Refill    acetaminophen (TYLENOL) 325 MG tablet Take 325 mg by mouth every 6 (six) hours as needed for Pain.      allopurinoL (ZYLOPRIM) 100 MG tablet Take 1 tablet (100 mg total) by mouth once daily. 10 tablet 0    amLODIPine (NORVASC) 10 MG tablet Take 1 tablet (10 mg total) by mouth once daily. 30 tablet 1    butalbital-acetaminophen-caffeine -40 mg (FIORICET, ESGIC) -40 mg per tablet Take 1 tablet by mouth every 6 (six) hours as needed for Pain. 7 tablet 0    colchicine (COLCRYS) 0.6 mg tablet Take 2 tablets for your 1st dose.  You may then take 1 tablet every 2 hours x3.  Do not use more than 3 days. 11 tablet 0    colchicine (COLCRYS) 0.6 mg tablet Take 1 tablet (0.6 mg total) by mouth once daily. Take 2 tablets for your 1st dose.  You may then take 1 tablet every 2 hours x3.  Do not use more than 3 days. 10 tablet 0    diclofenac (VOLTAREN) 50 MG EC tablet Take 1 tablet (50 mg total) by mouth 2 (two) times daily as needed (pain). 20 tablet 0    diclofenac (VOLTAREN) 50 MG EC tablet Take 1 tablet (50 mg total) by mouth 2 (two) times daily.  20 tablet 0    glyBURIDE (DIABETA) 5 MG tablet Take 1 tablet (5 mg total) by mouth daily with breakfast. 90 tablet 3    HYDROcodone-acetaminophen (NORCO)  mg per tablet Take 1 tablet by mouth every 6 (six) hours as needed. 12 tablet 0    HYDROcodone-acetaminophen (NORCO)  mg per tablet Take 1 tablet by mouth every 6 (six) hours as needed. 12 tablet 0    ibuprofen (ADVIL,MOTRIN) 800 MG tablet Take 1 tablet (800 mg total) by mouth every 6 (six) hours as needed for Pain. 20 tablet 0    lisinopriL (PRINIVIL,ZESTRIL) 40 MG tablet Take 1 tablet (40 mg total) by mouth once daily. 30 tablet 0    meclizine (ANTIVERT) 25 mg tablet Take 1 tablet (25 mg total) by mouth 3 (three) times daily as needed. 20 tablet 0    meclizine (ANTIVERT) 25 mg tablet Take 1 tablet (25 mg total) by mouth 3 (three) times daily as needed. 20 tablet 0    medroxyPROGESTERone (PROVERA) 10 MG tablet Take 1 tablet (10 mg total) by mouth once daily. for 5 days 5 tablet 0    methylPREDNISolone (MEDROL DOSEPACK) 4 mg tablet Take as directed on the package. 1 each 0    metoclopramide HCl (REGLAN) 10 MG tablet Take 1 tablet (10 mg total) by mouth every 6 (six) hours as needed (headache or nausea). 16 tablet 0    metoprolol succinate (TOPROL-XL) 50 MG 24 hr tablet Take 50 mg by mouth.      naproxen (NAPROSYN) 500 MG tablet Take 1 tablet (500 mg total) by mouth 2 (two) times daily with meals. 20 tablet 0    traMADoL (ULTRAM) 50 mg tablet Take 1 tablet (50 mg total) by mouth every 8 (eight) hours as needed for Pain. 12 tablet 0    traMADoL (ULTRAM) 50 mg tablet Take 1 tablet (50 mg total) by mouth every 6 (six) hours as needed for Pain. 12 tablet 0     Current Facility-Administered Medications on File Prior to Visit   Medication Dose Route Frequency Provider Last Rate Last Admin    [COMPLETED] HYDROmorphone (PF) injection 1 mg  1 mg Intravenous ED 1 Time Jonas Lucio Jr., MD   1 mg at 05/14/24 1632    [COMPLETED] ketorolac injection 30 mg  30  mg Intravenous ED 1 Time Jonas Lucio Jr., MD   30 mg at 05/14/24 1830    [COMPLETED] ondansetron injection 4 mg  4 mg Intravenous ED 1 Time Jonas Lucio Jr., MD   4 mg at 05/14/24 1632     Family History   Problem Relation Name Age of Onset    Hypertension Mother      Heart disease Mother      Fibromyalgia Mother      Diabetes Father         Medications Ordered                [id number] [place], [place] - [address] [facility] [place] [id number] [facility] [address]    Telephone: [phone]   Fax: [phone]   Hours: Not open 24 hours                         E-Prescribed (1 of 1)              fluconazole (DIFLUCAN) 150 MG Tab    Sig: Take 1 tablet (150 mg total) by mouth once daily. Take one now and may repeat in 72 h prn for 2 doses       Start: 5/15/24     Quantity: 2 tablet Refills: 0                           Ohs Peq Odvv Intake    5/15/2024  1:26 PM CDT - Filed by Patient   What is your current physical address in the event of a medical emergency?    Are you able to take your vital signs? No   Please attach any relevant images or files          43 yo female with c/o vaginal discharge and itching. She states she is currently on antibiotic. She denies dysuria, hematuria. Denies vaginal lesions.         Constitution: Negative.   HENT: Negative.     Neck: neck negative.   Cardiovascular: Negative.    Eyes: Negative.    Respiratory: Negative.     Endocrine: negative.   Genitourinary:  Positive for vaginal discharge and vaginal odor. Negative for dysuria, frequency, urgency and urine decreased.   Skin: Negative.    Allergic/Immunologic: Negative.    Neurological: Negative.    Hematologic/Lymphatic: Negative.    Psychiatric/Behavioral: Negative.          Objective:   The physical exam was conducted virtually.  LOCATION OF PATIENT home  Physical Exam   Constitutional: She is oriented to person, place, and time. She appears well-developed.   HENT:   Head: Normocephalic and atraumatic.    Ears:   Right Ear: Hearing, tympanic membrane and external ear normal.   Left Ear: Hearing, tympanic membrane and external ear normal.   Nose: Nose normal.   Mouth/Throat: Uvula is midline, oropharynx is clear and moist and mucous membranes are normal.   Eyes: Conjunctivae and EOM are normal. Pupils are equal, round, and reactive to light.   Neck: Neck supple.   Cardiovascular: Normal rate.   Pulmonary/Chest: Effort normal and breath sounds normal.   Musculoskeletal: Normal range of motion.         General: Normal range of motion.   Neurological: She is alert and oriented to person, place, and time.   Skin: Skin is warm.   Psychiatric: Her behavior is normal. Thought content normal.   Nursing note and vitals reviewed.      Assessment:     1. Antibiotic-induced yeast infection        Plan:   PLEASE READ YOUR DISCHARGE INSTRUCTIONS ENTIRELY AS IT CONTAINS IMPORTANT INFORMATION.     Take one diflucan when you get it, take the other in 72 hours IF NEEDED       Try taking an over the counter probiotic or eating yogurt.     You can use over the counter clotrimazole (lotrimin) cream to the outer vagina for irritation.      Please return or see your primary care doctor if you develop new or worsening symptoms.      Please arrange follow up with your primary medical clinic as soon as possible. You must understand that you've received an Urgent Care treatment only and that you may be released before all of your medical problems are known or treated. You, the patient, will arrange for follow up as instructed. If your symptoms worsen or fail to improve you should go to the Emergency Room.     Antibiotic-induced yeast infection  -     fluconazole (DIFLUCAN) 150 MG Tab; Take 1 tablet (150 mg total) by mouth once daily. Take one now and may repeat in 72 h prn for 2 doses  Dispense: 2 tablet; Refill: 0

## 2024-05-19 ENCOUNTER — HOSPITAL ENCOUNTER (EMERGENCY)
Facility: HOSPITAL | Age: 44
Discharge: HOME | End: 2024-05-19
Attending: EMERGENCY MEDICINE
Payer: MEDICAID

## 2024-05-19 VITALS
WEIGHT: 211.19 LBS | BODY MASS INDEX: 41.46 KG/M2 | TEMPERATURE: 98 F | HEIGHT: 60 IN | SYSTOLIC BLOOD PRESSURE: 166 MMHG | DIASTOLIC BLOOD PRESSURE: 88 MMHG | RESPIRATION RATE: 18 BRPM | HEART RATE: 92 BPM | OXYGEN SATURATION: 98 %

## 2024-05-19 DIAGNOSIS — M79.673 FOOT PAIN: ICD-10-CM

## 2024-05-19 DIAGNOSIS — I73.9 PAD (PERIPHERAL ARTERY DISEASE): Primary | ICD-10-CM

## 2024-05-19 DIAGNOSIS — M79.604 RIGHT LEG PAIN: ICD-10-CM

## 2024-05-19 LAB
ALBUMIN SERPL BCP-MCNC: 4.1 G/DL (ref 3.5–5.2)
ALP SERPL-CCNC: 87 U/L (ref 55–135)
ALT SERPL W/O P-5'-P-CCNC: 27 U/L (ref 10–44)
ANION GAP SERPL CALC-SCNC: 12 MMOL/L (ref 8–16)
AST SERPL-CCNC: 15 U/L (ref 10–40)
B-OH-BUTYR BLD STRIP-SCNC: 0.1 MMOL/L (ref 0–0.5)
BACTERIA BLD CULT: NORMAL
BACTERIA BLD CULT: NORMAL
BASOPHILS # BLD AUTO: 0.05 K/UL (ref 0–0.2)
BASOPHILS NFR BLD: 0.3 % (ref 0–1.9)
BILIRUB SERPL-MCNC: 0.2 MG/DL (ref 0.1–1)
BUN SERPL-MCNC: 22 MG/DL (ref 6–20)
CALCIUM SERPL-MCNC: 9.8 MG/DL (ref 8.7–10.5)
CHLORIDE SERPL-SCNC: 99 MMOL/L (ref 95–110)
CO2 SERPL-SCNC: 24 MMOL/L (ref 23–29)
CREAT SERPL-MCNC: 1 MG/DL (ref 0.5–1.4)
DIFFERENTIAL METHOD BLD: ABNORMAL
EOSINOPHIL # BLD AUTO: 0.1 K/UL (ref 0–0.5)
EOSINOPHIL NFR BLD: 0.4 % (ref 0–8)
ERYTHROCYTE [DISTWIDTH] IN BLOOD BY AUTOMATED COUNT: 16 % (ref 11.5–14.5)
EST. GFR  (NO RACE VARIABLE): >60 ML/MIN/1.73 M^2
GLUCOSE SERPL-MCNC: 140 MG/DL (ref 70–110)
HCT VFR BLD AUTO: 46.8 % (ref 37–48.5)
HGB BLD-MCNC: 14.8 G/DL (ref 12–16)
IMM GRANULOCYTES # BLD AUTO: 0.2 K/UL (ref 0–0.04)
IMM GRANULOCYTES NFR BLD AUTO: 1 % (ref 0–0.5)
LYMPHOCYTES # BLD AUTO: 3 K/UL (ref 1–4.8)
LYMPHOCYTES NFR BLD: 15.4 % (ref 18–48)
MCH RBC QN AUTO: 24 PG (ref 27–31)
MCHC RBC AUTO-ENTMCNC: 31.6 G/DL (ref 32–36)
MCV RBC AUTO: 76 FL (ref 82–98)
MONOCYTES # BLD AUTO: 1.2 K/UL (ref 0.3–1)
MONOCYTES NFR BLD: 6.1 % (ref 4–15)
NEUTROPHILS # BLD AUTO: 14.9 K/UL (ref 1.8–7.7)
NEUTROPHILS NFR BLD: 76.8 % (ref 38–73)
NRBC BLD-RTO: 0 /100 WBC
PLATELET # BLD AUTO: 398 K/UL (ref 150–450)
PMV BLD AUTO: 8.5 FL (ref 9.2–12.9)
POTASSIUM SERPL-SCNC: 4.5 MMOL/L (ref 3.5–5.1)
PROT SERPL-MCNC: 8.6 G/DL (ref 6–8.4)
RBC # BLD AUTO: 6.16 M/UL (ref 4–5.4)
SODIUM SERPL-SCNC: 135 MMOL/L (ref 136–145)
WBC # BLD AUTO: 19.43 K/UL (ref 3.9–12.7)

## 2024-05-19 PROCEDURE — 82010 KETONE BODYS QUAN: CPT | Performed by: EMERGENCY MEDICINE

## 2024-05-19 PROCEDURE — 85025 COMPLETE CBC W/AUTO DIFF WBC: CPT | Performed by: EMERGENCY MEDICINE

## 2024-05-19 PROCEDURE — 80053 COMPREHEN METABOLIC PANEL: CPT | Performed by: EMERGENCY MEDICINE

## 2024-05-19 PROCEDURE — 99285 EMERGENCY DEPT VISIT HI MDM: CPT | Mod: 25

## 2024-05-19 RX ORDER — CLOPIDOGREL BISULFATE 75 MG/1
75 TABLET ORAL DAILY
Qty: 30 TABLET | Refills: 0 | Status: ON HOLD | OUTPATIENT
Start: 2024-05-19 | End: 2024-06-05 | Stop reason: HOSPADM

## 2024-05-19 RX ORDER — HYDROCODONE BITARTRATE AND ACETAMINOPHEN 10; 325 MG/1; MG/1
1 TABLET ORAL EVERY 6 HOURS PRN
Qty: 12 TABLET | Refills: 0 | Status: SHIPPED | OUTPATIENT
Start: 2024-05-19 | End: 2024-05-24

## 2024-05-19 NOTE — ED PROVIDER NOTES
SCRIBE #1 NOTE: I, Renzo Pascual, am scribing for, and in the presence of, Bishop Wright MD. I have scribed the entire note.       History     Chief Complaint   Patient presents with    Foot Injury     Pt c/o pain to R foot, hx of gout and DM, compliant with medication, on steroids, toes blue since Friday, +sensation, +1 pulse to R foot, warm to touch, sharp pain     Review of patient's allergies indicates:   Allergen Reactions    Neurontin [gabapentin] Itching and Blisters    Percocet [oxycodone-acetaminophen] Itching and Blisters         History of Present Illness     HPI    5/19/2024, 9:56 AM  History obtained from the patient      History of Present Illness: Jeanna Helm is a 44 y.o. female patient with a PMHx of gout who presents to the Emergency Department for evaluation of R foot pain which onset Friday. Pt was seen here Tuesday and proscribed medication/ steroids which were ineffective in mitigating symptoms. Symptoms are constant and moderate in severity. No further complaints or concerns at this time.       Arrival mode: Personal vehicle    PCP: Jarek Crespo MD        Past Medical History:  Past Medical History:   Diagnosis Date    Back pain     Diabetes mellitus     Hypertension     Left knee pain     Miscarriage     x2    Stroke        Past Surgical History:  Past Surgical History:   Procedure Laterality Date    ANTERIOR CRUCIATE LIGAMENT REPAIR Left     DILATION AND CURETTAGE OF UTERUS      x2    LUMBAR FUSION      TIBIA FRACTURE SURGERY Right          Family History:  Family History   Problem Relation Name Age of Onset    Hypertension Mother      Heart disease Mother      Fibromyalgia Mother      Diabetes Father         Social History:  Social History     Tobacco Use    Smoking status: Every Day     Current packs/day: 1.00     Average packs/day: 1 pack/day for 20.0 years (20.0 ttl pk-yrs)     Types: Cigarettes    Smokeless tobacco: Not on file   Substance and Sexual Activity    Alcohol  use: No    Drug use: No    Sexual activity: Not Currently        Review of Systems     Review of Systems   Constitutional:  Positive for fatigue. Negative for fever.   HENT:  Negative for sore throat.    Respiratory:  Negative for shortness of breath.    Cardiovascular:  Negative for chest pain.   Gastrointestinal:  Negative for nausea.   Genitourinary:  Negative for dysuria.   Musculoskeletal:  Positive for myalgias (pain to right foot). Negative for back pain.   Skin:  Negative for rash.   Neurological:  Negative for weakness.   Hematological:  Does not bruise/bleed easily.        Physical Exam     Physical Exam  Nursing Notes and Vital Signs Reviewed.  Constitutional: Patient is in no acute distress. Well-developed and well-nourished.  Head: Atraumatic. Normocephalic.  Eyes: PERRL. EOM intact. Conjunctivae are not pale. No scleral icterus.  ENT: Mucous membranes are moist.   Neck: Supple. Full ROM.   Cardiovascular: Regular rate. Regular rhythm. No murmurs, rubs, or gallops. Distal pulses are 2+ and symmetric.  Pulmonary/Chest: No respiratory distress. Clear to auscultation bilaterally. No wheezing or rales.  Abdominal: Soft and non-distended.  There is no tenderness.  No rebound, guarding, or rigidity.   Genitourinary: No CVA tenderness  Musculoskeletal: Moves all extremities. No obvious deformities. No edema. Tenderness to right great toe.  FROM.  Warm.  Skin: Warm and dry.  Neurological:  Alert, awake, and appropriate.  Normal speech.  No acute focal neurological deficits are appreciated.  Psychiatric: Normal affect. Good eye contact. Appropriate in content.     ED Course   Procedures  ED Vital Signs:  Vitals:    05/19/24 0946 05/19/24 0947 05/19/24 1225   BP:  (!) 190/109 (!) 166/88   Pulse:  106 92   Resp:  17 18   Temp:  97.7 °F (36.5 °C) 98.2 °F (36.8 °C)   TempSrc:  Oral Oral   SpO2:  98% 98%   Weight: 95.8 kg (211 lb 3.2 oz)     Height: 5' (1.524 m)         Abnormal Lab Results:  Labs Reviewed   CBC W/  AUTO DIFFERENTIAL - Abnormal; Notable for the following components:       Result Value    WBC 19.43 (*)     RBC 6.16 (*)     MCV 76 (*)     MCH 24.0 (*)     MCHC 31.6 (*)     RDW 16.0 (*)     MPV 8.5 (*)     Immature Granulocytes 1.0 (*)     Gran # (ANC) 14.9 (*)     Immature Grans (Abs) 0.20 (*)     Mono # 1.2 (*)     Gran % 76.8 (*)     Lymph % 15.4 (*)     All other components within normal limits   COMPREHENSIVE METABOLIC PANEL - Abnormal; Notable for the following components:    Sodium 135 (*)     Glucose 140 (*)     BUN 22 (*)     Total Protein 8.6 (*)     All other components within normal limits   BETA - HYDROXYBUTYRATE, SERUM        All Lab Results:  Results for orders placed or performed during the hospital encounter of 05/19/24   CBC Auto Differential   Result Value Ref Range    WBC 19.43 (H) 3.90 - 12.70 K/uL    RBC 6.16 (H) 4.00 - 5.40 M/uL    Hemoglobin 14.8 12.0 - 16.0 g/dL    Hematocrit 46.8 37.0 - 48.5 %    MCV 76 (L) 82 - 98 fL    MCH 24.0 (L) 27.0 - 31.0 pg    MCHC 31.6 (L) 32.0 - 36.0 g/dL    RDW 16.0 (H) 11.5 - 14.5 %    Platelets 398 150 - 450 K/uL    MPV 8.5 (L) 9.2 - 12.9 fL    Immature Granulocytes 1.0 (H) 0.0 - 0.5 %    Gran # (ANC) 14.9 (H) 1.8 - 7.7 K/uL    Immature Grans (Abs) 0.20 (H) 0.00 - 0.04 K/uL    Lymph # 3.0 1.0 - 4.8 K/uL    Mono # 1.2 (H) 0.3 - 1.0 K/uL    Eos # 0.1 0.0 - 0.5 K/uL    Baso # 0.05 0.00 - 0.20 K/uL    nRBC 0 0 /100 WBC    Gran % 76.8 (H) 38.0 - 73.0 %    Lymph % 15.4 (L) 18.0 - 48.0 %    Mono % 6.1 4.0 - 15.0 %    Eosinophil % 0.4 0.0 - 8.0 %    Basophil % 0.3 0.0 - 1.9 %    Differential Method Automated    Comprehensive Metabolic Panel   Result Value Ref Range    Sodium 135 (L) 136 - 145 mmol/L    Potassium 4.5 3.5 - 5.1 mmol/L    Chloride 99 95 - 110 mmol/L    CO2 24 23 - 29 mmol/L    Glucose 140 (H) 70 - 110 mg/dL    BUN 22 (H) 6 - 20 mg/dL    Creatinine 1.0 0.5 - 1.4 mg/dL    Calcium 9.8 8.7 - 10.5 mg/dL    Total Protein 8.6 (H) 6.0 - 8.4 g/dL    Albumin 4.1  3.5 - 5.2 g/dL    Total Bilirubin 0.2 0.1 - 1.0 mg/dL    Alkaline Phosphatase 87 55 - 135 U/L    AST 15 10 - 40 U/L    ALT 27 10 - 44 U/L    eGFR >60 >60 mL/min/1.73 m^2    Anion Gap 12 8 - 16 mmol/L   Beta-Hydroxybutyrate, Serum   Result Value Ref Range    Beta-Hydroxybutyrate 0.1 0.0 - 0.5 mmol/L         Imaging Results:  Imaging Results              US Lower Extremity Arteries Right (Final result)  Result time 05/19/24 11:42:15      Final result by Elgin Ta MD (05/19/24 11:42:15)                   Impression:      1.  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      Electronically signed by: Elgin Ta MD  Date:    05/19/2024  Time:    11:42               Narrative:    EXAMINATION:  Ultrasound lower extremity arteries right    CLINICAL HISTORY:  Pain in right leg    COMPARISON:  No comparison studies are available.    FINDINGS:  There is monophasic flow seen in all vessels imaged, which suggest aorta iliac disease.  Negative for vascular occlusions.    The flow velocities are as follows:    Right common femoral artery = 67 cm/sec    Right profundofemoral artery = 30 cm/sec    Right proximal superficial femoral artery = 66 cm/sec    Right mid superficial femoral artery = 60 cm/sec    Right distal superficial femoral artery = 71 cm/sec    Right popliteal artery = 44 cm/sec    Right posterior tibial artery = 55 cm/sec    Right anterior tibial artery = 36 cm/sec                                       X-Ray Foot Complete Right (Final result)  Result time 05/19/24 10:14:52      Final result by Elgin Ta MD (05/19/24 10:14:52)                   Impression:      As above      Electronically signed by: Elgin Ta MD  Date:    05/19/2024  Time:    10:14               Narrative:    EXAMINATION:  XR FOOT COMPLETE 3 VIEW RIGHT    CLINICAL HISTORY:  . Pain in unspecified foot    TECHNIQUE:  AP, lateral, and oblique views of the right foot were  performed.    COMPARISON:  April 28, 2024    FINDINGS:  Extensive hardware involving the tibia and fibula again seen.  The osseous structures are intact.  No definite acute or healing fractures noted.  Negative for soft tissue abnormalities.  Large plantar and Achilles calcaneal spurs noted.                                                The Emergency Provider reviewed the vital signs and test results, which are outlined above.     ED Discussion     11:51 AM: Reassessed pt at this time. Discussed with pt all pertinent ED information and results. Discussed pt dx and plan of tx. Gave pt all f/u and return to the ED instructions. All questions and concerns were addressed at this time. Pt expresses understanding of information and instructions, and is comfortable with plan to discharge. Pt is stable for discharge.    I discussed with patient and/or family/caretaker that evaluation in the ED does not suggest any emergent or life threatening medical conditions requiring immediate intervention beyond what was provided in the ED, and I believe patient is safe for discharge.  Regardless, an unremarkable evaluation in the ED does not preclude the development or presence of a serious of life threatening condition. As such, patient was instructed to return immediately for any worsening or change in current symptoms.         Medical Decision Making  DDx: claudication, toe pain    Amount and/or Complexity of Data Reviewed  Labs: ordered. Decision-making details documented in ED Course.  Radiology: ordered. Decision-making details documented in ED Course.  Discussion of management or test interpretation with external provider(s): Follow up with vascular surgery    Risk  Prescription drug management.                ED Medication(s):  Medications - No data to display    Discharge Medication List as of 5/19/2024 12:01 PM        START taking these medications    Details   clopidogreL (PLAVIX) 75 mg tablet Take 1 tablet (75 mg total)  by mouth once daily., Starting Sun 5/19/2024, Until Mon 5/19/2025, Normal              Follow-up Information       Jarek Crespo MD.    Specialty: Family Medicine  Contact information:  [address] [facility] [place] [address]  [phone]                                 Scribe Attestation:   Scribe #1: I performed the above scribed service and the documentation accurately describes the services I performed. I attest to the accuracy of the note.     Attending:   Physician Attestation Statement for Scribe #1: I, Bishop Wright MD, personally performed the services described in this documentation, as scribed by Renzo Pascual, in my presence, and it is both accurate and complete.           Clinical Impression       ICD-10-CM ICD-9-CM   1. PAD (peripheral artery disease)  I73.9 443.9   2. Right leg pain  M79.604 729.5   3. Foot pain  M79.673 729.5       Disposition:   Disposition: Discharged  Condition: Stable         Bishop Wright MD  05/19/24 1241     no

## 2024-05-22 ENCOUNTER — HOSPITAL ENCOUNTER (EMERGENCY)
Facility: HOSPITAL | Age: 44
Discharge: HOME | End: 2024-05-22
Attending: EMERGENCY MEDICINE
Payer: MEDICAID

## 2024-05-22 VITALS
RESPIRATION RATE: 18 BRPM | HEIGHT: 60 IN | OXYGEN SATURATION: 100 % | BODY MASS INDEX: 41.03 KG/M2 | SYSTOLIC BLOOD PRESSURE: 172 MMHG | TEMPERATURE: 98 F | HEART RATE: 110 BPM | WEIGHT: 209 LBS | DIASTOLIC BLOOD PRESSURE: 89 MMHG

## 2024-05-22 DIAGNOSIS — M79.674 CHRONIC PAIN OF TOE OF RIGHT FOOT: Primary | ICD-10-CM

## 2024-05-22 DIAGNOSIS — Z91.148 NONCOMPLIANCE WITH DIET AND MEDICATION REGIMEN: ICD-10-CM

## 2024-05-22 DIAGNOSIS — Z72.0 TOBACCO ABUSE: ICD-10-CM

## 2024-05-22 DIAGNOSIS — Z91.199 NONCOMPLIANCE WITH DIET AND MEDICATION REGIMEN: ICD-10-CM

## 2024-05-22 DIAGNOSIS — G89.29 CHRONIC PAIN OF TOE OF RIGHT FOOT: Primary | ICD-10-CM

## 2024-05-22 DIAGNOSIS — I73.9 PVD (PERIPHERAL VASCULAR DISEASE): ICD-10-CM

## 2024-05-22 DIAGNOSIS — I10 CHRONIC HYPERTENSION: ICD-10-CM

## 2024-05-22 PROCEDURE — 99283 EMERGENCY DEPT VISIT LOW MDM: CPT

## 2024-05-22 RX ORDER — TRAMADOL HYDROCHLORIDE 50 MG/1
50 TABLET ORAL EVERY 6 HOURS PRN
Qty: 12 TABLET | Refills: 0 | Status: SHIPPED | OUTPATIENT
Start: 2024-05-22 | End: 2024-05-28 | Stop reason: ALTCHOICE

## 2024-05-22 NOTE — ED PROVIDER NOTES
SCRIBE #1 NOTE: I, Kelsey Zamora, am scribing for, and in the presence of, Stephanie Ayon MD. I have scribed the entire note.       History     Chief Complaint   Patient presents with    Foot Pain     Patient presents to ED with c/o right foot pain. Patient recently was diagnosed with Gout and PAD, hasn't been able to see the vascular doctor yet.     Review of patient's allergies indicates:   Allergen Reactions    Neurontin [gabapentin] Itching and Blisters    Percocet [oxycodone-acetaminophen] Itching and Blisters         History of Present Illness     HPI    5/22/2024, 11:00 AM  History obtained from the patient      History of Present Illness: Jeanna Helm is a 44 y.o. female patient with a PMHx of HTN, DM, CVA who presents to the Emergency Department for evaluation of R foot pain which has been an intermittent issue. The pt was seen in the ED on 5/19/24 for the same complaint. She states that she was dx with PAD and was told to follow up with vascular. Pt also states that she has a history of gout in the R foot. She has had 8 ER visits within the last few weeks for same complaint, has not followed up with pcp or vascular although see referral placed. She states that is in pain and that she is unable to live her normal life due to the pain. Symptoms are constant and moderate in severity. No mitigating or exacerbating factors reported. Patient denies any fever, chills, n/v/d, headaches, CP, SOB, cough, congestion, and all other sxs at this time. Pt currently takes lisinopril and plavix. Pt also states that she still smokes. No further complaints or concerns at this time.       Arrival mode: Personal vehicle    PCP: Jarek Crespo MD        Past Medical History:  Past Medical History:   Diagnosis Date    Back pain     Diabetes mellitus     Hypertension     Left knee pain     Miscarriage     x2    Stroke        Past Surgical History:  Past Surgical History:   Procedure Laterality Date    ANTERIOR CRUCIATE  LIGAMENT REPAIR Left     DILATION AND CURETTAGE OF UTERUS      x2    LUMBAR FUSION      TIBIA FRACTURE SURGERY Right          Family History:  Family History   Problem Relation Name Age of Onset    Hypertension Mother      Heart disease Mother      Fibromyalgia Mother      Diabetes Father         Social History:  Social History     Tobacco Use    Smoking status: Every Day     Current packs/day: 1.00     Average packs/day: 1 pack/day for 20.0 years (20.0 ttl pk-yrs)     Types: Cigarettes    Smokeless tobacco: Not on file   Substance and Sexual Activity    Alcohol use: No    Drug use: No    Sexual activity: Not Currently        Review of Systems     Review of Systems   Constitutional:  Negative for chills and fever.   HENT:  Negative for congestion.    Respiratory:  Negative for cough and shortness of breath.    Cardiovascular:  Negative for chest pain.   Gastrointestinal:  Negative for diarrhea, nausea and vomiting.   Musculoskeletal:  Positive for myalgias (R foot).   Neurological:  Negative for headaches.      Physical Exam     Initial Vitals [05/22/24 0907]   BP Pulse Resp Temp SpO2   (!) 172/89 110 18 98 °F (36.7 °C) 100 %      MAP       --          Physical Exam  Nursing Notes and Vital Signs Reviewed.  Constitutional: Patient is in no acute distress. Morbidly obese.  Head: Atraumatic. Normocephalic.  Eyes: PERRL. EOM intact. Conjunctivae are not pale. No scleral icterus.  ENT: Mucous membranes are moist. Oropharynx is clear and symmetric.    Neck: Supple. Full ROM. No lymphadenopathy.  Cardiovascular: Regular rate. Regular rhythm. No murmurs, rubs, or gallops. Palpable pulses in the R foot.  Pulmonary/Chest: No respiratory distress. Clear to auscultation bilaterally. No wheezing or rales.  Abdominal: Soft and non-distended.  There is no tenderness.  No rebound, guarding, or rigidity. Good bowel sounds.  Musculoskeletal: Moves all extremities. No obvious deformities. No edema. There are no signs of necrosis to  the toes or feet. There are no ulcerations or wounds noted. Pulses are 1 Plus equal and symmetric. Brisk cap refill.   Skin: Warm and dry.  Neurological:  Alert, awake, and appropriate.  Normal speech. Neurovascularly intact. No acute focal neurological deficits are appreciated.  Psychiatric: Patient is anxious.     ED Course   Procedures  ED Vital Signs:  Vitals:    05/22/24 0907   BP: (!) 172/89   Pulse: 110   Resp: 18   Temp: 98 °F (36.7 °C)   TempSrc: Oral   SpO2: 100%   Weight: 94.8 kg (209 lb)   Height: 5' (1.524 m)       Abnormal Lab Results:  Labs Reviewed - No data to display            The Emergency Provider reviewed the vital signs and test results, which are outlined above.     ED Discussion     11:04 AM: Reassessed pt at this time.  Discussed with pt all pertinent ED information and results. Discussed pt dx and plan of tx. Gave pt all f/u and return to the ED instructions. All questions and concerns were addressed at this time. Pt expresses understanding of information and instructions, and is comfortable with plan to discharge. Pt is stable for discharge.    I discussed with patient and/or family/caretaker that evaluation in the ED does not suggest any emergent or life threatening medical conditions requiring immediate intervention beyond what was provided in the ED, and I believe patient is safe for discharge.  Regardless, an unremarkable evaluation in the ED does not preclude the development or presence of a serious of life threatening condition. As such, patient was instructed to return immediately for any worsening or change in current symptoms.        Medical Decision Making  DDX: 1. Claudication 2. Neuropathic pain 3. PVD    Repeat lab work and imaging considered however there is no clinical findings that would suggestive of acute limb ischemia or active infection. She ultimately needs to follow up outpatient for further management and was counseled extensively on smoking cessation, she is  wanting more steroids but again not clinically indicated, she is taking plavix, there is no indication for antibiotics, she is stable in my opinion for discharge.     Amount and/or Complexity of Data Reviewed  External Data Reviewed: labs, radiology, ECG and notes.     Details: 5/19 reviewed arterial study There is monophasic flow seen in all vessels imaged, which suggest aorta iliac disease.  Negative for vascular occlusions. Also reviewed the 4 xrays of her foot and toes she has had which did not show anything acute. Also reviewed her lab work from her recent ER visits, uric acid chronically elevated, WBC intermittently elevated but was given steroids, CRP mildly elevated but trending down.   Radiology:  Decision-making details documented in ED Course.    Risk  Prescription drug management.       Additional MDM:   Smoking Cessation: The patient is a smoker. The patient was counseled on smoking cessation for: 4 minutes. The patient was counseled on tobacco related  health complications.           ED Medication(s):  Medications - No data to display    Discharge Medication List as of 5/22/2024 11:03 AM           Follow-up Information       Jarek Crespo MD. Schedule an appointment as soon as possible for a visit today.    Specialty: Family Medicine  Why: Return to the Emergency Room, If symptoms worsen  Contact information:  [address] [facility] [address]  [phone]               [facility]. Schedule an appointment as soon as possible for a visit in 1 day.    Contact information:  [address] [facility] [address]  [phone]                                 Scribe Attestation:   Scribe #1: I performed the above scribed service and the documentation accurately describes the services I performed. I attest to the accuracy of the note.     Attending:   Physician Attestation Statement for Scribe #1: I, Stephanie Ayon MD, personally performed the services described in this  documentation, as scribed by Kelsey Zamora, in my presence, and it is both accurate and complete.           Clinical Impression       ICD-10-CM ICD-9-CM   1. Chronic pain of toe of right foot  M79.674 729.5    G89.29 338.29   2. Chronic hypertension  I10 401.9   3. Tobacco abuse  Z72.0 305.1   4. PVD (peripheral vascular disease)  I73.9 443.9   5. Noncompliance with diet and medication regimen  Z91.199 V15.81    Z91.148        Disposition:   Disposition: Discharged  Condition: Stable        Stephanie Ayon MD  05/24/24 1835

## 2024-05-22 NOTE — FIRST PROVIDER EVALUATION
Medical screening examination initiated.  I have conducted a focused provider triage encounter, findings are as follows:    Brief history of present illness:  Patient presents to ER for right foot pain, states evaluated here in the ER three days ago for same.    Vitals:    05/22/24 0907   BP: (!) 172/89   BP Location: Right arm   Patient Position: Sitting   Pulse: 110   Resp: 18   Temp: 98 °F (36.7 °C)   TempSrc: Oral   SpO2: 100%   Weight: 94.8 kg (209 lb)   Height: 5' (1.524 m)       Pertinent physical exam:  No acute distress.    Brief workup plan:  Room for evaluation, further workup as warranted.    Preliminary workup initiated; this workup will be continued and followed by the physician or advanced practice provider that is assigned to the patient when roomed.

## 2024-05-27 ENCOUNTER — HOSPITAL ENCOUNTER (EMERGENCY)
Facility: HOSPITAL | Age: 44
Discharge: HOME | End: 2024-05-28
Attending: EMERGENCY MEDICINE
Payer: MEDICAID

## 2024-05-27 DIAGNOSIS — M79.671 RIGHT FOOT PAIN: Primary | ICD-10-CM

## 2024-05-27 LAB
ALBUMIN SERPL BCP-MCNC: 4 G/DL (ref 3.5–5.2)
ALP SERPL-CCNC: 81 U/L (ref 55–135)
ALT SERPL W/O P-5'-P-CCNC: 30 U/L (ref 10–44)
ANION GAP SERPL CALC-SCNC: 12 MMOL/L (ref 8–16)
AST SERPL-CCNC: 19 U/L (ref 10–40)
B-HCG UR QL: NEGATIVE
BASOPHILS # BLD AUTO: 0.04 K/UL (ref 0–0.2)
BASOPHILS NFR BLD: 0.2 % (ref 0–1.9)
BILIRUB SERPL-MCNC: 0.3 MG/DL (ref 0.1–1)
BUN SERPL-MCNC: 19 MG/DL (ref 6–20)
CALCIUM SERPL-MCNC: 10.8 MG/DL (ref 8.7–10.5)
CHLORIDE SERPL-SCNC: 99 MMOL/L (ref 95–110)
CO2 SERPL-SCNC: 24 MMOL/L (ref 23–29)
CREAT SERPL-MCNC: 0.8 MG/DL (ref 0.5–1.4)
DIFFERENTIAL METHOD BLD: ABNORMAL
EOSINOPHIL # BLD AUTO: 0 K/UL (ref 0–0.5)
EOSINOPHIL NFR BLD: 0 % (ref 0–8)
ERYTHROCYTE [DISTWIDTH] IN BLOOD BY AUTOMATED COUNT: 15.4 % (ref 11.5–14.5)
EST. GFR  (NO RACE VARIABLE): >60 ML/MIN/1.73 M^2
GLUCOSE SERPL-MCNC: 143 MG/DL (ref 70–110)
HCT VFR BLD AUTO: 45.7 % (ref 37–48.5)
HGB BLD-MCNC: 14.5 G/DL (ref 12–16)
IMM GRANULOCYTES # BLD AUTO: 0.14 K/UL (ref 0–0.04)
IMM GRANULOCYTES NFR BLD AUTO: 0.7 % (ref 0–0.5)
LACTATE SERPL-SCNC: 1.5 MMOL/L (ref 0.5–2.2)
LYMPHOCYTES # BLD AUTO: 3.3 K/UL (ref 1–4.8)
LYMPHOCYTES NFR BLD: 15.2 % (ref 18–48)
MCH RBC QN AUTO: 24.4 PG (ref 27–31)
MCHC RBC AUTO-ENTMCNC: 31.7 G/DL (ref 32–36)
MCV RBC AUTO: 77 FL (ref 82–98)
MONOCYTES # BLD AUTO: 1.5 K/UL (ref 0.3–1)
MONOCYTES NFR BLD: 6.8 % (ref 4–15)
NEUTROPHILS # BLD AUTO: 16.5 K/UL (ref 1.8–7.7)
NEUTROPHILS NFR BLD: 77.1 % (ref 38–73)
NRBC BLD-RTO: 0 /100 WBC
PLATELET # BLD AUTO: 344 K/UL (ref 150–450)
PMV BLD AUTO: 9 FL (ref 9.2–12.9)
POTASSIUM SERPL-SCNC: 4.7 MMOL/L (ref 3.5–5.1)
PROT SERPL-MCNC: 8.7 G/DL (ref 6–8.4)
RBC # BLD AUTO: 5.94 M/UL (ref 4–5.4)
SODIUM SERPL-SCNC: 135 MMOL/L (ref 136–145)
WBC # BLD AUTO: 21.46 K/UL (ref 3.9–12.7)

## 2024-05-27 PROCEDURE — 63600175 PHARM REV CODE 636 W HCPCS: Performed by: NURSE PRACTITIONER

## 2024-05-27 PROCEDURE — 80053 COMPREHEN METABOLIC PANEL: CPT | Performed by: NURSE PRACTITIONER

## 2024-05-27 PROCEDURE — 96375 TX/PRO/DX INJ NEW DRUG ADDON: CPT

## 2024-05-27 PROCEDURE — 85025 COMPLETE CBC W/AUTO DIFF WBC: CPT | Performed by: NURSE PRACTITIONER

## 2024-05-27 PROCEDURE — 25000003 PHARM REV CODE 250: Performed by: EMERGENCY MEDICINE

## 2024-05-27 PROCEDURE — 99285 EMERGENCY DEPT VISIT HI MDM: CPT | Mod: 25

## 2024-05-27 PROCEDURE — 83605 ASSAY OF LACTIC ACID: CPT | Performed by: NURSE PRACTITIONER

## 2024-05-27 PROCEDURE — 96374 THER/PROPH/DIAG INJ IV PUSH: CPT

## 2024-05-27 PROCEDURE — 63600175 PHARM REV CODE 636 W HCPCS: Performed by: EMERGENCY MEDICINE

## 2024-05-27 PROCEDURE — 81025 URINE PREGNANCY TEST: CPT | Performed by: NURSE PRACTITIONER

## 2024-05-27 RX ORDER — ACETAMINOPHEN AND CODEINE PHOSPHATE 300; 30 MG/1; MG/1
1 TABLET ORAL
COMMUNITY
Start: 2024-05-24 | End: 2024-05-28 | Stop reason: ALTCHOICE

## 2024-05-27 RX ORDER — HYDROMORPHONE HYDROCHLORIDE 1 MG/ML
1 INJECTION, SOLUTION INTRAMUSCULAR; INTRAVENOUS; SUBCUTANEOUS
Status: COMPLETED | OUTPATIENT
Start: 2024-05-27 | End: 2024-05-27

## 2024-05-27 RX ORDER — ONDANSETRON HYDROCHLORIDE 2 MG/ML
4 INJECTION, SOLUTION INTRAVENOUS
Status: COMPLETED | OUTPATIENT
Start: 2024-05-27 | End: 2024-05-27

## 2024-05-27 RX ORDER — MORPHINE SULFATE 4 MG/ML
6 INJECTION, SOLUTION INTRAMUSCULAR; INTRAVENOUS
Status: COMPLETED | OUTPATIENT
Start: 2024-05-27 | End: 2024-05-27

## 2024-05-27 RX ORDER — DOXYCYCLINE HYCLATE 100 MG
100 TABLET ORAL
Status: COMPLETED | OUTPATIENT
Start: 2024-05-27 | End: 2024-05-27

## 2024-05-27 RX ORDER — PREDNISONE 20 MG/1
60 TABLET ORAL
Status: ON HOLD | COMMUNITY
Start: 2024-05-24 | End: 2024-06-03

## 2024-05-27 RX ADMIN — MORPHINE SULFATE 6 MG: 4 INJECTION INTRAVENOUS at 09:05

## 2024-05-27 RX ADMIN — IOHEXOL 100 ML: 350 INJECTION, SOLUTION INTRAVENOUS at 11:05

## 2024-05-27 RX ADMIN — HYDROMORPHONE HYDROCHLORIDE 1 MG: 1 INJECTION, SOLUTION INTRAMUSCULAR; INTRAVENOUS; SUBCUTANEOUS at 11:05

## 2024-05-27 RX ADMIN — ONDANSETRON 4 MG: 2 INJECTION INTRAMUSCULAR; INTRAVENOUS at 07:05

## 2024-05-27 RX ADMIN — DOXYCYCLINE HYCLATE 100 MG: 100 TABLET, COATED ORAL at 11:05

## 2024-05-27 NOTE — FIRST PROVIDER EVALUATION
Medical screening examination initiated.  I have conducted a focused provider triage encounter, findings are as follows:    Brief history of present illness:   44-year-old female past medical history of diabetes and PAD,  presents to ER via ambulance complaining of increased pain to toes on right foot and darkening in color.  Reports   associated symptoms of chills, headache, and nausea.    Vitals:    05/27/24 1715   BP: (!) 153/109   BP Location: Right arm   Patient Position: Sitting   Pulse: 101   Resp: 18   TempSrc: Oral   SpO2: 98%   Height: 5' (1.524 m)       Pertinent physical exam:   TOES ON RIGHT FOOT ARE COLD AND BLACK.    Brief workup plan:    LABS AND IMAGING    Preliminary workup initiated; this workup will be continued and followed by the physician or advanced practice provider that is assigned to the patient when roomed.

## 2024-05-28 VITALS
BODY MASS INDEX: 40.82 KG/M2 | DIASTOLIC BLOOD PRESSURE: 72 MMHG | RESPIRATION RATE: 20 BRPM | HEART RATE: 68 BPM | TEMPERATURE: 99 F | SYSTOLIC BLOOD PRESSURE: 151 MMHG | HEIGHT: 60 IN | OXYGEN SATURATION: 100 %

## 2024-05-28 PROCEDURE — 25500020 PHARM REV CODE 255: Performed by: EMERGENCY MEDICINE

## 2024-05-28 PROCEDURE — 96376 TX/PRO/DX INJ SAME DRUG ADON: CPT

## 2024-05-28 PROCEDURE — 63600175 PHARM REV CODE 636 W HCPCS: Performed by: STUDENT IN AN ORGANIZED HEALTH CARE EDUCATION/TRAINING PROGRAM

## 2024-05-28 PROCEDURE — 25000003 PHARM REV CODE 250: Performed by: EMERGENCY MEDICINE

## 2024-05-28 RX ORDER — ONDANSETRON HYDROCHLORIDE 2 MG/ML
4 INJECTION, SOLUTION INTRAVENOUS
Status: COMPLETED | OUTPATIENT
Start: 2024-05-28 | End: 2024-05-28

## 2024-05-28 RX ORDER — DOXYCYCLINE 100 MG/1
100 CAPSULE ORAL 2 TIMES DAILY
Qty: 14 CAPSULE | Refills: 0 | Status: ON HOLD | OUTPATIENT
Start: 2024-05-28 | End: 2024-06-05 | Stop reason: HOSPADM

## 2024-05-28 RX ORDER — HYDROMORPHONE HYDROCHLORIDE 1 MG/ML
1 INJECTION, SOLUTION INTRAMUSCULAR; INTRAVENOUS; SUBCUTANEOUS
Status: COMPLETED | OUTPATIENT
Start: 2024-05-28 | End: 2024-05-28

## 2024-05-28 RX ORDER — CEPHALEXIN 500 MG/1
500 CAPSULE ORAL 4 TIMES DAILY
Qty: 28 CAPSULE | Refills: 0 | Status: ON HOLD | OUTPATIENT
Start: 2024-05-28 | End: 2024-06-05 | Stop reason: HOSPADM

## 2024-05-28 RX ORDER — HYDROCODONE BITARTRATE AND ACETAMINOPHEN 10; 325 MG/1; MG/1
1 TABLET ORAL EVERY 6 HOURS PRN
Qty: 12 TABLET | Refills: 0 | Status: ON HOLD | OUTPATIENT
Start: 2024-05-28 | End: 2024-06-05 | Stop reason: HOSPADM

## 2024-05-28 RX ORDER — ONDANSETRON 4 MG/1
4 TABLET, ORALLY DISINTEGRATING ORAL
Status: COMPLETED | OUTPATIENT
Start: 2024-05-28 | End: 2024-05-28

## 2024-05-28 RX ADMIN — HYDROMORPHONE HYDROCHLORIDE 1 MG: 1 INJECTION, SOLUTION INTRAMUSCULAR; INTRAVENOUS; SUBCUTANEOUS at 04:05

## 2024-05-28 RX ADMIN — ONDANSETRON 4 MG: 2 INJECTION INTRAMUSCULAR; INTRAVENOUS at 04:05

## 2024-05-28 RX ADMIN — ONDANSETRON 4 MG: 4 TABLET, ORALLY DISINTEGRATING ORAL at 07:05

## 2024-05-28 NOTE — ED PROVIDER NOTES
SCRIBE #1 NOTE: I, Basim Pineda, am scribing for, and in the presence of, Merly Dumont DO. I have scribed the HPI, ROS, and PEx.     SCRIBE #2 NOTE: I, Julia Salguero, am scribing for, and in the presence of,  Justin Barrios M.D.. I have scribed the remaining portions of the note not scribed by Scribe #1.     SCRIBE #3 NOTE: I, Mike Valentin, am scribing for, and in the presence of, JESUS Oquendo DO. I have scribed the remaining portions of the note not scribed by Scribe #1 and #2.    History     Chief Complaint   Patient presents with    Foot Pain     Right foot, headache     Review of patient's allergies indicates:   Allergen Reactions    Neurontin [gabapentin] Itching and Blisters    Percocet [oxycodone-acetaminophen] Itching and Blisters         History of Present Illness     HPI    5/27/2024, 8:31 PM  History obtained from the patient      History of Present Illness: Jeanna Helm is a 44 y.o. female patient with a PMHx of DM and HTN who presents to the Emergency Department for evaluation of R foot pain and headache which onset once month. Patient mentioned that she noticed discoloration on April 24th where she was seen at [facility]. Patient states that was prescribed hydrocodone for pain as well as antibiotics but didn't have any relief. Last night patient noticed swelling, discoloration, drying and cracking on her R foot. Patient states her great toe was causing her problems when she noticed the discoloration had spread to her over metatarsals. Symptoms are constant and worsening in severity. No mitigating or exacerbating factors reported. Patient denies any fever, n/v/d, sob, cp, fatigue, and all other sxs at this time. No further complaints or concerns at this time.       Arrival mode: AASI    PCP: Jarek Crespo MD        Past Medical History:  Past Medical History:   Diagnosis Date    Back pain     Diabetes mellitus     Hypertension     Left knee pain     Miscarriage     x2    Stroke         Past Surgical History:  Past Surgical History:   Procedure Laterality Date    ANTERIOR CRUCIATE LIGAMENT REPAIR Left     DILATION AND CURETTAGE OF UTERUS      x2    LUMBAR FUSION      TIBIA FRACTURE SURGERY Right          Family History:  Family History   Problem Relation Name Age of Onset    Hypertension Mother      Heart disease Mother      Fibromyalgia Mother      Diabetes Father         Social History:  Social History     Tobacco Use    Smoking status: Every Day     Current packs/day: 1.00     Average packs/day: 1 pack/day for 20.0 years (20.0 ttl pk-yrs)     Types: Cigarettes    Smokeless tobacco: Not on file   Substance and Sexual Activity    Alcohol use: No    Drug use: No    Sexual activity: Not Currently        Review of Systems     Review of Systems   Constitutional:  Negative for fever.   Respiratory:  Negative for shortness of breath.    Cardiovascular:  Negative for chest pain.   Gastrointestinal:  Positive for nausea. Negative for abdominal pain and vomiting.   Musculoskeletal:         (+) R foot pain   Skin:  Positive for color change (Right toes).   Neurological:  Negative for headaches.        Physical Exam     Initial Vitals   BP Pulse Resp Temp SpO2   05/27/24 1715 05/27/24 1715 05/27/24 1715 05/27/24 2020 05/27/24 1715   (!) 153/109 101 18 98.9 °F (37.2 °C) 98 %      MAP       --                 Physical Exam  Nursing Notes and Vital Signs Reviewed.  Constitutional: Patient is in no apparent distress. Well-developed and well-nourished.  Head: Atraumatic. Normocephalic.  Eyes: PERRL. EOM intact. Conjunctivae are not pale. No scleral icterus.  ENT: Mucous membranes are moist. Oropharynx is clear and symmetric.    Neck: Supple. Full ROM.   Cardiovascular: Regular rate. Regular rhythm. No murmurs, rubs, or gallops. DP PT pulses detected with doppler are 2+ and symmetric.  Pulmonary/Chest: No respiratory distress. Clear to auscultation bilaterally. No wheezing or rales.  Abdominal: Soft and  non-distended.  There is no tenderness.  No rebound, guarding, or rigidity.   Musculoskeletal: Moves all extremities. No obvious deformities.  Edema and induration of Right toes. No calf tenderness.  Skin: Warm and dry.  Hyperpigmented Discoloration of R toes w/ tenderness with no warmth, erythema, or fluctuance.  Neurological:  Alert, awake, and appropriate.  Normal speech.  No acute focal neurological deficits are appreciated.  Psychiatric: Normal affect. Good eye contact. Appropriate in content.             ED Course   Procedures  ED Vital Signs:  Vitals:    05/27/24 1715 05/27/24 2014 05/27/24 2020 05/27/24 2048   BP: (!) 153/109 137/76     Pulse: 101 76  78   Resp: 18      Temp:   98.9 °F (37.2 °C)    TempSrc: Oral  Oral    SpO2: 98% 100%  100%   Height: 5' (1.524 m)       05/27/24 2120 05/27/24 2313 05/28/24 0133 05/28/24 0134   BP:   (!) 166/91    Pulse:   63    Resp: 17 16     Temp:    98.9 °F (37.2 °C)   TempSrc:    Oral   SpO2:   100%    Height:        05/28/24 0413 05/28/24 0420 05/28/24 0500 05/28/24 0615   BP:  (!) 147/66 (!) 156/82 (!) 152/88   Pulse:  65 66 69   Resp: 18 18 20 20   Temp:       TempSrc:       SpO2:  96% 98% 100%   Height:        05/28/24 0732   BP: (!) 151/72   Pulse: 68   Resp:    Temp:    TempSrc:    SpO2: 100%   Height:        Abnormal Lab Results:  Labs Reviewed   CBC W/ AUTO DIFFERENTIAL - Abnormal; Notable for the following components:       Result Value    WBC 21.46 (*)     RBC 5.94 (*)     MCV 77 (*)     MCH 24.4 (*)     MCHC 31.7 (*)     RDW 15.4 (*)     MPV 9.0 (*)     Immature Granulocytes 0.7 (*)     Gran # (ANC) 16.5 (*)     Immature Grans (Abs) 0.14 (*)     Mono # 1.5 (*)     Gran % 77.1 (*)     Lymph % 15.2 (*)     All other components within normal limits   COMPREHENSIVE METABOLIC PANEL - Abnormal; Notable for the following components:    Sodium 135 (*)     Glucose 143 (*)     Calcium 10.8 (*)     Total Protein 8.7 (*)     All other components within normal limits    LACTIC ACID, PLASMA   PREGNANCY TEST, URINE RAPID    Narrative:     Specimen Source->Urine        All Lab Results:  Results for orders placed or performed during the hospital encounter of 05/27/24   CBC auto differential   Result Value Ref Range    WBC 21.46 (H) 3.90 - 12.70 K/uL    RBC 5.94 (H) 4.00 - 5.40 M/uL    Hemoglobin 14.5 12.0 - 16.0 g/dL    Hematocrit 45.7 37.0 - 48.5 %    MCV 77 (L) 82 - 98 fL    MCH 24.4 (L) 27.0 - 31.0 pg    MCHC 31.7 (L) 32.0 - 36.0 g/dL    RDW 15.4 (H) 11.5 - 14.5 %    Platelets 344 150 - 450 K/uL    MPV 9.0 (L) 9.2 - 12.9 fL    Immature Granulocytes 0.7 (H) 0.0 - 0.5 %    Gran # (ANC) 16.5 (H) 1.8 - 7.7 K/uL    Immature Grans (Abs) 0.14 (H) 0.00 - 0.04 K/uL    Lymph # 3.3 1.0 - 4.8 K/uL    Mono # 1.5 (H) 0.3 - 1.0 K/uL    Eos # 0.0 0.0 - 0.5 K/uL    Baso # 0.04 0.00 - 0.20 K/uL    nRBC 0 0 /100 WBC    Gran % 77.1 (H) 38.0 - 73.0 %    Lymph % 15.2 (L) 18.0 - 48.0 %    Mono % 6.8 4.0 - 15.0 %    Eosinophil % 0.0 0.0 - 8.0 %    Basophil % 0.2 0.0 - 1.9 %    Differential Method Automated    Comprehensive metabolic panel   Result Value Ref Range    Sodium 135 (L) 136 - 145 mmol/L    Potassium 4.7 3.5 - 5.1 mmol/L    Chloride 99 95 - 110 mmol/L    CO2 24 23 - 29 mmol/L    Glucose 143 (H) 70 - 110 mg/dL    BUN 19 6 - 20 mg/dL    Creatinine 0.8 0.5 - 1.4 mg/dL    Calcium 10.8 (H) 8.7 - 10.5 mg/dL    Total Protein 8.7 (H) 6.0 - 8.4 g/dL    Albumin 4.0 3.5 - 5.2 g/dL    Total Bilirubin 0.3 0.1 - 1.0 mg/dL    Alkaline Phosphatase 81 55 - 135 U/L    AST 19 10 - 40 U/L    ALT 30 10 - 44 U/L    eGFR >60 >60 mL/min/1.73 m^2    Anion Gap 12 8 - 16 mmol/L   Lactic acid, plasma   Result Value Ref Range    Lactate (Lactic Acid) 1.5 0.5 - 2.2 mmol/L   Pregnancy, urine rapid (UPT)   Result Value Ref Range    Preg Test, Ur Negative          Imaging Results:  Imaging Results              CTA Chest Abdomen Pelvis (Edited Result - FINAL)  Result time 05/28/24 00:56:01      Addendum (preliminary) 1 of 1 by  Jhon Chavez MD (05/28/24 00:56:01)      Incidentally small left adrenal adenoma.      Electronically signed by: Jhon Chavez  Date:    05/28/2024  Time:    00:56                 Final result by Jhon Chavez MD (05/28/24 00:49:35)                   Impression:      No acute findings.      Electronically signed by: Jhon Chavez  Date:    05/28/2024  Time:    00:49               Narrative:    EXAMINATION:  CTA CHEST ABDOMEN PELVIS    CLINICAL HISTORY:  Aortic atherosclerosis;    TECHNIQUE:  Low dose axial images, sagittal and coronal reformations were obtained from the thoracic inlet to the pubic symphysis following the IV administration of 100 mL of Omnipaque 350.  Oral contrast was not administered.    COMPARISON:  None.    FINDINGS:  Base of Neck: No significant abnormality.    Thoracic soft tissues: Unremarkable.    Aorta: Left-sided aortic arch.  No aneurysm and no significant atherosclerosis.  No aneurysm or dissection.    Heart: Normal size. No effusion.    Pulmonary vasculature: Enlarged main pulmonary artery suggestive of pulmonary arterial hypertension.    Poonam/Mediastinum: No pathologic patito enlargement.    Airways: Patent.    Lungs/Pleura: Clear lungs. No pleural effusion or thickening.    Esophagus: Unremarkable.    Liver: Normal size and attenuation. No focal lesions.    Gallbladder: No calcified gallstones.    Bile Ducts: No dilatation.    Pancreas: No mass. No peripancreatic fat stranding.    Spleen: Normal.    Adrenals: Normal.    Kidneys/Ureters: Normal enhancement.  No mass or  hydroureteronephrosis.    Bladder: No wall thickening.    Reproductive organs: Normal.    GI Tract/Mesentery: No evidence of bowel obstruction or inflammation.    Peritoneal Space: No ascites or free air.    Retroperitoneum: No significant adenopathy.    Abdominal wall: Normal.    Vasculature: No aneurysm.  No large vessel occlusion.    Bones: No acute fracture. No suspicious lytic or  sclerotic lesions.                                       US Lower Extrem Arteries Bilat with DANY (xpd) (Final result)  Result time 05/27/24 21:54:21      Final result by Gisella Vences MD (05/27/24 21:54:21)                   Impression:      Redemonstrated monophasic flow throughout both lower extremities consistent with more proximal nonvisualized aortoiliac hemodynamically significant disease    DANY Values    >1.3: Non-compressible or calcified vessels    0.97 - 1.29: No significant PAD    0.75 - 0.96: Mild PAD    0.50 - 0.74: Moderate PAD    <0.50: Severe PAD    <0.30: Critical PAD      Electronically signed by: Gisella Vences  Date:    05/27/2024  Time:    21:54               Narrative:    EXAMINATION:  US ARTERIAL LOWER EXTREMITY BILAT WITH DANY (XPD)    CLINICAL HISTORY:  black toes;    FINDINGS:  The lower extremities were evaluated with continuous wave Doppler to obtain systolic segmental pressure recordings at the brachial and ankle segments.    Right DANY 0.92    Left DANY 0.98    Again there is monophasic flow throughout both lower extremities consistent with more proximal nonvisualized aortoiliac hemodynamically significant disease.  Negative for arterial occlusion                                       X-Ray Foot Complete Right (Final result)  Result time 05/27/24 22:22:16      Final result by Gisella Vences MD (05/27/24 22:22:16)                   Impression:      No acute or adverse bony finding.      Electronically signed by: Gisella Vences  Date:    05/27/2024  Time:    22:22               Narrative:    EXAMINATION:  XR FOOT COMPLETE 3 VIEW RIGHT    CLINICAL HISTORY:  Pain in right foot    TECHNIQUE:  Three-view exam    COMPARISON:  05/19/2024                                            ED Discussion     2:00 AM: Dr. Dumont transfers care of patient to Dr. Barrios pending CTA results.    2:38 AM: Dr. Barrios agrees with Dr. Dumont's assessment and plan of care. Evaluated pt. Pt is resting  comfortably and is in no acute distress.  D/w pt all pertinent results. D/w pt any concerns expressed at this time. Answered all questions. Pt expresses understanding at this time.    6:00 AM: Dr. Barrios transfers care of patient to Dr. Oquendo pending CT results.    7:31 AM: Reassessed pt at this time. Discussed with pt all pertinent ED information and results. Discussed pt dx and plan of tx. Gave pt all f/u and return to the ED instructions. All questions and concerns were addressed at this time. Pt expresses understanding of information and instructions, and is comfortable with plan to discharge. Pt is stable for discharge.    I discussed with patient and/or family/caretaker that evaluation in the ED does not suggest any emergent or life threatening medical conditions requiring immediate intervention beyond what was provided in the ED, and I believe patient is safe for discharge.  Regardless, an unremarkable evaluation in the ED does not preclude the development or presence of a serious of life threatening condition. As such, patient was instructed to return immediately for any worsening or change in current symptoms.     ED Course as of 05/28/24 1111   Mon May 27, 2024   2028 WBC(!): 21.46  May be from recent steroid use versus persistent cellulitis despite clindamycin.  No fever or elevation and lactic acid and otherwise vital signs negative for sepsis.  Consider doxycycline.  [NF]   2251 Patient has been seen in this emergency department 8 times within the last 30 days for the same complaint.  She is received Plavix, allopurinol, Norco x2, prednisone, colchicine, clindamycin, Voltaren, and tramadol with no changes in her imaging as well as smoking sensation instructions and vascular surgery referral. [NF]   Tue May 28, 2024   0152 Redemonstrated monophasic flow throughout both lower extremities consistent with more proximal nonvisualized aortoiliac hemodynamically significant disease    Right DANY 0.92     Left  DANY 0.98   [NF]   0153 X-ray shows no free air, soft tissue swelling, retained foreign bodies, or osteomyelitis. [NF]      ED Course User Index  [NF] Merly Dumont, DO     Medical Decision Making  Amount and/or Complexity of Data Reviewed  Labs: ordered. Decision-making details documented in ED Course.  Radiology: ordered. Decision-making details documented in ED Course.    Risk  Prescription drug management.                ED Medication(s):  Medications   ondansetron injection 4 mg (4 mg Intravenous Given 5/27/24 1920)   morphine injection 6 mg (6 mg Intravenous Given 5/27/24 2120)   doxycycline tablet 100 mg (100 mg Oral Given 5/27/24 2313)   HYDROmorphone injection 1 mg (1 mg Intravenous Given 5/27/24 2313)   iohexoL (OMNIPAQUE 350) injection 100 mL (100 mLs Intravenous Given 5/27/24 2346)   HYDROmorphone injection 1 mg (1 mg Intravenous Given 5/28/24 0413)   ondansetron injection 4 mg (4 mg Intravenous Given 5/28/24 0413)   ondansetron disintegrating tablet 4 mg (4 mg Oral Given 5/28/24 0754)       Discharge Medication List as of 5/28/2024  7:29 AM        START taking these medications    Details   cephALEXin (KEFLEX) 500 MG capsule Take 1 capsule (500 mg total) by mouth 4 (four) times daily. for 7 days, Starting Tue 5/28/2024, Until Tue 6/4/2024, Normal      doxycycline (VIBRAMYCIN) 100 MG Cap Take 1 capsule (100 mg total) by mouth 2 (two) times daily. for 7 days, Starting Tue 5/28/2024, Until Tue 6/4/2024, Normal      HYDROcodone-acetaminophen (NORCO)  mg per tablet Take 1 tablet by mouth every 6 (six) hours as needed for Pain., Starting Tue 5/28/2024, Normal              Follow-up Information       Schedule an appointment as soon as possible for a visit  with Jarek Crespo MD.    Specialty: Family Medicine  Contact information:  [address] [facility]  [place] [address]  [phone]                                 Scribe Attestation:   Scribe #1: I performed the above scribed service and the  documentation accurately describes the services I performed. I attest to the accuracy of the note.     Attending:   Physician Attestation Statement for Scribe #1: I, Merly Dumont DO, personally performed the services described in this documentation, as scribed by Basim Pineda, in my presence, and it is both accurate and complete.       Scribe Attestation:   Scribe #2: I performed the above scribed service and the documentation accurately describes the services I performed. I attest to the accuracy of the note.  Scribe #3: I performed the above scribed service and the documentation accurately describes the services I performed. I attest to the accuracy of the note.    Attending Attestation:           Physician Attestation for Scribe:    Physician Attestation Statement for Scribe #2: I, Justin Barrios M.D., reviewed documentation, as scribed by Julia Salguero in my presence, and it is both accurate and complete. I also acknowledge and confirm the content of the note done by Scribe #1.      Physician Attestation Statement for Scribe #3: I, JESUS Oquendo DO., reviewed documentation, as scribed by Mike Valentin in my presence, and it is both accurate and complete. I also acknowledge and confirm the content of the note done by Scribe #1 and #2.     Clinical Impression       ICD-10-CM ICD-9-CM   1. Right foot pain  M79.671 729.5       Disposition:   Disposition: Discharged  Condition: Stable         Nehemias Oquendo DO  05/29/24 1246

## 2024-05-30 ENCOUNTER — ON-DEMAND VIRTUAL (OUTPATIENT)
Dept: URGENT CARE | Facility: CLINIC | Age: 44
End: 2024-05-30
Payer: MEDICAID

## 2024-05-30 DIAGNOSIS — G89.29 CHRONIC FOOT PAIN, RIGHT: Primary | ICD-10-CM

## 2024-05-30 DIAGNOSIS — M79.671 CHRONIC FOOT PAIN, RIGHT: Primary | ICD-10-CM

## 2024-05-30 PROCEDURE — 99212 OFFICE O/P EST SF 10 MIN: CPT | Mod: 95,,, | Performed by: NURSE PRACTITIONER

## 2024-05-30 NOTE — PATIENT INSTRUCTIONS
Patient Education       Peripheral Vascular (Arterial) Disease Discharge Instructions   About this topic   Peripheral vascular disease is also called PVD. It is where the arteries that carry blood, oxygen, and nutrients to your legs, arms, or pelvis become narrow or blocked. PVD is caused by the buildup of fatty material in your arteries. This fatty material is called plaque. Plaque is a sticky substance found in the blood. The arteries begin to narrow and limit blood flow to your body.  PVD can cause numbness and pain in the leg. If not treated, it can make walking painful. PVD may lead to more serious health problems.  Treatments may lower your signs and slow how fast the disease progresses. It may also prevent other problems.  This illness is managed with drugs and lifestyle changes. There is no cure for PVD. Surgery may be needed if you have a more serious case.  What care is needed at home?   Ask your doctor what you need to do when you go home. Make sure you ask questions if you do not understand what the doctor says. This way you will know what you need to do.  When lying down or relaxing, raise your legs above the level of your heart.  Wear loose fitting pants and other clothes. This will help keep good blood flow to your legs.  When sitting, do not cross your legs.  If you smoke, ask your doctor how to quit. Quitting is very important in treatment.  Work with your doctor to treat other illnesses like high cholesterol or diabetes.  What follow-up care is needed?   Your doctor may ask you to make visits to the office to check on your progress. Be sure to keep these visits.  There may be other tests needed to check your progress.  What drugs may be needed?   The doctor may order drugs to:  Prevent blood clots  Help you stop smoking  Lower high blood pressure  Lower cholesterol levels  Improve blood sugar control if you have diabetes  Will physical activity be limited?   Walking is good for this illness. Talk  to your doctor about an exercise program for you. You may need to see a physical therapist to find the best exercises for you.  What changes to diet are needed?   Follow a heart healthy diet. Ask the dietitian for a plan.  Eat foods high in fiber like fruits, vegetables, cereals, whole grains, and beans.  Do not eat foods high in cholesterol and saturated fats like fried foods, fatty meats, sausages, and canned meats.  Limit salty foods. Avoid canned, dried, and processed foods. Do not add salt to your food. Season food with herbs when you cook.  Limit caffeine intake.  Avoid beer, wine, and mixed drinks (alcohol). Ask for help to stop you from drinking.  What problems could happen?   Problems with your heart  Stroke  Less able to get around  When do I need to call the doctor?   Activate the emergency medical system right away if you have signs of stroke or heart attack. Call 911 in the [place] or [place]. The sooner treatment begins, the better your chances for recovery. Call for emergency help right away if you have:  Signs of stroke:  Sudden numbness or weakness of the face, arm, or leg, especially on one side of the body  Sudden confusion, trouble speaking or understanding  Sudden trouble seeing in one or both eyes  Sudden trouble walking, dizziness, loss of balance or coordination  Sudden severe headache with no known cause  Signs of heart attack:  Chest pain  Trouble breathing  Fast heartbeat  Feeling dizzy  Call your doctor if you have:  Very bad pain in the leg or foot  Color of foot changes to blue or gray and becomes cold  Feelings of being very tired or weak  Teach Back: Helping You Understand   The Teach Back Method helps you understand the information we are giving you. After you talk with the staff, tell them in your own words what you learned. This helps to make sure the staff has described each thing clearly. It also helps to explain things that may have been confusing. Before going home, make  sure you can do these:  I can tell you about my condition.  I can tell you what I can do to help keep good blood flow to my legs.  I can tell you what I will do if I have signs of a heart attack or stroke.  Where can I learn more?   Centers for Disease Control and Prevention  https://www.cdc.gov/heartdisease/PAD.htm   NHS Choices  https://www.nhs.uk/conditions/peripheral-arterial-disease-pad/treatment/   Last Reviewed Date   2020-02-26  Consumer Information Use and Disclaimer   This information is not specific medical advice and does not replace information you receive from your health care provider. This is only a brief summary of general information. It does NOT include all information about conditions, illnesses, injuries, tests, procedures, treatments, therapies, discharge instructions or life-style choices that may apply to you. You must talk with your health care provider for complete information about your health and treatment options. This information should not be used to decide whether or not to accept your health care providers advice, instructions or recommendations. Only your health care provider has the knowledge and training to provide advice that is right for you.  Copyright   Copyright © 2021 [employer] and its affiliates and/or licensors. All rights reserved.

## 2024-05-30 NOTE — PROGRESS NOTES
Subjective:      Patient ID: Jeanna Helm is a 44 y.o. female.    Vitals:  vitals were not taken for this visit.     Chief Complaint: right foot pain      Visit Type: TELE AUDIOVISUAL    Present with the patient at the time of consultation: TELEMED PRESENT WITH PATIENT: None, at home    Past Medical History:   Diagnosis Date    Back pain     Diabetes mellitus     Hypertension     Left knee pain     Miscarriage     x2    Stroke      Past Surgical History:   Procedure Laterality Date    ANTERIOR CRUCIATE LIGAMENT REPAIR Left     DILATION AND CURETTAGE OF UTERUS      x2    LUMBAR FUSION      TIBIA FRACTURE SURGERY Right      Review of patient's allergies indicates:   Allergen Reactions    Neurontin [gabapentin] Itching and Blisters    Percocet [oxycodone-acetaminophen] Itching and Blisters     Current Outpatient Medications on File Prior to Visit   Medication Sig Dispense Refill    acetaminophen (TYLENOL) 325 MG tablet Take 325 mg by mouth every 6 (six) hours as needed for Pain.      allopurinoL (ZYLOPRIM) 100 MG tablet Take 1 tablet (100 mg total) by mouth once daily. 10 tablet 0    amLODIPine (NORVASC) 10 MG tablet Take 1 tablet (10 mg total) by mouth once daily. 30 tablet 1    butalbital-acetaminophen-caffeine -40 mg (FIORICET, ESGIC) -40 mg per tablet Take 1 tablet by mouth every 6 (six) hours as needed for Pain. 7 tablet 0    cephALEXin (KEFLEX) 500 MG capsule Take 1 capsule (500 mg total) by mouth 4 (four) times daily. for 7 days 28 capsule 0    clopidogreL (PLAVIX) 75 mg tablet Take 1 tablet (75 mg total) by mouth once daily. 30 tablet 0    colchicine (COLCRYS) 0.6 mg tablet Take 2 tablets for your 1st dose.  You may then take 1 tablet every 2 hours x3.  Do not use more than 3 days. 11 tablet 0    colchicine (COLCRYS) 0.6 mg tablet Take 1 tablet (0.6 mg total) by mouth once daily. Take 2 tablets for your 1st dose.  You may then take 1 tablet every 2 hours x3.  Do not use more than 3 days. 10 tablet  0    diclofenac (VOLTAREN) 50 MG EC tablet Take 1 tablet (50 mg total) by mouth 2 (two) times daily as needed (pain). 20 tablet 0    diclofenac (VOLTAREN) 50 MG EC tablet Take 1 tablet (50 mg total) by mouth 2 (two) times daily. 20 tablet 0    doxycycline (VIBRAMYCIN) 100 MG Cap Take 1 capsule (100 mg total) by mouth 2 (two) times daily. for 7 days 14 capsule 0    glyBURIDE (DIABETA) 5 MG tablet Take 1 tablet (5 mg total) by mouth daily with breakfast. 90 tablet 3    HYDROcodone-acetaminophen (NORCO)  mg per tablet Take 1 tablet by mouth every 6 (six) hours as needed for Pain. 12 tablet 0    ibuprofen (ADVIL,MOTRIN) 800 MG tablet Take 1 tablet (800 mg total) by mouth every 6 (six) hours as needed for Pain. 20 tablet 0    lisinopriL (PRINIVIL,ZESTRIL) 40 MG tablet Take 1 tablet (40 mg total) by mouth once daily. 30 tablet 0    meclizine (ANTIVERT) 25 mg tablet Take 1 tablet (25 mg total) by mouth 3 (three) times daily as needed. 20 tablet 0    meclizine (ANTIVERT) 25 mg tablet Take 1 tablet (25 mg total) by mouth 3 (three) times daily as needed. 20 tablet 0    medroxyPROGESTERone (PROVERA) 10 MG tablet Take 1 tablet (10 mg total) by mouth once daily. for 5 days 5 tablet 0    methylPREDNISolone (MEDROL DOSEPACK) 4 mg tablet Take as directed on the package. 1 each 0    metoclopramide HCl (REGLAN) 10 MG tablet Take 1 tablet (10 mg total) by mouth every 6 (six) hours as needed (headache or nausea). 16 tablet 0    metoprolol succinate (TOPROL-XL) 50 MG 24 hr tablet Take 50 mg by mouth.      naproxen (NAPROSYN) 500 MG tablet Take 1 tablet (500 mg total) by mouth 2 (two) times daily with meals. 20 tablet 0    predniSONE (DELTASONE) 20 MG tablet Take 60 mg by mouth.       No current facility-administered medications on file prior to visit.     Family History   Problem Relation Name Age of Onset    Hypertension Mother      Heart disease Mother      Fibromyalgia Mother      Diabetes Father             [facility] P[facility] Odvv Intake     5/30/2024  8:46 AM CDT - Filed by Patient   What is your current physical address in the event of a medical emergency? [id number] [place] [address]   Are you able to take your vital signs? No   Please attach any relevant images or files          Chronic right foot pain. Hx of PVD. Pain persists. Referrals pending for Vascular and Podiatry evaluations. Seeking pain management options at this time.        Musculoskeletal:  Positive for pain, joint pain and joint swelling.   Skin:  Positive for color change (toes).        Objective:   The physical exam was conducted virtually.  Physical Exam   Constitutional: She is oriented to person, place, and time. She does not appear ill. No distress.   HENT:   Head: Normocephalic and atraumatic.   Nose: Nose normal.   Eyes: Extraocular movement intact   Pulmonary/Chest: Effort normal.   Abdominal: Normal appearance.   Musculoskeletal: Normal range of motion.         General: Normal range of motion.   Neurological: no focal deficit. She is alert and oriented to person, place, and time.   Psychiatric: Her behavior is normal. Mood normal.   Vitals reviewed.      Assessment:     1. Chronic foot pain, right        Plan:   Given history and symptoms further in-person evaluation is needed for a diagnosis and treatment plan regarding pain control.    Patient encouraged to monitor symptoms closely and instructed to follow-up for new or worsening symptoms. Further, in-person, evaluation is necessary for continued treatment. Please follow up in Urgent Care, ER, or  with your primary care doctor or specialist as needed. Verbally discussed plan. Patient confirms understanding and is in agreement with treatment and plan.     You must understand that you've received a Virtual Care evaluation only and that you may be released before all your medical problems are known or treated. You, the patient, will arrange for follow-up care as instructed.      Chronic foot pain,  right        Patient Instructions   Patient Education       Peripheral Vascular (Arterial) Disease Discharge Instructions   About this topic   Peripheral vascular disease is also called PVD. It is where the arteries that carry blood, oxygen, and nutrients to your legs, arms, or pelvis become narrow or blocked. PVD is caused by the buildup of fatty material in your arteries. This fatty material is called plaque. Plaque is a sticky substance found in the blood. The arteries begin to narrow and limit blood flow to your body.  PVD can cause numbness and pain in the leg. If not treated, it can make walking painful. PVD may lead to more serious health problems.  Treatments may lower your signs and slow how fast the disease progresses. It may also prevent other problems.  This illness is managed with drugs and lifestyle changes. There is no cure for PVD. Surgery may be needed if you have a more serious case.  What care is needed at home?   Ask your doctor what you need to do when you go home. Make sure you ask questions if you do not understand what the doctor says. This way you will know what you need to do.  When lying down or relaxing, raise your legs above the level of your heart.  Wear loose fitting pants and other clothes. This will help keep good blood flow to your legs.  When sitting, do not cross your legs.  If you smoke, ask your doctor how to quit. Quitting is very important in treatment.  Work with your doctor to treat other illnesses like high cholesterol or diabetes.  What follow-up care is needed?   Your doctor may ask you to make visits to the office to check on your progress. Be sure to keep these visits.  There may be other tests needed to check your progress.  What drugs may be needed?   The doctor may order drugs to:  Prevent blood clots  Help you stop smoking  Lower high blood pressure  Lower cholesterol levels  Improve blood sugar control if you have diabetes  Will physical activity be limited?    Walking is good for this illness. Talk to your doctor about an exercise program for you. You may need to see a physical therapist to find the best exercises for you.  What changes to diet are needed?   Follow a heart healthy diet. Ask the dietitian for a plan.  Eat foods high in fiber like fruits, vegetables, cereals, whole grains, and beans.  Do not eat foods high in cholesterol and saturated fats like fried foods, fatty meats, sausages, and canned meats.  Limit salty foods. Avoid canned, dried, and processed foods. Do not add salt to your food. Season food with herbs when you cook.  Limit caffeine intake.  Avoid beer, wine, and mixed drinks (alcohol). Ask for help to stop you from drinking.  What problems could happen?   Problems with your heart  Stroke  Less able to get around  When do I need to call the doctor?   Activate the emergency medical system right away if you have signs of stroke or heart attack. Call 911 in the [place] or [place]. The sooner treatment begins, the better your chances for recovery. Call for emergency help right away if you have:  Signs of stroke:  Sudden numbness or weakness of the face, arm, or leg, especially on one side of the body  Sudden confusion, trouble speaking or understanding  Sudden trouble seeing in one or both eyes  Sudden trouble walking, dizziness, loss of balance or coordination  Sudden severe headache with no known cause  Signs of heart attack:  Chest pain  Trouble breathing  Fast heartbeat  Feeling dizzy  Call your doctor if you have:  Very bad pain in the leg or foot  Color of foot changes to blue or gray and becomes cold  Feelings of being very tired or weak  Teach Back: Helping You Understand   The Teach Back Method helps you understand the information we are giving you. After you talk with the staff, tell them in your own words what you learned. This helps to make sure the staff has described each thing clearly. It also helps to explain things that may have  been confusing. Before going home, make sure you can do these:  I can tell you about my condition.  I can tell you what I can do to help keep good blood flow to my legs.  I can tell you what I will do if I have signs of a heart attack or stroke.  Where can I learn more?   Centers for Disease Control and Prevention  https://www.cdc.gov/heartdisease/PAD.htm   [facility] Choices  https://www.nhs.uk/conditions/peripheral-arterial-disease-pad/treatment/   Last Reviewed Date   2020-02-26  Consumer Information Use and Disclaimer   This information is not specific medical advice and does not replace information you receive from your health care provider. This is only a brief summary of general information. It does NOT include all information about conditions, illnesses, injuries, tests, procedures, treatments, therapies, discharge instructions or life-style choices that may apply to you. You must talk with your health care provider for complete information about your health and treatment options. This information should not be used to decide whether or not to accept your health care providers advice, instructions or recommendations. Only your health care provider has the knowledge and training to provide advice that is right for you.  Copyright   Copyright © 2021 [employer] and its affiliates and/or licensors. All rights reserved.

## 2024-06-02 ENCOUNTER — HOSPITAL ENCOUNTER (INPATIENT)
Facility: HOSPITAL | Age: 44
LOS: 3 days | Discharge: HOME | DRG: 554 | End: 2024-06-05
Attending: EMERGENCY MEDICINE | Admitting: HOSPITALIST
Payer: MEDICAID

## 2024-06-02 DIAGNOSIS — Z72.0 TOBACCO ABUSE: Chronic | ICD-10-CM

## 2024-06-02 DIAGNOSIS — Z76.0 ENCOUNTER FOR MEDICATION REFILL: ICD-10-CM

## 2024-06-02 DIAGNOSIS — E78.5 HYPERLIPIDEMIA, UNSPECIFIED HYPERLIPIDEMIA TYPE: Chronic | ICD-10-CM

## 2024-06-02 DIAGNOSIS — I82.90 THROMBUS: ICD-10-CM

## 2024-06-02 DIAGNOSIS — R07.9 CHEST PAIN: ICD-10-CM

## 2024-06-02 DIAGNOSIS — I10 HYPERTENSION, UNSPECIFIED TYPE: Chronic | ICD-10-CM

## 2024-06-02 DIAGNOSIS — M79.671 RIGHT FOOT PAIN: ICD-10-CM

## 2024-06-02 DIAGNOSIS — M79.674 PAIN OF TOE OF RIGHT FOOT: Primary | ICD-10-CM

## 2024-06-02 DIAGNOSIS — Z72.0 TOBACCO USE: ICD-10-CM

## 2024-06-02 LAB
ALBUMIN SERPL BCP-MCNC: 3.5 G/DL (ref 3.5–5.2)
ALP SERPL-CCNC: 77 U/L (ref 55–135)
ALT SERPL W/O P-5'-P-CCNC: 23 U/L (ref 10–44)
ANION GAP SERPL CALC-SCNC: 11 MMOL/L (ref 8–16)
AST SERPL-CCNC: 14 U/L (ref 10–40)
BASOPHILS # BLD AUTO: 0.05 K/UL (ref 0–0.2)
BASOPHILS NFR BLD: 0.3 % (ref 0–1.9)
BILIRUB SERPL-MCNC: 0.6 MG/DL (ref 0.1–1)
BUN SERPL-MCNC: 10 MG/DL (ref 6–20)
CALCIUM SERPL-MCNC: 9.7 MG/DL (ref 8.7–10.5)
CHLORIDE SERPL-SCNC: 98 MMOL/L (ref 95–110)
CO2 SERPL-SCNC: 22 MMOL/L (ref 23–29)
CREAT SERPL-MCNC: 0.8 MG/DL (ref 0.5–1.4)
CRP SERPL-MCNC: 55.8 MG/L (ref 0–8.2)
DIFFERENTIAL METHOD BLD: ABNORMAL
EOSINOPHIL # BLD AUTO: 0.4 K/UL (ref 0–0.5)
EOSINOPHIL NFR BLD: 2.4 % (ref 0–8)
ERYTHROCYTE [DISTWIDTH] IN BLOOD BY AUTOMATED COUNT: 14.8 % (ref 11.5–14.5)
ERYTHROCYTE [SEDIMENTATION RATE] IN BLOOD BY WESTERGREN METHOD: 25 MM/HR (ref 0–20)
EST. GFR  (NO RACE VARIABLE): >60 ML/MIN/1.73 M^2
GLUCOSE SERPL-MCNC: 152 MG/DL (ref 70–110)
HCT VFR BLD AUTO: 43.7 % (ref 37–48.5)
HGB BLD-MCNC: 13.9 G/DL (ref 12–16)
IMM GRANULOCYTES # BLD AUTO: 0.17 K/UL (ref 0–0.04)
IMM GRANULOCYTES NFR BLD AUTO: 1.2 % (ref 0–0.5)
LACTATE SERPL-SCNC: 1.8 MMOL/L (ref 0.5–2.2)
LYMPHOCYTES # BLD AUTO: 2.4 K/UL (ref 1–4.8)
LYMPHOCYTES NFR BLD: 16.5 % (ref 18–48)
MCH RBC QN AUTO: 24.5 PG (ref 27–31)
MCHC RBC AUTO-ENTMCNC: 31.8 G/DL (ref 32–36)
MCV RBC AUTO: 77 FL (ref 82–98)
MONOCYTES # BLD AUTO: 1.2 K/UL (ref 0.3–1)
MONOCYTES NFR BLD: 8 % (ref 4–15)
NEUTROPHILS # BLD AUTO: 10.3 K/UL (ref 1.8–7.7)
NEUTROPHILS NFR BLD: 71.6 % (ref 38–73)
NRBC BLD-RTO: 0 /100 WBC
PLATELET # BLD AUTO: 328 K/UL (ref 150–450)
PMV BLD AUTO: 8.8 FL (ref 9.2–12.9)
POTASSIUM SERPL-SCNC: 4.9 MMOL/L (ref 3.5–5.1)
PROCALCITONIN SERPL IA-MCNC: 0.12 NG/ML
PROT SERPL-MCNC: 7.9 G/DL (ref 6–8.4)
RBC # BLD AUTO: 5.68 M/UL (ref 4–5.4)
SODIUM SERPL-SCNC: 131 MMOL/L (ref 136–145)
WBC # BLD AUTO: 14.39 K/UL (ref 3.9–12.7)

## 2024-06-02 PROCEDURE — 87040 BLOOD CULTURE FOR BACTERIA: CPT | Performed by: NURSE PRACTITIONER

## 2024-06-02 PROCEDURE — 85025 COMPLETE CBC W/AUTO DIFF WBC: CPT | Performed by: NURSE PRACTITIONER

## 2024-06-02 PROCEDURE — 85651 RBC SED RATE NONAUTOMATED: CPT | Performed by: EMERGENCY MEDICINE

## 2024-06-02 PROCEDURE — 86140 C-REACTIVE PROTEIN: CPT | Performed by: EMERGENCY MEDICINE

## 2024-06-02 PROCEDURE — 96375 TX/PRO/DX INJ NEW DRUG ADDON: CPT

## 2024-06-02 PROCEDURE — 25000003 PHARM REV CODE 250

## 2024-06-02 PROCEDURE — 63600175 PHARM REV CODE 636 W HCPCS: Performed by: EMERGENCY MEDICINE

## 2024-06-02 PROCEDURE — 96365 THER/PROPH/DIAG IV INF INIT: CPT

## 2024-06-02 PROCEDURE — 11000001 HC ACUTE MED/SURG PRIVATE ROOM

## 2024-06-02 PROCEDURE — 25000003 PHARM REV CODE 250: Performed by: EMERGENCY MEDICINE

## 2024-06-02 PROCEDURE — 83605 ASSAY OF LACTIC ACID: CPT | Performed by: NURSE PRACTITIONER

## 2024-06-02 PROCEDURE — 99285 EMERGENCY DEPT VISIT HI MDM: CPT | Mod: 25

## 2024-06-02 PROCEDURE — 84145 PROCALCITONIN (PCT): CPT | Performed by: EMERGENCY MEDICINE

## 2024-06-02 PROCEDURE — 80053 COMPREHEN METABOLIC PANEL: CPT | Performed by: NURSE PRACTITIONER

## 2024-06-02 PROCEDURE — 63600175 PHARM REV CODE 636 W HCPCS

## 2024-06-02 RX ORDER — HYDROMORPHONE HYDROCHLORIDE 1 MG/ML
1 INJECTION, SOLUTION INTRAMUSCULAR; INTRAVENOUS; SUBCUTANEOUS
Status: DISCONTINUED | OUTPATIENT
Start: 2024-06-03 | End: 2024-06-03

## 2024-06-02 RX ORDER — ACETAMINOPHEN 650 MG/1
650 SUPPOSITORY RECTAL EVERY 6 HOURS PRN
Status: DISCONTINUED | OUTPATIENT
Start: 2024-06-02 | End: 2024-06-05 | Stop reason: HOSPADM

## 2024-06-02 RX ORDER — AMLODIPINE BESYLATE 5 MG/1
10 TABLET ORAL DAILY
Status: CANCELLED | OUTPATIENT
Start: 2024-06-03

## 2024-06-02 RX ORDER — ENOXAPARIN SODIUM 100 MG/ML
40 INJECTION SUBCUTANEOUS EVERY 24 HOURS
Status: DISCONTINUED | OUTPATIENT
Start: 2024-06-03 | End: 2024-06-03

## 2024-06-02 RX ORDER — AMOXICILLIN 250 MG
1 CAPSULE ORAL 2 TIMES DAILY PRN
Status: DISCONTINUED | OUTPATIENT
Start: 2024-06-02 | End: 2024-06-05 | Stop reason: HOSPADM

## 2024-06-02 RX ORDER — ALUMINUM HYDROXIDE, MAGNESIUM HYDROXIDE, AND SIMETHICONE 1200; 120; 1200 MG/30ML; MG/30ML; MG/30ML
30 SUSPENSION ORAL 4 TIMES DAILY PRN
Status: DISCONTINUED | OUTPATIENT
Start: 2024-06-02 | End: 2024-06-05 | Stop reason: HOSPADM

## 2024-06-02 RX ORDER — IPRATROPIUM BROMIDE AND ALBUTEROL SULFATE 2.5; .5 MG/3ML; MG/3ML
3 SOLUTION RESPIRATORY (INHALATION) EVERY 6 HOURS PRN
Status: DISCONTINUED | OUTPATIENT
Start: 2024-06-02 | End: 2024-06-05 | Stop reason: HOSPADM

## 2024-06-02 RX ORDER — ONDANSETRON HYDROCHLORIDE 2 MG/ML
4 INJECTION, SOLUTION INTRAVENOUS EVERY 8 HOURS PRN
Status: DISCONTINUED | OUTPATIENT
Start: 2024-06-02 | End: 2024-06-05 | Stop reason: HOSPADM

## 2024-06-02 RX ORDER — METOPROLOL SUCCINATE 50 MG/1
50 TABLET, EXTENDED RELEASE ORAL DAILY
Status: CANCELLED | OUTPATIENT
Start: 2024-06-03

## 2024-06-02 RX ORDER — ACETAMINOPHEN 325 MG/1
650 TABLET ORAL EVERY 8 HOURS PRN
Status: DISCONTINUED | OUTPATIENT
Start: 2024-06-02 | End: 2024-06-05 | Stop reason: HOSPADM

## 2024-06-02 RX ORDER — PROMETHAZINE HYDROCHLORIDE 25 MG/1
25 TABLET ORAL EVERY 6 HOURS PRN
Status: DISCONTINUED | OUTPATIENT
Start: 2024-06-02 | End: 2024-06-05 | Stop reason: HOSPADM

## 2024-06-02 RX ORDER — HYDROMORPHONE HYDROCHLORIDE 1 MG/ML
1 INJECTION, SOLUTION INTRAMUSCULAR; INTRAVENOUS; SUBCUTANEOUS EVERY 4 HOURS PRN
Status: DISCONTINUED | OUTPATIENT
Start: 2024-06-02 | End: 2024-06-02

## 2024-06-02 RX ORDER — COLCHICINE 0.6 MG/1
0.6 TABLET, FILM COATED ORAL 2 TIMES DAILY
Status: CANCELLED | OUTPATIENT
Start: 2024-06-02

## 2024-06-02 RX ORDER — MORPHINE SULFATE 4 MG/ML
4 INJECTION, SOLUTION INTRAMUSCULAR; INTRAVENOUS
Status: COMPLETED | OUTPATIENT
Start: 2024-06-02 | End: 2024-06-02

## 2024-06-02 RX ORDER — TALC
6 POWDER (GRAM) TOPICAL NIGHTLY PRN
Status: DISCONTINUED | OUTPATIENT
Start: 2024-06-02 | End: 2024-06-05 | Stop reason: HOSPADM

## 2024-06-02 RX ORDER — IBUPROFEN 200 MG
24 TABLET ORAL
Status: DISCONTINUED | OUTPATIENT
Start: 2024-06-02 | End: 2024-06-05 | Stop reason: HOSPADM

## 2024-06-02 RX ORDER — NALOXONE HCL 0.4 MG/ML
0.02 VIAL (ML) INJECTION
Status: DISCONTINUED | OUTPATIENT
Start: 2024-06-02 | End: 2024-06-05 | Stop reason: HOSPADM

## 2024-06-02 RX ORDER — SODIUM CHLORIDE 0.9 % (FLUSH) 0.9 %
10 SYRINGE (ML) INJECTION EVERY 12 HOURS PRN
Status: DISCONTINUED | OUTPATIENT
Start: 2024-06-02 | End: 2024-06-05 | Stop reason: HOSPADM

## 2024-06-02 RX ORDER — GLUCAGON 1 MG
1 KIT INJECTION
Status: DISCONTINUED | OUTPATIENT
Start: 2024-06-02 | End: 2024-06-05 | Stop reason: HOSPADM

## 2024-06-02 RX ORDER — HYDROMORPHONE HYDROCHLORIDE 1 MG/ML
1 INJECTION, SOLUTION INTRAMUSCULAR; INTRAVENOUS; SUBCUTANEOUS
Status: COMPLETED | OUTPATIENT
Start: 2024-06-02 | End: 2024-06-02

## 2024-06-02 RX ORDER — ONDANSETRON HYDROCHLORIDE 2 MG/ML
4 INJECTION, SOLUTION INTRAVENOUS
Status: COMPLETED | OUTPATIENT
Start: 2024-06-02 | End: 2024-06-02

## 2024-06-02 RX ORDER — IBUPROFEN 200 MG
16 TABLET ORAL
Status: DISCONTINUED | OUTPATIENT
Start: 2024-06-02 | End: 2024-06-05 | Stop reason: HOSPADM

## 2024-06-02 RX ORDER — BUTALBITAL, ACETAMINOPHEN AND CAFFEINE 50; 325; 40 MG/1; MG/1; MG/1
1 TABLET ORAL EVERY 6 HOURS PRN
Status: CANCELLED | OUTPATIENT
Start: 2024-06-02

## 2024-06-02 RX ORDER — SODIUM CHLORIDE, SODIUM LACTATE, POTASSIUM CHLORIDE, CALCIUM CHLORIDE 600; 310; 30; 20 MG/100ML; MG/100ML; MG/100ML; MG/100ML
INJECTION, SOLUTION INTRAVENOUS CONTINUOUS
Status: DISCONTINUED | OUTPATIENT
Start: 2024-06-02 | End: 2024-06-03

## 2024-06-02 RX ORDER — LISINOPRIL 20 MG/1
40 TABLET ORAL DAILY
Status: CANCELLED | OUTPATIENT
Start: 2024-06-03

## 2024-06-02 RX ADMIN — HYDROMORPHONE HYDROCHLORIDE 1 MG: 1 INJECTION, SOLUTION INTRAMUSCULAR; INTRAVENOUS; SUBCUTANEOUS at 08:06

## 2024-06-02 RX ADMIN — MORPHINE SULFATE 4 MG: 4 INJECTION INTRAVENOUS at 06:06

## 2024-06-02 RX ADMIN — ONDANSETRON 4 MG: 2 INJECTION INTRAMUSCULAR; INTRAVENOUS at 06:06

## 2024-06-02 RX ADMIN — CEFEPIME 2 G: 2 INJECTION, POWDER, FOR SOLUTION INTRAVENOUS at 08:06

## 2024-06-02 NOTE — Clinical Note
The groin and left brachial was prepped. The site was prepped with ChloraPrep. The site was clipped. The patient was draped.

## 2024-06-02 NOTE — Clinical Note
Diagnosis: Pain of toe of right foot [395787]   Future Attending Provider: DEMARCUS LAY [[id number]]   Reason for IP Medical Treatment  (Clinical interventions that can only be accomplished in the IP setting? ) :: podiatry and vascular surgery work-up   I certify that Inpatient services for greater than or equal to 2 midnights are medically necessary:: Yes   Plans for Post-Acute care--if anticipated (pick the single best option):: A. No post acute care anticipated at this time

## 2024-06-02 NOTE — FIRST PROVIDER EVALUATION
Medical screening examination initiated.  I have conducted a focused provider triage encounter, findings are as follows:    Brief history of present illness:   patient currently on antibiotics.  Patient reports worsening right foot pain with discoloration.    Vitals:    06/02/24 1637   BP: 112/86   BP Location: Right arm   Patient Position: Sitting   Pulse: (!) 120   Resp: 18   Temp: 98.3 °F (36.8 °C)   TempSrc: Oral   SpO2: 100%   Weight: 92.6 kg (204 lb 3.2 oz)   Height: 5' (1.524 m)       Pertinent physical exam:    Tachycardic    Brief workup plan:   septic workup    Preliminary workup initiated; this workup will be continued and followed by the physician or advanced practice provider that is assigned to the patient when roomed.

## 2024-06-02 NOTE — ED PROVIDER NOTES
SCRIBE #1 NOTE: I, Me-Torres Ding, am scribing for, and in the presence of, Pete Lauren MD. I have scribed the entire note.       History     Chief Complaint   Patient presents with    Foot Pain     R great toe pain pt says she does not know why and that this has been a problem since April and wants to be sedated if anyone touches it     Review of patient's allergies indicates:   Allergen Reactions    Neurontin [gabapentin] Itching and Blisters    Percocet [oxycodone-acetaminophen] Itching and Blisters         History of Present Illness     HPI    6/2/2024, 5:43 PM  History obtained from the patient      History of Present Illness: Jeanna Helm is a 44 y.o. female patient with a PMHx of DM, HTN, and stroke who presents to the Emergency Department for evaluation of pain to all the toes of the R foot which onset gradually 2 months ago. Pt states that, initially, pain was only to the R great toe. However, today, pt feels pain to all her toes on the R foot, and she reports discoloration, swelling, and tenderness to the toes. Pt notes that the skin to her R toe has been yellowish-white in color for a few days now. Symptoms are constant and moderate in severity. Pain is exacerbated with touch and placing pressure. Associated symptoms include nausea. Patient denies all other sxs at this time. Pt has had multiple visits to the ED for the same complaint this past month. Pt is on multiple antibiotics and pain medications with no improvement to her condition. Pt has a visit with vascular surgery in the next 2-3 weeks. Pt reports h/o smoking. Pt is on Plavix. No further complaints or concerns at this time.       Arrival mode: Personal vehicle    PCP: Jarek Crespo MD        Past Medical History:  Past Medical History:   Diagnosis Date    Back pain     Diabetes mellitus     Hypertension     Left knee pain     Miscarriage     x2    Stroke        Past Surgical History:  Past Surgical History:   Procedure Laterality Date     ANTERIOR CRUCIATE LIGAMENT REPAIR Left     DILATION AND CURETTAGE OF UTERUS      x2    LUMBAR FUSION      TIBIA FRACTURE SURGERY Right          Family History:  Family History   Problem Relation Name Age of Onset    Hypertension Mother      Heart disease Mother      Fibromyalgia Mother      Diabetes Father         Social History:  Social History     Tobacco Use    Smoking status: Every Day     Current packs/day: 1.00     Average packs/day: 1 pack/day for 20.0 years (20.0 ttl pk-yrs)     Types: Cigarettes    Smokeless tobacco: Not on file   Substance and Sexual Activity    Alcohol use: No    Drug use: No    Sexual activity: Not Currently        Review of Systems     Review of Systems   Constitutional:  Negative for fever.   HENT:  Negative for sore throat.    Respiratory:  Negative for shortness of breath.    Cardiovascular:  Negative for chest pain.   Gastrointestinal:  Positive for nausea.   Genitourinary:  Negative for dysuria.   Musculoskeletal:  Negative for back pain.        (+) swelling, discoloration, and tenderness to toes of R foot   Skin:  Negative for rash.   Neurological:  Negative for weakness.   All other systems reviewed and are negative.       Physical Exam     Initial Vitals [06/02/24 1637]   BP Pulse Resp Temp SpO2   112/86 (!) 120 18 98.3 °F (36.8 °C) 100 %      MAP       --          Physical Exam  Nursing Notes and Vital Signs Reviewed.  Constitutional: Patient is in no acute distress. Well-developed and well-nourished.  Head: Atraumatic. Normocephalic.  Eyes: PERRL. EOM intact. Conjunctivae are not pale. No scleral icterus.  ENT: Mucous membranes are moist.   Neck: Supple. Full ROM. No lymphadenopathy.  Cardiovascular: Regular rate. Regular rhythm. No murmurs, rubs, or gallops. Distal pulses are 2+ and symmetric.  Pulmonary/Chest: No respiratory distress. Clear to auscultation bilaterally. No wheezing or rales.  Abdominal: Soft and non-distended.  There is no tenderness.  No rebound,  guarding, or rigidity.  Genitourinary: No CVA tenderness  Musculoskeletal:   R Foot: See images below. War, doppler DP and PT pulses identified            Skin: Warm and dry.  Neurological:  Alert, awake, and appropriate.  Normal speech.  No acute focal neurological deficits are appreciated.  Psychiatric: Normal affect. Good eye contact. Appropriate in content.     ED Course   Critical Care    Date/Time: 6/2/2024 7:15 PM    Performed by: Pete Lauren MD  Authorized by: Pete Lauren MD  Direct patient critical care time: 15 minutes  Additional history critical care time: 5 minutes  Ordering / reviewing critical care time: 5 minutes  Documentation critical care time: 5 minutes  Consulting other physicians critical care time: 5 minutes  Total critical care time (exclusive of procedural time) : 35 minutes  Critical care time was exclusive of separately billable procedures and treating other patients and teaching time.  Critical care was necessary to treat or prevent imminent or life-threatening deterioration of the following conditions: limb threatening process to R foot.  Spectrum IV antibiotics started.  Admitted to Medicine with specialist consultation.  Critical care was time spent personally by me on the following activities: blood draw for specimens, development of treatment plan with patient or surrogate, discussions with consultants, interpretation of cardiac output measurements, evaluation of patient's response to treatment, examination of patient, obtaining history from patient or surrogate, ordering and performing treatments and interventions, ordering and review of laboratory studies, ordering and review of radiographic studies, pulse oximetry, re-evaluation of patient's condition and review of old charts.        ED Vital Signs:  Vitals:    06/02/24 1637 06/02/24 1747 06/02/24 1807 06/02/24 1808   BP: 112/86 (!) 166/99 135/77    Pulse: (!) 120 94 99    Resp: 18 19 19 16   Temp: 98.3 °F (36.8 °C)       TempSrc: Oral      SpO2: 100% 100% 100%    Weight: 92.6 kg (204 lb 3.2 oz)      Height: 5' (1.524 m)       06/02/24 1837 06/02/24 1907 06/02/24 2002   BP: 104/61 110/65    Pulse: 90 91    Resp: 20 (!) 22 20   Temp:      TempSrc:      SpO2: 100% 100%    Weight:      Height:          Abnormal Lab Results:  Labs Reviewed   CBC W/ AUTO DIFFERENTIAL - Abnormal; Notable for the following components:       Result Value    WBC 14.39 (*)     RBC 5.68 (*)     MCV 77 (*)     MCH 24.5 (*)     MCHC 31.8 (*)     RDW 14.8 (*)     MPV 8.8 (*)     Immature Granulocytes 1.2 (*)     Gran # (ANC) 10.3 (*)     Immature Grans (Abs) 0.17 (*)     Mono # 1.2 (*)     Lymph % 16.5 (*)     All other components within normal limits   COMPREHENSIVE METABOLIC PANEL - Abnormal; Notable for the following components:    Sodium 131 (*)     CO2 22 (*)     Glucose 152 (*)     All other components within normal limits   SEDIMENTATION RATE - Abnormal; Notable for the following components:    Sed Rate 25 (*)     All other components within normal limits   C-REACTIVE PROTEIN - Abnormal; Notable for the following components:    CRP 55.8 (*)     All other components within normal limits   CULTURE, BLOOD   CULTURE, BLOOD   LACTIC ACID, PLASMA   PROCALCITONIN   LACTIC ACID, PLASMA        All Lab Results:  Results for orders placed or performed during the hospital encounter of 06/02/24   CBC auto differential   Result Value Ref Range    WBC 14.39 (H) 3.90 - 12.70 K/uL    RBC 5.68 (H) 4.00 - 5.40 M/uL    Hemoglobin 13.9 12.0 - 16.0 g/dL    Hematocrit 43.7 37.0 - 48.5 %    MCV 77 (L) 82 - 98 fL    MCH 24.5 (L) 27.0 - 31.0 pg    MCHC 31.8 (L) 32.0 - 36.0 g/dL    RDW 14.8 (H) 11.5 - 14.5 %    Platelets 328 150 - 450 K/uL    MPV 8.8 (L) 9.2 - 12.9 fL    Immature Granulocytes 1.2 (H) 0.0 - 0.5 %    Gran # (ANC) 10.3 (H) 1.8 - 7.7 K/uL    Immature Grans (Abs) 0.17 (H) 0.00 - 0.04 K/uL    Lymph # 2.4 1.0 - 4.8 K/uL    Mono # 1.2 (H) 0.3 - 1.0 K/uL    Eos # 0.4 0.0 -  0.5 K/uL    Baso # 0.05 0.00 - 0.20 K/uL    nRBC 0 0 /100 WBC    Gran % 71.6 38.0 - 73.0 %    Lymph % 16.5 (L) 18.0 - 48.0 %    Mono % 8.0 4.0 - 15.0 %    Eosinophil % 2.4 0.0 - 8.0 %    Basophil % 0.3 0.0 - 1.9 %    Differential Method Automated    Comprehensive metabolic panel   Result Value Ref Range    Sodium 131 (L) 136 - 145 mmol/L    Potassium 4.9 3.5 - 5.1 mmol/L    Chloride 98 95 - 110 mmol/L    CO2 22 (L) 23 - 29 mmol/L    Glucose 152 (H) 70 - 110 mg/dL    BUN 10 6 - 20 mg/dL    Creatinine 0.8 0.5 - 1.4 mg/dL    Calcium 9.7 8.7 - 10.5 mg/dL    Total Protein 7.9 6.0 - 8.4 g/dL    Albumin 3.5 3.5 - 5.2 g/dL    Total Bilirubin 0.6 0.1 - 1.0 mg/dL    Alkaline Phosphatase 77 55 - 135 U/L    AST 14 10 - 40 U/L    ALT 23 10 - 44 U/L    eGFR >60 >60 mL/min/1.73 m^2    Anion Gap 11 8 - 16 mmol/L   Lactic acid, plasma #1   Result Value Ref Range    Lactate (Lactic Acid) 1.8 0.5 - 2.2 mmol/L   Sedimentation rate   Result Value Ref Range    Sed Rate 25 (H) 0 - 20 mm/Hr   C-reactive protein   Result Value Ref Range    CRP 55.8 (H) 0.0 - 8.2 mg/L   Procalcitonin   Result Value Ref Range    Procalcitonin 0.12 <0.25 ng/mL         Imaging Results:  Imaging Results    None               The Emergency Provider reviewed the vital signs and test results, which are outlined above.     ED Discussion     7:00 PM: Discussed patient's case with Dr. Durbin (Vascular Surgery) who will come see the patient at bedside and recommends inpatient admission.    8:11 PM: Discussed case with Esvin Rubio MD (Hospital Medicine). Dr. Fabián Rubio MD agrees with current care and management of pt and accepts admission.   Admitting Service: Hospital Medicine  Admitting Physician: Dr. Alombro  Admit to: Inpatient Med Surg    8:12 PM: Re-evaluated pt. I have discussed test results, shared treatment plan, and the need for admission with patient and family at bedside. Pt and family express understanding at this time and agree with all  information. All questions answered. Pt and family have no further questions or concerns at this time. Pt is ready for admit.        ED Course as of 06/02/24 2013   Sun Jun 02, 2024   1721 Chart reviewed.  Patient has been seen in the emergency department multiple times for same complaint.  Ultrasound bilateral lower extremities and CTA chest abdomen pelvis from approximately 1 week ago reviewed [DP]      ED Course User Index  [DP] Pete Lauren MD     Medical Decision Making  44-year-old female presenting with pain swelling, discoloration to all 5 toes of her right foot.  This has been ongoing for some time now.  Initially diagnosed as gout, when it only involve the great toe.  Patient has been seen multiple times for this and treated with multiple different treatments.  Symptoms only continued to worsen.  Her outpatient follow-ups with specialists are not until another few weeks.  I am concerned patient needs more immediate evaluation by specialists.  She has had vascular imaging during her ED visit, which I did review.  She is a smoker and Buerger's disease is on my differential diagnosis.  Infection also on my differential diagnosis.  Pain medication given.  Antibiotics given.  MRI ordered to evaluate for osteomyelitis or abscess.  Vascular surgery consulted.  Vascular surgery evaluated patient at bedside and will continue to follow.  Patient admitted to Hospital Medicine. May need podiatry consult during her admission. Counseled on smoking cessation    Amount and/or Complexity of Data Reviewed  External Data Reviewed: labs, radiology and notes.     Details: Prior ED visits reviewed.  Multiple prior workups including imaging reviewed  Labs: ordered. Decision-making details documented in ED Course.  Radiology: ordered. Decision-making details documented in ED Course.    Risk  Prescription drug management.  Parenteral controlled substances.  Decision regarding hospitalization.                ED  Medication(s):  Medications   vancomycin - pharmacy to dose (has no administration in time range)   ceFEPIme (MAXIPIME) 2 g in dextrose 5 % in water (D5W) 100 mL IVPB (MB+) (2 g Intravenous New Bag 6/2/24 2009)   vancomycin (VANCOCIN) 2,250 mg in dextrose 5 % (D5W) 500 mL IVPB (has no administration in time range)   ondansetron injection 4 mg (4 mg Intravenous Given 6/2/24 1808)   morphine injection 4 mg (4 mg Intravenous Given 6/2/24 1808)   HYDROmorphone injection 1 mg (1 mg Intravenous Given 6/2/24 2002)       New Prescriptions    No medications on file               Scribe Attestation:   Scribe #1: I performed the above scribed service and the documentation accurately describes the services I performed. I attest to the accuracy of the note.     Attending:   Physician Attestation Statement for Scribe #1: I, Pete Lauren MD, personally performed the services described in this documentation, as scribed by Keeley Ding, in my presence, and it is both accurate and complete.           Clinical Impression       ICD-10-CM ICD-9-CM   1. Pain of toe of right foot  M79.674 729.5   2. Tobacco use  Z72.0 305.1       Disposition:   Disposition: Admitted  Condition: Pete Washington MD  06/23/24 0811

## 2024-06-02 NOTE — Clinical Note
An angiography of the  abdominal aorta was performed with the catheter and power injected with 20 mL contrast at at 15 mL/s.

## 2024-06-02 NOTE — Clinical Note
Manual pressure was applied to the left brachial artery and left brachial artery sheath insertion site. Using thrombix

## 2024-06-03 PROBLEM — E66.01 SEVERE OBESITY (BMI >= 40): Chronic | Status: ACTIVE | Noted: 2024-06-03

## 2024-06-03 PROBLEM — Z86.73 H/O: CVA (CEREBROVASCULAR ACCIDENT): Chronic | Status: ACTIVE | Noted: 2024-06-03

## 2024-06-03 PROBLEM — Z86.73 H/O: CVA (CEREBROVASCULAR ACCIDENT): Status: ACTIVE | Noted: 2024-06-03

## 2024-06-03 PROBLEM — I10 HYPERTENSION: Status: ACTIVE | Noted: 2024-06-03

## 2024-06-03 PROBLEM — I10 HYPERTENSION: Chronic | Status: ACTIVE | Noted: 2024-06-03

## 2024-06-03 PROBLEM — Z72.0 TOBACCO ABUSE: Status: ACTIVE | Noted: 2024-06-03

## 2024-06-03 PROBLEM — L81.9 DISCOLORATION OF SKIN OF MULTIPLE SITES OF LOWER EXTREMITY: Status: ACTIVE | Noted: 2024-06-03

## 2024-06-03 PROBLEM — E11.9 TYPE 2 DIABETES MELLITUS: Status: ACTIVE | Noted: 2024-06-03

## 2024-06-03 PROBLEM — M79.671 RIGHT FOOT PAIN: Status: ACTIVE | Noted: 2024-06-03

## 2024-06-03 PROBLEM — E78.5 HLD (HYPERLIPIDEMIA): Chronic | Status: ACTIVE | Noted: 2024-06-03

## 2024-06-03 PROBLEM — Z72.0 TOBACCO ABUSE: Chronic | Status: ACTIVE | Noted: 2024-06-03

## 2024-06-03 PROBLEM — E78.5 HLD (HYPERLIPIDEMIA): Status: ACTIVE | Noted: 2024-06-03

## 2024-06-03 PROBLEM — E11.9 TYPE 2 DIABETES MELLITUS: Chronic | Status: ACTIVE | Noted: 2024-06-03

## 2024-06-03 PROBLEM — E66.01 SEVERE OBESITY (BMI >= 40): Status: ACTIVE | Noted: 2024-06-03

## 2024-06-03 LAB
ALBUMIN SERPL BCP-MCNC: 3.2 G/DL (ref 3.5–5.2)
ALP SERPL-CCNC: 70 U/L (ref 55–135)
ALT SERPL W/O P-5'-P-CCNC: 22 U/L (ref 10–44)
ANION GAP SERPL CALC-SCNC: 11 MMOL/L (ref 8–16)
AST SERPL-CCNC: 14 U/L (ref 10–40)
BASOPHILS # BLD AUTO: 0.06 K/UL (ref 0–0.2)
BASOPHILS NFR BLD: 0.5 % (ref 0–1.9)
BILIRUB SERPL-MCNC: 0.7 MG/DL (ref 0.1–1)
BUN SERPL-MCNC: 11 MG/DL (ref 6–20)
CALCIUM SERPL-MCNC: 8.9 MG/DL (ref 8.7–10.5)
CHLORIDE SERPL-SCNC: 99 MMOL/L (ref 95–110)
CO2 SERPL-SCNC: 20 MMOL/L (ref 23–29)
CREAT SERPL-MCNC: 0.8 MG/DL (ref 0.5–1.4)
DIFFERENTIAL METHOD BLD: ABNORMAL
EOSINOPHIL # BLD AUTO: 0.4 K/UL (ref 0–0.5)
EOSINOPHIL NFR BLD: 3.3 % (ref 0–8)
ERYTHROCYTE [DISTWIDTH] IN BLOOD BY AUTOMATED COUNT: 14.6 % (ref 11.5–14.5)
EST. GFR  (NO RACE VARIABLE): >60 ML/MIN/1.73 M^2
GLUCOSE SERPL-MCNC: 161 MG/DL (ref 70–110)
HCT VFR BLD AUTO: 41.4 % (ref 37–48.5)
HGB BLD-MCNC: 13 G/DL (ref 12–16)
IMM GRANULOCYTES # BLD AUTO: 0.15 K/UL (ref 0–0.04)
IMM GRANULOCYTES NFR BLD AUTO: 1.2 % (ref 0–0.5)
LYMPHOCYTES # BLD AUTO: 2 K/UL (ref 1–4.8)
LYMPHOCYTES NFR BLD: 15.9 % (ref 18–48)
MCH RBC QN AUTO: 24.7 PG (ref 27–31)
MCHC RBC AUTO-ENTMCNC: 31.4 G/DL (ref 32–36)
MCV RBC AUTO: 79 FL (ref 82–98)
MONOCYTES # BLD AUTO: 1 K/UL (ref 0.3–1)
MONOCYTES NFR BLD: 7.9 % (ref 4–15)
NEUTROPHILS # BLD AUTO: 8.9 K/UL (ref 1.8–7.7)
NEUTROPHILS NFR BLD: 71.2 % (ref 38–73)
NRBC BLD-RTO: 0 /100 WBC
PLATELET # BLD AUTO: 311 K/UL (ref 150–450)
PMV BLD AUTO: 9 FL (ref 9.2–12.9)
POCT GLUCOSE: 165 MG/DL (ref 70–110)
POTASSIUM SERPL-SCNC: 4.3 MMOL/L (ref 3.5–5.1)
PROT SERPL-MCNC: 6.7 G/DL (ref 6–8.4)
RBC # BLD AUTO: 5.26 M/UL (ref 4–5.4)
SODIUM SERPL-SCNC: 130 MMOL/L (ref 136–145)
URATE SERPL-MCNC: 7.4 MG/DL (ref 2.4–5.7)
WBC # BLD AUTO: 12.43 K/UL (ref 3.9–12.7)

## 2024-06-03 PROCEDURE — 36415 COLL VENOUS BLD VENIPUNCTURE: CPT | Performed by: HOSPITALIST

## 2024-06-03 PROCEDURE — 36200 PLACE CATHETER IN AORTA: CPT | Mod: ,,, | Performed by: STUDENT IN AN ORGANIZED HEALTH CARE EDUCATION/TRAINING PROGRAM

## 2024-06-03 PROCEDURE — 99153 MOD SED SAME PHYS/QHP EA: CPT | Performed by: STUDENT IN AN ORGANIZED HEALTH CARE EDUCATION/TRAINING PROGRAM

## 2024-06-03 PROCEDURE — 25000003 PHARM REV CODE 250: Performed by: HOSPITALIST

## 2024-06-03 PROCEDURE — 75625 CONTRAST EXAM ABDOMINL AORTA: CPT | Mod: 26,,, | Performed by: STUDENT IN AN ORGANIZED HEALTH CARE EDUCATION/TRAINING PROGRAM

## 2024-06-03 PROCEDURE — S4991 NICOTINE PATCH NONLEGEND: HCPCS | Performed by: HOSPITALIST

## 2024-06-03 PROCEDURE — 36200 PLACE CATHETER IN AORTA: CPT | Performed by: STUDENT IN AN ORGANIZED HEALTH CARE EDUCATION/TRAINING PROGRAM

## 2024-06-03 PROCEDURE — 11000001 HC ACUTE MED/SURG PRIVATE ROOM

## 2024-06-03 PROCEDURE — 25000003 PHARM REV CODE 250: Performed by: STUDENT IN AN ORGANIZED HEALTH CARE EDUCATION/TRAINING PROGRAM

## 2024-06-03 PROCEDURE — 99152 MOD SED SAME PHYS/QHP 5/>YRS: CPT | Performed by: STUDENT IN AN ORGANIZED HEALTH CARE EDUCATION/TRAINING PROGRAM

## 2024-06-03 PROCEDURE — 63600175 PHARM REV CODE 636 W HCPCS: Performed by: STUDENT IN AN ORGANIZED HEALTH CARE EDUCATION/TRAINING PROGRAM

## 2024-06-03 PROCEDURE — 84550 ASSAY OF BLOOD/URIC ACID: CPT | Performed by: HOSPITALIST

## 2024-06-03 PROCEDURE — 75625 CONTRAST EXAM ABDOMINL AORTA: CPT | Performed by: STUDENT IN AN ORGANIZED HEALTH CARE EDUCATION/TRAINING PROGRAM

## 2024-06-03 PROCEDURE — C1894 INTRO/SHEATH, NON-LASER: HCPCS | Performed by: STUDENT IN AN ORGANIZED HEALTH CARE EDUCATION/TRAINING PROGRAM

## 2024-06-03 PROCEDURE — 25500020 PHARM REV CODE 255: Performed by: STUDENT IN AN ORGANIZED HEALTH CARE EDUCATION/TRAINING PROGRAM

## 2024-06-03 PROCEDURE — 63600175 PHARM REV CODE 636 W HCPCS: Performed by: HOSPITALIST

## 2024-06-03 PROCEDURE — 99152 MOD SED SAME PHYS/QHP 5/>YRS: CPT | Mod: ,,, | Performed by: STUDENT IN AN ORGANIZED HEALTH CARE EDUCATION/TRAINING PROGRAM

## 2024-06-03 PROCEDURE — 75710 ARTERY X-RAYS ARM/LEG: CPT | Mod: 26,,, | Performed by: STUDENT IN AN ORGANIZED HEALTH CARE EDUCATION/TRAINING PROGRAM

## 2024-06-03 PROCEDURE — 76937 US GUIDE VASCULAR ACCESS: CPT | Performed by: STUDENT IN AN ORGANIZED HEALTH CARE EDUCATION/TRAINING PROGRAM

## 2024-06-03 PROCEDURE — C1887 CATHETER, GUIDING: HCPCS | Performed by: STUDENT IN AN ORGANIZED HEALTH CARE EDUCATION/TRAINING PROGRAM

## 2024-06-03 PROCEDURE — C1769 GUIDE WIRE: HCPCS | Performed by: STUDENT IN AN ORGANIZED HEALTH CARE EDUCATION/TRAINING PROGRAM

## 2024-06-03 PROCEDURE — 76937 US GUIDE VASCULAR ACCESS: CPT | Mod: 26,,, | Performed by: STUDENT IN AN ORGANIZED HEALTH CARE EDUCATION/TRAINING PROGRAM

## 2024-06-03 PROCEDURE — 75710 ARTERY X-RAYS ARM/LEG: CPT | Performed by: STUDENT IN AN ORGANIZED HEALTH CARE EDUCATION/TRAINING PROGRAM

## 2024-06-03 PROCEDURE — B41DYZZ FLUOROSCOPY OF AORTA AND BILATERAL LOWER EXTREMITY ARTERIES USING OTHER CONTRAST: ICD-10-PCS | Performed by: STUDENT IN AN ORGANIZED HEALTH CARE EDUCATION/TRAINING PROGRAM

## 2024-06-03 PROCEDURE — 25000003 PHARM REV CODE 250

## 2024-06-03 PROCEDURE — 85025 COMPLETE CBC W/AUTO DIFF WBC: CPT | Performed by: HOSPITALIST

## 2024-06-03 PROCEDURE — 80053 COMPREHEN METABOLIC PANEL: CPT | Performed by: HOSPITALIST

## 2024-06-03 RX ORDER — VERAPAMIL HYDROCHLORIDE 2.5 MG/ML
INJECTION, SOLUTION INTRAVENOUS
Status: DISCONTINUED | OUTPATIENT
Start: 2024-06-03 | End: 2024-06-03

## 2024-06-03 RX ORDER — NITROGLYCERIN 5 MG/ML
INJECTION, SOLUTION INTRAVENOUS
Status: DISCONTINUED | OUTPATIENT
Start: 2024-06-03 | End: 2024-06-03 | Stop reason: HOSPADM

## 2024-06-03 RX ORDER — MIDAZOLAM HYDROCHLORIDE 1 MG/ML
INJECTION, SOLUTION INTRAMUSCULAR; INTRAVENOUS
Status: DISCONTINUED | OUTPATIENT
Start: 2024-06-03 | End: 2024-06-03 | Stop reason: HOSPADM

## 2024-06-03 RX ORDER — ONDANSETRON HYDROCHLORIDE 2 MG/ML
INJECTION, SOLUTION INTRAVENOUS
Status: DISCONTINUED | OUTPATIENT
Start: 2024-06-03 | End: 2024-06-03 | Stop reason: HOSPADM

## 2024-06-03 RX ORDER — HYDROCODONE BITARTRATE AND ACETAMINOPHEN 7.5; 325 MG/1; MG/1
1 TABLET ORAL EVERY 6 HOURS PRN
Status: DISCONTINUED | OUTPATIENT
Start: 2024-06-03 | End: 2024-06-05 | Stop reason: HOSPADM

## 2024-06-03 RX ORDER — ALLOPURINOL 100 MG/1
100 TABLET ORAL DAILY
Status: DISCONTINUED | OUTPATIENT
Start: 2024-06-03 | End: 2024-06-05 | Stop reason: HOSPADM

## 2024-06-03 RX ORDER — IBUPROFEN 200 MG
1 TABLET ORAL DAILY
Status: DISCONTINUED | OUTPATIENT
Start: 2024-06-03 | End: 2024-06-05 | Stop reason: HOSPADM

## 2024-06-03 RX ORDER — LISINOPRIL 20 MG/1
40 TABLET ORAL DAILY
Status: DISCONTINUED | OUTPATIENT
Start: 2024-06-03 | End: 2024-06-05 | Stop reason: HOSPADM

## 2024-06-03 RX ORDER — HYDROMORPHONE HYDROCHLORIDE 2 MG/ML
1 INJECTION, SOLUTION INTRAMUSCULAR; INTRAVENOUS; SUBCUTANEOUS
Status: DISCONTINUED | OUTPATIENT
Start: 2024-06-03 | End: 2024-06-05 | Stop reason: HOSPADM

## 2024-06-03 RX ORDER — IODIXANOL 320 MG/ML
INJECTION, SOLUTION INTRAVASCULAR
Status: DISCONTINUED | OUTPATIENT
Start: 2024-06-03 | End: 2024-06-03 | Stop reason: HOSPADM

## 2024-06-03 RX ORDER — FENTANYL CITRATE 50 UG/ML
INJECTION, SOLUTION INTRAMUSCULAR; INTRAVENOUS
Status: DISCONTINUED | OUTPATIENT
Start: 2024-06-03 | End: 2024-06-03 | Stop reason: HOSPADM

## 2024-06-03 RX ORDER — HYDROMORPHONE HYDROCHLORIDE 2 MG/ML
INJECTION, SOLUTION INTRAMUSCULAR; INTRAVENOUS; SUBCUTANEOUS
Status: DISCONTINUED | OUTPATIENT
Start: 2024-06-03 | End: 2024-06-03 | Stop reason: HOSPADM

## 2024-06-03 RX ORDER — OXYCODONE HYDROCHLORIDE 5 MG/1
5 TABLET ORAL EVERY 4 HOURS PRN
Status: DISCONTINUED | OUTPATIENT
Start: 2024-06-03 | End: 2024-06-05 | Stop reason: HOSPADM

## 2024-06-03 RX ORDER — LIDOCAINE HYDROCHLORIDE 20 MG/ML
INJECTION, SOLUTION EPIDURAL; INFILTRATION; INTRACAUDAL; PERINEURAL
Status: DISCONTINUED | OUTPATIENT
Start: 2024-06-03 | End: 2024-06-03 | Stop reason: HOSPADM

## 2024-06-03 RX ORDER — DIPHENHYDRAMINE HYDROCHLORIDE 50 MG/ML
INJECTION INTRAMUSCULAR; INTRAVENOUS
Status: DISCONTINUED | OUTPATIENT
Start: 2024-06-03 | End: 2024-06-03 | Stop reason: HOSPADM

## 2024-06-03 RX ORDER — LORAZEPAM 2 MG/ML
1 INJECTION INTRAMUSCULAR ONCE
Status: COMPLETED | OUTPATIENT
Start: 2024-06-03 | End: 2024-06-04

## 2024-06-03 RX ORDER — TRAMADOL HYDROCHLORIDE 50 MG/1
50 TABLET ORAL EVERY 6 HOURS PRN
Status: ON HOLD | COMMUNITY
Start: 2024-05-30 | End: 2024-06-03

## 2024-06-03 RX ORDER — PROBENECID 500 MG/1
500 TABLET, FILM COATED ORAL 2 TIMES DAILY
Status: DISCONTINUED | OUTPATIENT
Start: 2024-06-03 | End: 2024-06-05 | Stop reason: HOSPADM

## 2024-06-03 RX ORDER — NAPROXEN SODIUM 220 MG/1
81 TABLET, FILM COATED ORAL DAILY
Status: DISCONTINUED | OUTPATIENT
Start: 2024-06-04 | End: 2024-06-05 | Stop reason: HOSPADM

## 2024-06-03 RX ADMIN — RIVAROXABAN 2.5 MG: 2.5 TABLET, FILM COATED ORAL at 06:06

## 2024-06-03 RX ADMIN — PROBENECID 500 MG: 500 TABLET, FILM COATED ORAL at 08:06

## 2024-06-03 RX ADMIN — ONDANSETRON 4 MG: 2 INJECTION INTRAMUSCULAR; INTRAVENOUS at 12:06

## 2024-06-03 RX ADMIN — HYDROCODONE BITARTRATE AND ACETAMINOPHEN 1 TABLET: 7.5; 325 TABLET ORAL at 04:06

## 2024-06-03 RX ADMIN — HYDROCODONE BITARTRATE AND ACETAMINOPHEN 1 TABLET: 7.5; 325 TABLET ORAL at 05:06

## 2024-06-03 RX ADMIN — OXYCODONE HYDROCHLORIDE 5 MG: 5 TABLET ORAL at 09:06

## 2024-06-03 RX ADMIN — ALLOPURINOL 100 MG: 100 TABLET ORAL at 08:06

## 2024-06-03 RX ADMIN — HYDROMORPHONE HYDROCHLORIDE 1 MG: 2 INJECTION, SOLUTION INTRAMUSCULAR; INTRAVENOUS; SUBCUTANEOUS at 08:06

## 2024-06-03 RX ADMIN — PROBENECID 500 MG: 500 TABLET, FILM COATED ORAL at 07:06

## 2024-06-03 RX ADMIN — LISINOPRIL 40 MG: 20 TABLET ORAL at 08:06

## 2024-06-03 RX ADMIN — HYDROMORPHONE HYDROCHLORIDE 1 MG: 2 INJECTION, SOLUTION INTRAMUSCULAR; INTRAVENOUS; SUBCUTANEOUS at 12:06

## 2024-06-03 RX ADMIN — SODIUM CHLORIDE, POTASSIUM CHLORIDE, SODIUM LACTATE AND CALCIUM CHLORIDE: 600; 310; 30; 20 INJECTION, SOLUTION INTRAVENOUS at 12:06

## 2024-06-03 RX ADMIN — NICOTINE 1 PATCH: 21 PATCH, EXTENDED RELEASE TRANSDERMAL at 01:06

## 2024-06-03 RX ADMIN — HYDROMORPHONE HYDROCHLORIDE 1 MG: 2 INJECTION, SOLUTION INTRAMUSCULAR; INTRAVENOUS; SUBCUTANEOUS at 05:06

## 2024-06-03 NOTE — ASSESSMENT & PLAN NOTE
"Patient's FSGs are uncontrolled due to hyperglycemia on current medication regimen.  Last A1c reviewed- No results found for: "LABA1C", "HGBA1C"  Most recent fingerstick glucose reviewed-   Recent Labs   Lab 06/03/24  0531   POCTGLUCOSE 165*     Current correctional scale  Low  Titrate as needed  anti-hyperglycemic dose as follows-   Antihyperglycemics (From admission, onward)    None      Plan:  -SSI  -Accu-checks  -Hold oral hypoglycemics while patient is in the hospital  -Hypoglycemic protocol     "

## 2024-06-03 NOTE — PROGRESS NOTES
Pharmacokinetic Initial Assessment: IV Vancomycin    Assessment/Plan:    Initiate intravenous vancomycin with loading dose of 2250 mg once followed by a maintenance dose of vancomycin 1500 mg IV every 12 hours  Desired empiric serum trough concentration is 15 to 20 mcg/mL  Draw vancomycin trough level 60 min prior to fourth dose on 6/4/24 at approximately 1000.  Pharmacy will continue to follow and monitor vancomycin.      Please contact pharmacy at extension [phone] with any questions regarding this assessment.     Thank you for the consult,   Mg Howe       Patient brief summary:  Jeanna Helm is a 44 y.o. female initiated on antimicrobial therapy with IV Vancomycin for treatment of suspected bacteremia    Drug Allergies:   Review of patient's allergies indicates:   Allergen Reactions    Neurontin [gabapentin] Itching and Blisters    Percocet [oxycodone-acetaminophen] Itching and Blisters       Actual Body Weight:   92.6 kg    Renal Function:   Estimated Creatinine Clearance: 92 mL/min (based on SCr of 0.8 mg/dL).,     Dialysis Method (if applicable):  N/A    CBC (last 72 hours):  Recent Labs   Lab Result Units 06/02/24  1809   WBC K/uL 14.39*   Hemoglobin g/dL 13.9   Hematocrit % 43.7   Platelets K/uL 328   Gran % % 71.6   Lymph % % 16.5*   Mono % % 8.0   Eosinophil % % 2.4   Basophil % % 0.3   Differential Method  Automated       Metabolic Panel (last 72 hours):  Recent Labs   Lab Result Units 06/02/24  1809   Sodium mmol/L 131*   Potassium mmol/L 4.9   Chloride mmol/L 98   CO2 mmol/L 22*   Glucose mg/dL 152*   BUN mg/dL 10   Creatinine mg/dL 0.8   Albumin g/dL 3.5   Total Bilirubin mg/dL 0.6   Alkaline Phosphatase U/L 77   AST U/L 14   ALT U/L 23       Microbiologic Results:  Microbiology Results (last 7 days)       Procedure Component Value Units Date/Time    Blood culture x two cultures. Draw prior to antibiotics. [[id number]] Collected: 06/02/24 1810    Order Status: Sent Specimen: Blood from  Peripheral, Antecubital, Right     Blood culture x two cultures. Draw prior to antibiotics. [[id number]] Collected: 06/02/24 1810    Order Status: Sent Specimen: Blood from Peripheral, Hand, Right

## 2024-06-03 NOTE — ASSESSMENT & PLAN NOTE
Chronic, controlled. Latest blood pressure and vitals reviewed-     Temp:  [97.6 °F (36.4 °C)-98.3 °F (36.8 °C)]   Pulse:  []   Resp:  [10-26]   BP: (104-166)/(61-99)   SpO2:  [97 %-100 %] .   Home meds for hypertension were reviewed and noted below.   Hypertension Medications               lisinopriL (PRINIVIL,ZESTRIL) 40 MG tablet Take 1 tablet (40 mg total) by mouth once daily.     While in the hospital, will manage blood pressure as follows; Continue home antihypertensive regimen    Will utilize p.r.n. blood pressure medication only if patient's blood pressure greater than 160/100 and she develops symptoms such as worsening chest pain or shortness of breath.

## 2024-06-03 NOTE — ASSESSMENT & PLAN NOTE
Patient presented with progressively worsening right foot pain and discoloration of all digits x2 months duration. Patient s/p multiple ED visits and was found to have up trending uric acid levels each time increasing from 6.8 > 7.0 > 8.2 with no improvement in her symptoms following treatment with allopurinol and colchicine. Patient s/p CTA chest/abdomen/pelvis last week which showed relatively normal vascular down to the proximal SFA in addition to arterial duplex which showed mental phasic flow throughout her right leg.  Initial workup in the ED revealed patient is afebrile with leukocytosis of 14.39, sed rate 25, CRP 55.8, lactic acid/procalcitonin within normal limits.  Repeat uric acid pending.  Vascular surgery consulted by ED staff and recommended obtaining MRI, continued treatment of gout, podiatry/orthopedic consultation for further evaluation, with potential angiogram of lower extremity this admission.  Given persistently elevated uric acid level and tophus appearing findings, patient likely having unresponsive gout flare but can not definitively rule out underlying ischemia given prior stroke history as well as possible vasculitic disease such as Buerger's given in setting of significant smoking history.   Plan:  -NPO  -Continue current pain regimen, titrate as needed  -Bowel regimen  -Bedrest  -Wound care   -None weightbearing  -IVFs prn  -Antiemetics prn  -Tylenol as needed for fever   -f/u uric acid level  -patient initiated on allopurinol and probenecid  -f/u MRI   -Podiatry consulted  -f/u vascular surgery   -patient educated on importance of smoking cessation

## 2024-06-03 NOTE — PLAN OF CARE
Formerly [facility] Pool Room/NONE.  Jeanna Helm is a 44 y.o.female admitted on 6/2/2024 for Right foot pain   Code Status: Full Code MRN: [id number]   Review of patient's allergies indicates:   Allergen Reactions    Neurontin [gabapentin] Itching and Blisters    Codeine Rash     Past Medical History:   Diagnosis Date    Back pain     Diabetes mellitus     Hypertension     Left knee pain     Miscarriage     x2    Stroke       PRN meds    acetaminophen, 650 mg, Q6H PRN  acetaminophen, 650 mg, Q8H PRN  albuterol-ipratropium, 3 mL, Q6H PRN  aluminum-magnesium hydroxide-simethicone, 30 mL, QID PRN  dextrose 10%, 12.5 g, PRN  dextrose 10%, 25 g, PRN  diphenhydrAMINE, , PRN  fentaNYL, , PRN  glucagon (human recombinant), 1 mg, PRN  glucose, 16 g, PRN  glucose, 24 g, PRN  HYDROcodone-acetaminophen, 1 tablet, Q6H PRN  HYDROmorphone, 1 mg, Q3H PRN  HYDROmorphone, , PRN  iodixanoL, , PRN  LIDOcaine (PF) 20 mg/mL (2%), , PRN  melatonin, 6 mg, Nightly PRN  midazolam, , PRN  naloxone, 0.02 mg, PRN  nitroGLYCERIN, , PRN  ondansetron, 4 mg, Q8H PRN  ondansetron, , PRN  oxyCODONE, 5 mg, Q4H PRN  promethazine, 25 mg, Q6H PRN  senna-docusate 8.6-50 mg, 1 tablet, BID PRN  sodium chloride 0.9%, 10 mL, Q12H PRN      Chart check completed. Will continue plan of care.         [place] Coma Scale Score: 15     Lead Monitored: Lead II Rhythm: normal sinus rhythm    Cardiac/Telemetry Box Number: [id number]    Last Bowel Movement: 06/02/24  Diet NPO Except for: Sips with Medication     Luis Score: 21  Fall Risk Score: 6  Accucheck []   Freq?      Lines/Drains/Airways       Peripheral Intravenous Line  Duration                  Peripheral IV - Single Lumen 06/02/24 1807 20 G Right Antecubital <1 day                       Problem: Adult Inpatient Plan of Care  Goal: Plan of Care Review  Outcome: Progressing  Goal: Patient-Specific Goal (Individualized)  Outcome: Progressing  Goal: Absence of Hospital-Acquired Illness or Injury  Outcome: Progressing  Goal:  Optimal Comfort and Wellbeing  Outcome: Progressing  Goal: Readiness for Transition of Care  Outcome: Progressing     Problem: Fall Injury Risk  Goal: Absence of Fall and Fall-Related Injury  Outcome: Progressing     Problem: Pain Acute  Goal: Optimal Pain Control and Function  Outcome: Progressing     Problem: Skin Injury Risk Increased  Goal: Skin Health and Integrity  Outcome: Progressing     Problem: Bariatric Environmental Safety  Goal: Safety Maintained with Care  Outcome: Progressing     Problem: Diabetes Comorbidity  Goal: Blood Glucose Level Within Targeted Range  Outcome: Progressing

## 2024-06-03 NOTE — DISCHARGE INSTRUCTIONS
"    1. DIET: It is advisable for you to follow a diet that limits the intake of salt, sugar, saturated fats and cholesterol.     2. DRIVING: Due to sedation you received during your procedure, DO NOT drive or operate machinery for 24 hours. Avoid making critical decisions or signing legal documents until tomorrow.    3. ACTIVITY: AVOID activities that require bending of the affected arm/wrist for 3 days and submerging the site in water for 3 days. REMOVE the dressing the day after  the procedure, and shower.  Apply a bandaid to site after shower.  WEAR wrist immobilizer until tomorrow night.    You may RESUME your normal activities or prescribed exercise program as instructed by your physician after 3 days.                                                                                                                 4. WOUND CARE: It is not unusual to have a small amount of bruising to appear at or near the puncture site. It is also common to have a tender "knot" develop beneath the skin at the puncture site of the wrist/arm. This is usually scar tissue and is not a cause for concern or alarm. This tender knot may take several weeks to fully resolve. The bruise will usually spread over several days. However, if the lump gets bigger, call your doctor immediately.    5. DISCOMFORT: For general discomfort at the puncture site, you may take 1 or 2 Acetaminophen (Tylenol) tablets every 4 - 6 hours as needed. (Do not take more than 4000 mg a day)    6. SIGNS AND SYMPTOMS TO REPORT:  Call your physician immediately if any of the following occur:                                            1. Loss of feeling, warmth or color to the affected arm/wrist                                                                                                          2. Mild beeding from the site                 3. Pain that is sudden, sharp or persistent in the affected arm/wrist                 4. Swelling or a change in "lump" size, " increased redness or drainage at the puncture site                                                                               5. High fever (101 degrees or higher)    7. GO TO  THE EMERGENCY ROOM OR CALL 911 IF YOU HAVE: Chest pains or discomforts not relieved with 3 nitroglycerin doses (sublingual tablets or spray), numbness or severe pain of limb, if your limb becomes cold or discolored or if you develop uncontrolled bleeding from the puncture site (quickly apply firm, direct pressure above the site).      Follow up with Vascular on 6/19/24  Schedule an appointment to establish care with PCP   Referral placed to smoking cessation

## 2024-06-03 NOTE — H&P
Formerly [facility] - Emergency Dept.  [facility] Medicine  History & Physical    Patient Name: Jaenna Helm  MRN: [id number]  Patient Class: IP- Inpatient  Admission Date: 6/2/2024  Attending Physician: Fabián Rubio MD   Primary Care Provider: Jarek Crespo MD         Patient information was obtained from patient, past medical records, and ER records.     Subjective:     Principal Problem:Right foot pain    Chief Complaint:   Chief Complaint   Patient presents with    Foot Pain     R great toe pain pt says she does not know why and that this has been a problem since April and wants to be sedated if anyone touches it        HPI: Jeanna Helm is a 44 y.o. female with a PMH  has a past medical history of Back pain, Diabetes mellitus, Hypertension, Left knee pain, Miscarriage, and Stroke. who presented to the ED for further evaluation of worsening pain and discoloration involving the toes in her right foot.  Patient reported experiencing symptoms since April and was progressively worsened initially beginning in her right great toe and involving all digits and is beginning to extend into her foot.  She reports endorsing excruciating pain throughout all her toes and her foot described as pressure with intermittent bouts of sharp/stabbing/burning sensation upon weight-bearing and with applying pressure.  She reports complete numbness throughout all her toes which is beginning to extend into her foot.  She denied endorsing any trauma, prior history of gout, known peripheral vascular/arterial disease, or personal/family history of vasculitic disease but did report prior CVA and smokes approximately 1 pack cigarettes daily for 20+ years.  Patient also reported noticing yellow/white bumps throughout her toes which are painful in addition to worsening swelling and discoloration of all her digits.  Patient reported no known alleviating factors and aggravated with weight-bearing, movement, palpation, and presence of cold air.   Other associated symptoms included nausea and her foot feeling cold to the touch but reported all other review of systems negative except as noted above.  Patient reported being seen in the ED multiple times for similar complaints and was found to have up trending uric acid levels each time increasing from 6.8 > 7.0 > 8.2 with no improvement in her symptoms following treatment with allopurinol and colchicine.  Patient reported Norco takes the edge off but never completely resolves.  She reported having an appointment with vascular surgery towards the end of the month but can not wait due to pain.  Patient does report being on Plavix but denies use of any other blood thinners/anticoagulants.  Patient also underwent CTA chest/abdomen/pelvis/week which showed relatively normal vascular down to the proximal SFA in addition to arterial duplex which showed mental phasic flow throughout her right leg.  Initial workup in the ED revealed patient is afebrile with leukocytosis measuring 14.39, sed rate 25, CRP 55.8, lactic acid/procalcitonin within normal limits.  Repeat uric acid pending.  Vascular surgery consulted by ED staff and recommended obtaining MRI, continued treatment of gout, podiatry/orthopedic consultation for further evaluation, with potential angiogram of lower extremity this admission.  Patient admitted to Hospital Medicine inpatient for continued medical management.      PCP: Jarek Crespo    Past Medical History:   Diagnosis Date    Back pain     Diabetes mellitus     Hypertension     Left knee pain     Miscarriage     x2    Stroke        Past Surgical History:   Procedure Laterality Date    ANTERIOR CRUCIATE LIGAMENT REPAIR Left     DILATION AND CURETTAGE OF UTERUS      x2    LUMBAR FUSION      TIBIA FRACTURE SURGERY Right        Review of patient's allergies indicates:   Allergen Reactions    Neurontin [gabapentin] Itching and Blisters    Percocet [oxycodone-acetaminophen] Itching and Blisters       No  current facility-administered medications on file prior to encounter.     Current Outpatient Medications on File Prior to Encounter   Medication Sig    acetaminophen (TYLENOL) 325 MG tablet Take 325 mg by mouth every 6 (six) hours as needed for Pain.    allopurinoL (ZYLOPRIM) 100 MG tablet Take 1 tablet (100 mg total) by mouth once daily.    amLODIPine (NORVASC) 10 MG tablet Take 1 tablet (10 mg total) by mouth once daily.    butalbital-acetaminophen-caffeine -40 mg (FIORICET, ESGIC) -40 mg per tablet Take 1 tablet by mouth every 6 (six) hours as needed for Pain.    cephALEXin (KEFLEX) 500 MG capsule Take 1 capsule (500 mg total) by mouth 4 (four) times daily. for 7 days    clopidogreL (PLAVIX) 75 mg tablet Take 1 tablet (75 mg total) by mouth once daily.    colchicine (COLCRYS) 0.6 mg tablet Take 2 tablets for your 1st dose.  You may then take 1 tablet every 2 hours x3.  Do not use more than 3 days.    colchicine (COLCRYS) 0.6 mg tablet Take 1 tablet (0.6 mg total) by mouth once daily. Take 2 tablets for your 1st dose.  You may then take 1 tablet every 2 hours x3.  Do not use more than 3 days.    diclofenac (VOLTAREN) 50 MG EC tablet Take 1 tablet (50 mg total) by mouth 2 (two) times daily as needed (pain).    diclofenac (VOLTAREN) 50 MG EC tablet Take 1 tablet (50 mg total) by mouth 2 (two) times daily.    doxycycline (VIBRAMYCIN) 100 MG Cap Take 1 capsule (100 mg total) by mouth 2 (two) times daily. for 7 days    glyBURIDE (DIABETA) 5 MG tablet Take 1 tablet (5 mg total) by mouth daily with breakfast.    HYDROcodone-acetaminophen (NORCO)  mg per tablet Take 1 tablet by mouth every 6 (six) hours as needed for Pain.    ibuprofen (ADVIL,MOTRIN) 800 MG tablet Take 1 tablet (800 mg total) by mouth every 6 (six) hours as needed for Pain.    lisinopriL (PRINIVIL,ZESTRIL) 40 MG tablet Take 1 tablet (40 mg total) by mouth once daily.    meclizine (ANTIVERT) 25 mg tablet Take 1 tablet (25 mg total) by  mouth 3 (three) times daily as needed.    meclizine (ANTIVERT) 25 mg tablet Take 1 tablet (25 mg total) by mouth 3 (three) times daily as needed.    medroxyPROGESTERone (PROVERA) 10 MG tablet Take 1 tablet (10 mg total) by mouth once daily. for 5 days    methylPREDNISolone (MEDROL DOSEPACK) 4 mg tablet Take as directed on the package.    metoclopramide HCl (REGLAN) 10 MG tablet Take 1 tablet (10 mg total) by mouth every 6 (six) hours as needed (headache or nausea).    metoprolol succinate (TOPROL-XL) 50 MG 24 hr tablet Take 50 mg by mouth.    naproxen (NAPROSYN) 500 MG tablet Take 1 tablet (500 mg total) by mouth 2 (two) times daily with meals.    predniSONE (DELTASONE) 20 MG tablet Take 60 mg by mouth.     Family History       Problem Relation (Age of Onset)    Diabetes Father    Fibromyalgia Mother    Heart disease Mother    Hypertension Mother          Tobacco Use    Smoking status: Every Day     Current packs/day: 1.00     Average packs/day: 1 pack/day for 20.0 years (20.0 ttl pk-yrs)     Types: Cigarettes    Smokeless tobacco: Not on file   Substance and Sexual Activity    Alcohol use: No    Drug use: No    Sexual activity: Not Currently     Review of Systems   All other systems reviewed and are negative.    Objective:     Vital Signs (Most Recent):  Temp: 98.3 °F (36.8 °C) (06/02/24 1637)  Pulse: 91 (06/02/24 1907)  Resp: 20 (06/02/24 2002)  BP: 110/65 (06/02/24 1907)  SpO2: 100 % (06/02/24 1907) Vital Signs (24h Range):  Temp:  [98.3 °F (36.8 °C)] 98.3 °F (36.8 °C)  Pulse:  [] 91  Resp:  [16-22] 20  SpO2:  [100 %] 100 %  BP: (104-166)/(61-99) 110/65     Weight: 92.6 kg (204 lb 3.2 oz)  Body mass index is 39.88 kg/m².     Physical Exam  Vitals reviewed.   Constitutional:       General: She is in acute distress.      Appearance: Normal appearance. She is obese. She is not ill-appearing, toxic-appearing or diaphoretic.   HENT:      Head: Normocephalic and atraumatic.      Right Ear: External ear normal.       Left Ear: External ear normal.      Nose: Nose normal. No congestion or rhinorrhea.      Mouth/Throat:      Mouth: Mucous membranes are moist.      Pharynx: Oropharynx is clear. No oropharyngeal exudate or posterior oropharyngeal erythema.   Eyes:      General: No scleral icterus.     Extraocular Movements: Extraocular movements intact.      Conjunctiva/sclera: Conjunctivae normal.      Pupils: Pupils are equal, round, and reactive to light.   Neck:      Vascular: No carotid bruit.   Cardiovascular:      Rate and Rhythm: Normal rate and regular rhythm.      Pulses: Normal pulses.      Heart sounds: Normal heart sounds. No murmur heard.     No friction rub. No gallop.   Pulmonary:      Effort: Pulmonary effort is normal. No respiratory distress.      Breath sounds: Normal breath sounds. No stridor. No wheezing, rhonchi or rales.   Chest:      Chest wall: No tenderness.   Abdominal:      General: Abdomen is flat. Bowel sounds are normal. There is no distension.      Palpations: Abdomen is soft.      Tenderness: There is no abdominal tenderness. There is no right CVA tenderness, left CVA tenderness, guarding or rebound.      Hernia: No hernia is present.   Musculoskeletal:         General: Tenderness present. No swelling, deformity or signs of injury. Normal range of motion.      Cervical back: Normal range of motion and neck supple. No rigidity or tenderness.      Right lower leg: No edema.      Left lower leg: No edema.      Comments: Decreased range of motion upon flexion/extension of all toes throughout right foot with associated TTP throughout extending up to her foot.  Tophus appearing findings noted on toes   Lymphadenopathy:      Cervical: No cervical adenopathy.   Skin:     General: Skin is warm and dry.      Capillary Refill: Capillary refill takes less than 2 seconds.      Coloration: Skin is not jaundiced or pale.      Findings: No bruising, erythema, lesion or rash.      Comments: All toes on right foot  dusky in nature and cool to the touch however, capillary refill appears intact   Neurological:      General: No focal deficit present.      Mental Status: She is alert and oriented to person, place, and time.      Cranial Nerves: No cranial nerve deficit.      Sensory: Sensory deficit present.      Motor: No weakness.      Coordination: Coordination normal.   Psychiatric:         Mood and Affect: Mood normal.         Behavior: Behavior normal.         Thought Content: Thought content normal.         Judgment: Judgment normal.              CRANIAL NERVES     CN III, IV, VI   Pupils are equal, round, and reactive to light.       Significant Labs: All pertinent labs within the past 24 hours have been reviewed.    Significant Imaging: I have reviewed all pertinent imaging results/findings within the past 24 hours.    LABS:  Recent Results (from the past 24 hour(s))   CBC auto differential    Collection Time: 06/02/24  6:09 PM   Result Value Ref Range    WBC 14.39 (H) 3.90 - 12.70 K/uL    RBC 5.68 (H) 4.00 - 5.40 M/uL    Hemoglobin 13.9 12.0 - 16.0 g/dL    Hematocrit 43.7 37.0 - 48.5 %    MCV 77 (L) 82 - 98 fL    MCH 24.5 (L) 27.0 - 31.0 pg    MCHC 31.8 (L) 32.0 - 36.0 g/dL    RDW 14.8 (H) 11.5 - 14.5 %    Platelets 328 150 - 450 K/uL    MPV 8.8 (L) 9.2 - 12.9 fL    Immature Granulocytes 1.2 (H) 0.0 - 0.5 %    Gran # (ANC) 10.3 (H) 1.8 - 7.7 K/uL    Immature Grans (Abs) 0.17 (H) 0.00 - 0.04 K/uL    Lymph # 2.4 1.0 - 4.8 K/uL    Mono # 1.2 (H) 0.3 - 1.0 K/uL    Eos # 0.4 0.0 - 0.5 K/uL    Baso # 0.05 0.00 - 0.20 K/uL    nRBC 0 0 /100 WBC    Gran % 71.6 38.0 - 73.0 %    Lymph % 16.5 (L) 18.0 - 48.0 %    Mono % 8.0 4.0 - 15.0 %    Eosinophil % 2.4 0.0 - 8.0 %    Basophil % 0.3 0.0 - 1.9 %    Differential Method Automated    Comprehensive metabolic panel    Collection Time: 06/02/24  6:09 PM   Result Value Ref Range    Sodium 131 (L) 136 - 145 mmol/L    Potassium 4.9 3.5 - 5.1 mmol/L    Chloride 98 95 - 110 mmol/L    CO2 22  (L) 23 - 29 mmol/L    Glucose 152 (H) 70 - 110 mg/dL    BUN 10 6 - 20 mg/dL    Creatinine 0.8 0.5 - 1.4 mg/dL    Calcium 9.7 8.7 - 10.5 mg/dL    Total Protein 7.9 6.0 - 8.4 g/dL    Albumin 3.5 3.5 - 5.2 g/dL    Total Bilirubin 0.6 0.1 - 1.0 mg/dL    Alkaline Phosphatase 77 55 - 135 U/L    AST 14 10 - 40 U/L    ALT 23 10 - 44 U/L    eGFR >60 >60 mL/min/1.73 m^2    Anion Gap 11 8 - 16 mmol/L   Lactic acid, plasma #1    Collection Time: 06/02/24  6:09 PM   Result Value Ref Range    Lactate (Lactic Acid) 1.8 0.5 - 2.2 mmol/L   Sedimentation rate    Collection Time: 06/02/24  6:10 PM   Result Value Ref Range    Sed Rate 25 (H) 0 - 20 mm/Hr   C-reactive protein    Collection Time: 06/02/24  6:10 PM   Result Value Ref Range    CRP 55.8 (H) 0.0 - 8.2 mg/L   Procalcitonin    Collection Time: 06/02/24  6:10 PM   Result Value Ref Range    Procalcitonin 0.12 <0.25 ng/mL       RADIOLOGY  CTA Chest Abdomen Pelvis    Addendum Date: 5/28/2024    Incidentally small left adrenal adenoma. Electronically signed by: Jhon Chavez Date:    05/28/2024 Time:    00:56    Result Date: 5/28/2024  EXAMINATION: CTA CHEST ABDOMEN PELVIS CLINICAL HISTORY: Aortic atherosclerosis; TECHNIQUE: Low dose axial images, sagittal and coronal reformations were obtained from the thoracic inlet to the pubic symphysis following the IV administration of 100 mL of Omnipaque 350.  Oral contrast was not administered. COMPARISON: None. FINDINGS: Base of Neck: No significant abnormality. Thoracic soft tissues: Unremarkable. Aorta: Left-sided aortic arch.  No aneurysm and no significant atherosclerosis.  No aneurysm or dissection. Heart: Normal size. No effusion. Pulmonary vasculature: Enlarged main pulmonary artery suggestive of pulmonary arterial hypertension. Poonam/Mediastinum: No pathologic patito enlargement. Airways: Patent. Lungs/Pleura: Clear lungs. No pleural effusion or thickening. Esophagus: Unremarkable. Liver: Normal size and attenuation. No focal  lesions. Gallbladder: No calcified gallstones. Bile Ducts: No dilatation. Pancreas: No mass. No peripancreatic fat stranding. Spleen: Normal. Adrenals: Normal. Kidneys/Ureters: Normal enhancement.  No mass or  hydroureteronephrosis. Bladder: No wall thickening. Reproductive organs: Normal. GI Tract/Mesentery: No evidence of bowel obstruction or inflammation. Peritoneal Space: No ascites or free air. Retroperitoneum: No significant adenopathy. Abdominal wall: Normal. Vasculature: No aneurysm.  No large vessel occlusion. Bones: No acute fracture. No suspicious lytic or sclerotic lesions.     No acute findings. Electronically signed by: Jhon Chavez Date:    05/28/2024 Time:    00:49    X-Ray Foot Complete Right    Result Date: 5/27/2024  EXAMINATION: XR FOOT COMPLETE 3 VIEW RIGHT CLINICAL HISTORY: Pain in right foot TECHNIQUE: Three-view exam COMPARISON: 05/19/2024     No acute or adverse bony finding. Electronically signed by: Gisella Vences Date:    05/27/2024 Time:    22:22    US Lower Extrem Arteries Bilat with DANY (xpd)    Result Date: 5/27/2024  EXAMINATION: US ARTERIAL LOWER EXTREMITY BILAT WITH DANY (XPD) CLINICAL HISTORY: black toes; FINDINGS: The lower extremities were evaluated with continuous wave Doppler to obtain systolic segmental pressure recordings at the brachial and ankle segments. Right DANY 0.92 Left DANY 0.98 Again there is monophasic flow throughout both lower extremities consistent with more proximal nonvisualized aortoiliac hemodynamically significant disease.  Negative for arterial occlusion     Redemonstrated monophasic flow throughout both lower extremities consistent with more proximal nonvisualized aortoiliac hemodynamically significant disease DANY Values >1.3: Non-compressible or calcified vessels 0.97 - 1.29: No significant PAD 0.75 - 0.96: Mild PAD 0.50 - 0.74: Moderate PAD <0.50: Severe PAD <0.30: Critical PAD Electronically signed by: Gisella Vences Date:    05/27/2024  Time:    21:54    US Lower Extremity Arteries Right    Result Date: 5/19/2024  EXAMINATION: Ultrasound lower extremity arteries right CLINICAL HISTORY: Pain in right leg COMPARISON: No comparison studies are available. FINDINGS: There is monophasic flow seen in all vessels imaged, which suggest aorta iliac disease.  Negative for vascular occlusions. The flow velocities are as follows: Right common femoral artery = 67 cm/sec Right profundofemoral artery = 30 cm/sec Right proximal superficial femoral artery = 66 cm/sec Right mid superficial femoral artery = 60 cm/sec Right distal superficial femoral artery = 71 cm/sec Right popliteal artery = 44 cm/sec Right posterior tibial artery = 55 cm/sec Right anterior tibial artery = 36 cm/sec     1.  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 Electronically signed by: Elgin Ta MD Date:    05/19/2024 Time:    11:42    X-Ray Foot Complete Right    Result Date: 5/19/2024  EXAMINATION: XR FOOT COMPLETE 3 VIEW RIGHT CLINICAL HISTORY: . Pain in unspecified foot TECHNIQUE: AP, lateral, and oblique views of the right foot were performed. COMPARISON: April 28, 2024 FINDINGS: Extensive hardware involving the tibia and fibula again seen.  The osseous structures are intact.  No definite acute or healing fractures noted.  Negative for soft tissue abnormalities.  Large plantar and Achilles calcaneal spurs noted.     As above Electronically signed by: Elgin Ta MD Date:    05/19/2024 Time:    10:14    X-Ray Foot Complete Left    Result Date: 5/14/2024  EXAMINATION: XR FOOT COMPLETE 3 VIEW LEFT CLINICAL HISTORY: Pain in left foot COMPARISON: 05/10/2024     FINDINGS/ Three-view exam is labeled right foot.  Correlate for laterality.  No acute fracture or dislocation seen.  No destructive bony findings.  Plantar spur and Achilles enthesophyte.  Distal fibular and tibial hardware present. Electronically signed by: Gisella Vences Date:    05/14/2024  Time:    17:00    X-Ray Toe 2 or More Views Right    Result Date: 5/10/2024  EXAMINATION: XR TOE 2 OR MORE VIEWS RIGHT CLINICAL HISTORY: XR TOE 2 OR MORE VIEWS RIGHT COMPARISON: None FINDINGS: Multiple radiographic views  were obtained. No evidence of acute fracture or dislocation.  Bony mineralization is normal.  Soft tissues are unremarkable. Question minimal degenerative joint disease at the 1st metacarpal phalangeal joint.     No acute abnormality. Electronically signed by: Claudy Tay Date:    05/10/2024 Time:    20:49      EKG    MICROBIOLOGY    MDM    Assessment/Plan:     * Right foot pain    Discoloration of skin of multiple sites of lower extremity  Patient presented with progressively worsening right foot pain and discoloration of all digits x2 months duration. Patient s/p multiple ED visits and was found to have up trending uric acid levels each time increasing from 6.8 > 7.0 > 8.2 with no improvement in her symptoms following treatment with allopurinol and colchicine. Patient s/p CTA chest/abdomen/pelvis last week which showed relatively normal vascular down to the proximal SFA in addition to arterial duplex which showed mental phasic flow throughout her right leg.  Initial workup in the ED revealed patient is afebrile with leukocytosis of 14.39, sed rate 25, CRP 55.8, lactic acid/procalcitonin within normal limits.  Repeat uric acid pending.  Vascular surgery consulted by ED staff and recommended obtaining MRI, continued treatment of gout, podiatry/orthopedic consultation for further evaluation, with potential angiogram of lower extremity this admission.  Given persistently elevated uric acid level and tophus appearing findings, patient likely having unresponsive gout flare but can not definitively rule out underlying ischemia given prior stroke history as well as possible vasculitic disease such as Buerger's given in setting of significant smoking history.   Plan:  -NPO  -Continue current pain  "regimen, titrate as needed  -Bowel regimen  -Bedrest  -Wound care   -None weightbearing  -IVFs prn  -Antiemetics prn  -Tylenol as needed for fever   -f/u uric acid level  -patient initiated on allopurinol and probenecid  -f/u MRI   -Podiatry consulted  -f/u vascular surgery   -patient educated on importance of smoking cessation      Hypertension  Chronic, controlled. Latest blood pressure and vitals reviewed-     Temp:  [97.7 °F (36.5 °C)-98.3 °F (36.8 °C)]   Pulse:  []   Resp:  [10-26]   BP: (104-166)/(61-99)   SpO2:  [97 %-100 %] .   Home meds for hypertension were reviewed and noted below.   Hypertension Medications               lisinopriL (PRINIVIL,ZESTRIL) 40 MG tablet Take 1 tablet (40 mg total) by mouth once daily.     While in the hospital, will manage blood pressure as follows; Continue home antihypertensive regimen    Will utilize p.r.n. blood pressure medication only if patient's blood pressure greater than 160/100 and she develops symptoms such as worsening chest pain or shortness of breath.      Type 2 diabetes mellitus  Patient's FSGs are uncontrolled due to hyperglycemia on current medication regimen.  Last A1c reviewed- No results found for: "LABA1C", "HGBA1C"  Most recent fingerstick glucose reviewed- No results for input(s): "POCTGLUCOSE" in the last 24 hours.  Current correctional scale  Low  Titrate as needed  anti-hyperglycemic dose as follows-   Antihyperglycemics (From admission, onward)     Plan:  -SSI  -Accu-checks  -Hold oral hypoglycemics while patient is in the hospital  -Hypoglycemic protocol       HLD (hyperlipidemia)  Patient is chronically on statin.will continue for now. Last Lipid Panel: No results found for: "CHOL", "HDL", "LDLCALC", "TRIG", "CHOLHDL"  Plan:  -Continue home medication  -low fat/low calorie diet      H/O: CVA (cerebrovascular accident)  Patient at neurological baseline without residual deficits.  Plan:  -continue home medications  -holding antiplatelet " medications pending potential surgical intervention      Severe obesity (BMI >= 40)  Body mass index is 40.17 kg/m². Morbid obesity complicates all aspects of disease management from diagnostic modalities to treatment. Weight loss encouraged and health benefits explained to patient.       Tobacco abuse  Patient reports smoking approximately 1 pack of cigarettes daily with no thoughts of cutting back currently. Patient educated on morbidity and mortality in regards to continuation of smoking and stressed importance of cessation. Patient currently requesting nicotine patch at time of admission.  Plan:  -Nicotine patch available at patient's request          VTE Risk Mitigation (From admission, onward)           Ordered     enoxaparin injection 40 mg  Daily         06/02/24 2049     IP VTE HIGH RISK PATIENT  Once         06/02/24 2049     Place sequential compression device  Until discontinued         06/02/24 2049                  //Core Measures   -DVT proph: SCDs, withholding anticoagulation pending surgical intervention   -Code status: Full    -Surrogate: none provided       Components of this note were documented using a voice recognition system and are subject to errors not corrected at the time the document was proof read. Please contact the author for any clarifications.       Pharmacokinetic Initial Assessment: IV Vancomycin    Assessment/Plan:    Initiate intravenous vancomycin with loading dose of 2250 mg once followed by a maintenance dose of vancomycin 1500 mg IV every 12 hours  Desired empiric serum trough concentration is 15 to 20 mcg/mL  Draw vancomycin trough level 60 min prior to fourth dose on 6/4/24 at approximately 1000.  Pharmacy will continue to follow and monitor vancomycin.      Please contact pharmacy at extension [phone] with any questions regarding this assessment.     Thank you for the consult,   Mg Howe       Patient brief summary:  Jeanna Helm is a 44 y.o. female initiated on  antimicrobial therapy with IV Vancomycin for treatment of suspected bacteremia    Drug Allergies:   Review of patient's allergies indicates:   Allergen Reactions    Neurontin [gabapentin] Itching and Blisters    Percocet [oxycodone-acetaminophen] Itching and Blisters       Actual Body Weight:   92.6 kg    Renal Function:   Estimated Creatinine Clearance: 92 mL/min (based on SCr of 0.8 mg/dL).,     Dialysis Method (if applicable):  N/A    CBC (last 72 hours):  Recent Labs   Lab Result Units 06/02/24  1809   WBC K/uL 14.39*   Hemoglobin g/dL 13.9   Hematocrit % 43.7   Platelets K/uL 328   Gran % % 71.6   Lymph % % 16.5*   Mono % % 8.0   Eosinophil % % 2.4   Basophil % % 0.3   Differential Method  Automated       Metabolic Panel (last 72 hours):  Recent Labs   Lab Result Units 06/02/24  1809   Sodium mmol/L 131*   Potassium mmol/L 4.9   Chloride mmol/L 98   CO2 mmol/L 22*   Glucose mg/dL 152*   BUN mg/dL 10   Creatinine mg/dL 0.8   Albumin g/dL 3.5   Total Bilirubin mg/dL 0.6   Alkaline Phosphatase U/L 77   AST U/L 14   ALT U/L 23       Microbiologic Results:  Microbiology Results (last 7 days)       Procedure Component Value Units Date/Time    Blood culture x two cultures. Draw prior to antibiotics. [[id number]] Collected: 06/02/24 1810    Order Status: Sent Specimen: Blood from Peripheral, Antecubital, Right     Blood culture x two cultures. Draw prior to antibiotics. [[id number]] Collected: 06/02/24 1810    Order Status: Sent Specimen: Blood from Peripheral, Hand, Right             Fabián Rubio MD  Department of Hospital Medicine  [facility] - Emergency Dept.

## 2024-06-03 NOTE — CONSULTS
Par[facility] Durbin    OFFICE NOTE    DATE OF VISIT: 2024  PATIENT NAME: Jeanna Helm  : 1980  MRN: [id number]  PRIMARY CARE PHYSICIAN: Jarek Crespo MD  CARDIOLOGIST:   REFERRING PROVIDER: Self, Aaareferral    CHIEF COMPLAINT   Chief Complaint   Patient presents with    Foot Pain     R great toe pain pt says she does not know why and that this has been a problem since April and wants to be sedated if anyone touches it       HISTORY OF PRESENT ILLNESS:  Jeanna Helm is a 44 y.o. female who presents to clinic today she has been having severe intractable pain right distal toes for about 1 month has been in the ED 10 times in the last few weeks. Her uric acid has been elevated so she has been managed as potential gout with colchicine and allopurinol. She underwent cta chest abd pelvis last week that shows relatively normal vasculature down to the proximal sfa, and she's had arterial duplex that shows monophasic flow in the right leg diffusely. The pain is unbearable and she has not had any relief from the medical therapy.  Denies fevers/chills.    ALLERGIES:  Review of patient's allergies indicates:   Allergen Reactions    Neurontin [gabapentin] Itching and Blisters    Percocet [oxycodone-acetaminophen] Itching and Blisters       PAST MEDICAL HISTORY:  Past Medical History:   Diagnosis Date    Back pain     Diabetes mellitus     Hypertension     Left knee pain     Miscarriage     x2    Stroke        PAST SURGICAL HISTORY:  Past Surgical History:   Procedure Laterality Date    ANTERIOR CRUCIATE LIGAMENT REPAIR Left     DILATION AND CURETTAGE OF UTERUS      x2    LUMBAR FUSION      TIBIA FRACTURE SURGERY Right        SOCIAL HISTORY:   Social History     Tobacco Use    Smoking status: Every Day     Current packs/day: 1.00     Average packs/day: 1 pack/day for 20.0 years (20.0 ttl pk-yrs)     Types: Cigarettes   Substance Use Topics    Alcohol use: No    Drug use: No       FAMILY HISTORY:  Family History    Problem Relation Name Age of Onset    Hypertension Mother      Heart disease Mother      Fibromyalgia Mother      Diabetes Father         REVIEW OF SYSTEMS:  ROS10 pt ros otherwise negative      PHYSICAL EXAM:  Vitals:    06/02/24 1807 06/02/24 1808 06/02/24 1837 06/02/24 1907   BP: 135/77  104/61 110/65   Pulse: 99  90 91   Resp: 19 16 20 (!) 22   Temp:       TempSrc:       SpO2: 100%  100% 100%   Weight:       Height:          Physical Exam      She has strong easily dopplerable signals in the dp, pt, peroneal distribution even in the distal foot and mtp joints she has easily identified pedal signals and the foot is warm and appears well perfused to the toes. The toes appear dusky/discolored purple, there is whitish substance underneath the skin of the great toe plantar surface  Abd soft obese nontender nondistended  Cv rrr  Lung ctab  Neuro no gross focal deficits          VASCULAR LAB STUDIES:  Roula last week are reassuring 0.92 right and 0.98 on left    ASSESSMENT AND PLAN:  No problem-specific Assessment & Plan notes found for this encounter.      43 yo F with right foot discoloration of toes and persistent pain. She has uric acid elevation consistent with gouty flare    I believe she has failed outpatient management would recommend admission to medicine start her on indomethacin, continue allopurinol and colchicine  Consult podiatry or orthopedics in AM for in hospital evaluation.  Blood cultures are pending  There is a remote possibility this is embolic, if podiatry team does not feel this is gouty flare we could consider diagnostic angiography although clinically I feel this would be low yield, will follow her while she's here    LUIS Durbin      No follow-ups on file.        [facility] Durbin  [facility]  Vascular Surgery  [phone] (Clinic Number)

## 2024-06-03 NOTE — H&P (VIEW-ONLY)
[facility]    OFFICE NOTE    DATE OF VISIT: 2024  PATIENT NAME: Jeanna Helm  : 1980  MRN: [id number]  PRIMARY CARE PHYSICIAN: Jarek Crespo MD  CARDIOLOGIST:   REFERRING PROVIDER: Self, Aaareferral    CHIEF COMPLAINT   Chief Complaint   Patient presents with    Foot Pain     R great toe pain pt says she does not know why and that this has been a problem since April and wants to be sedated if anyone touches it       HISTORY OF PRESENT ILLNESS:  Jeanna Helm is a 44 y.o. female who presents to clinic today she has been having severe intractable pain right distal toes for about 1 month has been in the ED 10 times in the last few weeks. Her uric acid has been elevated so she has been managed as potential gout with colchicine and allopurinol. She underwent cta chest abd pelvis last week that shows relatively normal vasculature down to the proximal sfa, and she's had arterial duplex that shows monophasic flow in the right leg diffusely. The pain is unbearable and she has not had any relief from the medical therapy.  Denies fevers/chills.    ALLERGIES:  Review of patient's allergies indicates:   Allergen Reactions    Neurontin [gabapentin] Itching and Blisters    Percocet [oxycodone-acetaminophen] Itching and Blisters       PAST MEDICAL HISTORY:  Past Medical History:   Diagnosis Date    Back pain     Diabetes mellitus     Hypertension     Left knee pain     Miscarriage     x2    Stroke        PAST SURGICAL HISTORY:  Past Surgical History:   Procedure Laterality Date    ANTERIOR CRUCIATE LIGAMENT REPAIR Left     DILATION AND CURETTAGE OF UTERUS      x2    LUMBAR FUSION      TIBIA FRACTURE SURGERY Right        SOCIAL HISTORY:   Social History     Tobacco Use    Smoking status: Every Day     Current packs/day: 1.00     Average packs/day: 1 pack/day for 20.0 years (20.0 ttl pk-yrs)     Types: Cigarettes   Substance Use Topics    Alcohol use: No    Drug use: No       FAMILY HISTORY:  Family History    Problem Relation Name Age of Onset    Hypertension Mother      Heart disease Mother      Fibromyalgia Mother      Diabetes Father         REVIEW OF SYSTEMS:  ROS10 pt ros otherwise negative      PHYSICAL EXAM:  Vitals:    06/02/24 1807 06/02/24 1808 06/02/24 1837 06/02/24 1907   BP: 135/77  104/61 110/65   Pulse: 99  90 91   Resp: 19 16 20 (!) 22   Temp:       TempSrc:       SpO2: 100%  100% 100%   Weight:       Height:          Physical Exam      She has strong easily dopplerable signals in the dp, pt, peroneal distribution even in the distal foot and mtp joints she has easily identified pedal signals and the foot is warm and appears well perfused to the toes. The toes appear dusky/discolored purple, there is whitish substance underneath the skin of the great toe plantar surface  Abd soft obese nontender nondistended  Cv rrr  Lung ctab  Neuro no gross focal deficits          VASCULAR LAB STUDIES:  Roula last week are reassuring 0.92 right and 0.98 on left    ASSESSMENT AND PLAN:  No problem-specific Assessment & Plan notes found for this encounter.      45 yo F with right foot discoloration of toes and persistent pain. She has uric acid elevation consistent with gouty flare    I believe she has failed outpatient management would recommend admission to medicine start her on indomethacin, continue allopurinol and colchicine  Consult podiatry or orthopedics in AM for in hospital evaluation.  Blood cultures are pending  There is a remote possibility this is embolic, if podiatry team does not feel this is gouty flare we could consider diagnostic angiography although clinically I feel this would be low yield, will follow her while she's here    LUIS Durbin      No follow-ups on file.        Parvez Durbin  [facility]  Vascular Surgery  [phone] (Clinic Number)

## 2024-06-03 NOTE — PROGRESS NOTES
'[place] - Med Surg 3  [facility] Medicine  Progress Note    Patient Name: Jeanna Helm  MRN: [id number]  Patient Class: IP- Inpatient   Admission Date: 6/2/2024  Length of Stay: 1 days  Attending Physician: Guy Taylor MD  Primary Care Provider: Jarek Crespo MD        Subjective:     Principal Problem:Right foot pain        HPI:  Jeanna Helm is a 44 y.o. female with a PMH  has a past medical history of Back pain, Diabetes mellitus, Hypertension, Left knee pain, Miscarriage, and Stroke. who presented to the ED for further evaluation of worsening pain and discoloration involving the toes in her right foot.  Patient reported experiencing symptoms since April and was progressively worsened initially beginning in her right great toe and involving all digits and is beginning to extend into her foot.  She reports endorsing excruciating pain throughout all her toes and her foot described as pressure with intermittent bouts of sharp/stabbing/burning sensation upon weight-bearing and with applying pressure.  She reports complete numbness throughout all her toes which is beginning to extend into her foot.  She denied endorsing any trauma, prior history of gout, known peripheral vascular/arterial disease, or personal/family history of vasculitic disease but did report prior CVA and smokes approximately 1 pack cigarettes daily for 20+ years.  Patient also reported noticing yellow/white bumps throughout her toes which are painful in addition to worsening swelling and discoloration of all her digits.  Patient reported no known alleviating factors and aggravated with weight-bearing, movement, palpation, and presence of cold air.  Other associated symptoms included nausea and her foot feeling cold to the touch but reported all other review of systems negative except as noted above.  Patient reported being seen in the ED multiple times for similar complaints and was found to have up trending uric acid levels each time increasing  from 6.8 > 7.0 > 8.2 with no improvement in her symptoms following treatment with allopurinol and colchicine.  Patient reported Norco takes the edge off but never completely resolves.  She reported having an appointment with vascular surgery towards the end of the month but can not wait due to pain.  Patient does report being on Plavix but denies use of any other blood thinners/anticoagulants.  Patient also underwent CTA chest/abdomen/pelvis/week which showed relatively normal vascular down to the proximal SFA in addition to arterial duplex which showed mental phasic flow throughout her right leg.  Initial workup in the ED revealed patient is afebrile with leukocytosis measuring 14.39, sed rate 25, CRP 55.8, lactic acid/procalcitonin within normal limits.  Repeat uric acid pending.  Vascular surgery consulted by ED staff and recommended obtaining MRI, continued treatment of gout, podiatry/orthopedic consultation for further evaluation, with potential angiogram of lower extremity this admission.  Patient admitted to Hospital Medicine inpatient for continued medical management.      PCP: Jarek Crespo    Overview/Hospital Course:  43 y/o female presents to ED for further evaluation of worsening pain and discoloration involving the toes in her right foot.   Labs: Sed rate 25, CRP 55.8,   MRI of Right foot pending   BC: NGTD   Vascular surgery consulted: Dr. Par[facility] Durbin recommends start indomethacin, continue allopurinol. States there is a remote possibility this is embolic, if podiatry team does not feel this is gouty flare we could consider diagnostic angiography  Podiatry consulted, awaiting recommendations   PRN pain medications         Interval History: f/u right foot pain. Patient awake and alert. NAD. Patient reports her symptoms have been ongoing since April and have progressively worsened as initially it was only her great toe but now is involving all digits on R foot. Reports pain is severe and only  minimal relief with PRN pain medications. Started on indomethacin per Vascular surgery recommendations. Podiatry consulted, awaiting further recommendations.     Review of Systems  Objective:     Vital Signs (Most Recent):  Temp: 98.3 °F (36.8 °C) (06/03/24 1229)  Pulse: 86 (06/03/24 1229)  Resp: 18 (06/03/24 1229)  BP: 113/66 (06/03/24 1229)  SpO2: 98 % (06/03/24 1229) Vital Signs (24h Range):  Temp:  [97.6 °F (36.4 °C)-98.3 °F (36.8 °C)] 98.3 °F (36.8 °C)  Pulse:  [] 86  Resp:  [10-26] 18  SpO2:  [97 %-100 %] 98 %  BP: (104-166)/(61-99) 113/66     Weight: 93.3 kg (205 lb 11 oz)  Body mass index is 40.17 kg/m².    Intake/Output Summary (Last 24 hours) at 6/3/2024 1339  Last data filed at 6/2/2024 2039  Gross per 24 hour   Intake 100 ml   Output --   Net 100 ml         Physical Exam  Vitals and nursing note reviewed.   Constitutional:       General: She is not in acute distress.     Appearance: She is obese.   HENT:      Mouth/Throat:      Mouth: Mucous membranes are moist.      Pharynx: Oropharynx is clear.   Eyes:      Pupils: Pupils are equal, round, and reactive to light.   Cardiovascular:      Rate and Rhythm: Normal rate and regular rhythm.      Heart sounds: No murmur heard.  Pulmonary:      Effort: Pulmonary effort is normal.      Breath sounds: Normal breath sounds. No wheezing.   Abdominal:      General: Bowel sounds are normal. There is no distension.      Palpations: Abdomen is soft.      Tenderness: There is no abdominal tenderness.   Musculoskeletal:         General: Tenderness present. Normal range of motion.      Comments: Decreased range of motion upon flexion/extension of all toes throughout right foot with associated TTP throughout extending up to her foot.  Tophus appearing findings noted on toes    Skin:     General: Skin is warm and dry.      Comments: All toes on right foot dusky and cool to the touch, capillary refill less 3 seconds    Neurological:      General: No focal deficit  present.      Mental Status: She is alert and oriented to person, place, and time.             Significant Labs: All pertinent labs within the past 24 hours have been reviewed.  Recent Lab Results         06/03/24  0531   06/03/24  0422   06/02/24  1810   06/02/24  1809        Procalcitonin     0.12  Comment: A concentration < 0.25 ng/mL represents a low risk of bacterial   infection.  Procalcitonin may not be accurate among patients with localized   infection, recent trauma or major surgery, immunosuppressed state,   invasive fungal infection, renal dysfunction. Decisions regarding   initiation or continuation of antibiotic therapy should not be based   solely on procalcitonin levels.           Albumin   3.2     3.5       ALP   70     77       ALT   22     23       Anion Gap   11     11       AST   14     14       Baso #   0.06     0.05       Basophil %   0.5     0.3       BILIRUBIN TOTAL   0.7  Comment: For infants and newborns, interpretation of results should be based  on gestational age, weight and in agreement with clinical  observations.    Premature Infant recommended reference ranges:  Up to 24 hours.............<8.0 mg/dL  Up to 48 hours............<12.0 mg/dL  3-5 days..................<15.0 mg/dL  6-29 days.................<15.0 mg/dL       0.6  Comment: For infants and newborns, interpretation of results should be based  on gestational age, weight and in agreement with clinical  observations.    Premature Infant recommended reference ranges:  Up to 24 hours.............<8.0 mg/dL  Up to 48 hours............<12.0 mg/dL  3-5 days..................<15.0 mg/dL  6-29 days.................<15.0 mg/dL         Blood Culture, Routine     No Growth to date  [P]              No Growth to date  [P]         BUN   11     10       Calcium   8.9     9.7       Chloride   99     98       CO2   20     22       Creatinine   0.8     0.8       CRP     55.8         Differential Method   Automated     Automated       eGFR    >60     >60       Eos #   0.4     0.4       Eos %   3.3     2.4       Glucose   161     152       Gran # (ANC)   8.9     10.3       Gran %   71.2     71.6       Hematocrit   41.4     43.7       Hemoglobin   13.0     13.9       Immature Grans (Abs)   0.15  Comment: Mild elevation in immature granulocytes is non specific and   can be seen in a variety of conditions including stress response,   acute inflammation, trauma and pregnancy. Correlation with other   laboratory and clinical findings is essential.       0.17  Comment: Mild elevation in immature granulocytes is non specific and   can be seen in a variety of conditions including stress response,   acute inflammation, trauma and pregnancy. Correlation with other   laboratory and clinical findings is essential.         Immature Granulocytes   1.2     1.2       Lactic Acid Level       1.8  Comment: Falsely low lactic acid results can be found in samples   containing >=13.0 mg/dL total bilirubin and/or >=3.5 mg/dL   direct bilirubin.         Lymph #   2.0     2.4       Lymph %   15.9     16.5       MCH   24.7     24.5       MCHC   31.4     31.8       MCV   79     77       Mono #   1.0     1.2       Mono %   7.9     8.0       MPV   9.0     8.8       nRBC   0     0       Platelet Count   311     328       POCT Glucose 165             Potassium   4.3     4.9       PROTEIN TOTAL   6.7     7.9       RBC   5.26     5.68       RDW   14.6     14.8       Sed Rate     25         Sodium   130     131       Uric Acid   7.4           WBC   12.43     14.39                [P] - Preliminary Result               Significant Imaging: I have reviewed all pertinent imaging results/findings within the past 24 hours.    Assessment/Plan:      * Right foot pain  See plan for discoloration of skin of multiple sites of lower extremity       Tobacco abuse  Patient reports smoking approximately 1 pack of cigarettes daily with no thoughts of cutting back currently. Patient educated on morbidity  "and mortality in regards to continuation of smoking and stressed importance of cessation. Patient currently requesting nicotine patch at time of admission.  Plan:  -Nicotine patch available at patient's request        Severe obesity (BMI >= 40)  Body mass index is 40.17 kg/m². Morbid obesity complicates all aspects of disease management from diagnostic modalities to treatment. Weight loss encouraged and health benefits explained to patient.       H/O: CVA (cerebrovascular accident)  Patient at neurological baseline without residual deficits.  Plan:  -continue home medications  -holding antiplatelet medications pending potential surgical intervention      HLD (hyperlipidemia)  Patient is chronically on statin.will continue for now. Last Lipid Panel: No results found for: "CHOL", "HDL", "LDLCALC", "TRIG", "CHOLHDL"  Plan:  -Continue home medication  -low fat/low calorie diet      Hypertension  Chronic, controlled. Latest blood pressure and vitals reviewed-     Temp:  [97.6 °F (36.4 °C)-98.3 °F (36.8 °C)]   Pulse:  []   Resp:  [10-26]   BP: (104-166)/(61-99)   SpO2:  [97 %-100 %] .   Home meds for hypertension were reviewed and noted below.   Hypertension Medications               lisinopriL (PRINIVIL,ZESTRIL) 40 MG tablet Take 1 tablet (40 mg total) by mouth once daily.     While in the hospital, will manage blood pressure as follows; Continue home antihypertensive regimen    Will utilize p.r.n. blood pressure medication only if patient's blood pressure greater than 160/100 and she develops symptoms such as worsening chest pain or shortness of breath.      Type 2 diabetes mellitus  Patient's FSGs are uncontrolled due to hyperglycemia on current medication regimen.  Last A1c reviewed- No results found for: "LABA1C", "HGBA1C"  Most recent fingerstick glucose reviewed-   Recent Labs   Lab 06/03/24  0531   POCTGLUCOSE 165*     Current correctional scale  Low  Titrate as needed  anti-hyperglycemic dose as follows- "   Antihyperglycemics (From admission, onward)      None        Plan:  -SSI  -Accu-checks  -Hold oral hypoglycemics while patient is in the hospital  -Hypoglycemic protocol       Discoloration of skin of multiple sites of lower extremity  Patient presented with progressively worsening right foot pain and discoloration of all digits x2 months duration. Patient s/p multiple ED visits and was found to have up trending uric acid levels each time increasing from 6.8 > 7.0 > 8.2 with no improvement in her symptoms following treatment with allopurinol and colchicine. Patient s/p CTA chest/abdomen/pelvis last week which showed relatively normal vascular down to the proximal SFA in addition to arterial duplex which showed mental phasic flow throughout her right leg.  Initial workup in the ED revealed patient is afebrile with leukocytosis of 14.39, sed rate 25, CRP 55.8, lactic acid/procalcitonin within normal limits.  Repeat uric acid pending.  Vascular surgery consulted by ED staff and recommended obtaining MRI, continued treatment of gout, podiatry/orthopedic consultation for further evaluation, with potential angiogram of lower extremity this admission.  Given persistently elevated uric acid level and tophus appearing findings, patient likely having unresponsive gout flare but can not definitively rule out underlying ischemia given prior stroke history as well as possible vasculitic disease such as Buerger's given in setting of significant smoking history.   Plan:  -NPO  -Continue current pain regimen, titrate as needed  -Bowel regimen  -Bedrest  -Wound care   -None weightbearing  -IVFs prn  -Antiemetics prn  -Tylenol as needed for fever   -f/u uric acid level  -patient initiated on allopurinol and probenecid  -f/u MRI   -Podiatry consulted  -f/u vascular surgery   -patient educated on importance of smoking cessation        VTE Risk Mitigation (From admission, onward)           Ordered     enoxaparin injection 40 mg  Daily          06/02/24 2049     IP VTE HIGH RISK PATIENT  Once         06/02/24 2049     Place sequential compression device  Until discontinued         06/02/24 2049                    Discharge Planning   FRED:      Code Status: Full Code   Is the patient medically ready for discharge?:     Reason for patient still in hospital (select all that apply): Patient trending condition, Treatment, and Consult recommendations  Discharge Plan A: Home        The patient's case has been reviewed by my supervising physician.   Supervising physician will provide additional orders and recommendations at his/her discretion.  Please see supervising physicians documentation and/or orders for further details.             Jacque Read NP  Department of Hospital Medicine   [facility] - Med Surg 3

## 2024-06-03 NOTE — ASSESSMENT & PLAN NOTE
Patient at neurological baseline without residual deficits.  Plan:  -continue home medications  -holding antiplatelet medications pending potential surgical intervention

## 2024-06-03 NOTE — HPI
Jeanna Helm is a 44 y.o. female with a PMH  has a past medical history of Back pain, Diabetes mellitus, Hypertension, Left knee pain, Miscarriage, and Stroke. who presented to the ED for further evaluation of worsening pain and discoloration involving the toes in her right foot.  Patient reported experiencing symptoms since April and was progressively worsened initially beginning in her right great toe and involving all digits and is beginning to extend into her foot.  She reports endorsing excruciating pain throughout all her toes and her foot described as pressure with intermittent bouts of sharp/stabbing/burning sensation upon weight-bearing and with applying pressure.  She reports complete numbness throughout all her toes which is beginning to extend into her foot.  She denied endorsing any trauma, prior history of gout, known peripheral vascular/arterial disease, or personal/family history of vasculitic disease but did report prior CVA and smokes approximately 1 pack cigarettes daily for 20+ years.  Patient also reported noticing yellow/white bumps throughout her toes which are painful in addition to worsening swelling and discoloration of all her digits.  Patient reported no known alleviating factors and aggravated with weight-bearing, movement, palpation, and presence of cold air.  Other associated symptoms included nausea and her foot feeling cold to the touch but reported all other review of systems negative except as noted above.  Patient reported being seen in the ED multiple times for similar complaints and was found to have up trending uric acid levels each time increasing from 6.8 > 7.0 > 8.2 with no improvement in her symptoms following treatment with allopurinol and colchicine.  Patient reported Norco takes the edge off but never completely resolves.  She reported having an appointment with vascular surgery towards the end of the month but can not wait due to pain.  Patient does report being on  Plavix but denies use of any other blood thinners/anticoagulants.  Patient also underwent CTA chest/abdomen/pelvis/week which showed relatively normal vascular down to the proximal SFA in addition to arterial duplex which showed mental phasic flow throughout her right leg.  Initial workup in the ED revealed patient is afebrile with leukocytosis measuring 14.39, sed rate 25, CRP 55.8, lactic acid/procalcitonin within normal limits.  Repeat uric acid pending.  Vascular surgery consulted by ED staff and recommended obtaining MRI, continued treatment of gout, podiatry/orthopedic consultation for further evaluation, with potential angiogram of lower extremity this admission.  Patient admitted to Hospital Medicine inpatient for continued medical management.      PCP: Jarek Crespo

## 2024-06-03 NOTE — PLAN OF CARE
[facility] - Med Surg 3  Initial Discharge Assessment       Primary Care Provider: Jarek Crespo MD    Admission Diagnosis: Pain of toe of right foot [M79.674]  Tobacco use [Z72.0]  Chest pain [R07.9]    Admission Date: 6/2/2024  Expected Discharge Date:     Transition of Care Barriers: Bariatric    Payor: MEDICAID / Plan: [employer] / Product Type: Managed Medicaid /     Extended Emergency Contact Information  Primary Emergency Contact: Emre Helm   [facility]  Home Phone: [phone]  Mobile Phone: [phone]  Relation: Other    Discharge Plan A: Home  Discharge Plan B: Home Health      [id number] - [place] - [address] AT [facility]  [address] [facility] [address]  Phone: [phone] Fax: [phone]    [id number]  [place] - [address] AT [facility]  [address]  [place] [address]  Phone: [phone] Fax: [phone]    [id number]  [place] - [address]  [address] [facility] [address]  Phone: [phone] Fax: [phone]      Initial Assessment (most recent)       Adult Discharge Assessment - 06/03/24 1135          Discharge Assessment    Assessment Type Discharge Planning Assessment     Confirmed/corrected address, phone number and insurance Yes     Confirmed Demographics Correct on Facesheet     Source of Information patient     People in Home alone     Do you expect to return to your current living situation? Yes     Do you have help at home or someone to help you manage your care at home? No     Prior to hospitilization cognitive status: Alert/Oriented     Current cognitive status: Alert/Oriented     Walking or Climbing Stairs Difficulty no     Dressing/Bathing Difficulty no     Equipment Currently Used at Home none     Readmission within 30 days? No     Patient currently being  followed by outpatient case management? No     Do you currently have service(s) that help you manage your care at home? No     Do you take prescription medications? Yes     Do you have prescription coverage? Yes     Do you have any problems affording any of your prescribed medications? No     Is the patient taking medications as prescribed? yes     Who is going to help you get home at discharge? family     How do you get to doctors appointments? car, drives self     Are you on dialysis? No     Do you take coumadin? No     Discharge Plan A Home     Discharge Plan B Home Health     DME Needed Upon Discharge  none     Discharge Plan discussed with: Patient     Transition of Care Barriers Bariatric                      Independent at home.  No anticipated needs.

## 2024-06-03 NOTE — PROGRESS NOTES
Therapy with Vancomycin complete and/or consult discontinued by provider.  Pharmacy will sign off, please re-consult as needed.    Pj Flood D 6/3/2024 8:38 AM

## 2024-06-03 NOTE — PLAN OF CARE
Report called to to Miriam RN /pt to be transferred to  312 per hosp bed/ VS stable/NADN  / L brachial dsg CDI

## 2024-06-03 NOTE — SUBJECTIVE & OBJECTIVE
Past Medical History:   Diagnosis Date    Back pain     Diabetes mellitus     Hypertension     Left knee pain     Miscarriage     x2    Stroke        Past Surgical History:   Procedure Laterality Date    ANTERIOR CRUCIATE LIGAMENT REPAIR Left     DILATION AND CURETTAGE OF UTERUS      x2    LUMBAR FUSION      TIBIA FRACTURE SURGERY Right        Review of patient's allergies indicates:   Allergen Reactions    Neurontin [gabapentin] Itching and Blisters    Percocet [oxycodone-acetaminophen] Itching and Blisters       No current facility-administered medications on file prior to encounter.     Current Outpatient Medications on File Prior to Encounter   Medication Sig    acetaminophen (TYLENOL) 325 MG tablet Take 325 mg by mouth every 6 (six) hours as needed for Pain.    allopurinoL (ZYLOPRIM) 100 MG tablet Take 1 tablet (100 mg total) by mouth once daily.    amLODIPine (NORVASC) 10 MG tablet Take 1 tablet (10 mg total) by mouth once daily.    butalbital-acetaminophen-caffeine -40 mg (FIORICET, ESGIC) -40 mg per tablet Take 1 tablet by mouth every 6 (six) hours as needed for Pain.    cephALEXin (KEFLEX) 500 MG capsule Take 1 capsule (500 mg total) by mouth 4 (four) times daily. for 7 days    clopidogreL (PLAVIX) 75 mg tablet Take 1 tablet (75 mg total) by mouth once daily.    colchicine (COLCRYS) 0.6 mg tablet Take 2 tablets for your 1st dose.  You may then take 1 tablet every 2 hours x3.  Do not use more than 3 days.    colchicine (COLCRYS) 0.6 mg tablet Take 1 tablet (0.6 mg total) by mouth once daily. Take 2 tablets for your 1st dose.  You may then take 1 tablet every 2 hours x3.  Do not use more than 3 days.    diclofenac (VOLTAREN) 50 MG EC tablet Take 1 tablet (50 mg total) by mouth 2 (two) times daily as needed (pain).    diclofenac (VOLTAREN) 50 MG EC tablet Take 1 tablet (50 mg total) by mouth 2 (two) times daily.    doxycycline (VIBRAMYCIN) 100 MG Cap Take 1 capsule (100 mg total) by mouth 2 (two)  times daily. for 7 days    glyBURIDE (DIABETA) 5 MG tablet Take 1 tablet (5 mg total) by mouth daily with breakfast.    HYDROcodone-acetaminophen (NORCO)  mg per tablet Take 1 tablet by mouth every 6 (six) hours as needed for Pain.    ibuprofen (ADVIL,MOTRIN) 800 MG tablet Take 1 tablet (800 mg total) by mouth every 6 (six) hours as needed for Pain.    lisinopriL (PRINIVIL,ZESTRIL) 40 MG tablet Take 1 tablet (40 mg total) by mouth once daily.    meclizine (ANTIVERT) 25 mg tablet Take 1 tablet (25 mg total) by mouth 3 (three) times daily as needed.    meclizine (ANTIVERT) 25 mg tablet Take 1 tablet (25 mg total) by mouth 3 (three) times daily as needed.    medroxyPROGESTERone (PROVERA) 10 MG tablet Take 1 tablet (10 mg total) by mouth once daily. for 5 days    methylPREDNISolone (MEDROL DOSEPACK) 4 mg tablet Take as directed on the package.    metoclopramide HCl (REGLAN) 10 MG tablet Take 1 tablet (10 mg total) by mouth every 6 (six) hours as needed (headache or nausea).    metoprolol succinate (TOPROL-XL) 50 MG 24 hr tablet Take 50 mg by mouth.    naproxen (NAPROSYN) 500 MG tablet Take 1 tablet (500 mg total) by mouth 2 (two) times daily with meals.    predniSONE (DELTASONE) 20 MG tablet Take 60 mg by mouth.     Family History       Problem Relation (Age of Onset)    Diabetes Father    Fibromyalgia Mother    Heart disease Mother    Hypertension Mother          Tobacco Use    Smoking status: Every Day     Current packs/day: 1.00     Average packs/day: 1 pack/day for 20.0 years (20.0 ttl pk-yrs)     Types: Cigarettes    Smokeless tobacco: Not on file   Substance and Sexual Activity    Alcohol use: No    Drug use: No    Sexual activity: Not Currently     Review of Systems   All other systems reviewed and are negative.    Objective:     Vital Signs (Most Recent):  Temp: 98.3 °F (36.8 °C) (06/02/24 1637)  Pulse: 91 (06/02/24 1907)  Resp: 20 (06/02/24 2002)  BP: 110/65 (06/02/24 1907)  SpO2: 100 % (06/02/24 1907)  Vital Signs (24h Range):  Temp:  [98.3 °F (36.8 °C)] 98.3 °F (36.8 °C)  Pulse:  [] 91  Resp:  [16-22] 20  SpO2:  [100 %] 100 %  BP: (104-166)/(61-99) 110/65     Weight: 92.6 kg (204 lb 3.2 oz)  Body mass index is 39.88 kg/m².     Physical Exam  Vitals reviewed.   Constitutional:       General: She is in acute distress.      Appearance: Normal appearance. She is obese. She is not ill-appearing, toxic-appearing or diaphoretic.   HENT:      Head: Normocephalic and atraumatic.      Right Ear: External ear normal.      Left Ear: External ear normal.      Nose: Nose normal. No congestion or rhinorrhea.      Mouth/Throat:      Mouth: Mucous membranes are moist.      Pharynx: Oropharynx is clear. No oropharyngeal exudate or posterior oropharyngeal erythema.   Eyes:      General: No scleral icterus.     Extraocular Movements: Extraocular movements intact.      Conjunctiva/sclera: Conjunctivae normal.      Pupils: Pupils are equal, round, and reactive to light.   Neck:      Vascular: No carotid bruit.   Cardiovascular:      Rate and Rhythm: Normal rate and regular rhythm.      Pulses: Normal pulses.      Heart sounds: Normal heart sounds. No murmur heard.     No friction rub. No gallop.   Pulmonary:      Effort: Pulmonary effort is normal. No respiratory distress.      Breath sounds: Normal breath sounds. No stridor. No wheezing, rhonchi or rales.   Chest:      Chest wall: No tenderness.   Abdominal:      General: Abdomen is flat. Bowel sounds are normal. There is no distension.      Palpations: Abdomen is soft.      Tenderness: There is no abdominal tenderness. There is no right CVA tenderness, left CVA tenderness, guarding or rebound.      Hernia: No hernia is present.   Musculoskeletal:         General: Tenderness present. No swelling, deformity or signs of injury. Normal range of motion.      Cervical back: Normal range of motion and neck supple. No rigidity or tenderness.      Right lower leg: No edema.      Left  lower leg: No edema.      Comments: Decreased range of motion upon flexion/extension of all toes throughout right foot with associated TTP throughout extending up to her foot.  Tophus appearing findings noted on toes   Lymphadenopathy:      Cervical: No cervical adenopathy.   Skin:     General: Skin is warm and dry.      Capillary Refill: Capillary refill takes less than 2 seconds.      Coloration: Skin is not jaundiced or pale.      Findings: No bruising, erythema, lesion or rash.      Comments: All toes on right foot dusky in nature and cool to the touch however, capillary refill appears intact   Neurological:      General: No focal deficit present.      Mental Status: She is alert and oriented to person, place, and time.      Cranial Nerves: No cranial nerve deficit.      Sensory: Sensory deficit present.      Motor: No weakness.      Coordination: Coordination normal.   Psychiatric:         Mood and Affect: Mood normal.         Behavior: Behavior normal.         Thought Content: Thought content normal.         Judgment: Judgment normal.              CRANIAL NERVES     CN III, IV, VI   Pupils are equal, round, and reactive to light.       Significant Labs: All pertinent labs within the past 24 hours have been reviewed.    Significant Imaging: I have reviewed all pertinent imaging results/findings within the past 24 hours.    LABS:  Recent Results (from the past 24 hour(s))   CBC auto differential    Collection Time: 06/02/24  6:09 PM   Result Value Ref Range    WBC 14.39 (H) 3.90 - 12.70 K/uL    RBC 5.68 (H) 4.00 - 5.40 M/uL    Hemoglobin 13.9 12.0 - 16.0 g/dL    Hematocrit 43.7 37.0 - 48.5 %    MCV 77 (L) 82 - 98 fL    MCH 24.5 (L) 27.0 - 31.0 pg    MCHC 31.8 (L) 32.0 - 36.0 g/dL    RDW 14.8 (H) 11.5 - 14.5 %    Platelets 328 150 - 450 K/uL    MPV 8.8 (L) 9.2 - 12.9 fL    Immature Granulocytes 1.2 (H) 0.0 - 0.5 %    Gran # (ANC) 10.3 (H) 1.8 - 7.7 K/uL    Immature Grans (Abs) 0.17 (H) 0.00 - 0.04 K/uL    Lymph #  2.4 1.0 - 4.8 K/uL    Mono # 1.2 (H) 0.3 - 1.0 K/uL    Eos # 0.4 0.0 - 0.5 K/uL    Baso # 0.05 0.00 - 0.20 K/uL    nRBC 0 0 /100 WBC    Gran % 71.6 38.0 - 73.0 %    Lymph % 16.5 (L) 18.0 - 48.0 %    Mono % 8.0 4.0 - 15.0 %    Eosinophil % 2.4 0.0 - 8.0 %    Basophil % 0.3 0.0 - 1.9 %    Differential Method Automated    Comprehensive metabolic panel    Collection Time: 06/02/24  6:09 PM   Result Value Ref Range    Sodium 131 (L) 136 - 145 mmol/L    Potassium 4.9 3.5 - 5.1 mmol/L    Chloride 98 95 - 110 mmol/L    CO2 22 (L) 23 - 29 mmol/L    Glucose 152 (H) 70 - 110 mg/dL    BUN 10 6 - 20 mg/dL    Creatinine 0.8 0.5 - 1.4 mg/dL    Calcium 9.7 8.7 - 10.5 mg/dL    Total Protein 7.9 6.0 - 8.4 g/dL    Albumin 3.5 3.5 - 5.2 g/dL    Total Bilirubin 0.6 0.1 - 1.0 mg/dL    Alkaline Phosphatase 77 55 - 135 U/L    AST 14 10 - 40 U/L    ALT 23 10 - 44 U/L    eGFR >60 >60 mL/min/1.73 m^2    Anion Gap 11 8 - 16 mmol/L   Lactic acid, plasma #1    Collection Time: 06/02/24  6:09 PM   Result Value Ref Range    Lactate (Lactic Acid) 1.8 0.5 - 2.2 mmol/L   Sedimentation rate    Collection Time: 06/02/24  6:10 PM   Result Value Ref Range    Sed Rate 25 (H) 0 - 20 mm/Hr   C-reactive protein    Collection Time: 06/02/24  6:10 PM   Result Value Ref Range    CRP 55.8 (H) 0.0 - 8.2 mg/L   Procalcitonin    Collection Time: 06/02/24  6:10 PM   Result Value Ref Range    Procalcitonin 0.12 <0.25 ng/mL       RADIOLOGY  CTA Chest Abdomen Pelvis    Addendum Date: 5/28/2024    Incidentally small left adrenal adenoma. Electronically signed by: Jhon Chavez Date:    05/28/2024 Time:    00:56    Result Date: 5/28/2024  EXAMINATION: CTA CHEST ABDOMEN PELVIS CLINICAL HISTORY: Aortic atherosclerosis; TECHNIQUE: Low dose axial images, sagittal and coronal reformations were obtained from the thoracic inlet to the pubic symphysis following the IV administration of 100 mL of Omnipaque 350.  Oral contrast was not administered. COMPARISON: None.  FINDINGS: Base of Neck: No significant abnormality. Thoracic soft tissues: Unremarkable. Aorta: Left-sided aortic arch.  No aneurysm and no significant atherosclerosis.  No aneurysm or dissection. Heart: Normal size. No effusion. Pulmonary vasculature: Enlarged main pulmonary artery suggestive of pulmonary arterial hypertension. Poonam/Mediastinum: No pathologic patito enlargement. Airways: Patent. Lungs/Pleura: Clear lungs. No pleural effusion or thickening. Esophagus: Unremarkable. Liver: Normal size and attenuation. No focal lesions. Gallbladder: No calcified gallstones. Bile Ducts: No dilatation. Pancreas: No mass. No peripancreatic fat stranding. Spleen: Normal. Adrenals: Normal. Kidneys/Ureters: Normal enhancement.  No mass or  hydroureteronephrosis. Bladder: No wall thickening. Reproductive organs: Normal. GI Tract/Mesentery: No evidence of bowel obstruction or inflammation. Peritoneal Space: No ascites or free air. Retroperitoneum: No significant adenopathy. Abdominal wall: Normal. Vasculature: No aneurysm.  No large vessel occlusion. Bones: No acute fracture. No suspicious lytic or sclerotic lesions.     No acute findings. Electronically signed by: Jhon Chavez Date:    05/28/2024 Time:    00:49    X-Ray Foot Complete Right    Result Date: 5/27/2024  EXAMINATION: XR FOOT COMPLETE 3 VIEW RIGHT CLINICAL HISTORY: Pain in right foot TECHNIQUE: Three-view exam COMPARISON: 05/19/2024     No acute or adverse bony finding. Electronically signed by: Gisella Vences Date:    05/27/2024 Time:    22:22    US Lower Extrem Arteries Bilat with DANY (xpd)    Result Date: 5/27/2024  EXAMINATION: US ARTERIAL LOWER EXTREMITY BILAT WITH DANY (XPD) CLINICAL HISTORY: black toes; FINDINGS: The lower extremities were evaluated with continuous wave Doppler to obtain systolic segmental pressure recordings at the brachial and ankle segments. Right DANY 0.92 Left DANY 0.98 Again there is monophasic flow throughout both lower  extremities consistent with more proximal nonvisualized aortoiliac hemodynamically significant disease.  Negative for arterial occlusion     Redemonstrated monophasic flow throughout both lower extremities consistent with more proximal nonvisualized aortoiliac hemodynamically significant disease DANY Values >1.3: Non-compressible or calcified vessels 0.97 - 1.29: No significant PAD 0.75 - 0.96: Mild PAD 0.50 - 0.74: Moderate PAD <0.50: Severe PAD <0.30: Critical PAD Electronically signed by: Gisella Vences Date:    05/27/2024 Time:    21:54    US Lower Extremity Arteries Right    Result Date: 5/19/2024  EXAMINATION: Ultrasound lower extremity arteries right CLINICAL HISTORY: Pain in right leg COMPARISON: No comparison studies are available. FINDINGS: There is monophasic flow seen in all vessels imaged, which suggest aorta iliac disease.  Negative for vascular occlusions. The flow velocities are as follows: Right common femoral artery = 67 cm/sec Right profundofemoral artery = 30 cm/sec Right proximal superficial femoral artery = 66 cm/sec Right mid superficial femoral artery = 60 cm/sec Right distal superficial femoral artery = 71 cm/sec Right popliteal artery = 44 cm/sec Right posterior tibial artery = 55 cm/sec Right anterior tibial artery = 36 cm/sec     1.  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 Electronically signed by: Elgin Ta MD Date:    05/19/2024 Time:    11:42    X-Ray Foot Complete Right    Result Date: 5/19/2024  EXAMINATION: XR FOOT COMPLETE 3 VIEW RIGHT CLINICAL HISTORY: . Pain in unspecified foot TECHNIQUE: AP, lateral, and oblique views of the right foot were performed. COMPARISON: April 28, 2024 FINDINGS: Extensive hardware involving the tibia and fibula again seen.  The osseous structures are intact.  No definite acute or healing fractures noted.  Negative for soft tissue abnormalities.  Large plantar and Achilles calcaneal spurs noted.     As above  Electronically signed by: Elgin Ta MD Date:    05/19/2024 Time:    10:14    X-Ray Foot Complete Left    Result Date: 5/14/2024  EXAMINATION: XR FOOT COMPLETE 3 VIEW LEFT CLINICAL HISTORY: Pain in left foot COMPARISON: 05/10/2024     FINDINGS/ Three-view exam is labeled right foot.  Correlate for laterality.  No acute fracture or dislocation seen.  No destructive bony findings.  Plantar spur and Achilles enthesophyte.  Distal fibular and tibial hardware present. Electronically signed by: Gisella Vences Date:    05/14/2024 Time:    17:00    X-Ray Toe 2 or More Views Right    Result Date: 5/10/2024  EXAMINATION: XR TOE 2 OR MORE VIEWS RIGHT CLINICAL HISTORY: XR TOE 2 OR MORE VIEWS RIGHT COMPARISON: None FINDINGS: Multiple radiographic views  were obtained. No evidence of acute fracture or dislocation.  Bony mineralization is normal.  Soft tissues are unremarkable. Question minimal degenerative joint disease at the 1st metacarpal phalangeal joint.     No acute abnormality. Electronically signed by: Claudy Tay Date:    05/10/2024 Time:    20:49      EKG    MICROBIOLOGY    MDM

## 2024-06-03 NOTE — OP NOTE
AORTOGRAM, WITH EXTREMITY RUNOFF Procedure Note  6/3/2024     Surgeon(s):  Parvez Durbin MD       Diagnosis:   Blue toe syndrome right foot  PVD  DM  HTN    Procedures:   Conscious sedation  U/s guided left brachial access  Aortogram  Diagnostic angiogram right lower extremity    Anesthesia: Conscious sedation was achieved using intravenous Versed and Fentanyl administered by a independent, trained observer who monitored the patient's rhythm strip, vital signs and pulse oximetry.  Supervised intraprocedural time: 30 minutes    Details of Access:   The patient was brought to the Cath Lab awake and alert and underwent conscious IV sedation.  Access to the left brachial artery achieved under real-time ultrasound guidance.  A permanent image was saved and stored to PACS showing a patent and pulsatile brachial artery. The vessel was then cannulated with a micropuncture needle.  A guidewire was inserted via the lumen of the needle, the needle was removed and a 5 Ugandan sheath inserted over the guidewire.  A 5 [place] Omni catheter was then positioned in the suprarenal abdominal aorta and abdominal aortography was performed.     This shows patent but small infrarenal aorta. Patent mesenteric vessels and b/l renal arteries. The iliac arteries are small in caliber but patent there is a significant 70% focal stenotic lesion at origin of left common iliac this does not appear to be an unstable plaque or ulcerated plaque    Next the omni catheter was pushed down to the bifurcation and diagnostic angiogram of right leg performed shows patent but Small caliber right external iliac, right cfa, right profunda and right sfa. The sfa  is patent. The popliteal is patent but small. The popliteal trifurcates into peroneal and anterior and posterior tibial vessels which are patent down to the level of ankle. The pt artery is dominant and continues into the foot. The aT is patent into a small DP artery. There is a complete pedal arch  however I was not able to visualize the digital arteries and they were either in spasm or are occluded by debris. There is possibility of buerger's disease.    I have recommended strict smoking cessation and anticoagulation with 2.5 BID xarelto and asa 81. I will allow her to demarcate the toes she may possibly develop gangrene of the toes but she does have excellent perfusion to distal metatarsals that she would heal partial toe amp possibly even tma if needed. I will see her in office in 2 weeks    (No adequate recent catheter-based angiographic study was available.  The decision to intervene was based on this current diagnostic study.)      Estimated Blood Loss: none               Implants: * No implants in log *           Condition: Hemodynamically Stable        Parvez Durbin MD   6/3/2024   4:07 PM

## 2024-06-03 NOTE — HOSPITAL COURSE
43 y/o female presents to ED for further evaluation of worsening pain and discoloration involving the toes in her right foot.   Labs: Sed rate 25, CRP 55.8  BC: NGTD   Vascular surgery consulted: Dr. Parvez Durbin recommends start indomethacin, continue allopurinol. States there is a remote possibility this is embolic, if podiatry team does not feel this is gouty flare we could consider diagnostic angiography. Dr. 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 Trace 1st through 3rd intermetatarsal bursitis.  Mild edema of the intrinsic foot musculature.  Flexor and extensor tendons are intact. No osteomyelitis.  PRN pain medications   ECHO: normal systolic function with ejection fraction of 60 - 65%. There is normal diastolic function. Pulmonary Artery: The estimated pulmonary artery systolic pressure is 23 mmHg.  The patient was seen and examined today and determined to be stable for discharge. Will f/u with CVT outpatient in 2 weeks. Ambulatory referral to smoking cessation and discharged with prescription for chantix per the recommendation of vascular team. Patient educated on importance of smoking cessation for risk reduction.     Patient discharged with ambulatory referral to internal medicine as she states she does not have a PCP for close follow up. TCC order placed to aid patient in establishing care with PCP to manage co-morbid conditions. Patient discharged with 4 days worth of pain medication, until she can follow up with a PCP. Patient to follow up with vascular surgeon on 6/19/24, number to office provided on discharge paperwork and patient educated. Patient discharged with prescription for xarelto 2.5mg PO and ASA daily per recommendations of Dr. Parvez Durbin. Will follow up with podiatry as needed if  symptoms worsen. Allopurinol dosage increased to 200mg due to continued elevated uric acid level.     Patient seen and examined on the day of discharge.  All questions and concerns were addressed prior to discharge.    Face to face encounter with patient: 46 min

## 2024-06-03 NOTE — PLAN OF CARE
Pt transferred to [facility] per Hosp bed to [facility]/ handoff at bedside to JERAMY Pineda / Left brachial dsg CDI/ no hematoma noted/ L arm immobilizer remains intact /  pt aware of time frame to keep arm straight / as well as RN  NADN /SR up x 2 / Call light within reach

## 2024-06-03 NOTE — INTERVAL H&P NOTE
The patient has been examined and the H&P has been reviewed:    I concur with the findings and no changes have occurred since H&P was written.    Procedure risks, benefits and alternative options discussed and understood by patient/family.          Active Hospital Problems    Diagnosis  POA    *Right foot pain [M79.671]  Yes    Discoloration of skin of multiple sites of lower extremity [L81.9]  Yes    Type 2 diabetes mellitus [E11.9]  Yes     Chronic    Hypertension [I10]  Yes     Chronic    HLD (hyperlipidemia) [E78.5]  Yes     Chronic    H/O: CVA (cerebrovascular accident) [Z86.73]  Not Applicable     Chronic    Severe obesity (BMI >= 40) [E66.01]  Yes     Chronic    Tobacco abuse [Z72.0]  Yes     Chronic      Resolved Hospital Problems   No resolved problems to display.      Abdominal Pain, N/V/D

## 2024-06-03 NOTE — ASSESSMENT & PLAN NOTE
Patient reports smoking approximately 1 pack of cigarettes daily with no thoughts of cutting back currently. Patient educated on morbidity and mortality in regards to continuation of smoking and stressed importance of cessation. Patient currently requesting nicotine patch at time of admission.  Plan:  -Nicotine patch available at patient's request

## 2024-06-03 NOTE — SUBJECTIVE & OBJECTIVE
Interval History: f/u right foot pain. Patient awake and alert. NAD. Patient reports her symptoms have been ongoing since April and have progressively worsened as initially it was only her great toe but now is involving all digits on R foot. Reports pain is severe and only minimal relief with PRN pain medications. Started on indomethacin per Vascular surgery recommendations. Podiatry consulted, awaiting further recommendations.     Review of Systems  Objective:     Vital Signs (Most Recent):  Temp: 98.3 °F (36.8 °C) (06/03/24 1229)  Pulse: 86 (06/03/24 1229)  Resp: 18 (06/03/24 1229)  BP: 113/66 (06/03/24 1229)  SpO2: 98 % (06/03/24 1229) Vital Signs (24h Range):  Temp:  [97.6 °F (36.4 °C)-98.3 °F (36.8 °C)] 98.3 °F (36.8 °C)  Pulse:  [] 86  Resp:  [10-26] 18  SpO2:  [97 %-100 %] 98 %  BP: (104-166)/(61-99) 113/66     Weight: 93.3 kg (205 lb 11 oz)  Body mass index is 40.17 kg/m².    Intake/Output Summary (Last 24 hours) at 6/3/2024 1339  Last data filed at 6/2/2024 2039  Gross per 24 hour   Intake 100 ml   Output --   Net 100 ml         Physical Exam  Vitals and nursing note reviewed.   Constitutional:       General: She is not in acute distress.     Appearance: She is obese.   HENT:      Mouth/Throat:      Mouth: Mucous membranes are moist.      Pharynx: Oropharynx is clear.   Eyes:      Pupils: Pupils are equal, round, and reactive to light.   Cardiovascular:      Rate and Rhythm: Normal rate and regular rhythm.      Heart sounds: No murmur heard.  Pulmonary:      Effort: Pulmonary effort is normal.      Breath sounds: Normal breath sounds. No wheezing.   Abdominal:      General: Bowel sounds are normal. There is no distension.      Palpations: Abdomen is soft.      Tenderness: There is no abdominal tenderness.   Musculoskeletal:         General: Tenderness present. Normal range of motion.      Comments: Decreased range of motion upon flexion/extension of all toes throughout right foot with associated TTP  throughout extending up to her foot.  Tophus appearing findings noted on toes    Skin:     General: Skin is warm and dry.      Comments: All toes on right foot dusky and cool to the touch, capillary refill less 3 seconds    Neurological:      General: No focal deficit present.      Mental Status: She is alert and oriented to person, place, and time.             Significant Labs: All pertinent labs within the past 24 hours have been reviewed.  Recent Lab Results         06/03/24  0531   06/03/24  0422   06/02/24  1810   06/02/24  1809        Procalcitonin     0.12  Comment: A concentration < 0.25 ng/mL represents a low risk of bacterial   infection.  Procalcitonin may not be accurate among patients with localized   infection, recent trauma or major surgery, immunosuppressed state,   invasive fungal infection, renal dysfunction. Decisions regarding   initiation or continuation of antibiotic therapy should not be based   solely on procalcitonin levels.           Albumin   3.2     3.5       ALP   70     77       ALT   22     23       Anion Gap   11     11       AST   14     14       Baso #   0.06     0.05       Basophil %   0.5     0.3       BILIRUBIN TOTAL   0.7  Comment: For infants and newborns, interpretation of results should be based  on gestational age, weight and in agreement with clinical  observations.    Premature Infant recommended reference ranges:  Up to 24 hours.............<8.0 mg/dL  Up to 48 hours............<12.0 mg/dL  3-5 days..................<15.0 mg/dL  6-29 days.................<15.0 mg/dL       0.6  Comment: For infants and newborns, interpretation of results should be based  on gestational age, weight and in agreement with clinical  observations.    Premature Infant recommended reference ranges:  Up to 24 hours.............<8.0 mg/dL  Up to 48 hours............<12.0 mg/dL  3-5 days..................<15.0 mg/dL  6-29 days.................<15.0 mg/dL         Blood Culture, Routine     No Growth  to date  [P]              No Growth to date  [P]         BUN   11     10       Calcium   8.9     9.7       Chloride   99     98       CO2   20     22       Creatinine   0.8     0.8       CRP     55.8         Differential Method   Automated     Automated       eGFR   >60     >60       Eos #   0.4     0.4       Eos %   3.3     2.4       Glucose   161     152       Gran # (ANC)   8.9     10.3       Gran %   71.2     71.6       Hematocrit   41.4     43.7       Hemoglobin   13.0     13.9       Immature Grans (Abs)   0.15  Comment: Mild elevation in immature granulocytes is non specific and   can be seen in a variety of conditions including stress response,   acute inflammation, trauma and pregnancy. Correlation with other   laboratory and clinical findings is essential.       0.17  Comment: Mild elevation in immature granulocytes is non specific and   can be seen in a variety of conditions including stress response,   acute inflammation, trauma and pregnancy. Correlation with other   laboratory and clinical findings is essential.         Immature Granulocytes   1.2     1.2       Lactic Acid Level       1.8  Comment: Falsely low lactic acid results can be found in samples   containing >=13.0 mg/dL total bilirubin and/or >=3.5 mg/dL   direct bilirubin.         Lymph #   2.0     2.4       Lymph %   15.9     16.5       MCH   24.7     24.5       MCHC   31.4     31.8       MCV   79     77       Mono #   1.0     1.2       Mono %   7.9     8.0       MPV   9.0     8.8       nRBC   0     0       Platelet Count   311     328       POCT Glucose 165             Potassium   4.3     4.9       PROTEIN TOTAL   6.7     7.9       RBC   5.26     5.68       RDW   14.6     14.8       Sed Rate     25         Sodium   130     131       Uric Acid   7.4           WBC   12.43     14.39                [P] - Preliminary Result               Significant Imaging: I have reviewed all pertinent imaging results/findings within the past 24 hours.

## 2024-06-03 NOTE — ASSESSMENT & PLAN NOTE
"Patient's FSGs are uncontrolled due to hyperglycemia on current medication regimen.  Last A1c reviewed- No results found for: "LABA1C", "HGBA1C"  Most recent fingerstick glucose reviewed- No results for input(s): "POCTGLUCOSE" in the last 24 hours.  Current correctional scale  Low  Titrate as needed  anti-hyperglycemic dose as follows-   Antihyperglycemics (From admission, onward)      None        Plan:  -SSI  -Accu-checks  -Hold oral hypoglycemics while patient is in the hospital  -Hypoglycemic protocol     "

## 2024-06-03 NOTE — PLAN OF CARE
Problem: Adult Inpatient Plan of Care  Goal: Plan of Care Review  6/3/2024 0020 by Lance Goldstein RN  Outcome: Progressing  6/3/2024 0020 by Lance Goldstein RN  Outcome: Progressing  Goal: Patient-Specific Goal (Individualized)  Outcome: Progressing  Goal: Absence of Hospital-Acquired Illness or Injury  Outcome: Progressing  Goal: Optimal Comfort and Wellbeing  Outcome: Progressing  Goal: Readiness for Transition of Care  Outcome: Progressing     Problem: Fall Injury Risk  Goal: Absence of Fall and Fall-Related Injury  Outcome: Progressing     Problem: Pain Acute  Goal: Optimal Pain Control and Function  Outcome: Progressing     Problem: Skin Injury Risk Increased  Goal: Skin Health and Integrity  Outcome: Progressing

## 2024-06-03 NOTE — ASSESSMENT & PLAN NOTE
Body mass index is 40.17 kg/m². Morbid obesity complicates all aspects of disease management from diagnostic modalities to treatment. Weight loss encouraged and health benefits explained to patient.

## 2024-06-04 LAB
ALBUMIN SERPL BCP-MCNC: 3.2 G/DL (ref 3.5–5.2)
ALP SERPL-CCNC: 78 U/L (ref 55–135)
ALT SERPL W/O P-5'-P-CCNC: 19 U/L (ref 10–44)
ANION GAP SERPL CALC-SCNC: 14 MMOL/L (ref 8–16)
AORTIC ROOT ANNULUS: 3.02 CM
ASCENDING AORTA: 3.12 CM
AST SERPL-CCNC: 11 U/L (ref 10–40)
AV INDEX (PROSTH): 0.84
AV MEAN GRADIENT: 10 MMHG
AV PEAK GRADIENT: 16 MMHG
AV VALVE AREA BY VELOCITY RATIO: 2.53 CM²
AV VALVE AREA: 2.74 CM²
AV VELOCITY RATIO: 0.78
BASOPHILS # BLD AUTO: 0.05 K/UL (ref 0–0.2)
BASOPHILS NFR BLD: 0.5 % (ref 0–1.9)
BILIRUB SERPL-MCNC: 0.4 MG/DL (ref 0.1–1)
BSA FOR ECHO PROCEDURE: 1.98 M2
BUN SERPL-MCNC: 18 MG/DL (ref 6–20)
CALCIUM SERPL-MCNC: 9.4 MG/DL (ref 8.7–10.5)
CHLORIDE SERPL-SCNC: 101 MMOL/L (ref 95–110)
CO2 SERPL-SCNC: 21 MMOL/L (ref 23–29)
CREAT SERPL-MCNC: 1 MG/DL (ref 0.5–1.4)
CV ECHO LV RWT: 0.67 CM
DIFFERENTIAL METHOD BLD: ABNORMAL
DOP CALC AO PEAK VEL: 2 M/S
DOP CALC AO VTI: 34 CM
DOP CALC LVOT AREA: 3.3 CM2
DOP CALC LVOT DIAMETER: 2.04 CM
DOP CALC LVOT PEAK VEL: 1.55 M/S
DOP CALC LVOT STROKE VOLUME: 93.11 CM3
DOP CALC RVOT PEAK VEL: 0.86 M/S
DOP CALC RVOT VTI: 14.5 CM
DOP CALCLVOT PEAK VEL VTI: 28.5 CM
E WAVE DECELERATION TIME: 263.97 MSEC
E/A RATIO: 0.81
E/E' RATIO: 5.5 M/S
ECHO LV POSTERIOR WALL: 1.16 CM (ref 0.6–1.1)
EOSINOPHIL # BLD AUTO: 0.3 K/UL (ref 0–0.5)
EOSINOPHIL NFR BLD: 3 % (ref 0–8)
ERYTHROCYTE [DISTWIDTH] IN BLOOD BY AUTOMATED COUNT: 14.8 % (ref 11.5–14.5)
EST. GFR  (NO RACE VARIABLE): >60 ML/MIN/1.73 M^2
FRACTIONAL SHORTENING: 34 % (ref 28–44)
GLUCOSE SERPL-MCNC: 162 MG/DL (ref 70–110)
HCT VFR BLD AUTO: 43.6 % (ref 37–48.5)
HGB BLD-MCNC: 13.4 G/DL (ref 12–16)
IMM GRANULOCYTES # BLD AUTO: 0.14 K/UL (ref 0–0.04)
IMM GRANULOCYTES NFR BLD AUTO: 1.3 % (ref 0–0.5)
INTERVENTRICULAR SEPTUM: 1.3 CM (ref 0.6–1.1)
IVC DIAMETER: 1.35 CM
IVRT: 72.31 MSEC
LA MAJOR: 4.58 CM
LA MINOR: 4.62 CM
LA WIDTH: 3.4 CM
LEFT ATRIUM SIZE: 3.18 CM
LEFT ATRIUM VOLUME INDEX: 22.4 ML/M2
LEFT ATRIUM VOLUME: 42.27 CM3
LEFT INTERNAL DIMENSION IN SYSTOLE: 2.3 CM (ref 2.1–4)
LEFT VENTRICLE DIASTOLIC VOLUME INDEX: 26.47 ML/M2
LEFT VENTRICLE DIASTOLIC VOLUME: 50.03 ML
LEFT VENTRICLE MASS INDEX: 74 G/M2
LEFT VENTRICLE SYSTOLIC VOLUME INDEX: 9.6 ML/M2
LEFT VENTRICLE SYSTOLIC VOLUME: 18.21 ML
LEFT VENTRICULAR INTERNAL DIMENSION IN DIASTOLE: 3.48 CM (ref 3.5–6)
LEFT VENTRICULAR MASS: 139.91 G
LV LATERAL E/E' RATIO: 4.23 M/S
LV SEPTAL E/E' RATIO: 7.86 M/S
LVOT MG: 6.95 MMHG
LVOT MV: 1.27 CM/S
LYMPHOCYTES # BLD AUTO: 2 K/UL (ref 1–4.8)
LYMPHOCYTES NFR BLD: 19.4 % (ref 18–48)
MCH RBC QN AUTO: 24.1 PG (ref 27–31)
MCHC RBC AUTO-ENTMCNC: 30.7 G/DL (ref 32–36)
MCV RBC AUTO: 79 FL (ref 82–98)
MONOCYTES # BLD AUTO: 0.9 K/UL (ref 0.3–1)
MONOCYTES NFR BLD: 8.9 % (ref 4–15)
MV PEAK A VEL: 0.68 M/S
MV PEAK E VEL: 0.55 M/S
MV STENOSIS PRESSURE HALF TIME: 76.55 MS
MV VALVE AREA P 1/2 METHOD: 2.87 CM2
NEUTROPHILS # BLD AUTO: 7 K/UL (ref 1.8–7.7)
NEUTROPHILS NFR BLD: 66.9 % (ref 38–73)
NRBC BLD-RTO: 0 /100 WBC
PISA TR MAX VEL: 2.21 M/S
PLATELET # BLD AUTO: 322 K/UL (ref 150–450)
PMV BLD AUTO: 9.1 FL (ref 9.2–12.9)
POTASSIUM SERPL-SCNC: 4.2 MMOL/L (ref 3.5–5.1)
PROT SERPL-MCNC: 7.6 G/DL (ref 6–8.4)
PV MEAN GRADIENT: 2 MMHG
RA MAJOR: 3.71 CM
RA PRESSURE ESTIMATED: 3 MMHG
RA WIDTH: 2.6 CM
RBC # BLD AUTO: 5.55 M/UL (ref 4–5.4)
RV TB RVSP: 5 MMHG
SODIUM SERPL-SCNC: 136 MMOL/L (ref 136–145)
STJ: 3.16 CM
TDI LATERAL: 0.13 M/S
TDI SEPTAL: 0.07 M/S
TDI: 0.1 M/S
TR MAX PG: 20 MMHG
TRICUSPID ANNULAR PLANE SYSTOLIC EXCURSION: 1.98 CM
TV REST PULMONARY ARTERY PRESSURE: 23 MMHG
WBC # BLD AUTO: 10.52 K/UL (ref 3.9–12.7)
Z-SCORE OF LEFT VENTRICULAR DIMENSION IN END DIASTOLE: -4.19
Z-SCORE OF LEFT VENTRICULAR DIMENSION IN END SYSTOLE: -2.76

## 2024-06-04 PROCEDURE — 80053 COMPREHEN METABOLIC PANEL: CPT | Performed by: HOSPITALIST

## 2024-06-04 PROCEDURE — 63600175 PHARM REV CODE 636 W HCPCS: Performed by: HOSPITALIST

## 2024-06-04 PROCEDURE — 36415 COLL VENOUS BLD VENIPUNCTURE: CPT | Performed by: HOSPITALIST

## 2024-06-04 PROCEDURE — 25000003 PHARM REV CODE 250: Performed by: STUDENT IN AN ORGANIZED HEALTH CARE EDUCATION/TRAINING PROGRAM

## 2024-06-04 PROCEDURE — 63600175 PHARM REV CODE 636 W HCPCS: Performed by: FAMILY MEDICINE

## 2024-06-04 PROCEDURE — 25000003 PHARM REV CODE 250: Performed by: HOSPITALIST

## 2024-06-04 PROCEDURE — 94761 N-INVAS EAR/PLS OXIMETRY MLT: CPT

## 2024-06-04 PROCEDURE — 11000001 HC ACUTE MED/SURG PRIVATE ROOM

## 2024-06-04 PROCEDURE — 25000003 PHARM REV CODE 250

## 2024-06-04 PROCEDURE — 85025 COMPLETE CBC W/AUTO DIFF WBC: CPT | Performed by: HOSPITALIST

## 2024-06-04 RX ADMIN — HYDROCODONE BITARTRATE AND ACETAMINOPHEN 1 TABLET: 7.5; 325 TABLET ORAL at 11:06

## 2024-06-04 RX ADMIN — ASPIRIN 81 MG CHEWABLE TABLET 81 MG: 81 TABLET CHEWABLE at 08:06

## 2024-06-04 RX ADMIN — RIVAROXABAN 2.5 MG: 2.5 TABLET, FILM COATED ORAL at 04:06

## 2024-06-04 RX ADMIN — PROBENECID 500 MG: 500 TABLET, FILM COATED ORAL at 08:06

## 2024-06-04 RX ADMIN — LISINOPRIL 40 MG: 20 TABLET ORAL at 08:06

## 2024-06-04 RX ADMIN — HYDROMORPHONE HYDROCHLORIDE 1 MG: 2 INJECTION, SOLUTION INTRAMUSCULAR; INTRAVENOUS; SUBCUTANEOUS at 12:06

## 2024-06-04 RX ADMIN — LORAZEPAM 1 MG: 2 INJECTION INTRAMUSCULAR; INTRAVENOUS at 08:06

## 2024-06-04 RX ADMIN — HYDROMORPHONE HYDROCHLORIDE 1 MG: 2 INJECTION, SOLUTION INTRAMUSCULAR; INTRAVENOUS; SUBCUTANEOUS at 04:06

## 2024-06-04 RX ADMIN — HYDROCODONE BITARTRATE AND ACETAMINOPHEN 1 TABLET: 7.5; 325 TABLET ORAL at 08:06

## 2024-06-04 RX ADMIN — OXYCODONE HYDROCHLORIDE 5 MG: 5 TABLET ORAL at 05:06

## 2024-06-04 RX ADMIN — RIVAROXABAN 2.5 MG: 2.5 TABLET, FILM COATED ORAL at 08:06

## 2024-06-04 RX ADMIN — ALLOPURINOL 100 MG: 100 TABLET ORAL at 08:06

## 2024-06-04 NOTE — ASSESSMENT & PLAN NOTE
Patient reports smoking approximately 1 pack of cigarettes daily with no thoughts of cutting back currently. Patient educated on morbidity and mortality in regards to continuation of smoking and stressed importance of cessation. Patient currently requesting nicotine patch at time of admission.  Plan:  -will need ambulatory referral to smoking cessation at time of discharge

## 2024-06-04 NOTE — SUBJECTIVE & OBJECTIVE
Interval History: f/u right foot pain. Patient awake and alert. NAD. Patient denies any improvement in foot pain. Patient educated on importance of smoking cessation. Patient to have MRI performed today. Patient denies any other complaints.     Review of Systems  Objective:     Vital Signs (Most Recent):  Temp: 98.4 °F (36.9 °C) (06/04/24 1216)  Pulse: 100 (06/04/24 1316)  Resp: 16 (06/04/24 1316)  BP: 131/67 (06/04/24 1216)  SpO2: 100 % (06/04/24 1316) Vital Signs (24h Range):  Temp:  [97.5 °F (36.4 °C)-98.8 °F (37.1 °C)] 98.4 °F (36.9 °C)  Pulse:  [] 100  Resp:  [16-20] 16  SpO2:  [93 %-100 %] 100 %  BP: ()/() 131/67     Weight: 93 kg (205 lb)  Body mass index is 40.04 kg/m².    Intake/Output Summary (Last 24 hours) at 6/4/2024 1330  Last data filed at 6/4/2024 1230  Gross per 24 hour   Intake 240 ml   Output --   Net 240 ml         Physical Exam  Vitals and nursing note reviewed.   Constitutional:       General: She is not in acute distress.     Appearance: She is obese.   HENT:      Mouth/Throat:      Mouth: Mucous membranes are moist.      Pharynx: Oropharynx is clear.   Eyes:      Pupils: Pupils are equal, round, and reactive to light.   Cardiovascular:      Rate and Rhythm: Normal rate and regular rhythm.      Heart sounds: No murmur heard.  Pulmonary:      Effort: Pulmonary effort is normal.      Breath sounds: Normal breath sounds. No wheezing.   Abdominal:      General: Bowel sounds are normal. There is no distension.      Palpations: Abdomen is soft.      Tenderness: There is no abdominal tenderness.   Musculoskeletal:         General: Tenderness present. Normal range of motion.      Comments: Decreased range of motion upon flexion/extension of all toes throughout right foot with associated TTP throughout extending up to her foot.  Tophus appearing findings noted on toes    Skin:     General: Skin is warm and dry.      Comments: All toes on right foot dusky and cool to the touch,  capillary refill less 3 seconds    Neurological:      General: No focal deficit present.      Mental Status: She is alert and oriented to person, place, and time.            Significant Labs: All pertinent labs within the past 24 hours have been reviewed.  Recent Lab Results         06/04/24  0404        Albumin 3.2       ALP 78       ALT 19       Anion Gap 14       AST 11       Baso # 0.05       Basophil % 0.5       BILIRUBIN TOTAL 0.4  Comment: For infants and newborns, interpretation of results should be based  on gestational age, weight and in agreement with clinical  observations.    Premature Infant recommended reference ranges:  Up to 24 hours.............<8.0 mg/dL  Up to 48 hours............<12.0 mg/dL  3-5 days..................<15.0 mg/dL  6-29 days.................<15.0 mg/dL         BUN 18       Calcium 9.4       Chloride 101       CO2 21       Creatinine 1.0       Differential Method Automated       eGFR >60       Eos # 0.3       Eos % 3.0       Glucose 162       Gran # (ANC) 7.0       Gran % 66.9       Hematocrit 43.6       Hemoglobin 13.4       Immature Grans (Abs) 0.14  Comment: Mild elevation in immature granulocytes is non specific and   can be seen in a variety of conditions including stress response,   acute inflammation, trauma and pregnancy. Correlation with other   laboratory and clinical findings is essential.         Immature Granulocytes 1.3       Lymph # 2.0       Lymph % 19.4       MCH 24.1       MCHC 30.7       MCV 79       Mono # 0.9       Mono % 8.9       MPV 9.1       nRBC 0       Platelet Count 322       Potassium 4.2       PROTEIN TOTAL 7.6       RBC 5.55       RDW 14.8       Sodium 136       WBC 10.52               Significant Imaging: I have reviewed all pertinent imaging results/findings within the past 24 hours.

## 2024-06-04 NOTE — ASSESSMENT & PLAN NOTE
Patient at neurological baseline without residual deficits.  Plan:  -continue home medications  -On ASA and xarelto per cardiology

## 2024-06-04 NOTE — ASSESSMENT & PLAN NOTE
Patient presented with progressively worsening right foot pain and discoloration of all digits x2 months duration. Patient s/p multiple ED visits and was found to have up trending uric acid levels each time increasing from 6.8 > 7.0 > 8.2 with no improvement in her symptoms following treatment with allopurinol and colchicine. Patient s/p CTA chest/abdomen/pelvis last week which showed relatively normal vascular down to the proximal SFA in addition to arterial duplex which showed mental phasic flow throughout her right leg.  Initial workup in the ED revealed patient is afebrile with leukocytosis of 14.39, sed rate 25, CRP 55.8, lactic acid/procalcitonin within normal limits.  Repeat uric acid pending.  Vascular surgery consulted by ED staff and recommended obtaining MRI, continued treatment of gout, podiatry/orthopedic consultation for further evaluation, with potential angiogram of lower extremity this admission.  Given persistently elevated uric acid level and tophus appearing findings, patient likely having unresponsive gout flare but can not definitively rule out underlying ischemia given prior stroke history as well as possible vasculitic disease such as Buerger's given in setting of significant smoking history.   Plan:  -Continue current pain regimen, titrate as needed  -Bowel regimen  -Bedrest  -Wound care   -None weightbearing  -IVFs prn  -Antiemetics prn  -Tylenol as needed for fever   -f/u uric acid level  -patient initiated on allopurinol and probenecid  -f/u MRI   -Podiatry consulted  -f/u vascular surgery   -patient educated on importance of smoking cessation

## 2024-06-04 NOTE — ASSESSMENT & PLAN NOTE
Chronic, controlled. Latest blood pressure and vitals reviewed-     Temp:  [97.5 °F (36.4 °C)-98.8 °F (37.1 °C)]   Pulse:  []   Resp:  [16-20]   BP: ()/()   SpO2:  [93 %-100 %] .   Home meds for hypertension were reviewed and noted below.   Hypertension Medications               lisinopriL (PRINIVIL,ZESTRIL) 40 MG tablet Take 1 tablet (40 mg total) by mouth once daily.     While in the hospital, will manage blood pressure as follows; Continue home antihypertensive regimen    Will utilize p.r.n. blood pressure medication only if patient's blood pressure greater than 160/100 and she develops symptoms such as worsening chest pain or shortness of breath.

## 2024-06-04 NOTE — PLAN OF CARE
Problem: Adult Inpatient Plan of Care  Goal: Plan of Care Review  Outcome: Progressing  Goal: Patient-Specific Goal (Individualized)  Outcome: Progressing  Goal: Absence of Hospital-Acquired Illness or Injury  Outcome: Progressing  Goal: Optimal Comfort and Wellbeing  Outcome: Progressing  Goal: Readiness for Transition of Care  Outcome: Progressing     Problem: Fall Injury Risk  Goal: Absence of Fall and Fall-Related Injury  Outcome: Progressing     Problem: Pain Acute  Goal: Optimal Pain Control and Function  Outcome: Progressing     Problem: Skin Injury Risk Increased  Goal: Skin Health and Integrity  Outcome: Progressing     Problem: Bariatric Environmental Safety  Goal: Safety Maintained with Care  Outcome: Progressing     Problem: Diabetes Comorbidity  Goal: Blood Glucose Level Within Targeted Range  Outcome: Progressing

## 2024-06-04 NOTE — PROGRESS NOTES
Patient doing well  Her toes on right foot feel better  Pain is controlled with medication  She wants to go home  Does not necessarily need mri  I will arrange for outpatient follow up in 2 weeks in my office  Ok for storm from my standpoint    LUIS Durbin

## 2024-06-04 NOTE — PROGRESS NOTES
[facility]  [facility]  Progress Note    Patient Name: Jeanna Helm  MRN: [id number]  Patient Class: IP- Inpatient   Admission Date: 6/2/2024  Length of Stay: 2 days  Attending Physician: Guy Taylor MD  Primary Care Provider: Jarek Crespo MD        Subjective:     Principal Problem:Right foot pain        HPI:  Jeanna Helm is a 44 y.o. female with a PMH  has a past medical history of Back pain, Diabetes mellitus, Hypertension, Left knee pain, Miscarriage, and Stroke. who presented to the ED for further evaluation of worsening pain and discoloration involving the toes in her right foot.  Patient reported experiencing symptoms since April and was progressively worsened initially beginning in her right great toe and involving all digits and is beginning to extend into her foot.  She reports endorsing excruciating pain throughout all her toes and her foot described as pressure with intermittent bouts of sharp/stabbing/burning sensation upon weight-bearing and with applying pressure.  She reports complete numbness throughout all her toes which is beginning to extend into her foot.  She denied endorsing any trauma, prior history of gout, known peripheral vascular/arterial disease, or personal/family history of vasculitic disease but did report prior CVA and smokes approximately 1 pack cigarettes daily for 20+ years.  Patient also reported noticing yellow/white bumps throughout her toes which are painful in addition to worsening swelling and discoloration of all her digits.  Patient reported no known alleviating factors and aggravated with weight-bearing, movement, palpation, and presence of cold air.  Other associated symptoms included nausea and her foot feeling cold to the touch but reported all other review of systems negative except as noted above.  Patient reported being seen in the ED multiple times for similar complaints and was found to have up trending uric acid levels each time increasing  from 6.8 > 7.0 > 8.2 with no improvement in her symptoms following treatment with allopurinol and colchicine.  Patient reported Norco takes the edge off but never completely resolves.  She reported having an appointment with vascular surgery towards the end of the month but can not wait due to pain.  Patient does report being on Plavix but denies use of any other blood thinners/anticoagulants.  Patient also underwent CTA chest/abdomen/pelvis/week which showed relatively normal vascular down to the proximal SFA in addition to arterial duplex which showed mental phasic flow throughout her right leg.  Initial workup in the ED revealed patient is afebrile with leukocytosis measuring 14.39, sed rate 25, CRP 55.8, lactic acid/procalcitonin within normal limits.  Repeat uric acid pending.  Vascular surgery consulted by ED staff and recommended obtaining MRI, continued treatment of gout, podiatry/orthopedic consultation for further evaluation, with potential angiogram of lower extremity this admission.  Patient admitted to Hospital Medicine inpatient for continued medical management.      PCP: Jarek Crespo    Overview/Hospital Course:  43 y/o female presents to ED for further evaluation of worsening pain and discoloration involving the toes in her right foot.   Labs: Sed rate 25, CRP 55.8,   MRI of Right foot pending- patient refused 6/3/24, patient agreeable to having MRI performed 6/4/24  BC: BRIAN   Vascular surgery consulted: Dr. Parvez Durbin recommends start indomethacin, continue allopurinol. States there is a remote possibility this is embolic, if podiatry team does not feel this is gouty flare we could consider diagnostic angiography. Dr. Durbin performed diagnostic angiography 6/3/24 and recommended ECHO, starting the patient on ASA and Xarelto 2.5mg PO.   Podiatry consulted, recommends patient complete the MRI   PRN pain medications   ECHO: normal systolic function with ejection fraction of 60 - 65%. There  is normal diastolic function. Pulmonary Artery: The estimated pulmonary artery systolic pressure is 23 mmHg.    Interval History: f/u right foot pain. Patient awake and alert. NAD. Patient denies any improvement in foot pain. Patient educated on importance of smoking cessation. Patient to have MRI performed today. Patient denies any other complaints.     Review of Systems  Objective:     Vital Signs (Most Recent):  Temp: 98.4 °F (36.9 °C) (06/04/24 1216)  Pulse: 100 (06/04/24 1316)  Resp: 16 (06/04/24 1316)  BP: 131/67 (06/04/24 1216)  SpO2: 100 % (06/04/24 1316) Vital Signs (24h Range):  Temp:  [97.5 °F (36.4 °C)-98.8 °F (37.1 °C)] 98.4 °F (36.9 °C)  Pulse:  [] 100  Resp:  [16-20] 16  SpO2:  [93 %-100 %] 100 %  BP: ()/() 131/67     Weight: 93 kg (205 lb)  Body mass index is 40.04 kg/m².    Intake/Output Summary (Last 24 hours) at 6/4/2024 1330  Last data filed at 6/4/2024 1230  Gross per 24 hour   Intake 240 ml   Output --   Net 240 ml         Physical Exam  Vitals and nursing note reviewed.   Constitutional:       General: She is not in acute distress.     Appearance: She is obese.   HENT:      Mouth/Throat:      Mouth: Mucous membranes are moist.      Pharynx: Oropharynx is clear.   Eyes:      Pupils: Pupils are equal, round, and reactive to light.   Cardiovascular:      Rate and Rhythm: Normal rate and regular rhythm.      Heart sounds: No murmur heard.  Pulmonary:      Effort: Pulmonary effort is normal.      Breath sounds: Normal breath sounds. No wheezing.   Abdominal:      General: Bowel sounds are normal. There is no distension.      Palpations: Abdomen is soft.      Tenderness: There is no abdominal tenderness.   Musculoskeletal:         General: Tenderness present. Normal range of motion.      Comments: Decreased range of motion upon flexion/extension of all toes throughout right foot with associated TTP throughout extending up to her foot.  Tophus appearing findings noted on toes     Skin:     General: Skin is warm and dry.      Comments: All toes on right foot dusky and cool to the touch, capillary refill less 3 seconds    Neurological:      General: No focal deficit present.      Mental Status: She is alert and oriented to person, place, and time.            Significant Labs: All pertinent labs within the past 24 hours have been reviewed.  Recent Lab Results         06/04/24  0404        Albumin 3.2       ALP 78       ALT 19       Anion Gap 14       AST 11       Baso # 0.05       Basophil % 0.5       BILIRUBIN TOTAL 0.4  Comment: For infants and newborns, interpretation of results should be based  on gestational age, weight and in agreement with clinical  observations.    Premature Infant recommended reference ranges:  Up to 24 hours.............<8.0 mg/dL  Up to 48 hours............<12.0 mg/dL  3-5 days..................<15.0 mg/dL  6-29 days.................<15.0 mg/dL         BUN 18       Calcium 9.4       Chloride 101       CO2 21       Creatinine 1.0       Differential Method Automated       eGFR >60       Eos # 0.3       Eos % 3.0       Glucose 162       Gran # (ANC) 7.0       Gran % 66.9       Hematocrit 43.6       Hemoglobin 13.4       Immature Grans (Abs) 0.14  Comment: Mild elevation in immature granulocytes is non specific and   can be seen in a variety of conditions including stress response,   acute inflammation, trauma and pregnancy. Correlation with other   laboratory and clinical findings is essential.         Immature Granulocytes 1.3       Lymph # 2.0       Lymph % 19.4       MCH 24.1       MCHC 30.7       MCV 79       Mono # 0.9       Mono % 8.9       MPV 9.1       nRBC 0       Platelet Count 322       Potassium 4.2       PROTEIN TOTAL 7.6       RBC 5.55       RDW 14.8       Sodium 136       WBC 10.52               Significant Imaging: I have reviewed all pertinent imaging results/findings within the past 24 hours.    Assessment/Plan:      * Right foot pain  See plan for  "discoloration of skin of multiple sites of lower extremity       Tobacco abuse  Patient reports smoking approximately 1 pack of cigarettes daily with no thoughts of cutting back currently. Patient educated on morbidity and mortality in regards to continuation of smoking and stressed importance of cessation. Patient currently requesting nicotine patch at time of admission.  Plan:  -will need ambulatory referral to smoking cessation at time of discharge       Severe obesity (BMI >= 40)  Body mass index is 40.04 kg/m². Morbid obesity complicates all aspects of disease management from diagnostic modalities to treatment. Weight loss encouraged and health benefits explained to patient.       H/O: CVA (cerebrovascular accident)  Patient at neurological baseline without residual deficits.  Plan:  -continue home medications  -On ASA and xarelto per cardiology         HLD (hyperlipidemia)  Patient is chronically on statin.will continue for now. Last Lipid Panel: No results found for: "CHOL", "HDL", "LDLCALC", "TRIG", "CHOLHDL"  Plan:  -Continue home medication  -low fat/low calorie diet      Hypertension  Chronic, controlled. Latest blood pressure and vitals reviewed-     Temp:  [97.5 °F (36.4 °C)-98.8 °F (37.1 °C)]   Pulse:  []   Resp:  [16-20]   BP: ()/()   SpO2:  [93 %-100 %] .   Home meds for hypertension were reviewed and noted below.   Hypertension Medications               lisinopriL (PRINIVIL,ZESTRIL) 40 MG tablet Take 1 tablet (40 mg total) by mouth once daily.     While in the hospital, will manage blood pressure as follows; Continue home antihypertensive regimen    Will utilize p.r.n. blood pressure medication only if patient's blood pressure greater than 160/100 and she develops symptoms such as worsening chest pain or shortness of breath.      Type 2 diabetes mellitus  Patient's FSGs are uncontrolled due to hyperglycemia on current medication regimen.  Last A1c reviewed- No results found for: " ""LABA1C", "HGBA1C"  Most recent fingerstick glucose reviewed- No results for input(s): "POCTGLUCOSE" in the last 24 hours.    Current correctional scale  Low  Titrate as needed  anti-hyperglycemic dose as follows-   Antihyperglycemics (From admission, onward)      None        Plan:  -SSI  -Accu-checks  -Hold oral hypoglycemics while patient is in the hospital  -Hypoglycemic protocol       Discoloration of skin of multiple sites of lower extremity  Patient presented with progressively worsening right foot pain and discoloration of all digits x2 months duration. Patient s/p multiple ED visits and was found to have up trending uric acid levels each time increasing from 6.8 > 7.0 > 8.2 with no improvement in her symptoms following treatment with allopurinol and colchicine. Patient s/p CTA chest/abdomen/pelvis last week which showed relatively normal vascular down to the proximal SFA in addition to arterial duplex which showed mental phasic flow throughout her right leg.  Initial workup in the ED revealed patient is afebrile with leukocytosis of 14.39, sed rate 25, CRP 55.8, lactic acid/procalcitonin within normal limits.  Repeat uric acid pending.  Vascular surgery consulted by ED staff and recommended obtaining MRI, continued treatment of gout, podiatry/orthopedic consultation for further evaluation, with potential angiogram of lower extremity this admission.  Given persistently elevated uric acid level and tophus appearing findings, patient likely having unresponsive gout flare but can not definitively rule out underlying ischemia given prior stroke history as well as possible vasculitic disease such as Buerger's given in setting of significant smoking history.   Plan:  -Continue current pain regimen, titrate as needed  -Bowel regimen  -Bedrest  -Wound care   -None weightbearing  -IVFs prn  -Antiemetics prn  -Tylenol as needed for fever   -f/u uric acid level  -patient initiated on allopurinol and probenecid  -f/u MRI "   -Podiatry consulted  -f/u vascular surgery   -patient educated on importance of smoking cessation        VTE Risk Mitigation (From admission, onward)           Ordered     IP VTE HIGH RISK PATIENT  Once         06/02/24 2049     Place sequential compression device  Until discontinued         06/02/24 2049                    Discharge Planning   FRED:      Code Status: Full Code   Is the patient medically ready for discharge?:     Reason for patient still in hospital (select all that apply): Treatment and Imaging  Discharge Plan A: Home            The patient's case has been reviewed by my supervising physician.   Supervising physician will provide additional orders and recommendations at his/her discretion.  Please see supervising physicians documentation and/or orders for further details.         Jacque Read NP  Department of Hospital Medicine   [facility]

## 2024-06-04 NOTE — ASSESSMENT & PLAN NOTE
Body mass index is 40.04 kg/m². Morbid obesity complicates all aspects of disease management from diagnostic modalities to treatment. Weight loss encouraged and health benefits explained to patient.

## 2024-06-05 VITALS
SYSTOLIC BLOOD PRESSURE: 132 MMHG | WEIGHT: 205 LBS | HEART RATE: 83 BPM | OXYGEN SATURATION: 100 % | TEMPERATURE: 98 F | DIASTOLIC BLOOD PRESSURE: 78 MMHG | BODY MASS INDEX: 40.25 KG/M2 | RESPIRATION RATE: 20 BRPM | HEIGHT: 60 IN

## 2024-06-05 LAB
ALBUMIN SERPL BCP-MCNC: 3.1 G/DL (ref 3.5–5.2)
ALP SERPL-CCNC: 81 U/L (ref 55–135)
ALT SERPL W/O P-5'-P-CCNC: 18 U/L (ref 10–44)
ANION GAP SERPL CALC-SCNC: 9 MMOL/L (ref 8–16)
AST SERPL-CCNC: 12 U/L (ref 10–40)
BASOPHILS # BLD AUTO: 0.05 K/UL (ref 0–0.2)
BASOPHILS NFR BLD: 0.4 % (ref 0–1.9)
BILIRUB SERPL-MCNC: 0.4 MG/DL (ref 0.1–1)
BUN SERPL-MCNC: 14 MG/DL (ref 6–20)
CALCIUM SERPL-MCNC: 9.2 MG/DL (ref 8.7–10.5)
CHLORIDE SERPL-SCNC: 103 MMOL/L (ref 95–110)
CO2 SERPL-SCNC: 21 MMOL/L (ref 23–29)
CREAT SERPL-MCNC: 0.8 MG/DL (ref 0.5–1.4)
DIFFERENTIAL METHOD BLD: ABNORMAL
EOSINOPHIL # BLD AUTO: 0.2 K/UL (ref 0–0.5)
EOSINOPHIL NFR BLD: 1.9 % (ref 0–8)
ERYTHROCYTE [DISTWIDTH] IN BLOOD BY AUTOMATED COUNT: 14.6 % (ref 11.5–14.5)
EST. GFR  (NO RACE VARIABLE): >60 ML/MIN/1.73 M^2
GLUCOSE SERPL-MCNC: 173 MG/DL (ref 70–110)
HCT VFR BLD AUTO: 37.7 % (ref 37–48.5)
HGB BLD-MCNC: 11.9 G/DL (ref 12–16)
IMM GRANULOCYTES # BLD AUTO: 0.08 K/UL (ref 0–0.04)
IMM GRANULOCYTES NFR BLD AUTO: 0.6 % (ref 0–0.5)
LYMPHOCYTES # BLD AUTO: 2.3 K/UL (ref 1–4.8)
LYMPHOCYTES NFR BLD: 18.2 % (ref 18–48)
MCH RBC QN AUTO: 24.7 PG (ref 27–31)
MCHC RBC AUTO-ENTMCNC: 31.6 G/DL (ref 32–36)
MCV RBC AUTO: 78 FL (ref 82–98)
MONOCYTES # BLD AUTO: 1 K/UL (ref 0.3–1)
MONOCYTES NFR BLD: 7.9 % (ref 4–15)
NEUTROPHILS # BLD AUTO: 9.1 K/UL (ref 1.8–7.7)
NEUTROPHILS NFR BLD: 71 % (ref 38–73)
NRBC BLD-RTO: 0 /100 WBC
PLATELET # BLD AUTO: 309 K/UL (ref 150–450)
PMV BLD AUTO: 8.9 FL (ref 9.2–12.9)
POTASSIUM SERPL-SCNC: 4.5 MMOL/L (ref 3.5–5.1)
PROT SERPL-MCNC: 7.2 G/DL (ref 6–8.4)
RBC # BLD AUTO: 4.82 M/UL (ref 4–5.4)
SODIUM SERPL-SCNC: 133 MMOL/L (ref 136–145)
WBC # BLD AUTO: 12.8 K/UL (ref 3.9–12.7)

## 2024-06-05 PROCEDURE — S4991 NICOTINE PATCH NONLEGEND: HCPCS | Performed by: HOSPITALIST

## 2024-06-05 PROCEDURE — 25000003 PHARM REV CODE 250: Performed by: STUDENT IN AN ORGANIZED HEALTH CARE EDUCATION/TRAINING PROGRAM

## 2024-06-05 PROCEDURE — 36415 COLL VENOUS BLD VENIPUNCTURE: CPT | Performed by: HOSPITALIST

## 2024-06-05 PROCEDURE — 85025 COMPLETE CBC W/AUTO DIFF WBC: CPT | Performed by: HOSPITALIST

## 2024-06-05 PROCEDURE — 25000003 PHARM REV CODE 250

## 2024-06-05 PROCEDURE — 80053 COMPREHEN METABOLIC PANEL: CPT | Performed by: HOSPITALIST

## 2024-06-05 PROCEDURE — 25000003 PHARM REV CODE 250: Performed by: HOSPITALIST

## 2024-06-05 RX ORDER — LISINOPRIL 40 MG/1
40 TABLET ORAL DAILY
Qty: 30 TABLET | Refills: 0 | Status: SHIPPED | OUTPATIENT
Start: 2024-06-05 | End: 2024-07-05

## 2024-06-05 RX ORDER — HYDROCODONE BITARTRATE AND ACETAMINOPHEN 7.5; 325 MG/1; MG/1
1 TABLET ORAL EVERY 6 HOURS PRN
Qty: 16 TABLET | Refills: 0 | Status: ON HOLD | OUTPATIENT
Start: 2024-06-05 | End: 2024-06-11

## 2024-06-05 RX ORDER — VARENICLINE TARTRATE 0.5 (11)-1
KIT ORAL
Qty: 1 EACH | Refills: 0 | Status: SHIPPED | OUTPATIENT
Start: 2024-06-05

## 2024-06-05 RX ORDER — NAPROXEN SODIUM 220 MG/1
81 TABLET, FILM COATED ORAL DAILY
Qty: 30 TABLET | Refills: 0 | Status: SHIPPED | OUTPATIENT
Start: 2024-06-06 | End: 2024-07-06

## 2024-06-05 RX ORDER — ALLOPURINOL 100 MG/1
200 TABLET ORAL DAILY
Qty: 10 TABLET | Refills: 0 | Status: SHIPPED | OUTPATIENT
Start: 2024-06-05

## 2024-06-05 RX ADMIN — ASPIRIN 81 MG CHEWABLE TABLET 81 MG: 81 TABLET CHEWABLE at 08:06

## 2024-06-05 RX ADMIN — HYDROCODONE BITARTRATE AND ACETAMINOPHEN 1 TABLET: 7.5; 325 TABLET ORAL at 05:06

## 2024-06-05 RX ADMIN — OXYCODONE HYDROCHLORIDE 5 MG: 5 TABLET ORAL at 02:06

## 2024-06-05 RX ADMIN — OXYCODONE HYDROCHLORIDE 5 MG: 5 TABLET ORAL at 08:06

## 2024-06-05 RX ADMIN — PROBENECID 500 MG: 500 TABLET, FILM COATED ORAL at 08:06

## 2024-06-05 RX ADMIN — NICOTINE 1 PATCH: 21 PATCH, EXTENDED RELEASE TRANSDERMAL at 08:06

## 2024-06-05 RX ADMIN — ALLOPURINOL 100 MG: 100 TABLET ORAL at 08:06

## 2024-06-05 RX ADMIN — RIVAROXABAN 2.5 MG: 2.5 TABLET, FILM COATED ORAL at 08:06

## 2024-06-05 RX ADMIN — HYDROCODONE BITARTRATE AND ACETAMINOPHEN 1 TABLET: 7.5; 325 TABLET ORAL at 04:06

## 2024-06-05 RX ADMIN — LISINOPRIL 40 MG: 20 TABLET ORAL at 08:06

## 2024-06-05 NOTE — CONSULTS
Podiatry consulted on patient who presents with severe pains to right foot. Pt has had multiple presentations to ER with severe ischemic pains to right foot. Digits with palor/cyanotic changes. Hx of tobacco abuse, hx of gout. No acute signs of infection. Labs stable.  Pt has had full vascular work up. Underwent, Aortogram (Dr. Durbin) to evaluate for blue toe syndrome. I discussed case with Vascular team. 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   I discussed with patient- absolute smoking cessation, Xarelto/ASA. There is nothing to do from podiatry standpoint inpatient aside from amputation which is highly aggressive at this juncture. Pt agrees. States she notes significant improvement in pains s/p aortogram. MRI reviewed- no infectious etiology. Pt should f/u with vascular surgery for close monitoring of digits as toes could demarcate. F/u in outpatient podiatry for surgical planning in event symptoms progress.    Report Electronically Signed By:     Anitra Chowdary DPM   Podiatry  [facility]  6/5/2024

## 2024-06-05 NOTE — ASSESSMENT & PLAN NOTE
Patient at neurological baseline without residual deficits.  Plan:  -continue home medications  -On ASA and xarelto per vascular surgery team recommendations

## 2024-06-05 NOTE — SUBJECTIVE & OBJECTIVE
Review of Systems   Constitutional:  Negative for chills, fatigue and fever.   HENT:  Negative for congestion, ear pain and facial swelling.    Eyes:  Negative for pain, redness and visual disturbance.   Respiratory:  Negative for chest tightness and shortness of breath.    Cardiovascular:  Negative for chest pain.   Gastrointestinal:  Negative for abdominal pain, nausea and vomiting.   Endocrine: Negative.    Genitourinary: Negative.    Musculoskeletal: Negative.    Skin:  Positive for color change.   Allergic/Immunologic: Negative.    Neurological:  Negative for dizziness, facial asymmetry, light-headedness and headaches.   Psychiatric/Behavioral:  Negative for agitation and behavioral problems.      Objective:     Vital Signs (Most Recent):  Temp: 98 °F (36.7 °C) (06/05/24 0736)  Pulse: 94 (06/05/24 0821)  Resp: 18 (06/05/24 0823)  BP: (!) 142/88 (06/05/24 0736)  SpO2: 100 % (06/05/24 0736) Vital Signs (24h Range):  Temp:  [98 °F (36.7 °C)-99 °F (37.2 °C)] 98 °F (36.7 °C)  Pulse:  [] 94  Resp:  [16-18] 18  SpO2:  [85 %-100 %] 100 %  BP: (118-142)/(60-88) 142/88     Weight: 93 kg (205 lb)  Body mass index is 40.04 kg/m².    Intake/Output Summary (Last 24 hours) at 6/5/2024 0928  Last data filed at 6/4/2024 1230  Gross per 24 hour   Intake 240 ml   Output --   Net 240 ml         Physical Exam  Constitutional:       General: She is not in acute distress.     Appearance: Normal appearance.   HENT:      Head: Normocephalic and atraumatic.      Nose: Nose normal. No congestion or rhinorrhea.   Eyes:      Extraocular Movements: Extraocular movements intact.      Conjunctiva/sclera: Conjunctivae normal.      Pupils: Pupils are equal, round, and reactive to light.   Cardiovascular:      Rate and Rhythm: Normal rate and regular rhythm.      Pulses: Normal pulses.      Heart sounds: Normal heart sounds. No murmur heard.     No gallop.   Pulmonary:      Effort: Pulmonary effort is normal. No respiratory distress.      " Breath sounds: Normal breath sounds. No wheezing.   Abdominal:      General: Bowel sounds are normal. There is no distension.      Tenderness: There is no abdominal tenderness. There is no guarding.   Genitourinary:     Comments: Patient defers.  Musculoskeletal:         General: Normal range of motion.      Cervical back: Normal range of motion.      Right lower leg: No edema.      Left lower leg: No edema.        Feet:    Feet:      Right foot:      Skin integrity: Skin breakdown present.      Comments: Right foot 1st-5th digits: discoloration likely due to ischemia   Neurological:      General: No focal deficit present.      Mental Status: She is alert and oriented to person, place, and time.   Psychiatric:         Mood and Affect: Mood normal.         Behavior: Behavior normal.             Significant Labs: All pertinent labs within the past 24 hours have been reviewed.  Blood Culture: No results for input(s): "LABBLOO" in the last 48 hours.  CBC:   Recent Labs   Lab 06/04/24  0404 06/05/24  0600   WBC 10.52 12.80*   HGB 13.4 11.9*   HCT 43.6 37.7    309     CMP:   Recent Labs   Lab 06/04/24  0404 06/05/24  0600    133*   K 4.2 4.5    103   CO2 21* 21*   * 173*   BUN 18 14   CREATININE 1.0 0.8   CALCIUM 9.4 9.2   PROT 7.6 7.2   ALBUMIN 3.2* 3.1*   BILITOT 0.4 0.4   ALKPHOS 78 81   AST 11 12   ALT 19 18   ANIONGAP 14 9       Significant Imaging: I have reviewed all pertinent imaging results/findings within the past 24 hours.  "

## 2024-06-05 NOTE — ASSESSMENT & PLAN NOTE
Patient presented with progressively worsening right foot pain and discoloration of all digits x2 months duration. Patient s/p multiple ED visits and was found to have up trending uric acid levels each time increasing from 6.8 > 7.0 > 8.2 with no improvement in her symptoms following treatment with allopurinol and colchicine. Patient s/p CTA chest/abdomen/pelvis last week which showed relatively normal vascular down to the proximal SFA in addition to arterial duplex which showed mental phasic flow throughout her right leg.  Initial workup in the ED revealed patient is afebrile with leukocytosis of 14.39, sed rate 25, CRP 55.8, lactic acid/procalcitonin within normal limits.  Repeat uric acid pending.  Vascular surgery consulted by ED staff and recommended obtaining MRI, continued treatment of gout, podiatry/orthopedic consultation for further evaluation, with potential angiogram of lower extremity this admission.  Given persistently elevated uric acid level and tophus appearing findings, patient likely having unresponsive gout flare but can not definitively rule out underlying ischemia given prior stroke history as well as possible vasculitic disease such as Buerger's given in setting of significant smoking history.   Plan:  -Continue current pain regimen, titrate as needed  -Bowel regimen  -Bedrest  -Wound care   -None weightbearing  -IVFs prn  -Antiemetics prn  -Tylenol as needed for fever   -f/u uric acid level  -patient initiated on allopurinol and probenecid  -f/u MRI   -Podiatry consulted  -f/u vascular surgery   -patient educated on importance of smoking cessation  -patient to follow up with podiatry as needed if symptoms worse  -patient to follow up in 2 weeks on 6/19/24 with vascular surgery team outpatient   -increased dose of allopurinol due to elevated uric acid level.

## 2024-06-05 NOTE — ASSESSMENT & PLAN NOTE
Patient reports smoking approximately 1 pack of cigarettes daily with no thoughts of cutting back currently. Patient educated on morbidity and mortality in regards to continuation of smoking and stressed importance of cessation. Patient currently requesting nicotine patch at time of admission.  Plan:  -will need ambulatory referral to smoking cessation at time of discharge   -discharged with prescription for chantix per recommendation of Dr. Durbin

## 2024-06-05 NOTE — PROGRESS NOTES
Patient is sitting in the bed this morning.  Still states her foot feels okay and is anxious to go home.  We will have arrangements to see Dr. James in 2 weeks.

## 2024-06-05 NOTE — NURSING
Patient discharge home self care. All discharge instruction, education, and Rxs giving to patient, verbal understanding received. PIV removed with cath tip intact. Patient discharge via w/c per transportation.

## 2024-06-05 NOTE — DISCHARGE SUMMARY
[facility]  [facility] Medicine  Discharge Summary      Patient Name: Jeanna Helm  MRN: [id number]  [facility]: [id number]  Patient Class: IP- Inpatient  Admission Date: 6/2/2024  Hospital Length of Stay: 3 days  Discharge Date and Time:  06/05/2024 11:05 AM  Attending Physician: Guy Taylor MD   Discharging Provider: Jacque Read NP  Primary Care Provider: Jarek Crespo MD    Primary Care Team: Networked reference to record PCT     HPI:   Jeanna Helm is a 44 y.o. female with a PMH  has a past medical history of Back pain, Diabetes mellitus, Hypertension, Left knee pain, Miscarriage, and Stroke. who presented to the ED for further evaluation of worsening pain and discoloration involving the toes in her right foot.  Patient reported experiencing symptoms since April and was progressively worsened initially beginning in her right great toe and involving all digits and is beginning to extend into her foot.  She reports endorsing excruciating pain throughout all her toes and her foot described as pressure with intermittent bouts of sharp/stabbing/burning sensation upon weight-bearing and with applying pressure.  She reports complete numbness throughout all her toes which is beginning to extend into her foot.  She denied endorsing any trauma, prior history of gout, known peripheral vascular/arterial disease, or personal/family history of vasculitic disease but did report prior CVA and smokes approximately 1 pack cigarettes daily for 20+ years.  Patient also reported noticing yellow/white bumps throughout her toes which are painful in addition to worsening swelling and discoloration of all her digits.  Patient reported no known alleviating factors and aggravated with weight-bearing, movement, palpation, and presence of cold air.  Other associated symptoms included nausea and her foot feeling cold to the touch but reported all other review of systems negative except as noted above.  Patient reported being seen  in the ED multiple times for similar complaints and was found to have up trending uric acid levels each time increasing from 6.8 > 7.0 > 8.2 with no improvement in her symptoms following treatment with allopurinol and colchicine.  Patient reported Norco takes the edge off but never completely resolves.  She reported having an appointment with vascular surgery towards the end of the month but can not wait due to pain.  Patient does report being on Plavix but denies use of any other blood thinners/anticoagulants.  Patient also underwent CTA chest/abdomen/pelvis/week which showed relatively normal vascular down to the proximal SFA in addition to arterial duplex which showed mental phasic flow throughout her right leg.  Initial workup in the ED revealed patient is afebrile with leukocytosis measuring 14.39, sed rate 25, CRP 55.8, lactic acid/procalcitonin within normal limits.  Repeat uric acid pending.  Vascular surgery consulted by ED staff and recommended obtaining MRI, continued treatment of gout, podiatry/orthopedic consultation for further evaluation, with potential angiogram of lower extremity this admission.  Patient admitted to Hospital Medicine inpatient for continued medical management.      PCP: Jarek Crespo    Procedure(s) (LRB):  AORTOGRAM, WITH EXTREMITY RUNOFF (N/A)      Hospital Course:   43 y/o female presents to ED for further evaluation of worsening pain and discoloration involving the toes in her right foot.   Labs: Sed rate 25, CRP 55.8  BC: NGTD   Vascular surgery consulted: Dr. Parvez Durbin recommends start indomethacin, continue allopurinol. States there is a remote possibility this is embolic, if podiatry team does not feel this is gouty flare we could consider diagnostic angiography. Dr. 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   Podiatry consulted, no concerns for infectious  etiology s/p MRI per Dr. 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 Trace 1st through 3rd intermetatarsal bursitis.  Mild edema of the intrinsic foot musculature.  Flexor and extensor tendons are intact. No osteomyelitis.  PRN pain medications   ECHO: normal systolic function with ejection fraction of 60 - 65%. There is normal diastolic function. Pulmonary Artery: The estimated pulmonary artery systolic pressure is 23 mmHg.  The patient was seen and examined today and determined to be stable for discharge. Will f/u with CVT outpatient in 2 weeks. Ambulatory referral to smoking cessation and discharged with prescription for chantix per the recommendation of vascular team. Patient educated on importance of smoking cessation for risk reduction.     Patient discharged with ambulatory referral to internal medicine as she states she does not have a PCP for close follow up. TCC order placed to aid patient in establishing care with PCP to manage co-morbid conditions. Patient discharged with 4 days worth of pain medication, until she can follow up with a PCP for continued pain management if needed. Patient to follow up with vascular surgeon on 6/19/24, number to office provided on discharge paperwork and patient educated. Patient discharged with prescription for xarelto 2.5mg PO and ASA daily per recommendations of Dr. Parvez Durbin. Will follow up with podiatry as needed if symptoms worsen. Allopurinol dosage increased to 200mg due to continued elevated uric acid level.     Patient seen and examined on the day of discharge.  All questions and concerns were addressed prior to discharge.    Face to face encounter with patient: 46 min      Goals of Care Treatment Preferences:  Code Status: Full Code      Consults:   Consults (From admission, onward)          Status Ordering Provider     Inpatient consult to Podiatry  Once        Provider:  Anitra Chowdary, HOWIE    Completed DEMARCUS LAY     Inpatient consult to  "Vascular Surgery  Once        Provider:  Parvez Durbin MD    Completed HALLEY FERMIN            Neuro  H/O: CVA (cerebrovascular accident)  Patient at neurological baseline without residual deficits.  Plan:  -continue home medications  -On ASA and xarelto per vascular surgery team recommendations         Cardiac/Vascular  HLD (hyperlipidemia)  Patient is chronically on statin.will continue for now. Last Lipid Panel: No results found for: "CHOL", "HDL", "LDLCALC", "TRIG", "CHOLHDL"  Plan:  -Continue home medication  -low fat/low calorie diet      Hypertension  Chronic, controlled. Latest blood pressure and vitals reviewed-     Temp:  [98 °F (36.7 °C)-99 °F (37.2 °C)]   Pulse:  []   Resp:  [16-18]   BP: (118-142)/(60-88)   SpO2:  [85 %-100 %] .   Home meds for hypertension were reviewed and noted below.   Hypertension Medications               lisinopriL (PRINIVIL,ZESTRIL) 40 MG tablet Take 1 tablet (40 mg total) by mouth once daily.     While in the hospital, will manage blood pressure as follows; Continue home antihypertensive regimen    Will utilize p.r.n. blood pressure medication only if patient's blood pressure greater than 160/100 and she develops symptoms such as worsening chest pain or shortness of breath.      Endocrine  Severe obesity (BMI >= 40)  Body mass index is 40.04 kg/m². Morbid obesity complicates all aspects of disease management from diagnostic modalities to treatment. Weight loss encouraged and health benefits explained to patient.       Type 2 diabetes mellitus  Patient's FSGs are uncontrolled due to hyperglycemia on current medication regimen.  Last A1c reviewed- No results found for: "LABA1C", "HGBA1C"  Most recent fingerstick glucose reviewed- No results for input(s): "POCTGLUCOSE" in the last 24 hours.    Current correctional scale  Low  Titrate as needed  anti-hyperglycemic dose as follows-   Antihyperglycemics (From admission, onward)      None      "   Plan:  -SSI  -Accu-checks  -Hold oral hypoglycemics while patient is in the hospital  -Hypoglycemic protocol       Orthopedic  * Right foot pain  See plan for discoloration of skin of multiple sites of lower extremity       Other  Tobacco abuse  Patient reports smoking approximately 1 pack of cigarettes daily with no thoughts of cutting back currently. Patient educated on morbidity and mortality in regards to continuation of smoking and stressed importance of cessation. Patient currently requesting nicotine patch at time of admission.  Plan:  -will need ambulatory referral to smoking cessation at time of discharge   -discharged with prescription for chantix per recommendation of Dr. Durbin       Discoloration of skin of multiple sites of lower extremity  Patient presented with progressively worsening right foot pain and discoloration of all digits x2 months duration. Patient s/p multiple ED visits and was found to have up trending uric acid levels each time increasing from 6.8 > 7.0 > 8.2 with no improvement in her symptoms following treatment with allopurinol and colchicine. Patient s/p CTA chest/abdomen/pelvis last week which showed relatively normal vascular down to the proximal SFA in addition to arterial duplex which showed mental phasic flow throughout her right leg.  Initial workup in the ED revealed patient is afebrile with leukocytosis of 14.39, sed rate 25, CRP 55.8, lactic acid/procalcitonin within normal limits.  Repeat uric acid pending.  Vascular surgery consulted by ED staff and recommended obtaining MRI, continued treatment of gout, podiatry/orthopedic consultation for further evaluation, with potential angiogram of lower extremity this admission.  Given persistently elevated uric acid level and tophus appearing findings, patient likely having unresponsive gout flare but can not definitively rule out underlying ischemia given prior stroke history as well as possible vasculitic disease such as  Buerger's given in setting of significant smoking history.   Plan:  -Continue current pain regimen, titrate as needed  -Bowel regimen  -Bedrest  -Wound care   -None weightbearing  -IVFs prn  -Antiemetics prn  -Tylenol as needed for fever   -f/u uric acid level  -patient initiated on allopurinol and probenecid  -f/u MRI   -Podiatry consulted  -f/u vascular surgery   -patient educated on importance of smoking cessation  -patient to follow up with podiatry as needed if symptoms worse  -patient to follow up in 2 weeks on 6/19/24 with vascular surgery team outpatient   -increased dose of allopurinol due to elevated uric acid level.         Final Active Diagnoses:    Diagnosis Date Noted POA    PRINCIPAL PROBLEM:  Right foot pain [M79.671] 06/03/2024 Yes    Discoloration of skin of multiple sites of lower extremity [L81.9] 06/03/2024 Yes    Type 2 diabetes mellitus [E11.9] 06/03/2024 Yes     Chronic    Hypertension [I10] 06/03/2024 Yes     Chronic    HLD (hyperlipidemia) [E78.5] 06/03/2024 Yes     Chronic    H/O: CVA (cerebrovascular accident) [Z86.73] 06/03/2024 Not Applicable     Chronic    Severe obesity (BMI >= 40) [E66.01] 06/03/2024 Yes     Chronic    Tobacco abuse [Z72.0] 06/03/2024 Yes     Chronic      Problems Resolved During this Admission:       Discharged Condition: good    Disposition: Home or Self Care    Follow Up:   Follow-up Information       Parvez Durbin MD Follow up.    Specialty: Vascular Surgery  Why: Schedule an appointment to follow up on 6/19/24  Contact information:  [address]  [place] [address]  [phone]               Anitra Chowdary DPM. Schedule an appointment as soon as possible for a visit.    Specialty: Podiatry  Why: As needed for new or worsening symptoms  Contact information:  [address]  [place] [address]  [phone]                           Patient Instructions:      Ambulatory referral/consult to Internal Medicine   Standing Status: Future  "  Referral Priority: Routine Referral Type: Consultation   Referral Reason: Specialty Services Required   Requested Specialty: Internal Medicine   Number of Visits Requested: 1     Ambulatory referral/consult to [facility]   Standing Status: Future   Referral Priority: Routine Referral Type: Consultation   Referral Reason: Specialty Services Required   Number of Visits Requested: 1     Ambulatory referral/consult to Smoking Cessation Program   Standing Status: Future   Referral Priority: Routine Referral Type: Consultation   Referral Reason: Specialty Services Required   Requested Specialty: CTTS   Number of Visits Requested: 1     Diet Cardiac     Reason for not Prescribing Nicotine Replacement   Order Comments: Discharged on chantix     Order Specific Question Answer Comments   Reason for not Prescribing: Patient refused discharged on chantix as per recommendation of vascular surgery team       Significant Diagnostic Studies: Labs: BMP:   Recent Labs   Lab 06/04/24  0404 06/05/24  0600   * 173*    133*   K 4.2 4.5    103   CO2 21* 21*   BUN 18 14   CREATININE 1.0 0.8   CALCIUM 9.4 9.2   , CMP   Recent Labs   Lab 06/04/24  0404 06/05/24  0600    133*   K 4.2 4.5    103   CO2 21* 21*   * 173*   BUN 18 14   CREATININE 1.0 0.8   CALCIUM 9.4 9.2   PROT 7.6 7.2   ALBUMIN 3.2* 3.1*   BILITOT 0.4 0.4   ALKPHOS 78 81   AST 11 12   ALT 19 18   ANIONGAP 14 9   , CBC   Recent Labs   Lab 06/04/24  0404 06/05/24  0600   WBC 10.52 12.80*   HGB 13.4 11.9*   HCT 43.6 37.7    309   , INR   Lab Results   Component Value Date    INR 1.0 10/04/2022   , Lipid Panel No results found for: "CHOL", "HDL", "LDLCALC", "TRIG", "CHOLHDL", Troponin No results for input(s): "TROPONINI" in the last 168 hours., A1C: No results for input(s): "HGBA1C" in the last 4320 hours., and All labs within the past 24 hours have been reviewed  Microbiology: Blood Culture   Lab Results   Component " Value Date    LABBLOO No Growth to date 06/02/2024    LABBLOO No Growth to date 06/02/2024    LABBLOO No Growth to date 06/02/2024    LABBLOO No Growth to date 06/02/2024    LABBLOO No Growth to date 06/02/2024    LABBLOO No Growth to date 06/02/2024    and Urine Culture    Lab Results   Component Value Date    LABURIN KLEBSIELLA AEROGENES  >100,000 cfu/ml   (A) 01/24/2024     Radiology: X-Ray: CXR: X-Ray Chest 1 View (CXR): No results found for this visit on 06/02/24. and X-Ray Chest PA and Lateral (CXR): No results found for this visit on 06/02/24. and KUB: X-Ray Abdomen AP 1 View (KUB): No results found for this visit on 06/02/24.  MRI: Mild 1st MTP osteoarthritis. Trace 1st through 3rd intermetatarsal bursitis.  Mild edema of the intrinsic foot musculature.  Flexor and extensor tendons are intact. No osteomyelitis.      Pending Diagnostic Studies:       Procedure Component Value Units Date/Time    Lactic acid, plasma #2 [[id number]]     Order Status: Sent Lab Status: No result     Specimen: Blood            Medications:  Reconciled Home Medications:      Medication List        START taking these medications      aspirin 81 MG Chew  Take 1 tablet (81 mg total) by mouth once daily.  Start taking on: June 6, 2024     HYDROcodone-acetaminophen 7.5-325 mg per tablet  Commonly known as: NORCO  Take 1 tablet by mouth every 6 (six) hours as needed for Pain.  Replaces: HYDROcodone-acetaminophen  mg per tablet     rivaroxaban 2.5 mg Tab  Commonly known as: XARELTO  Take 1 tablet (2.5 mg total) by mouth 2 (two) times daily with meals.     varenicline 0.5 mg (11)- 1 mg (42) tablet  Commonly known as: CHANTIX STARTING MONTH BOX  Take one 0.5mg tab by mouth once daily X3 days,then increase to one 0.5mg tab twice daily X4 days,then increase to one 1mg tab twice daily            CHANGE how you take these medications      allopurinoL 100 MG tablet  Commonly known as: ZYLOPRIM  Take 2 tablets (200 mg total) by mouth once  daily.  What changed: how much to take            CONTINUE taking these medications      acetaminophen 325 MG tablet  Commonly known as: TYLENOL  Take 325 mg by mouth every 6 (six) hours as needed for Pain.     glyBURIDE 5 MG tablet  Commonly known as: DIABETA  Take 1 tablet (5 mg total) by mouth daily with breakfast.     lisinopriL 40 MG tablet  Commonly known as: PRINIVIL,ZESTRIL  Take 1 tablet (40 mg total) by mouth once daily.            STOP taking these medications      cephALEXin 500 MG capsule  Commonly known as: KEFLEX     clopidogreL 75 mg tablet  Commonly known as: PLAVIX     colchicine 0.6 mg tablet  Commonly known as: COLCRYS     diclofenac 50 MG EC tablet  Commonly known as: VOLTAREN     doxycycline 100 MG Cap  Commonly known as: VIBRAMYCIN     HYDROcodone-acetaminophen  mg per tablet  Commonly known as: NORCO  Replaced by: HYDROcodone-acetaminophen 7.5-325 mg per tablet              Indwelling Lines/Drains at time of discharge:   Lines/Drains/Airways       None                   Time spent on the discharge of patient: 55 minutes     The patient's case has been reviewed by my supervising physician.   Supervising physician will provide additional orders and recommendations at his/her discretion.  Please see supervising physicians documentation and/or orders for further details.       Jacque Read NP  Department of Hospital Medicine  [facility]

## 2024-06-05 NOTE — ASSESSMENT & PLAN NOTE
Chronic, controlled. Latest blood pressure and vitals reviewed-     Temp:  [98 °F (36.7 °C)-99 °F (37.2 °C)]   Pulse:  []   Resp:  [16-18]   BP: (118-142)/(60-88)   SpO2:  [85 %-100 %] .   Home meds for hypertension were reviewed and noted below.   Hypertension Medications               lisinopriL (PRINIVIL,ZESTRIL) 40 MG tablet Take 1 tablet (40 mg total) by mouth once daily.     While in the hospital, will manage blood pressure as follows; Continue home antihypertensive regimen    Will utilize p.r.n. blood pressure medication only if patient's blood pressure greater than 160/100 and she develops symptoms such as worsening chest pain or shortness of breath.

## 2024-06-05 NOTE — PLAN OF CARE
[facility]  Discharge Final Note    Primary Care Provider: Jarek Crespo MD    Expected Discharge Date: 6/5/2024    Final Discharge Note (most recent)       Final Note - 06/05/24 1110          Final Note    Assessment Type Final Discharge Note     Anticipated Discharge Disposition Home or Self Care                     Important Message from Medicare             Contact Info       Parvez Durbin MD   Specialty: Vascular Surgery    [address]  [place] [address]   Phone: [phone]       Next Steps: Follow up    Instructions: Schedule an appointment to follow up on 6/19/24    Anitra Chowdary DPM   Specialty: Podiatry    [address] [address] [facility] [address]  [place] [address]   Phone: [phone]       Next Steps: Schedule an appointment as soon as possible for a visit    Instructions: As needed for new or worsening symptoms          DC DISPO: HOME  PCP F/U: FOLLOWS WITH NON[facility]  VASCULAR F/U: PATIENT TO F/U 6/19

## 2024-06-07 ENCOUNTER — HOSPITAL ENCOUNTER (INPATIENT)
Facility: HOSPITAL | Age: 44
LOS: 4 days | Discharge: HOME HEALTH | DRG: 240 | End: 2024-06-11
Attending: EMERGENCY MEDICINE | Admitting: HOSPITALIST
Payer: MEDICAID

## 2024-06-07 ENCOUNTER — NURSE TRIAGE (OUTPATIENT)
Dept: ADMINISTRATIVE | Facility: CLINIC | Age: 44
End: 2024-06-07
Payer: MEDICAID

## 2024-06-07 DIAGNOSIS — I96 GANGRENE OF TOE OF RIGHT FOOT: Primary | ICD-10-CM

## 2024-06-07 DIAGNOSIS — R07.9 CHEST PAIN: ICD-10-CM

## 2024-06-07 LAB
ALBUMIN SERPL BCP-MCNC: 3.3 G/DL (ref 3.5–5.2)
ALP SERPL-CCNC: 78 U/L (ref 55–135)
ALT SERPL W/O P-5'-P-CCNC: 15 U/L (ref 10–44)
ANION GAP SERPL CALC-SCNC: 10 MMOL/L (ref 8–16)
AST SERPL-CCNC: 12 U/L (ref 10–40)
BASOPHILS # BLD AUTO: 0.03 K/UL (ref 0–0.2)
BASOPHILS NFR BLD: 0.2 % (ref 0–1.9)
BILIRUB SERPL-MCNC: 0.3 MG/DL (ref 0.1–1)
BUN SERPL-MCNC: 8 MG/DL (ref 6–20)
CALCIUM SERPL-MCNC: 9.9 MG/DL (ref 8.7–10.5)
CHLORIDE SERPL-SCNC: 101 MMOL/L (ref 95–110)
CO2 SERPL-SCNC: 24 MMOL/L (ref 23–29)
CREAT SERPL-MCNC: 0.8 MG/DL (ref 0.5–1.4)
CRP SERPL-MCNC: 68.9 MG/L (ref 0–8.2)
DIFFERENTIAL METHOD BLD: ABNORMAL
EOSINOPHIL # BLD AUTO: 0.2 K/UL (ref 0–0.5)
EOSINOPHIL NFR BLD: 1.5 % (ref 0–8)
ERYTHROCYTE [DISTWIDTH] IN BLOOD BY AUTOMATED COUNT: 14.3 % (ref 11.5–14.5)
ERYTHROCYTE [SEDIMENTATION RATE] IN BLOOD BY WESTERGREN METHOD: 66 MM/HR (ref 0–20)
EST. GFR  (NO RACE VARIABLE): >60 ML/MIN/1.73 M^2
GLUCOSE SERPL-MCNC: 151 MG/DL (ref 70–110)
HCT VFR BLD AUTO: 41.2 % (ref 37–48.5)
HGB BLD-MCNC: 12.9 G/DL (ref 12–16)
IMM GRANULOCYTES # BLD AUTO: 0.06 K/UL (ref 0–0.04)
IMM GRANULOCYTES NFR BLD AUTO: 0.4 % (ref 0–0.5)
LYMPHOCYTES # BLD AUTO: 1.9 K/UL (ref 1–4.8)
LYMPHOCYTES NFR BLD: 14.2 % (ref 18–48)
MCH RBC QN AUTO: 24.2 PG (ref 27–31)
MCHC RBC AUTO-ENTMCNC: 31.3 G/DL (ref 32–36)
MCV RBC AUTO: 77 FL (ref 82–98)
MONOCYTES # BLD AUTO: 0.8 K/UL (ref 0.3–1)
MONOCYTES NFR BLD: 5.8 % (ref 4–15)
NEUTROPHILS # BLD AUTO: 10.6 K/UL (ref 1.8–7.7)
NEUTROPHILS NFR BLD: 77.9 % (ref 38–73)
NRBC BLD-RTO: 0 /100 WBC
PLATELET # BLD AUTO: 418 K/UL (ref 150–450)
PMV BLD AUTO: 9.2 FL (ref 9.2–12.9)
POTASSIUM SERPL-SCNC: 4.1 MMOL/L (ref 3.5–5.1)
PROT SERPL-MCNC: 8 G/DL (ref 6–8.4)
RBC # BLD AUTO: 5.32 M/UL (ref 4–5.4)
SODIUM SERPL-SCNC: 135 MMOL/L (ref 136–145)
WBC # BLD AUTO: 13.56 K/UL (ref 3.9–12.7)

## 2024-06-07 PROCEDURE — 25000003 PHARM REV CODE 250: Performed by: HOSPITALIST

## 2024-06-07 PROCEDURE — 85025 COMPLETE CBC W/AUTO DIFF WBC: CPT | Performed by: EMERGENCY MEDICINE

## 2024-06-07 PROCEDURE — 80053 COMPREHEN METABOLIC PANEL: CPT | Performed by: EMERGENCY MEDICINE

## 2024-06-07 PROCEDURE — 85651 RBC SED RATE NONAUTOMATED: CPT | Performed by: EMERGENCY MEDICINE

## 2024-06-07 PROCEDURE — 96376 TX/PRO/DX INJ SAME DRUG ADON: CPT

## 2024-06-07 PROCEDURE — 63600175 PHARM REV CODE 636 W HCPCS: Performed by: EMERGENCY MEDICINE

## 2024-06-07 PROCEDURE — 63600175 PHARM REV CODE 636 W HCPCS: Performed by: HOSPITALIST

## 2024-06-07 PROCEDURE — 99285 EMERGENCY DEPT VISIT HI MDM: CPT | Mod: 25

## 2024-06-07 PROCEDURE — 11000001 HC ACUTE MED/SURG PRIVATE ROOM

## 2024-06-07 PROCEDURE — 96374 THER/PROPH/DIAG INJ IV PUSH: CPT

## 2024-06-07 PROCEDURE — 96375 TX/PRO/DX INJ NEW DRUG ADDON: CPT

## 2024-06-07 PROCEDURE — 99223 1ST HOSP IP/OBS HIGH 75: CPT | Mod: ,,, | Performed by: PODIATRIST

## 2024-06-07 PROCEDURE — 86140 C-REACTIVE PROTEIN: CPT | Performed by: EMERGENCY MEDICINE

## 2024-06-07 RX ORDER — SODIUM CHLORIDE 0.9 % (FLUSH) 0.9 %
10 SYRINGE (ML) INJECTION EVERY 12 HOURS PRN
Status: DISCONTINUED | OUTPATIENT
Start: 2024-06-07 | End: 2024-06-11 | Stop reason: HOSPADM

## 2024-06-07 RX ORDER — TALC
6 POWDER (GRAM) TOPICAL NIGHTLY PRN
Status: DISCONTINUED | OUTPATIENT
Start: 2024-06-07 | End: 2024-06-11 | Stop reason: HOSPADM

## 2024-06-07 RX ORDER — HYDROMORPHONE HYDROCHLORIDE 1 MG/ML
1 INJECTION, SOLUTION INTRAMUSCULAR; INTRAVENOUS; SUBCUTANEOUS EVERY 4 HOURS PRN
Status: DISCONTINUED | OUTPATIENT
Start: 2024-06-07 | End: 2024-06-08

## 2024-06-07 RX ORDER — ACETAMINOPHEN 325 MG/1
650 TABLET ORAL EVERY 8 HOURS PRN
Status: DISCONTINUED | OUTPATIENT
Start: 2024-06-07 | End: 2024-06-11 | Stop reason: HOSPADM

## 2024-06-07 RX ORDER — ONDANSETRON HYDROCHLORIDE 2 MG/ML
4 INJECTION, SOLUTION INTRAVENOUS EVERY 8 HOURS PRN
Status: DISCONTINUED | OUTPATIENT
Start: 2024-06-07 | End: 2024-06-11 | Stop reason: HOSPADM

## 2024-06-07 RX ORDER — NALOXONE HCL 0.4 MG/ML
0.02 VIAL (ML) INJECTION
Status: DISCONTINUED | OUTPATIENT
Start: 2024-06-07 | End: 2024-06-11 | Stop reason: HOSPADM

## 2024-06-07 RX ORDER — SODIUM CHLORIDE, SODIUM LACTATE, POTASSIUM CHLORIDE, CALCIUM CHLORIDE 600; 310; 30; 20 MG/100ML; MG/100ML; MG/100ML; MG/100ML
INJECTION, SOLUTION INTRAVENOUS CONTINUOUS
Status: DISCONTINUED | OUTPATIENT
Start: 2024-06-07 | End: 2024-06-09

## 2024-06-07 RX ORDER — SODIUM CHLORIDE 0.9 % (FLUSH) 0.9 %
10 SYRINGE (ML) INJECTION
Status: DISCONTINUED | OUTPATIENT
Start: 2024-06-07 | End: 2024-06-11 | Stop reason: HOSPADM

## 2024-06-07 RX ORDER — AMOXICILLIN 250 MG
1 CAPSULE ORAL 2 TIMES DAILY PRN
Status: DISCONTINUED | OUTPATIENT
Start: 2024-06-07 | End: 2024-06-11 | Stop reason: HOSPADM

## 2024-06-07 RX ORDER — GLUCAGON 1 MG
1 KIT INJECTION
Status: DISCONTINUED | OUTPATIENT
Start: 2024-06-07 | End: 2024-06-11 | Stop reason: HOSPADM

## 2024-06-07 RX ORDER — HYDROMORPHONE HYDROCHLORIDE 1 MG/ML
1 INJECTION, SOLUTION INTRAMUSCULAR; INTRAVENOUS; SUBCUTANEOUS
Status: COMPLETED | OUTPATIENT
Start: 2024-06-07 | End: 2024-06-07

## 2024-06-07 RX ORDER — IBUPROFEN 200 MG
24 TABLET ORAL
Status: DISCONTINUED | OUTPATIENT
Start: 2024-06-07 | End: 2024-06-11 | Stop reason: HOSPADM

## 2024-06-07 RX ORDER — PROMETHAZINE HYDROCHLORIDE 25 MG/1
25 TABLET ORAL EVERY 6 HOURS PRN
Status: DISCONTINUED | OUTPATIENT
Start: 2024-06-07 | End: 2024-06-11 | Stop reason: HOSPADM

## 2024-06-07 RX ORDER — IPRATROPIUM BROMIDE AND ALBUTEROL SULFATE 2.5; .5 MG/3ML; MG/3ML
3 SOLUTION RESPIRATORY (INHALATION) EVERY 6 HOURS PRN
Status: DISCONTINUED | OUTPATIENT
Start: 2024-06-07 | End: 2024-06-11 | Stop reason: HOSPADM

## 2024-06-07 RX ORDER — IBUPROFEN 200 MG
16 TABLET ORAL
Status: DISCONTINUED | OUTPATIENT
Start: 2024-06-07 | End: 2024-06-11 | Stop reason: HOSPADM

## 2024-06-07 RX ORDER — ALUMINUM HYDROXIDE, MAGNESIUM HYDROXIDE, AND SIMETHICONE 1200; 120; 1200 MG/30ML; MG/30ML; MG/30ML
30 SUSPENSION ORAL 4 TIMES DAILY PRN
Status: DISCONTINUED | OUTPATIENT
Start: 2024-06-07 | End: 2024-06-11 | Stop reason: HOSPADM

## 2024-06-07 RX ORDER — ACETAMINOPHEN 650 MG/1
650 SUPPOSITORY RECTAL EVERY 6 HOURS PRN
Status: DISCONTINUED | OUTPATIENT
Start: 2024-06-07 | End: 2024-06-11 | Stop reason: HOSPADM

## 2024-06-07 RX ORDER — HYDROCODONE BITARTRATE AND ACETAMINOPHEN 5; 325 MG/1; MG/1
1 TABLET ORAL EVERY 6 HOURS PRN
Status: DISCONTINUED | OUTPATIENT
Start: 2024-06-07 | End: 2024-06-08

## 2024-06-07 RX ORDER — CLINDAMYCIN PHOSPHATE 900 MG/50ML
900 INJECTION, SOLUTION INTRAVENOUS
OUTPATIENT
Start: 2024-06-07

## 2024-06-07 RX ORDER — ONDANSETRON HYDROCHLORIDE 2 MG/ML
4 INJECTION, SOLUTION INTRAVENOUS
Status: COMPLETED | OUTPATIENT
Start: 2024-06-07 | End: 2024-06-07

## 2024-06-07 RX ADMIN — HYDROCODONE BITARTRATE AND ACETAMINOPHEN 1 TABLET: 5; 325 TABLET ORAL at 10:06

## 2024-06-07 RX ADMIN — HYDROMORPHONE HYDROCHLORIDE 1 MG: 1 INJECTION, SOLUTION INTRAMUSCULAR; INTRAVENOUS; SUBCUTANEOUS at 07:06

## 2024-06-07 RX ADMIN — ONDANSETRON 4 MG: 2 INJECTION INTRAMUSCULAR; INTRAVENOUS at 07:06

## 2024-06-07 RX ADMIN — SODIUM CHLORIDE, POTASSIUM CHLORIDE, SODIUM LACTATE AND CALCIUM CHLORIDE: 600; 310; 30; 20 INJECTION, SOLUTION INTRAVENOUS at 11:06

## 2024-06-07 RX ADMIN — HYDROMORPHONE HYDROCHLORIDE 1 MG: 1 INJECTION, SOLUTION INTRAMUSCULAR; INTRAVENOUS; SUBCUTANEOUS at 08:06

## 2024-06-07 NOTE — ED PROVIDER NOTES
SCRIBE #1 NOTE: ISamantha, am scribing for, and in the presence of, Rolando Sanchez MD. I have scribed the entire note.       History     Chief Complaint   Patient presents with    Toe Pain     Pt states she is having toe pain in all of the toes on her right foot. Pt states the pain has been going on for 2 months. Pt states she was recently discharged.      Review of patient's allergies indicates:   Allergen Reactions    Neurontin [gabapentin] Itching and Blisters    Codeine Rash         History of Present Illness     HPI    6/7/2024, 6:18 PM  History obtained from the patient      History of Present Illness: Jeanna Helm is a 44 y.o. female patient with a PMHx of DM Type II, stroke, and HTN who presents to the Emergency Department for evaluation of R-toe swelling and pain which onset yesterday. Pt has been seen in the ED multiple times for the same symptoms since 04/28/24. Pt was admitted here on 6/2 for these symptoms and was discharged on 6/5. She was evaluated by vascular surgery and podiatry during admission. Pt states that her toes were ok after being discharged, but began to swell again yesterday. Pt has been compliant with her discharge orders from 6/5. Symptoms are constant and moderate in severity. No mitigating or exacerbating factors reported. Associated sxs include nausea and purulent drainage from the R-toes. Patient denies any fever, chills, and all other sxs at this time. No further complaints or concerns at this time.       Arrival mode: Personal vehicle    PCP: Jarek Crespo MD        Past Medical History:  Past Medical History:   Diagnosis Date    Back pain     Diabetes mellitus     Hypertension     Left knee pain     Miscarriage     x2    Stroke        Past Surgical History:  Past Surgical History:   Procedure Laterality Date    ANTERIOR CRUCIATE LIGAMENT REPAIR Left     AORTOGRAPHY WITH EXTREMITY RUNOFF N/A 6/3/2024    Procedure: AORTOGRAM, WITH EXTREMITY RUNOFF;  Surgeon:  Parvez Durbin MD;  Location: [facility] CATH LAB;  Service: Vascular;  Laterality: N/A;  Possible Brachial access    DILATION AND CURETTAGE OF UTERUS      x2    LUMBAR FUSION      TIBIA FRACTURE SURGERY Right          Family History:  Family History   Problem Relation Name Age of Onset    Hypertension Mother      Heart disease Mother      Fibromyalgia Mother      Diabetes Father         Social History:  Social History     Tobacco Use    Smoking status: Every Day     Current packs/day: 1.00     Average packs/day: 1 pack/day for 20.0 years (20.0 ttl pk-yrs)     Types: Cigarettes    Smokeless tobacco: Not on file   Substance and Sexual Activity    Alcohol use: No    Drug use: No    Sexual activity: Not Currently        Review of Systems     Review of Systems   Constitutional:  Negative for chills and fever.   HENT:  Negative for sore throat.    Respiratory:  Negative for shortness of breath.    Cardiovascular:  Negative for chest pain.   Gastrointestinal:  Positive for nausea.   Genitourinary:  Negative for dysuria.   Musculoskeletal:  Negative for back pain.        (+) R-toes swelling  (+) pain in R-toes   Skin:  Negative for rash.        (+) purulent drainage from R-toes   Neurological:  Negative for weakness.   Hematological:  Does not bruise/bleed easily.      Physical Exam     Initial Vitals   BP Pulse Resp Temp SpO2   06/07/24 1807 06/07/24 1807 06/07/24 1807 06/07/24 2030 06/07/24 1807   (!) 136/94 105 16 98.8 °F (37.1 °C) 99 %      MAP       --                 Physical Exam  Nursing Notes and Vital Signs Reviewed.  Constitutional: Patient is in no acute distress. Well-developed and well-nourished.  Head: Atraumatic. Normocephalic.  Eyes: PERRL. EOM intact. Conjunctivae are not pale. No scleral icterus.  ENT: Mucous membranes are moist. Oropharynx is clear and symmetric.    Neck: Supple. Full ROM. No lymphadenopathy.  Cardiovascular: Regular rate. Regular rhythm. No murmurs, rubs, or gallops. Distal pulses are 2+  and symmetric.  Pulmonary/Chest: No respiratory distress. Clear to auscultation bilaterally. No wheezing or rales.  Abdominal: Soft and non-distended.  There is no tenderness.  No rebound, guarding, or rigidity. Good bowel sounds.  Genitourinary: No CVA tenderness  Musculoskeletal: Moves all extremities. No obvious deformities. No edema. No calf tenderness.  Skin: Warm and dry. Purple discoloration and several regions of underlying fluctuance to distal R- toes. Purulent drainage to R-great toe. Tenderness to palpation with warmth and edema.  Neurological:  Alert, awake, and appropriate.  Normal speech.  No acute focal neurological deficits are appreciated.  Psychiatric: Normal affect. Good eye contact. Appropriate in content.               ED Course   Procedures  ED Vital Signs:  Vitals:    06/07/24 1807 06/07/24 1938 06/07/24 2030 06/07/24 2038   BP: (!) 136/94  (!) 163/85    Pulse: 105  98    Resp: 16 18 18    Temp:   98.8 °F (37.1 °C) 98.8 °F (37.1 °C)   TempSrc: Oral  Oral Oral   SpO2: 99%  95%    Weight: 92.5 kg (204 lb)       06/07/24 2042   BP:    Pulse:    Resp: 18   Temp:    TempSrc:    SpO2:    Weight:        Abnormal Lab Results:  Labs Reviewed   CBC W/ AUTO DIFFERENTIAL - Abnormal; Notable for the following components:       Result Value    WBC 13.56 (*)     MCV 77 (*)     MCH 24.2 (*)     MCHC 31.3 (*)     Gran # (ANC) 10.6 (*)     Immature Grans (Abs) 0.06 (*)     Gran % 77.9 (*)     Lymph % 14.2 (*)     All other components within normal limits   SEDIMENTATION RATE - Abnormal; Notable for the following components:    Sed Rate 66 (*)     All other components within normal limits   COMPREHENSIVE METABOLIC PANEL - Abnormal; Notable for the following components:    Sodium 135 (*)     Glucose 151 (*)     Albumin 3.3 (*)     All other components within normal limits   C-REACTIVE PROTEIN - Abnormal; Notable for the following components:    CRP 68.9 (*)     All other components within normal limits        All  Lab Results:  Results for orders placed or performed during the hospital encounter of 06/07/24   CBC auto differential   Result Value Ref Range    WBC 13.56 (H) 3.90 - 12.70 K/uL    RBC 5.32 4.00 - 5.40 M/uL    Hemoglobin 12.9 12.0 - 16.0 g/dL    Hematocrit 41.2 37.0 - 48.5 %    MCV 77 (L) 82 - 98 fL    MCH 24.2 (L) 27.0 - 31.0 pg    MCHC 31.3 (L) 32.0 - 36.0 g/dL    RDW 14.3 11.5 - 14.5 %    Platelets 418 150 - 450 K/uL    MPV 9.2 9.2 - 12.9 fL    Immature Granulocytes 0.4 0.0 - 0.5 %    Gran # (ANC) 10.6 (H) 1.8 - 7.7 K/uL    Immature Grans (Abs) 0.06 (H) 0.00 - 0.04 K/uL    Lymph # 1.9 1.0 - 4.8 K/uL    Mono # 0.8 0.3 - 1.0 K/uL    Eos # 0.2 0.0 - 0.5 K/uL    Baso # 0.03 0.00 - 0.20 K/uL    nRBC 0 0 /100 WBC    Gran % 77.9 (H) 38.0 - 73.0 %    Lymph % 14.2 (L) 18.0 - 48.0 %    Mono % 5.8 4.0 - 15.0 %    Eosinophil % 1.5 0.0 - 8.0 %    Basophil % 0.2 0.0 - 1.9 %    Differential Method Automated    Sedimentation rate   Result Value Ref Range    Sed Rate 66 (H) 0 - 20 mm/Hr   Comprehensive metabolic panel   Result Value Ref Range    Sodium 135 (L) 136 - 145 mmol/L    Potassium 4.1 3.5 - 5.1 mmol/L    Chloride 101 95 - 110 mmol/L    CO2 24 23 - 29 mmol/L    Glucose 151 (H) 70 - 110 mg/dL    BUN 8 6 - 20 mg/dL    Creatinine 0.8 0.5 - 1.4 mg/dL    Calcium 9.9 8.7 - 10.5 mg/dL    Total Protein 8.0 6.0 - 8.4 g/dL    Albumin 3.3 (L) 3.5 - 5.2 g/dL    Total Bilirubin 0.3 0.1 - 1.0 mg/dL    Alkaline Phosphatase 78 55 - 135 U/L    AST 12 10 - 40 U/L    ALT 15 10 - 44 U/L    eGFR >60 >60 mL/min/1.73 m^2    Anion Gap 10 8 - 16 mmol/L   C-reactive protein   Result Value Ref Range    CRP 68.9 (H) 0.0 - 8.2 mg/L        Imaging Results:  Imaging Results              X-Ray Toe 2 or More Views Right (Final result)  Result time 06/07/24 19:08:52      Final result by Claudy Tay MD (06/07/24 19:08:52)                   Impression:      No acute abnormality.      Electronically signed by: Claudy  Maggi  Date:    06/07/2024  Time:    19:08               Narrative:    EXAMINATION:  XR TOE 2 OR MORE VIEWS RIGHT    CLINICAL HISTORY:  XR TOE 2 OR MORE VIEWS RIGHT    COMPARISON:  None    FINDINGS:  Multiple radiographic views  were obtained.    No evidence of acute fracture or dislocation.  Bony mineralization is normal.  Soft tissues are unremarkable.                                            The Emergency Provider reviewed the vital signs and test results, which are outlined above.     ED Discussion     10:13 PM: Discussed pt's case with Dr. Chowdary (Podiatry) who requested patient be NPO. Possible can take her to the OR at 7 AM tomorrow. She requested a vascular surgery consult as well.    10:17 PM: Discussed case with Dr. Rubio ([facility]). Dr. Rubio agrees with current care and management of pt and accepts admission.   Admitting Service: [facility]  Admitting Physician: Dr. Rubio    ED Course as of 06/07/24 2218   Fri Jun 07, 2024 1817 Reviewed prior hospitalization, ddx includes Beurger's disease vs. Gout.  [BA]      ED Course User Index  [BA] Rolando Sanchez MD     Medical Decision Making  Amount and/or Complexity of Data Reviewed  External Data Reviewed: labs, radiology and notes.  Labs: ordered. Decision-making details documented in ED Course.  Radiology: ordered. Decision-making details documented in ED Course.    Risk  Prescription drug management.  Decision regarding hospitalization.                ED Medication(s):  Medications   sodium chloride 0.9% flush 10 mL (has no administration in time range)   lactated ringers infusion (has no administration in time range)   albuterol-ipratropium 2.5 mg-0.5 mg/3 mL nebulizer solution 3 mL (has no administration in time range)   melatonin tablet 6 mg (has no administration in time range)   ondansetron injection 4 mg (has no administration in time range)   promethazine tablet 25 mg (has no administration in time range)   senna-docusate  8.6-50 mg per tablet 1 tablet (has no administration in time range)   acetaminophen tablet 650 mg (has no administration in time range)   aluminum-magnesium hydroxide-simethicone 200-200-20 mg/5 mL suspension 30 mL (has no administration in time range)   acetaminophen suppository 650 mg (has no administration in time range)   HYDROcodone-acetaminophen 5-325 mg per tablet 1 tablet (has no administration in time range)   naloxone 0.4 mg/mL injection 0.02 mg (has no administration in time range)   glucose chewable tablet 16 g (has no administration in time range)   glucose chewable tablet 24 g (has no administration in time range)   glucagon (human recombinant) injection 1 mg (has no administration in time range)   HYDROmorphone injection 1 mg (has no administration in time range)   dextrose 10% bolus 125 mL 125 mL (has no administration in time range)   dextrose 10% bolus 250 mL 250 mL (has no administration in time range)   HYDROmorphone injection 1 mg (1 mg Intravenous Given 6/7/24 1938)   ondansetron injection 4 mg (4 mg Intravenous Given 6/7/24 1937)   HYDROmorphone injection 1 mg (1 mg Intravenous Given 6/7/24 2042)       New Prescriptions    No medications on file               Scribe Attestation:   Scribe #1: I performed the above scribed service and the documentation accurately describes the services I performed. I attest to the accuracy of the note.     Attending:   Physician Attestation Statement for Scribe #1: I, Rolando Sanchez MD, personally performed the services described in this documentation, as scribed by Samantha Read, in my presence, and it is both accurate and complete.       Scribe Attestation:   Scribe #1: I performed the above scribed service and the documentation accurately describes the services I performed. I attest to the accuracy of the note.         Clinical Impression       ICD-10-CM ICD-9-CM   1. Gangrene of toe of right foot  I96 785.4     Disposition:   Disposition:  Admitted  Condition: Stable        Rolando Sanchez MD  06/08/24 3906

## 2024-06-08 ENCOUNTER — ANESTHESIA EVENT (OUTPATIENT)
Dept: SURGERY | Facility: HOSPITAL | Age: 44
DRG: 240 | End: 2024-06-08
Payer: MEDICAID

## 2024-06-08 ENCOUNTER — ANESTHESIA (OUTPATIENT)
Dept: SURGERY | Facility: HOSPITAL | Age: 44
DRG: 240 | End: 2024-06-08
Payer: MEDICAID

## 2024-06-08 LAB
ALBUMIN SERPL BCP-MCNC: 3.1 G/DL (ref 3.5–5.2)
ALP SERPL-CCNC: 79 U/L (ref 55–135)
ALT SERPL W/O P-5'-P-CCNC: 16 U/L (ref 10–44)
ANION GAP SERPL CALC-SCNC: 10 MMOL/L (ref 8–16)
AST SERPL-CCNC: 11 U/L (ref 10–40)
B-HCG UR QL: NEGATIVE
BACTERIA BLD CULT: NORMAL
BACTERIA BLD CULT: NORMAL
BASOPHILS # BLD AUTO: 0.02 K/UL (ref 0–0.2)
BASOPHILS NFR BLD: 0.2 % (ref 0–1.9)
BILIRUB SERPL-MCNC: 0.3 MG/DL (ref 0.1–1)
BUN SERPL-MCNC: 9 MG/DL (ref 6–20)
CALCIUM SERPL-MCNC: 9.8 MG/DL (ref 8.7–10.5)
CHLORIDE SERPL-SCNC: 101 MMOL/L (ref 95–110)
CO2 SERPL-SCNC: 24 MMOL/L (ref 23–29)
CREAT SERPL-MCNC: 0.8 MG/DL (ref 0.5–1.4)
CTP QC/QA: YES
DIFFERENTIAL METHOD BLD: ABNORMAL
EOSINOPHIL # BLD AUTO: 0.3 K/UL (ref 0–0.5)
EOSINOPHIL NFR BLD: 2.3 % (ref 0–8)
ERYTHROCYTE [DISTWIDTH] IN BLOOD BY AUTOMATED COUNT: 14.2 % (ref 11.5–14.5)
EST. GFR  (NO RACE VARIABLE): >60 ML/MIN/1.73 M^2
GLUCOSE SERPL-MCNC: 163 MG/DL (ref 70–110)
GRAM STN SPEC: NORMAL
GRAM STN SPEC: NORMAL
HCT VFR BLD AUTO: 36.7 % (ref 37–48.5)
HGB BLD-MCNC: 11.4 G/DL (ref 12–16)
IMM GRANULOCYTES # BLD AUTO: 0.06 K/UL (ref 0–0.04)
IMM GRANULOCYTES NFR BLD AUTO: 0.5 % (ref 0–0.5)
LYMPHOCYTES # BLD AUTO: 2.6 K/UL (ref 1–4.8)
LYMPHOCYTES NFR BLD: 23.4 % (ref 18–48)
MCH RBC QN AUTO: 24.6 PG (ref 27–31)
MCHC RBC AUTO-ENTMCNC: 31.1 G/DL (ref 32–36)
MCV RBC AUTO: 79 FL (ref 82–98)
MONOCYTES # BLD AUTO: 0.8 K/UL (ref 0.3–1)
MONOCYTES NFR BLD: 6.9 % (ref 4–15)
NEUTROPHILS # BLD AUTO: 7.4 K/UL (ref 1.8–7.7)
NEUTROPHILS NFR BLD: 66.7 % (ref 38–73)
NRBC BLD-RTO: 0 /100 WBC
PLATELET # BLD AUTO: 371 K/UL (ref 150–450)
PMV BLD AUTO: 9.1 FL (ref 9.2–12.9)
POCT GLUCOSE: 205 MG/DL (ref 70–110)
POCT GLUCOSE: 218 MG/DL (ref 70–110)
POTASSIUM SERPL-SCNC: 4.1 MMOL/L (ref 3.5–5.1)
PROT SERPL-MCNC: 7.5 G/DL (ref 6–8.4)
RBC # BLD AUTO: 4.63 M/UL (ref 4–5.4)
SODIUM SERPL-SCNC: 135 MMOL/L (ref 136–145)
WBC # BLD AUTO: 11.08 K/UL (ref 3.9–12.7)

## 2024-06-08 PROCEDURE — 88305 TISSUE EXAM BY PATHOLOGIST: CPT | Performed by: PATHOLOGY

## 2024-06-08 PROCEDURE — 37000009 HC ANESTHESIA EA ADD 15 MINS: Performed by: PODIATRIST

## 2024-06-08 PROCEDURE — 87070 CULTURE OTHR SPECIMN AEROBIC: CPT | Performed by: PODIATRIST

## 2024-06-08 PROCEDURE — 36000708 HC OR TIME LEV III 1ST 15 MIN: Performed by: PODIATRIST

## 2024-06-08 PROCEDURE — 36000709 HC OR TIME LEV III EA ADD 15 MIN: Performed by: PODIATRIST

## 2024-06-08 PROCEDURE — 27201423 OPTIME MED/SURG SUP & DEVICES STERILE SUPPLY: Performed by: PODIATRIST

## 2024-06-08 PROCEDURE — 85025 COMPLETE CBC W/AUTO DIFF WBC: CPT | Performed by: HOSPITALIST

## 2024-06-08 PROCEDURE — 11000001 HC ACUTE MED/SURG PRIVATE ROOM

## 2024-06-08 PROCEDURE — 87102 FUNGUS ISOLATION CULTURE: CPT | Performed by: PODIATRIST

## 2024-06-08 PROCEDURE — 88311 DECALCIFY TISSUE: CPT | Performed by: PATHOLOGY

## 2024-06-08 PROCEDURE — 63600175 PHARM REV CODE 636 W HCPCS: Mod: JZ,JG | Performed by: PODIATRIST

## 2024-06-08 PROCEDURE — 94761 N-INVAS EAR/PLS OXIMETRY MLT: CPT

## 2024-06-08 PROCEDURE — 87075 CULTR BACTERIA EXCEPT BLOOD: CPT | Performed by: PODIATRIST

## 2024-06-08 PROCEDURE — 63600175 PHARM REV CODE 636 W HCPCS: Performed by: INTERNAL MEDICINE

## 2024-06-08 PROCEDURE — 36415 COLL VENOUS BLD VENIPUNCTURE: CPT | Performed by: HOSPITALIST

## 2024-06-08 PROCEDURE — 0L8V0ZZ DIVISION OF RIGHT FOOT TENDON, OPEN APPROACH: ICD-10-PCS | Performed by: PODIATRIST

## 2024-06-08 PROCEDURE — 88305 TISSUE EXAM BY PATHOLOGIST: CPT | Mod: 26,,, | Performed by: PATHOLOGY

## 2024-06-08 PROCEDURE — 94799 UNLISTED PULMONARY SVC/PX: CPT

## 2024-06-08 PROCEDURE — 25000003 PHARM REV CODE 250: Performed by: INTERNAL MEDICINE

## 2024-06-08 PROCEDURE — 88311 DECALCIFY TISSUE: CPT | Mod: 26,,, | Performed by: PATHOLOGY

## 2024-06-08 PROCEDURE — 27000221 HC OXYGEN, UP TO 24 HOURS

## 2024-06-08 PROCEDURE — 80053 COMPREHEN METABOLIC PANEL: CPT | Performed by: HOSPITALIST

## 2024-06-08 PROCEDURE — 63600175 PHARM REV CODE 636 W HCPCS: Performed by: HOSPITALIST

## 2024-06-08 PROCEDURE — 99900035 HC TECH TIME PER 15 MIN (STAT)

## 2024-06-08 PROCEDURE — 71000033 HC RECOVERY, INTIAL HOUR: Performed by: PODIATRIST

## 2024-06-08 PROCEDURE — 27695 REPAIR OF ANKLE LIGAMENT: CPT | Mod: 59,51,RT, | Performed by: PODIATRIST

## 2024-06-08 PROCEDURE — 37000008 HC ANESTHESIA 1ST 15 MINUTES: Performed by: PODIATRIST

## 2024-06-08 PROCEDURE — 28805 AMPUTATION THRU METATARSAL: CPT | Mod: RT,,, | Performed by: PODIATRIST

## 2024-06-08 PROCEDURE — 81025 URINE PREGNANCY TEST: CPT | Performed by: ANESTHESIOLOGY

## 2024-06-08 PROCEDURE — 25000003 PHARM REV CODE 250: Performed by: FAMILY MEDICINE

## 2024-06-08 PROCEDURE — 63600175 PHARM REV CODE 636 W HCPCS: Performed by: FAMILY MEDICINE

## 2024-06-08 PROCEDURE — 87205 SMEAR GRAM STAIN: CPT | Performed by: PODIATRIST

## 2024-06-08 PROCEDURE — 0Y6M0Z9 DETACHMENT AT RIGHT FOOT, PARTIAL 1ST RAY, OPEN APPROACH: ICD-10-PCS | Performed by: PODIATRIST

## 2024-06-08 PROCEDURE — 25000003 PHARM REV CODE 250: Performed by: PODIATRIST

## 2024-06-08 RX ORDER — KETOROLAC TROMETHAMINE 30 MG/ML
15 INJECTION, SOLUTION INTRAMUSCULAR; INTRAVENOUS EVERY 8 HOURS PRN
OUTPATIENT
Start: 2024-06-08 | End: 2024-06-10

## 2024-06-08 RX ORDER — KETAMINE HCL IN 0.9 % NACL 50 MG/5 ML
SYRINGE (ML) INTRAVENOUS
Status: DISCONTINUED | OUTPATIENT
Start: 2024-06-08 | End: 2024-06-08

## 2024-06-08 RX ORDER — HYDROMORPHONE HYDROCHLORIDE 2 MG/ML
INJECTION, SOLUTION INTRAMUSCULAR; INTRAVENOUS; SUBCUTANEOUS
Status: DISCONTINUED | OUTPATIENT
Start: 2024-06-08 | End: 2024-06-08

## 2024-06-08 RX ORDER — HYDROMORPHONE HYDROCHLORIDE 2 MG/ML
0.2 INJECTION, SOLUTION INTRAMUSCULAR; INTRAVENOUS; SUBCUTANEOUS EVERY 5 MIN PRN
OUTPATIENT
Start: 2024-06-08

## 2024-06-08 RX ORDER — LISINOPRIL 20 MG/1
40 TABLET ORAL NIGHTLY
Status: DISCONTINUED | OUTPATIENT
Start: 2024-06-08 | End: 2024-06-11 | Stop reason: HOSPADM

## 2024-06-08 RX ORDER — HYDROCODONE BITARTRATE AND ACETAMINOPHEN 7.5; 325 MG/1; MG/1
1 TABLET ORAL EVERY 6 HOURS PRN
Status: DISCONTINUED | OUTPATIENT
Start: 2024-06-09 | End: 2024-06-11 | Stop reason: HOSPADM

## 2024-06-08 RX ORDER — ONDANSETRON HYDROCHLORIDE 2 MG/ML
INJECTION, SOLUTION INTRAVENOUS
Status: DISCONTINUED | OUTPATIENT
Start: 2024-06-08 | End: 2024-06-08

## 2024-06-08 RX ORDER — ONDANSETRON HYDROCHLORIDE 2 MG/ML
4 INJECTION, SOLUTION INTRAVENOUS DAILY PRN
OUTPATIENT
Start: 2024-06-08

## 2024-06-08 RX ORDER — VARENICLINE TARTRATE 0.5 MG/1
0.5 TABLET, FILM COATED ORAL 2 TIMES DAILY
Status: DISCONTINUED | OUTPATIENT
Start: 2024-06-08 | End: 2024-06-11 | Stop reason: HOSPADM

## 2024-06-08 RX ORDER — HYDROMORPHONE HYDROCHLORIDE 2 MG/ML
1 INJECTION, SOLUTION INTRAMUSCULAR; INTRAVENOUS; SUBCUTANEOUS
Status: DISCONTINUED | OUTPATIENT
Start: 2024-06-09 | End: 2024-06-11 | Stop reason: HOSPADM

## 2024-06-08 RX ORDER — MIDAZOLAM HYDROCHLORIDE 1 MG/ML
INJECTION INTRAMUSCULAR; INTRAVENOUS
Status: DISCONTINUED | OUTPATIENT
Start: 2024-06-08 | End: 2024-06-08

## 2024-06-08 RX ORDER — PROPOFOL 10 MG/ML
VIAL (ML) INTRAVENOUS
Status: DISCONTINUED | OUTPATIENT
Start: 2024-06-08 | End: 2024-06-08

## 2024-06-08 RX ORDER — HYDROMORPHONE HYDROCHLORIDE 2 MG/ML
1 INJECTION, SOLUTION INTRAMUSCULAR; INTRAVENOUS; SUBCUTANEOUS EVERY 4 HOURS PRN
Status: DISCONTINUED | OUTPATIENT
Start: 2024-06-08 | End: 2024-06-08

## 2024-06-08 RX ORDER — INSULIN ASPART 100 [IU]/ML
0-10 INJECTION, SOLUTION INTRAVENOUS; SUBCUTANEOUS
Status: DISCONTINUED | OUTPATIENT
Start: 2024-06-08 | End: 2024-06-11 | Stop reason: HOSPADM

## 2024-06-08 RX ORDER — LIDOCAINE HYDROCHLORIDE 20 MG/ML
INJECTION, SOLUTION EPIDURAL; INFILTRATION; INTRACAUDAL; PERINEURAL
Status: DISCONTINUED | OUTPATIENT
Start: 2024-06-08 | End: 2024-06-08

## 2024-06-08 RX ORDER — LIDOCAINE HYDROCHLORIDE 10 MG/ML
INJECTION, SOLUTION EPIDURAL; INFILTRATION; INTRACAUDAL; PERINEURAL
Status: DISCONTINUED | OUTPATIENT
Start: 2024-06-08 | End: 2024-06-08 | Stop reason: HOSPADM

## 2024-06-08 RX ORDER — BUPIVACAINE HYDROCHLORIDE 5 MG/ML
INJECTION, SOLUTION EPIDURAL; INTRACAUDAL
Status: DISCONTINUED | OUTPATIENT
Start: 2024-06-08 | End: 2024-06-08 | Stop reason: HOSPADM

## 2024-06-08 RX ORDER — CLINDAMYCIN PHOSPHATE 900 MG/50ML
INJECTION, SOLUTION INTRAVENOUS
Status: DISCONTINUED | OUTPATIENT
Start: 2024-06-08 | End: 2024-06-08

## 2024-06-08 RX ORDER — FENTANYL CITRATE 50 UG/ML
INJECTION, SOLUTION INTRAMUSCULAR; INTRAVENOUS
Status: DISCONTINUED | OUTPATIENT
Start: 2024-06-08 | End: 2024-06-08

## 2024-06-08 RX ORDER — HYDRALAZINE HYDROCHLORIDE 20 MG/ML
10 INJECTION INTRAMUSCULAR; INTRAVENOUS ONCE
Status: COMPLETED | OUTPATIENT
Start: 2024-06-08 | End: 2024-06-08

## 2024-06-08 RX ADMIN — HYDROCODONE BITARTRATE AND ACETAMINOPHEN 1 TABLET: 7.5; 325 TABLET ORAL at 11:06

## 2024-06-08 RX ADMIN — HYDROMORPHONE HYDROCHLORIDE 1 MG: 2 INJECTION INTRAMUSCULAR; INTRAVENOUS; SUBCUTANEOUS at 10:06

## 2024-06-08 RX ADMIN — SODIUM CHLORIDE: 9 INJECTION, SOLUTION INTRAVENOUS at 07:06

## 2024-06-08 RX ADMIN — LISINOPRIL 40 MG: 20 TABLET ORAL at 10:06

## 2024-06-08 RX ADMIN — FENTANYL CITRATE 50 MCG: 50 INJECTION, SOLUTION INTRAMUSCULAR; INTRAVENOUS at 07:06

## 2024-06-08 RX ADMIN — CLINDAMYCIN PHOSPHATE 900 MG: 900 INJECTION, SOLUTION INTRAVENOUS at 07:06

## 2024-06-08 RX ADMIN — HYDROMORPHONE HYDROCHLORIDE 1 MG: 2 INJECTION INTRAMUSCULAR; INTRAVENOUS; SUBCUTANEOUS at 04:06

## 2024-06-08 RX ADMIN — HYDROMORPHONE HYDROCHLORIDE 1 MG: 2 INJECTION INTRAMUSCULAR; INTRAVENOUS; SUBCUTANEOUS at 12:06

## 2024-06-08 RX ADMIN — HYDROMORPHONE HYDROCHLORIDE 1 MG: 2 INJECTION INTRAMUSCULAR; INTRAVENOUS; SUBCUTANEOUS at 07:06

## 2024-06-08 RX ADMIN — LIDOCAINE HYDROCHLORIDE 50 MG: 20 INJECTION, SOLUTION EPIDURAL; INFILTRATION; INTRACAUDAL; PERINEURAL at 07:06

## 2024-06-08 RX ADMIN — Medication 25 MG: at 07:06

## 2024-06-08 RX ADMIN — VARENICLINE 0.5 MG: 0.5 TABLET, FILM COATED ORAL at 10:06

## 2024-06-08 RX ADMIN — HYDRALAZINE HYDROCHLORIDE 10 MG: 20 INJECTION, SOLUTION INTRAMUSCULAR; INTRAVENOUS at 01:06

## 2024-06-08 RX ADMIN — MIDAZOLAM HYDROCHLORIDE 2 MG: 1 INJECTION, SOLUTION INTRAMUSCULAR; INTRAVENOUS at 07:06

## 2024-06-08 RX ADMIN — SODIUM CHLORIDE, POTASSIUM CHLORIDE, SODIUM LACTATE AND CALCIUM CHLORIDE: 600; 310; 30; 20 INJECTION, SOLUTION INTRAVENOUS at 10:06

## 2024-06-08 RX ADMIN — PROPOFOL 150 MG: 10 INJECTION, EMULSION INTRAVENOUS at 07:06

## 2024-06-08 RX ADMIN — HYDROMORPHONE HYDROCHLORIDE 1 MG: 2 INJECTION INTRAMUSCULAR; INTRAVENOUS; SUBCUTANEOUS at 08:06

## 2024-06-08 RX ADMIN — INSULIN ASPART 2 UNITS: 100 INJECTION, SOLUTION INTRAVENOUS; SUBCUTANEOUS at 10:06

## 2024-06-08 RX ADMIN — ONDANSETRON 4 MG: 2 INJECTION INTRAMUSCULAR; INTRAVENOUS at 08:06

## 2024-06-08 NOTE — ASSESSMENT & PLAN NOTE
Patient presented with worsening right foot pain and discoloration following recent hospital discharge. Patient was undergoing treatment of gout with no improvement and was thought to have remote possibility embolic cause of symptom by vascular surgery via diagnostic angiography on 6/3/24 concerning for possible Beurgers disease. Patient was started on ASA and Xarelto 2.5mg PO. 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 Trace 1st through 3rd intermetatarsal bursitis.  Mild edema of the intrinsic foot musculature.  Flexor and extensor tendons are intact. No osteomyelitis. Podiatry consulted and discussed possible amputation but patient declined. Patient now agreeable to amputation and pending surgery in the morning. Podiatry and Vascular surgery aware of patient and will see in the morning. According to RCRI, patient is classified as a class 1 risk with a 3.9% 30 day risk of death, MI, cardiac arrest.  Plan:  -NPO  -Continue current pain regimen, titrate as needed  -Bowel regimen  -Bedrest  -Wound care   -None weightbearing  -IVFs prn  -Antiemetics prn  -Tylenol as needed for fever    -f/u surgery  -PT/OT

## 2024-06-08 NOTE — ANESTHESIA PREPROCEDURE EVALUATION
06/08/2024  Jeanna Helm is a 44 y.o., female.    Patient Active Problem List   Diagnosis    Cellulitis of labia majora    Right foot pain    Discoloration of skin of multiple sites of lower extremity    Type 2 diabetes mellitus    Hypertension    HLD (hyperlipidemia)    H/O: CVA (cerebrovascular accident)    Severe obesity (BMI >= 40)    Tobacco abuse    Gangrene of toe of right foot     Past Surgical History:   Procedure Laterality Date    ANTERIOR CRUCIATE LIGAMENT REPAIR Left     AORTOGRAPHY WITH EXTREMITY RUNOFF N/A 6/3/2024    Procedure: AORTOGRAM, WITH EXTREMITY RUNOFF;  Surgeon: Parvez Durbin MD;  Location: [facility] CATH LAB;  Service: Vascular;  Laterality: N/A;  Possible Brachial access    DILATION AND CURETTAGE OF UTERUS      x2    LUMBAR FUSION      TIBIA FRACTURE SURGERY Right        Pre-op Assessment    I have reviewed the Patient Summary Reports.    I have reviewed the NPO Status.   I have reviewed the Medications.     Review of Systems  Anesthesia Hx:  No problems with previous Anesthesia                Social:  Smoker       Hematology/Oncology:  Hematology Normal                                     Cardiovascular:     Hypertension              ECG has been reviewed.                          Pulmonary:  Pulmonary Normal                       Renal/:  Renal/ Normal                 Hepatic/GI:  Hepatic/GI Normal                 Neurological:  Neurology Normal                                      Endocrine:  Diabetes         Obesity / BMI > 30      Physical Exam  General: Well nourished    Airway:  Mallampati: II   Mouth Opening: Normal  TM Distance: Normal  Neck ROM: Normal ROM    Dental:  Intact        Anesthesia Plan  Type of Anesthesia, risks & benefits discussed:    Anesthesia Type: Gen ETT, Gen Supraglottic Airway  Intra-op Monitoring Plan: Standard ASA Monitors  Post Op Pain Control  Plan: multimodal analgesia  Induction:  IV  Airway Plan: , Post-Induction  Informed Consent: Informed consent signed with the Patient and all parties understand the risks and agree with anesthesia plan.  All questions answered.   ASA Score: 2    Ready For Surgery From Anesthesia Perspective.     .      Chemistry        Component Value Date/Time     (L) 06/08/2024 0354    K 4.1 06/08/2024 0354     06/08/2024 0354    CO2 24 06/08/2024 0354    BUN 9 06/08/2024 0354    CREATININE 0.8 06/08/2024 0354     (H) 06/08/2024 0354        Component Value Date/Time    CALCIUM 9.8 06/08/2024 0354    ALKPHOS 79 06/08/2024 0354    AST 11 06/08/2024 0354    ALT 16 06/08/2024 0354    BILITOT 0.3 06/08/2024 0354    ESTGFRAFRICA >60 02/10/2022 0907    EGFRNONAA >60 02/10/2022 0907        Lab Results   Component Value Date    WBC 11.08 06/08/2024    HGB 11.4 (L) 06/08/2024    HCT 36.7 (L) 06/08/2024    MCV 79 (L) 06/08/2024     06/08/2024     Echo 6/4/24:      Left Ventricle: The left ventricle is normal in size. Normal wall thickness. There is mild concentric hypertrophy. There is normal systolic function with a visually estimated ejection fraction of 60 - 65%. There is normal diastolic function.    Right Ventricle: Normal right ventricular cavity size. Systolic function is normal.    Pulmonary Artery: The estimated pulmonary artery systolic pressure is 23 mmHg.    IVC/SVC: Normal venous pressure at 3 mmHg.

## 2024-06-08 NOTE — OP NOTE
[facility] Surg 3  Surgery Department  Operative Note    SUMMARY     Date of Procedure: 6/8/2024     Procedure: Procedure(s) (LRB):  AMPUTATION, FOOT, TRANSMETATARSAL (Right)  LENGTHENING, TENDON, ACHILLES (Right)     Surgeons and Role:     * Anitra Chowdary DPM - Primary    Assisting Surgeon: None    Pre-Operative Diagnosis: Gangrene of toes of right foot [I96]  Gastrocsoleus equinus, Right leg     Post-Operative Diagnosis: Post-Op Diagnosis Codes:     * Gangrene of toe of right foot [I96]    Anesthesia: General    Description of Technical Procedures:     The patient was brought to operating room and placed on table in supine position.  A time out was performed to confirm correct patient identification and surgical procedure.   After induction of IV sedation, the operative foot was then scrubbed, prepped and draped in usual sterile manner.    Tendoachilles lengthening, Right Lower extremity  Attention was then directed to the posterior aspect of the patients left Achilles tendon where 3 incisions were made approximately 3 cm from one another in a transverse fashion. The most distal and the most proximal of the 3 incisions were made along the medial border of the Achilles tendon while the center incision was made along the lateral border of the Achilles tendon at the midpoint between the 2 medial incisions. The incisions were deepened with a #15 blade directly down to the subcutaneous layer and into the paratenon, into the Achilles tendon creating a hemisection. Active doriflexion of the ankle joint with supination of the forefoot was able to increase the palpable dells made from the hemisection. Lengthening of the Achilles tendon was performed to increase the available dorsiflexion of the ankle past perpendicular with the knee; with this procedure, the patient was able to achieve approximately 5 degrees of dorsiflexion from neutral with knee fully extended. Negative Donnellys test insured an intact  Achilles. The wounds were then copiously irrigated with normal saline solution. The skin margins were then reapproximated maintaining physiological normal tension with simple interrupted suture with 4-0 Nylon.    Transmetatarsal Amputation, Right Foot   Attention was directed to the distal aspect of the operative lower extremity. A fishmouth incision was placed over the distal aspect of the foot encompassing all digits. The incision was deepened down through the subcutaneous tissues with sharp dissection to the deep fascial layer. The soft tissue was freed from the metatarsal bones. The power sagittal saw was utilized to remove the distal forefoot at the distal metatarsal level. Care was taken to leave a good parabola shape of distal bones with no ly prominences. The first metatarsal was angulated dorsal distal to plantar proximal slanted slightly proximally from medially to laterally. The lesser metatarsals were cut in a distal to proximal fashion with care to resect more bone laterally. The forefoot was then disarticulated from the plantar flap with care to preserve maximum soft tissue coverage in the plantar flap. The specimen was passed from the operative field and sent to pathology. Any remaining non viable tissue and tendon was removed. The wound was then copiously irrigated wtih normal Saline. The remaining tissue was evaluated and no necrosis was noted. The tourniquet was deflated at this time at 9 minutes.  Hemostasis was achieved with electrocauterization and hand ties as necessary.  The  skin was closed with nylon under normal anatomic tension using simple and vertical mattress sutures.  A post operative block was injected into the surgical site in an ankle block fashion consisting of 15 ccs of 0.5% marcaine plain, 5 ccs of 1% lidocaine plain. The incision site was dressed with adaptic, gauze, and a posterior splint was applied.The patient tolerated procedure and anesthesia well and was transferred to  recovery room with vital signs stable and vascular status to pre operative level.      Significant Surgical Tasks Conducted by the Assistant(s), if Applicable: <5mL     Estimated Blood Loss (EBL): 25 mL           Implants: * No implants in log *    Specimens:   Specimen (24h ago, onward)       Start     Ordered    06/08/24 0905  Specimen to Pathology, Surgery Other (Podiatry)  Once        Comments: Pre-op Diagnosis: Gangrene of toe of right foot [I96]Procedure(s):AMPUTATION, FOOT, TRANSMETATARSALLENGTHENING, TENDON, ACHILLES Number of specimens: 1Name of specimens: 1. Right foot (perm) Evaluate for osteomyelitis     References:    Click here for ordering Quick Tip   Question Answer Comment   Procedure Type: Other Podiatry   Specimen Class: Routine/Screening    Release to patient Immediate        06/08/24 0905 06/08/24 0847  Specimen to Pathology, Surgery Other (Podiatry)  Once        Comments: Pre-op Diagnosis: Gangrene of toe of right foot [I96]Procedure(s):AMPUTATION, FOOT, TRANSMETATARSALLENGTHENING, TENDON, ACHILLES Number of specimens: 1Name of specimens: 1. Right transmetatarsal (perm)     References:    Click here for ordering Quick Tip   Question Answer Comment   Procedure Type: Other Podiatry   Specimen Class: Routine/Screening    Release to patient Immediate        06/08/24 0855                            Condition: Good    Disposition: PACU - hemodynamically stable.    Attestation: Op Note Attestation: I performed the procedure.

## 2024-06-08 NOTE — TRANSFER OF CARE
Anesthesia Transfer of Care Note    Patient: Jeanna Helm    Procedure(s) Performed: Procedure(s) (LRB):  AMPUTATION, FOOT, TRANSMETATARSAL (Right)  LENGTHENING, TENDON, ACHILLES (Right)    Patient location: PACU    Anesthesia Type: general    Transport from OR: Transported from OR on room air with adequate spontaneous ventilation    Post pain: adequate analgesia    Post assessment: no apparent anesthetic complications    Post vital signs: stable    Level of consciousness: sedated    Nausea/Vomiting: no nausea/vomiting    Complications: none    Transfer of care protocol was followed      Last vitals: Visit Vitals  /77 (BP Location: Right arm, Patient Position: Lying)   Pulse 94   Temp 36.8 °C (98.3 °F) (Oral)   Resp 17   Ht 5' (1.524 m)   Wt 92.4 kg (203 lb 11.3 oz)   LMP 05/03/2024 (Exact Date)   SpO2 99%   Breastfeeding No   BMI 39.78 kg/m²

## 2024-06-08 NOTE — ASSESSMENT & PLAN NOTE
Body mass index is 39.84 kg/m². Morbid obesity complicates all aspects of disease management from diagnostic modalities to treatment. Weight loss encouraged and health benefits explained to patient.

## 2024-06-08 NOTE — ASSESSMENT & PLAN NOTE
Patient reports smoking approximately 1 pack of cigarettes daily and recently started on Chantix during prior admission. Patient educated on morbidity and mortality in regards to continuation of smoking and stressed importance of cessation. Patient currently declined nicotine patch at time of admission but will be available at patient's request.  Plan:  -Nicotine patch available at patient's request

## 2024-06-08 NOTE — PROGRESS NOTES
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 She does not have any revascularization options for the distal toes but fortunately she has excellent perfusion down to the level of her toes I think she has good potential to heal TMA and feel that it may be necessary for pain control at this time. It is imperative that she not smoke again even a single cigarette or she will risk the TMA incision and her other limbs. 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 And overall has small vessels but are patent bilaterally for this reason I'll be following her long term. Vascular is available for any assistance in her care during this admission

## 2024-06-08 NOTE — SUBJECTIVE & OBJECTIVE
Past Medical History:   Diagnosis Date    Back pain     Diabetes mellitus     Hypertension     Left knee pain     Miscarriage     x2    Stroke        Past Surgical History:   Procedure Laterality Date    ANTERIOR CRUCIATE LIGAMENT REPAIR Left     AORTOGRAPHY WITH EXTREMITY RUNOFF N/A 6/3/2024    Procedure: AORTOGRAM, WITH EXTREMITY RUNOFF;  Surgeon: Parvez Durbin MD;  Location: [facility] CATH LAB;  Service: Vascular;  Laterality: N/A;  Possible Brachial access    DILATION AND CURETTAGE OF UTERUS      x2    LUMBAR FUSION      TIBIA FRACTURE SURGERY Right        Review of patient's allergies indicates:   Allergen Reactions    Neurontin [gabapentin] Itching and Blisters    Codeine Rash       No current facility-administered medications on file prior to encounter.     Current Outpatient Medications on File Prior to Encounter   Medication Sig    acetaminophen (TYLENOL) 325 MG tablet Take 325 mg by mouth every 6 (six) hours as needed for Pain.    allopurinoL (ZYLOPRIM) 100 MG tablet Take 2 tablets (200 mg total) by mouth once daily.    aspirin 81 MG Chew Take 1 tablet (81 mg total) by mouth once daily.    glyBURIDE (DIABETA) 5 MG tablet Take 1 tablet (5 mg total) by mouth daily with breakfast.    HYDROcodone-acetaminophen (NORCO) 7.5-325 mg per tablet Take 1 tablet by mouth every 6 (six) hours as needed for Pain.    lisinopriL (PRINIVIL,ZESTRIL) 40 MG tablet Take 1 tablet (40 mg total) by mouth once daily.    rivaroxaban (XARELTO) 2.5 mg Tab Take 1 tablet (2.5 mg total) by mouth 2 (two) times daily with meals.    varenicline (CHANTIX STARTING MONTH BOX) 0.5 mg (11)- 1 mg (42) tablet Take one 0.5mg tab by mouth once daily X3 days,then increase to one 0.5mg tab twice daily X4 days,then increase to one 1mg tab twice daily     Family History       Problem Relation (Age of Onset)    Diabetes Father    Fibromyalgia Mother    Heart disease Mother    Hypertension Mother          Tobacco Use    Smoking status: Every Day     Current  packs/day: 1.00     Average packs/day: 1 pack/day for 20.0 years (20.0 ttl pk-yrs)     Types: Cigarettes    Smokeless tobacco: Not on file   Substance and Sexual Activity    Alcohol use: No    Drug use: No    Sexual activity: Not Currently     Review of Systems   All other systems reviewed and are negative.    Objective:     Vital Signs (Most Recent):  Temp: 98.8 °F (37.1 °C) (06/07/24 2038)  Pulse: 98 (06/07/24 2030)  Resp: 18 (06/07/24 2042)  BP: (!) 163/85 (06/07/24 2030)  SpO2: 95 % (06/07/24 2030) Vital Signs (24h Range):  Temp:  [98.8 °F (37.1 °C)] 98.8 °F (37.1 °C)  Pulse:  [] 98  Resp:  [16-18] 18  SpO2:  [95 %-99 %] 95 %  BP: (136-163)/(85-94) 163/85     Weight: 92.5 kg (204 lb)  Body mass index is 39.84 kg/m².     Physical Exam  Vitals reviewed.   Constitutional:       General: She is in acute distress.      Appearance: Normal appearance. She is obese. She is not ill-appearing, toxic-appearing or diaphoretic.   HENT:      Head: Normocephalic and atraumatic.      Right Ear: External ear normal.      Left Ear: External ear normal.      Nose: Nose normal. No congestion or rhinorrhea.      Mouth/Throat:      Mouth: Mucous membranes are moist.      Pharynx: Oropharynx is clear. No oropharyngeal exudate or posterior oropharyngeal erythema.   Eyes:      General: No scleral icterus.     Extraocular Movements: Extraocular movements intact.      Conjunctiva/sclera: Conjunctivae normal.      Pupils: Pupils are equal, round, and reactive to light.   Neck:      Vascular: No carotid bruit.   Cardiovascular:      Rate and Rhythm: Normal rate and regular rhythm.      Pulses: Normal pulses.      Heart sounds: Normal heart sounds. No murmur heard.     No friction rub. No gallop.   Pulmonary:      Effort: Pulmonary effort is normal. No respiratory distress.      Breath sounds: Normal breath sounds. No stridor. No wheezing, rhonchi or rales.   Chest:      Chest wall: No tenderness.   Abdominal:      General: Abdomen is  flat. Bowel sounds are normal. There is no distension.      Palpations: Abdomen is soft.      Tenderness: There is no abdominal tenderness. There is no right CVA tenderness, left CVA tenderness, guarding or rebound.      Hernia: No hernia is present.   Musculoskeletal:         General: Tenderness present. No swelling, deformity or signs of injury. Normal range of motion.      Cervical back: Normal range of motion and neck supple. No rigidity or tenderness.      Right lower leg: No edema.      Left lower leg: No edema.      Comments: Decreased range of motion upon flexion/extension of all toes throughout right foot with associated TTP throughout extending up to her foot.  Tophus appearing findings noted on toes    Lymphadenopathy:      Cervical: No cervical adenopathy.   Skin:     General: Skin is warm and dry.      Capillary Refill: Capillary refill takes less than 2 seconds.      Coloration: Skin is not jaundiced or pale.      Findings: No bruising, erythema, lesion or rash.      Comments: All toes on right foot dusky in nature and cool to the touch however, capillary refill appears intact    Neurological:      General: No focal deficit present.      Mental Status: She is alert and oriented to person, place, and time. Mental status is at baseline.      Cranial Nerves: No cranial nerve deficit.      Sensory: Sensory deficit present.      Motor: No weakness.      Coordination: Coordination normal.   Psychiatric:         Mood and Affect: Mood normal.         Behavior: Behavior normal.         Thought Content: Thought content normal.         Judgment: Judgment normal.              CRANIAL NERVES     CN III, IV, VI   Pupils are equal, round, and reactive to light.       Significant Labs: All pertinent labs within the past 24 hours have been reviewed.    Significant Imaging: I have reviewed all pertinent imaging results/findings within the past 24 hours.    LABS:  Recent Results (from the past 24 hour(s))   CBC auto  differential    Collection Time: 06/07/24  7:40 PM   Result Value Ref Range    WBC 13.56 (H) 3.90 - 12.70 K/uL    RBC 5.32 4.00 - 5.40 M/uL    Hemoglobin 12.9 12.0 - 16.0 g/dL    Hematocrit 41.2 37.0 - 48.5 %    MCV 77 (L) 82 - 98 fL    MCH 24.2 (L) 27.0 - 31.0 pg    MCHC 31.3 (L) 32.0 - 36.0 g/dL    RDW 14.3 11.5 - 14.5 %    Platelets 418 150 - 450 K/uL    MPV 9.2 9.2 - 12.9 fL    Immature Granulocytes 0.4 0.0 - 0.5 %    Gran # (ANC) 10.6 (H) 1.8 - 7.7 K/uL    Immature Grans (Abs) 0.06 (H) 0.00 - 0.04 K/uL    Lymph # 1.9 1.0 - 4.8 K/uL    Mono # 0.8 0.3 - 1.0 K/uL    Eos # 0.2 0.0 - 0.5 K/uL    Baso # 0.03 0.00 - 0.20 K/uL    nRBC 0 0 /100 WBC    Gran % 77.9 (H) 38.0 - 73.0 %    Lymph % 14.2 (L) 18.0 - 48.0 %    Mono % 5.8 4.0 - 15.0 %    Eosinophil % 1.5 0.0 - 8.0 %    Basophil % 0.2 0.0 - 1.9 %    Differential Method Automated    Sedimentation rate    Collection Time: 06/07/24  7:40 PM   Result Value Ref Range    Sed Rate 66 (H) 0 - 20 mm/Hr   Comprehensive metabolic panel    Collection Time: 06/07/24  8:23 PM   Result Value Ref Range    Sodium 135 (L) 136 - 145 mmol/L    Potassium 4.1 3.5 - 5.1 mmol/L    Chloride 101 95 - 110 mmol/L    CO2 24 23 - 29 mmol/L    Glucose 151 (H) 70 - 110 mg/dL    BUN 8 6 - 20 mg/dL    Creatinine 0.8 0.5 - 1.4 mg/dL    Calcium 9.9 8.7 - 10.5 mg/dL    Total Protein 8.0 6.0 - 8.4 g/dL    Albumin 3.3 (L) 3.5 - 5.2 g/dL    Total Bilirubin 0.3 0.1 - 1.0 mg/dL    Alkaline Phosphatase 78 55 - 135 U/L    AST 12 10 - 40 U/L    ALT 15 10 - 44 U/L    eGFR >60 >60 mL/min/1.73 m^2    Anion Gap 10 8 - 16 mmol/L   C-reactive protein    Collection Time: 06/07/24  8:23 PM   Result Value Ref Range    CRP 68.9 (H) 0.0 - 8.2 mg/L       RADIOLOGY  X-Ray Toe 2 or More Views Right    Result Date: 6/7/2024  EXAMINATION: XR TOE 2 OR MORE VIEWS RIGHT CLINICAL HISTORY: XR TOE 2 OR MORE VIEWS RIGHT COMPARISON: None FINDINGS: Multiple radiographic views  were obtained. No evidence of acute fracture or  dislocation.  Bony mineralization is normal.  Soft tissues are unremarkable.     No acute abnormality. Electronically signed by: Claudy Tay Date:    06/07/2024 Time:    19:08    MRI Foot (Forefoot) Right Without Contrast    Result Date: 6/5/2024  EXAMINATION: MRI FOOT (FOREFOOT) RIGHT WITHOUT CONTRAST CLINICAL HISTORY: Foot swelling, diabetic, osteomyelitis suspected, xray done; TECHNIQUE: Multisequence, multiplanar MR images of the right forefoot without contrast. COMPARISON: None FINDINGS: Limited by patient motion artifact. Bones/joints: Normal bone marrow signal.  No fracture, stress response, or osteomyelitis.  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 Soft tissues: Trace 1st through 3rd intermetatarsal bursitis.  Mild edema of the intrinsic foot musculature.  Flexor and extensor tendons are intact.     No osteomyelitis. Electronically signed by: Jhon Chavez Date:    06/05/2024 Time:    02:19    Echo    Result Date: 6/4/2024    Left Ventricle: The left ventricle is normal in size. Normal wall thickness. There is mild concentric hypertrophy. There is normal systolic function with a visually estimated ejection fraction of 60 - 65%. There is normal diastolic function.   Right Ventricle: Normal right ventricular cavity size. Systolic function is normal.   Pulmonary Artery: The estimated pulmonary artery systolic pressure is 23 mmHg.   IVC/SVC: Normal venous pressure at 3 mmHg.     Cardiac catheterization    Result Date: 6/3/2024  Procedure performed in the Invasive Lab  - See Procedure Log link below for nursing documentation  - See OpNote on Surgeries Tab for physician findings  - See Imaging Tab for radiologist dictation    CTA Chest Abdomen Pelvis    Addendum Date: 5/28/2024    Incidentally small left adrenal adenoma. Electronically signed by: Jhon Chavez Date:    05/28/2024 Time:    00:56    Result Date: 5/28/2024  EXAMINATION: CTA CHEST ABDOMEN PELVIS CLINICAL HISTORY: Aortic atherosclerosis; TECHNIQUE:  Low dose axial images, sagittal and coronal reformations were obtained from the thoracic inlet to the pubic symphysis following the IV administration of 100 mL of Omnipaque 350.  Oral contrast was not administered. COMPARISON: None. FINDINGS: Base of Neck: No significant abnormality. Thoracic soft tissues: Unremarkable. Aorta: Left-sided aortic arch.  No aneurysm and no significant atherosclerosis.  No aneurysm or dissection. Heart: Normal size. No effusion. Pulmonary vasculature: Enlarged main pulmonary artery suggestive of pulmonary arterial hypertension. Poonam/Mediastinum: No pathologic patito enlargement. Airways: Patent. Lungs/Pleura: Clear lungs. No pleural effusion or thickening. Esophagus: Unremarkable. Liver: Normal size and attenuation. No focal lesions. Gallbladder: No calcified gallstones. Bile Ducts: No dilatation. Pancreas: No mass. No peripancreatic fat stranding. Spleen: Normal. Adrenals: Normal. Kidneys/Ureters: Normal enhancement.  No mass or  hydroureteronephrosis. Bladder: No wall thickening. Reproductive organs: Normal. GI Tract/Mesentery: No evidence of bowel obstruction or inflammation. Peritoneal Space: No ascites or free air. Retroperitoneum: No significant adenopathy. Abdominal wall: Normal. Vasculature: No aneurysm.  No large vessel occlusion. Bones: No acute fracture. No suspicious lytic or sclerotic lesions.     No acute findings. Electronically signed by: Jhon Chavez Date:    05/28/2024 Time:    00:49    X-Ray Foot Complete Right    Result Date: 5/27/2024  EXAMINATION: XR FOOT COMPLETE 3 VIEW RIGHT CLINICAL HISTORY: Pain in right foot TECHNIQUE: Three-view exam COMPARISON: 05/19/2024     No acute or adverse bony finding. Electronically signed by: Gisella Vences Date:    05/27/2024 Time:    22:22    US Lower Extrem Arteries Bilat with DANY (xpd)    Result Date: 5/27/2024  EXAMINATION: US ARTERIAL LOWER EXTREMITY BILAT WITH DANY (XPD) CLINICAL HISTORY: black toes; FINDINGS: The  lower extremities were evaluated with continuous wave Doppler to obtain systolic segmental pressure recordings at the brachial and ankle segments. Right DANY 0.92 Left DANY 0.98 Again there is monophasic flow throughout both lower extremities consistent with more proximal nonvisualized aortoiliac hemodynamically significant disease.  Negative for arterial occlusion     Redemonstrated monophasic flow throughout both lower extremities consistent with more proximal nonvisualized aortoiliac hemodynamically significant disease DANY Values >1.3: Non-compressible or calcified vessels 0.97 - 1.29: No significant PAD 0.75 - 0.96: Mild PAD 0.50 - 0.74: Moderate PAD <0.50: Severe PAD <0.30: Critical PAD Electronically signed by: Gisella Vences Date:    05/27/2024 Time:    21:54    US Lower Extremity Arteries Right    Result Date: 5/19/2024  EXAMINATION: Ultrasound lower extremity arteries right CLINICAL HISTORY: Pain in right leg COMPARISON: No comparison studies are available. FINDINGS: There is monophasic flow seen in all vessels imaged, which suggest aorta iliac disease.  Negative for vascular occlusions. The flow velocities are as follows: Right common femoral artery = 67 cm/sec Right profundofemoral artery = 30 cm/sec Right proximal superficial femoral artery = 66 cm/sec Right mid superficial femoral artery = 60 cm/sec Right distal superficial femoral artery = 71 cm/sec Right popliteal artery = 44 cm/sec Right posterior tibial artery = 55 cm/sec Right anterior tibial artery = 36 cm/sec     1.  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 Electronically signed by: Elgin Ta MD Date:    05/19/2024 Time:    11:42    X-Ray Foot Complete Right    Result Date: 5/19/2024  EXAMINATION: XR FOOT COMPLETE 3 VIEW RIGHT CLINICAL HISTORY: . Pain in unspecified foot TECHNIQUE: AP, lateral, and oblique views of the right foot were performed. COMPARISON: April 28, 2024 FINDINGS: Extensive hardware  involving the tibia and fibula again seen.  The osseous structures are intact.  No definite acute or healing fractures noted.  Negative for soft tissue abnormalities.  Large plantar and Achilles calcaneal spurs noted.     As above Electronically signed by: Elgin Ta MD Date:    05/19/2024 Time:    10:14    X-Ray Foot Complete Left    Result Date: 5/14/2024  EXAMINATION: XR FOOT COMPLETE 3 VIEW LEFT CLINICAL HISTORY: Pain in left foot COMPARISON: 05/10/2024     FINDINGS/ Three-view exam is labeled right foot.  Correlate for laterality.  No acute fracture or dislocation seen.  No destructive bony findings.  Plantar spur and Achilles enthesophyte.  Distal fibular and tibial hardware present. Electronically signed by: Gisella Vences Date:    05/14/2024 Time:    17:00    X-Ray Toe 2 or More Views Right    Result Date: 5/10/2024  EXAMINATION: XR TOE 2 OR MORE VIEWS RIGHT CLINICAL HISTORY: XR TOE 2 OR MORE VIEWS RIGHT COMPARISON: None FINDINGS: Multiple radiographic views  were obtained. No evidence of acute fracture or dislocation.  Bony mineralization is normal.  Soft tissues are unremarkable. Question minimal degenerative joint disease at the 1st metacarpal phalangeal joint.     No acute abnormality. Electronically signed by: Claudy Tay Date:    05/10/2024 Time:    20:49      EKG    MICROBIOLOGY    MDM

## 2024-06-08 NOTE — PROGRESS NOTES
Pt is sp RLE TMA and SHEREE, DOS: 6/8/24  PT order placed for home needs- strict NWB to RLE  IV antibiotics x 2 days, trend WBC  Ok to plan to DC home, Monday with crutches and PT clearance  Home dispo: Po clindamycin x10 days, PO pain medication  Keep dressing clean, dry, intact.  Do not REMOVE. Do not GET WET.  F/u in my outpatient podiatry clinic : 6/20 @8:45 AM- [facility], Dr. Evuleocha    Report Electronically Signed By:     Anitra Chowdary DPM   Podiatry  [facility]  6/8/2024

## 2024-06-08 NOTE — PLAN OF CARE
[facility] Med Surg 3  Initial Discharge Assessment       Primary Care Provider: Jarek Crespo MD    Admission Diagnosis: Chest pain [R07.9]  Gangrene of toe of right foot [I96]    Admission Date: 6/7/2024  Expected Discharge Date:     Transition of Care Barriers: None    Payor: MEDICAID / Plan: [employer] / Product Type: Managed Medicaid /     Extended Emergency Contact Information  Primary Emergency Contact: Emre Helm   [facility]  Home Phone: [phone]  Mobile Phone: [phone]  Relation: Other    Discharge Plan A: Home         [id number] - [place] - [address] AT [facility]  [address] [place] [address]  Phone: [phone] Fax: [phone]    [id number] - [place] - [address] AT [facility]  [address]  [facility] [place] [address]  Phone: [phone] Fax: [phone]    [id number]  [place] [facility] [address]  [address] [place] [address]  Phone: [phone] Fax: [phone]      Initial Assessment (most recent)       Adult Discharge Assessment - 06/08/24 1129          Discharge Assessment    Assessment Type Discharge Planning Assessment     Confirmed/corrected address, phone number and insurance Yes     Confirmed Demographics Correct on Facesheet     Source of Information patient     Does patient/caregiver understand observation status Yes     Communicated FRED with patient/caregiver Date not available/Unable to determine     People in Home alone     Do you expect to return to your current living situation? Yes     Do you have help at home or someone to help you manage your care at home? No     Prior to hospitilization cognitive status: Alert/Oriented     Current cognitive status: Alert/Oriented     Walking or Climbing Stairs Difficulty no     Dressing/Bathing Difficulty no     Equipment  Currently Used at Home none     Readmission within 30 days? No     Patient currently being followed by outpatient case management? No     Do you currently have service(s) that help you manage your care at home? No     Do you take prescription medications? Yes     Do you have prescription coverage? Yes     Do you have any problems affording any of your prescribed medications? No     Is the patient taking medications as prescribed? yes     Who is going to help you get home at discharge? family     How do you get to doctors appointments? family or friend will provide     Are you on dialysis? No     Do you take coumadin? No     Discharge Plan A Home     DME Needed Upon Discharge  none     Discharge Plan discussed with: Patient     Transition of Care Barriers None

## 2024-06-08 NOTE — HPI
Jeanna Helm is a 44 y.o. female with a PMH  has a past medical history of Back pain, Diabetes mellitus, Hypertension, Left knee pain, Miscarriage, and Stroke. who presented to the ED for further evaluation of worsening pain and discoloration involving the toes on her right foot. Patient was recently hospitalized from 06/02/24 to 06/05/24 for similar complaints following failed outpatient treatment for gout with a past few months and was evaluated by both Podiatry and vascular surgery during that admission. Patient was undergoing treatment of gout following initial presentation back in April and reported no improvement despite mediation compliance. Patient was seen by vascular surgery and underwent diagnostic angiography on 6/3/24 concerning for possible Beurgers disease. 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 Trace 1st through 3rd intermetatarsal bursitis.  Mild edema of the intrinsic foot musculature.  Flexor and extensor tendons are intact. No osteomyelitis. Podiatry evaluated patient and discussed amputation but patient declined. Patient was started on ASA and Xarelto 2.5mg PO in addition to indomethacin and allopurinol at time of hospital discharge with outpatient follow-up. Patient presents again tonight complaining of worsening pain and is now agreeable to amputation.  Podiatry and vascular surgery was consulted by ED staff with plans to bring to OR tomorrow morning for surgical intervention.  At time of bedside assessment, patient is still complaining of 10/10 pain throughout all her toes extending proximally into her foot and requesting additional pain medications.  All other review of systems negative except those noted above with symptoms and complaints unchanged since previous hospital discharge.  Patient admitted to Hospital Medicine inpatient for continued medical management.       PCP: Jarek Crespo

## 2024-06-08 NOTE — H&P
[facility] - Emergency Dept.  [facility] Medicine  History & Physical    Patient Name: Jeanna Helm  MRN: [id number]  Patient Class: IP- Inpatient  Admission Date: 6/7/2024  Attending Physician: Fabián Rubio MD   Primary Care Provider: Jarek Crespo MD         Patient information was obtained from patient, past medical records, and ER records.     Subjective:     Principal Problem:Right foot pain    Chief Complaint:   Chief Complaint   Patient presents with    Toe Pain     Pt states she is having toe pain in all of the toes on her right foot. Pt states the pain has been going on for 2 months. Pt states she was recently discharged.         HPI: Jeanna Helm is a 44 y.o. female with a PMH  has a past medical history of Back pain, Diabetes mellitus, Hypertension, Left knee pain, Miscarriage, and Stroke. who presented to the ED for further evaluation of worsening pain and discoloration involving the toes on her right foot. Patient was recently hospitalized from 06/02/24 to 06/05/24 for similar complaints following failed outpatient treatment for gout with a past few months and was evaluated by both Podiatry and vascular surgery during that admission. Patient was undergoing treatment of gout following initial presentation back in April and reported no improvement despite mediation compliance. Patient was seen by vascular surgery and underwent diagnostic angiography on 6/3/24 concerning for possible Beurgers disease. 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 Trace 1st through 3rd intermetatarsal bursitis.  Mild edema of the intrinsic foot musculature.  Flexor and extensor tendons are intact. No osteomyelitis. Podiatry evaluated patient and discussed amputation but patient declined. Patient was started on ASA and Xarelto 2.5mg PO in addition to indomethacin and allopurinol at time of hospital discharge with outpatient follow-up. Patient presents again tonight complaining of worsening pain  and is now agreeable to amputation.  Podiatry and vascular surgery was consulted by ED staff with plans to bring to OR tomorrow morning for surgical intervention.  At time of bedside assessment, patient is still complaining of 10/10 pain throughout all her toes extending proximally into her foot and requesting additional pain medications.  All other review of systems negative except those noted above with symptoms and complaints unchanged since previous hospital discharge.  Patient admitted to Hospital Medicine inpatient for continued medical management.       PCP: Jarek Crespo      Past Medical History:   Diagnosis Date    Back pain     Diabetes mellitus     Hypertension     Left knee pain     Miscarriage     x2    Stroke        Past Surgical History:   Procedure Laterality Date    ANTERIOR CRUCIATE LIGAMENT REPAIR Left     AORTOGRAPHY WITH EXTREMITY RUNOFF N/A 6/3/2024    Procedure: AORTOGRAM, WITH EXTREMITY RUNOFF;  Surgeon: Parvez Durbin MD;  Location: [facility] CATH LAB;  Service: Vascular;  Laterality: N/A;  Possible Brachial access    DILATION AND CURETTAGE OF UTERUS      x2    LUMBAR FUSION      TIBIA FRACTURE SURGERY Right        Review of patient's allergies indicates:   Allergen Reactions    Neurontin [gabapentin] Itching and Blisters    Codeine Rash       No current facility-administered medications on file prior to encounter.     Current Outpatient Medications on File Prior to Encounter   Medication Sig    acetaminophen (TYLENOL) 325 MG tablet Take 325 mg by mouth every 6 (six) hours as needed for Pain.    allopurinoL (ZYLOPRIM) 100 MG tablet Take 2 tablets (200 mg total) by mouth once daily.    aspirin 81 MG Chew Take 1 tablet (81 mg total) by mouth once daily.    glyBURIDE (DIABETA) 5 MG tablet Take 1 tablet (5 mg total) by mouth daily with breakfast.    HYDROcodone-acetaminophen (NORCO) 7.5-325 mg per tablet Take 1 tablet by mouth every 6 (six) hours as needed for Pain.    lisinopriL  (PRINIVIL,ZESTRIL) 40 MG tablet Take 1 tablet (40 mg total) by mouth once daily.    rivaroxaban (XARELTO) 2.5 mg Tab Take 1 tablet (2.5 mg total) by mouth 2 (two) times daily with meals.    varenicline (CHANTIX STARTING MONTH BOX) 0.5 mg (11)- 1 mg (42) tablet Take one 0.5mg tab by mouth once daily X3 days,then increase to one 0.5mg tab twice daily X4 days,then increase to one 1mg tab twice daily     Family History       Problem Relation (Age of Onset)    Diabetes Father    Fibromyalgia Mother    Heart disease Mother    Hypertension Mother          Tobacco Use    Smoking status: Every Day     Current packs/day: 1.00     Average packs/day: 1 pack/day for 20.0 years (20.0 ttl pk-yrs)     Types: Cigarettes    Smokeless tobacco: Not on file   Substance and Sexual Activity    Alcohol use: No    Drug use: No    Sexual activity: Not Currently     Review of Systems   All other systems reviewed and are negative.    Objective:     Vital Signs (Most Recent):  Temp: 98.8 °F (37.1 °C) (06/07/24 2038)  Pulse: 98 (06/07/24 2030)  Resp: 18 (06/07/24 2042)  BP: (!) 163/85 (06/07/24 2030)  SpO2: 95 % (06/07/24 2030) Vital Signs (24h Range):  Temp:  [98.8 °F (37.1 °C)] 98.8 °F (37.1 °C)  Pulse:  [] 98  Resp:  [16-18] 18  SpO2:  [95 %-99 %] 95 %  BP: (136-163)/(85-94) 163/85     Weight: 92.5 kg (204 lb)  Body mass index is 39.84 kg/m².     Physical Exam  Vitals reviewed.   Constitutional:       General: She is in acute distress.      Appearance: Normal appearance. She is obese. She is not ill-appearing, toxic-appearing or diaphoretic.   HENT:      Head: Normocephalic and atraumatic.      Right Ear: External ear normal.      Left Ear: External ear normal.      Nose: Nose normal. No congestion or rhinorrhea.      Mouth/Throat:      Mouth: Mucous membranes are moist.      Pharynx: Oropharynx is clear. No oropharyngeal exudate or posterior oropharyngeal erythema.   Eyes:      General: No scleral icterus.     Extraocular Movements:  Extraocular movements intact.      Conjunctiva/sclera: Conjunctivae normal.      Pupils: Pupils are equal, round, and reactive to light.   Neck:      Vascular: No carotid bruit.   Cardiovascular:      Rate and Rhythm: Normal rate and regular rhythm.      Pulses: Normal pulses.      Heart sounds: Normal heart sounds. No murmur heard.     No friction rub. No gallop.   Pulmonary:      Effort: Pulmonary effort is normal. No respiratory distress.      Breath sounds: Normal breath sounds. No stridor. No wheezing, rhonchi or rales.   Chest:      Chest wall: No tenderness.   Abdominal:      General: Abdomen is flat. Bowel sounds are normal. There is no distension.      Palpations: Abdomen is soft.      Tenderness: There is no abdominal tenderness. There is no right CVA tenderness, left CVA tenderness, guarding or rebound.      Hernia: No hernia is present.   Musculoskeletal:         General: Tenderness present. No swelling, deformity or signs of injury. Normal range of motion.      Cervical back: Normal range of motion and neck supple. No rigidity or tenderness.      Right lower leg: No edema.      Left lower leg: No edema.      Comments: Decreased range of motion upon flexion/extension of all toes throughout right foot with associated TTP throughout extending up to her foot.  Tophus appearing findings noted on toes    Lymphadenopathy:      Cervical: No cervical adenopathy.   Skin:     General: Skin is warm and dry.      Capillary Refill: Capillary refill takes less than 2 seconds.      Coloration: Skin is not jaundiced or pale.      Findings: No bruising, erythema, lesion or rash.      Comments: All toes on right foot dusky in nature and cool to the touch however, capillary refill appears intact    Neurological:      General: No focal deficit present.      Mental Status: She is alert and oriented to person, place, and time. Mental status is at baseline.      Cranial Nerves: No cranial nerve deficit.      Sensory: Sensory  deficit present.      Motor: No weakness.      Coordination: Coordination normal.   Psychiatric:         Mood and Affect: Mood normal.         Behavior: Behavior normal.         Thought Content: Thought content normal.         Judgment: Judgment normal.              CRANIAL NERVES     CN III, IV, VI   Pupils are equal, round, and reactive to light.       Significant Labs: All pertinent labs within the past 24 hours have been reviewed.    Significant Imaging: I have reviewed all pertinent imaging results/findings within the past 24 hours.    LABS:  Recent Results (from the past 24 hour(s))   CBC auto differential    Collection Time: 06/07/24  7:40 PM   Result Value Ref Range    WBC 13.56 (H) 3.90 - 12.70 K/uL    RBC 5.32 4.00 - 5.40 M/uL    Hemoglobin 12.9 12.0 - 16.0 g/dL    Hematocrit 41.2 37.0 - 48.5 %    MCV 77 (L) 82 - 98 fL    MCH 24.2 (L) 27.0 - 31.0 pg    MCHC 31.3 (L) 32.0 - 36.0 g/dL    RDW 14.3 11.5 - 14.5 %    Platelets 418 150 - 450 K/uL    MPV 9.2 9.2 - 12.9 fL    Immature Granulocytes 0.4 0.0 - 0.5 %    Gran # (ANC) 10.6 (H) 1.8 - 7.7 K/uL    Immature Grans (Abs) 0.06 (H) 0.00 - 0.04 K/uL    Lymph # 1.9 1.0 - 4.8 K/uL    Mono # 0.8 0.3 - 1.0 K/uL    Eos # 0.2 0.0 - 0.5 K/uL    Baso # 0.03 0.00 - 0.20 K/uL    nRBC 0 0 /100 WBC    Gran % 77.9 (H) 38.0 - 73.0 %    Lymph % 14.2 (L) 18.0 - 48.0 %    Mono % 5.8 4.0 - 15.0 %    Eosinophil % 1.5 0.0 - 8.0 %    Basophil % 0.2 0.0 - 1.9 %    Differential Method Automated    Sedimentation rate    Collection Time: 06/07/24  7:40 PM   Result Value Ref Range    Sed Rate 66 (H) 0 - 20 mm/Hr   Comprehensive metabolic panel    Collection Time: 06/07/24  8:23 PM   Result Value Ref Range    Sodium 135 (L) 136 - 145 mmol/L    Potassium 4.1 3.5 - 5.1 mmol/L    Chloride 101 95 - 110 mmol/L    CO2 24 23 - 29 mmol/L    Glucose 151 (H) 70 - 110 mg/dL    BUN 8 6 - 20 mg/dL    Creatinine 0.8 0.5 - 1.4 mg/dL    Calcium 9.9 8.7 - 10.5 mg/dL    Total Protein 8.0 6.0 - 8.4 g/dL     Albumin 3.3 (L) 3.5 - 5.2 g/dL    Total Bilirubin 0.3 0.1 - 1.0 mg/dL    Alkaline Phosphatase 78 55 - 135 U/L    AST 12 10 - 40 U/L    ALT 15 10 - 44 U/L    eGFR >60 >60 mL/min/1.73 m^2    Anion Gap 10 8 - 16 mmol/L   C-reactive protein    Collection Time: 06/07/24  8:23 PM   Result Value Ref Range    CRP 68.9 (H) 0.0 - 8.2 mg/L       RADIOLOGY  X-Ray Toe 2 or More Views Right    Result Date: 6/7/2024  EXAMINATION: XR TOE 2 OR MORE VIEWS RIGHT CLINICAL HISTORY: XR TOE 2 OR MORE VIEWS RIGHT COMPARISON: None FINDINGS: Multiple radiographic views  were obtained. No evidence of acute fracture or dislocation.  Bony mineralization is normal.  Soft tissues are unremarkable.     No acute abnormality. Electronically signed by: Claudy Tay Date:    06/07/2024 Time:    19:08    MRI Foot (Forefoot) Right Without Contrast    Result Date: 6/5/2024  EXAMINATION: MRI FOOT (FOREFOOT) RIGHT WITHOUT CONTRAST CLINICAL HISTORY: Foot swelling, diabetic, osteomyelitis suspected, xray done; TECHNIQUE: Multisequence, multiplanar MR images of the right forefoot without contrast. COMPARISON: None FINDINGS: Limited by patient motion artifact. Bones/joints: Normal bone marrow signal.  No fracture, stress response, or osteomyelitis.  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 Soft tissues: Trace 1st through 3rd intermetatarsal bursitis.  Mild edema of the intrinsic foot musculature.  Flexor and extensor tendons are intact.     No osteomyelitis. Electronically signed by: Jhon Chavez Date:    06/05/2024 Time:    02:19    Echo    Result Date: 6/4/2024    Left Ventricle: The left ventricle is normal in size. Normal wall thickness. There is mild concentric hypertrophy. There is normal systolic function with a visually estimated ejection fraction of 60 - 65%. There is normal diastolic function.   Right Ventricle: Normal right ventricular cavity size. Systolic function is normal.   Pulmonary Artery: The estimated pulmonary artery systolic pressure is 23  mmHg.   IVC/SVC: Normal venous pressure at 3 mmHg.     Cardiac catheterization    Result Date: 6/3/2024  Procedure performed in the Invasive Lab  - See Procedure Log link below for nursing documentation  - See OpNote on Surgeries Tab for physician findings  - See Imaging Tab for radiologist dictation    CTA Chest Abdomen Pelvis    Addendum Date: 5/28/2024    Incidentally small left adrenal adenoma. Electronically signed by: Jhon Chavez Date:    05/28/2024 Time:    00:56    Result Date: 5/28/2024  EXAMINATION: CTA CHEST ABDOMEN PELVIS CLINICAL HISTORY: Aortic atherosclerosis; TECHNIQUE: Low dose axial images, sagittal and coronal reformations were obtained from the thoracic inlet to the pubic symphysis following the IV administration of 100 mL of Omnipaque 350.  Oral contrast was not administered. COMPARISON: None. FINDINGS: Base of Neck: No significant abnormality. Thoracic soft tissues: Unremarkable. Aorta: Left-sided aortic arch.  No aneurysm and no significant atherosclerosis.  No aneurysm or dissection. Heart: Normal size. No effusion. Pulmonary vasculature: Enlarged main pulmonary artery suggestive of pulmonary arterial hypertension. Poonam/Mediastinum: No pathologic patito enlargement. Airways: Patent. Lungs/Pleura: Clear lungs. No pleural effusion or thickening. Esophagus: Unremarkable. Liver: Normal size and attenuation. No focal lesions. Gallbladder: No calcified gallstones. Bile Ducts: No dilatation. Pancreas: No mass. No peripancreatic fat stranding. Spleen: Normal. Adrenals: Normal. Kidneys/Ureters: Normal enhancement.  No mass or  hydroureteronephrosis. Bladder: No wall thickening. Reproductive organs: Normal. GI Tract/Mesentery: No evidence of bowel obstruction or inflammation. Peritoneal Space: No ascites or free air. Retroperitoneum: No significant adenopathy. Abdominal wall: Normal. Vasculature: No aneurysm.  No large vessel occlusion. Bones: No acute fracture. No suspicious lytic or sclerotic  lesions.     No acute findings. Electronically signed by: Jhon Chavez Date:    05/28/2024 Time:    00:49    X-Ray Foot Complete Right    Result Date: 5/27/2024  EXAMINATION: XR FOOT COMPLETE 3 VIEW RIGHT CLINICAL HISTORY: Pain in right foot TECHNIQUE: Three-view exam COMPARISON: 05/19/2024     No acute or adverse bony finding. Electronically signed by: Gisella Vences Date:    05/27/2024 Time:    22:22    US Lower Extrem Arteries Bilat with DANY (xpd)    Result Date: 5/27/2024  EXAMINATION: US ARTERIAL LOWER EXTREMITY BILAT WITH DANY (XPD) CLINICAL HISTORY: black toes; FINDINGS: The lower extremities were evaluated with continuous wave Doppler to obtain systolic segmental pressure recordings at the brachial and ankle segments. Right DANY 0.92 Left DANY 0.98 Again there is monophasic flow throughout both lower extremities consistent with more proximal nonvisualized aortoiliac hemodynamically significant disease.  Negative for arterial occlusion     Redemonstrated monophasic flow throughout both lower extremities consistent with more proximal nonvisualized aortoiliac hemodynamically significant disease DANY Values >1.3: Non-compressible or calcified vessels 0.97 - 1.29: No significant PAD 0.75 - 0.96: Mild PAD 0.50 - 0.74: Moderate PAD <0.50: Severe PAD <0.30: Critical PAD Electronically signed by: Gisella Vences Date:    05/27/2024 Time:    21:54    US Lower Extremity Arteries Right    Result Date: 5/19/2024  EXAMINATION: Ultrasound lower extremity arteries right CLINICAL HISTORY: Pain in right leg COMPARISON: No comparison studies are available. FINDINGS: There is monophasic flow seen in all vessels imaged, which suggest aorta iliac disease.  Negative for vascular occlusions. The flow velocities are as follows: Right common femoral artery = 67 cm/sec Right profundofemoral artery = 30 cm/sec Right proximal superficial femoral artery = 66 cm/sec Right mid superficial femoral artery = 60 cm/sec Right distal  superficial femoral artery = 71 cm/sec Right popliteal artery = 44 cm/sec Right posterior tibial artery = 55 cm/sec Right anterior tibial artery = 36 cm/sec     1.  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 Electronically signed by: Elgin Ta MD Date:    05/19/2024 Time:    11:42    X-Ray Foot Complete Right    Result Date: 5/19/2024  EXAMINATION: XR FOOT COMPLETE 3 VIEW RIGHT CLINICAL HISTORY: . Pain in unspecified foot TECHNIQUE: AP, lateral, and oblique views of the right foot were performed. COMPARISON: April 28, 2024 FINDINGS: Extensive hardware involving the tibia and fibula again seen.  The osseous structures are intact.  No definite acute or healing fractures noted.  Negative for soft tissue abnormalities.  Large plantar and Achilles calcaneal spurs noted.     As above Electronically signed by: Elgin Ta MD Date:    05/19/2024 Time:    10:14    X-Ray Foot Complete Left    Result Date: 5/14/2024  EXAMINATION: XR FOOT COMPLETE 3 VIEW LEFT CLINICAL HISTORY: Pain in left foot COMPARISON: 05/10/2024     FINDINGS/ Three-view exam is labeled right foot.  Correlate for laterality.  No acute fracture or dislocation seen.  No destructive bony findings.  Plantar spur and Achilles enthesophyte.  Distal fibular and tibial hardware present. Electronically signed by: Gisella Vences Date:    05/14/2024 Time:    17:00    X-Ray Toe 2 or More Views Right    Result Date: 5/10/2024  EXAMINATION: XR TOE 2 OR MORE VIEWS RIGHT CLINICAL HISTORY: XR TOE 2 OR MORE VIEWS RIGHT COMPARISON: None FINDINGS: Multiple radiographic views  were obtained. No evidence of acute fracture or dislocation.  Bony mineralization is normal.  Soft tissues are unremarkable. Question minimal degenerative joint disease at the 1st metacarpal phalangeal joint.     No acute abnormality. Electronically signed by: Claudy Tay Date:    05/10/2024 Time:    20:49      EKG    MICROBIOLOGY    MDM    Assessment/Plan:      * Right foot pain    Gangrene of toe of right foot    Discoloration of skin of multiple sites of lower extremity  Patient presented with worsening right foot pain and discoloration following recent hospital discharge. Patient was undergoing treatment of gout with no improvement and was thought to have remote possibility embolic cause of symptom by vascular surgery via diagnostic angiography on 6/3/24 concerning for possible Beurgers disease. Patient was started on ASA and Xarelto 2.5mg PO. 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 Trace 1st through 3rd intermetatarsal bursitis.  Mild edema of the intrinsic foot musculature.  Flexor and extensor tendons are intact. No osteomyelitis. Podiatry consulted and discussed possible amputation but patient declined. Patient now agreeable to amputation and pending surgery in the morning. Podiatry and Vascular surgery aware of patient and will see in the morning. According to [employer]CRI, patient is classified as a class 1 risk with a 3.9% 30 day risk of death, MI, cardiac arrest.  Plan:  -NPO  -Continue current pain regimen, titrate as needed  -Bowel regimen  -Bedrest  -Wound care   -None weightbearing  -IVFs prn  -Antiemetics prn  -Tylenol as needed for fever    -f/u surgery  -PT/OT       Hypertension  Chronic, uncontrolled. Latest blood pressure and vitals reviewed-     Temp:  [98.8 °F (37.1 °C)]   Pulse:  []   Resp:  [16-18]   BP: (136-163)/(85-94)   SpO2:  [95 %-99 %] .   Home meds for hypertension were reviewed and noted below.   Hypertension Medications               lisinopriL (PRINIVIL,ZESTRIL) 40 MG tablet Take 1 tablet (40 mg total) by mouth once daily.     While in the hospital, will manage blood pressure as follows; Continue home antihypertensive regimen    Will utilize p.r.n. blood pressure medication only if patient's blood pressure greater than 160/100 and she develops symptoms such as worsening chest pain or shortness of  "breath.      Type 2 diabetes mellitus  Patient's FSGs are controlled on current medication regimen.  Last A1c reviewed- No results found for: "LABA1C", "HGBA1C"  Most recent fingerstick glucose reviewed- No results for input(s): "POCTGLUCOSE" in the last 24 hours.  Current correctional scale  Low  Titrate as needed  anti-hyperglycemic dose as follows-   Antihyperglycemics (From admission, onward)     Plan:  -SSI  -Accu-checks  -Hold oral hypoglycemics while patient is in the hospital  -Hypoglycemic protocol       HLD (hyperlipidemia)  Patient is chronically on statin.will continue for now. Last Lipid Panel: No results found for: "CHOL", "HDL", "LDLCALC", "TRIG", "CHOLHDL"  Plan:  -Continue home medication  -low fat/low calorie diet      H/O: CVA (cerebrovascular accident)  Currently at neurological baseline.  Plan:  -continue home medications      Severe obesity (BMI >= 40)  Body mass index is 39.84 kg/m². Morbid obesity complicates all aspects of disease management from diagnostic modalities to treatment. Weight loss encouraged and health benefits explained to patient.       Tobacco abuse  Patient reports smoking approximately 1 pack of cigarettes daily and recently started on Chantix during prior admission. Patient educated on morbidity and mortality in regards to continuation of smoking and stressed importance of cessation. Patient currently declined nicotine patch at time of admission but will be available at patient's request.  Plan:  -Nicotine patch available at patient's request          VTE Risk Mitigation (From admission, onward)           Ordered     Reason for No Pharmacological VTE Prophylaxis  Once        Question:  Reasons:  Answer:  Physician Provided (leave comment)  Comment:  pending surgical intervention    06/07/24 2214     IP VTE HIGH RISK PATIENT  Once         06/07/24 2214     Place sequential compression device  Until discontinued         06/07/24 2214                  //Core Measures   -DVT " proph: SCDs, withholding anticoagulation pending surgical intervention   -Code status: Full    -Surrogate: none provided       Components of this note were documented using a voice recognition system and are subject to errors not corrected at the time the document was proof read. Please contact the author for any clarifications.       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 She does not have any revascularization options for the distal toes but fortunately she has excellent perfusion down to the level of her toes I think she has good potential to heal TMA and feel that it may be necessary for pain control at this time. It is imperative that she not smoke again even a single cigarette or she will risk the TMA incision and her other limbs. 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 And overall has small vessels but are patent bilaterally for this reason I'll be following her long term. Vascular is available for any assistance in her care during this admission      Fabián Rubio MD  Department of Hospital Medicine  [facility] - Emergency Dept.

## 2024-06-08 NOTE — ANESTHESIA POSTPROCEDURE EVALUATION
Anesthesia Post Evaluation    Patient: Jeanna Helm    Procedure(s) Performed: Procedure(s) (LRB):  AMPUTATION, FOOT, TRANSMETATARSAL (Right)  LENGTHENING, TENDON, ACHILLES (Right)    Final Anesthesia Type: general      Patient location during evaluation: PACU  Patient participation: Yes- Able to Participate  Level of consciousness: awake and alert  Post-procedure vital signs: reviewed and stable  Pain management: adequate  Airway patency: patent  JAYLENE mitigation strategies: Extubation while patient is awake  PONV status at discharge: No PONV  Anesthetic complications: no      Cardiovascular status: hemodynamically stable  Respiratory status: spontaneous ventilation  Hydration status: euvolemic  Follow-up not needed.              Vitals Value Taken Time   /85 06/08/24 1140   Temp 36.6 °C (97.9 °F) 06/08/24 1140   Pulse 84 06/08/24 1140   Resp 20 06/08/24 1140   SpO2 100 % 06/08/24 1140         Event Time   Out of Recovery 06/08/2024 09:51:53         Pain/Marisol Score: Pain Rating Prior to Med Admin: 10 (6/8/2024  4:29 AM)  Pain Rating Post Med Admin: 10 (6/7/2024  8:09 PM)  Marisol Score: 8 (6/8/2024  9:45 AM)

## 2024-06-08 NOTE — CONSULTS
"[facility]  Podiatry  Consult Note    Patient Name: Jeanna Helm  MRN: [id number]  Admission Date: 6/7/2024  Hospital Length of Stay: 0 days  Attending Physician: Fabián Rubio MD  Primary Care Provider: Jarek Crespo MD     Inpatient consult to Podiatry  Consult performed by: Anitra Chowdary DPM  Consult ordered by: Rolando Sanchez MD        Subjective:     History of Present Illness: Jeanna Helm is a 44 y.o. female who presents to hospital with severe pains to right foot. Pt has had multiple complaints/ER admissions for severe right foot pains.  Patient was recently discharged on June 5, 2024 for severe right foot pain complicated with gout and likely Buerger's disease.  She was started on aspirin and Xarelto 2.5 mg in addition to gout management.  Patient was scheduled to follow up with outpatient podiatry and vascular surgery.  She is aware that ischemic changes will progress and likely turn into gangrene.  Patient states foot pains were so severe wit increased changes in therefore she reported to the emergency room to proceed with surgical intervention.  MRI reviewed.  I did discuss case with vascular surgery team who agrees to proceed with transmetatarsal amputation.  Patient does have adequate blood flow and likely healing potential.  Patient seen at bedside this morning.  States she is ready to proceed with amputation.  She does understand no guarantees were made or implied.  She has been NPO since midnight.  She admits that she will stop absolute smoking cessation as well as will not use nicotine patch.  She states symptoms have been present for months and is severely affecting her quality of life.    No results found for: "HGBA1C"      Scheduled Meds:  Continuous Infusions:   lactated ringers   Intravenous Continuous         PRN Meds:  Current Facility-Administered Medications:     acetaminophen, 650 mg, Rectal, Q6H PRN    acetaminophen, 650 mg, Oral, Q8H PRN    " albuterol-ipratropium, 3 mL, Nebulization, Q6H PRN    aluminum-magnesium hydroxide-simethicone, 30 mL, Oral, QID PRN    dextrose 10%, 12.5 g, Intravenous, PRN    dextrose 10%, 25 g, Intravenous, PRN    glucagon (human recombinant), 1 mg, Intramuscular, PRN    glucose, 16 g, Oral, PRN    glucose, 24 g, Oral, PRN    HYDROcodone-acetaminophen, 1 tablet, Oral, Q6H PRN    HYDROmorphone, 1 mg, Intravenous, Q4H PRN    melatonin, 6 mg, Oral, Nightly PRN    naloxone, 0.02 mg, Intravenous, PRN    ondansetron, 4 mg, Intravenous, Q8H PRN    promethazine, 25 mg, Oral, Q6H PRN    senna-docusate 8.6-50 mg, 1 tablet, Oral, BID PRN    sodium chloride 0.9%, 10 mL, Intravenous, Q12H PRN    sodium chloride 0.9%, 10 mL, Intravenous, PRN    Review of patient's allergies indicates:   Allergen Reactions    Neurontin [gabapentin] Itching and Blisters    Codeine Rash        Past Medical History:   Diagnosis Date    Back pain     Diabetes mellitus     Hypertension     Left knee pain     Miscarriage     x2    Stroke      Past Surgical History:   Procedure Laterality Date    ANTERIOR CRUCIATE LIGAMENT REPAIR Left     AORTOGRAPHY WITH EXTREMITY RUNOFF N/A 6/3/2024    Procedure: AORTOGRAM, WITH EXTREMITY RUNOFF;  Surgeon: Parvez Durbin MD;  Location: [facility] CATH LAB;  Service: Vascular;  Laterality: N/A;  Possible Brachial access    DILATION AND CURETTAGE OF UTERUS      x2    LUMBAR FUSION      TIBIA FRACTURE SURGERY Right        Family History       Problem Relation (Age of Onset)    Diabetes Father    Fibromyalgia Mother    Heart disease Mother    Hypertension Mother          Tobacco Use    Smoking status: Every Day     Current packs/day: 1.00     Average packs/day: 1 pack/day for 20.0 years (20.0 ttl pk-yrs)     Types: Cigarettes    Smokeless tobacco: Not on file   Substance and Sexual Activity    Alcohol use: No    Drug use: No    Sexual activity: Not Currently     Review of Systems  Objective:     Vital Signs (Most Recent):  Temp: 98.2 °F  "(36.8 °C) (06/07/24 2254)  Pulse: 97 (06/07/24 2254)  Resp: 18 (06/07/24 2254)  BP: (!) 140/92 (06/07/24 2254)  SpO2: 99 % (06/07/24 2254) Vital Signs (24h Range):  Temp:  [98.2 °F (36.8 °C)-98.8 °F (37.1 °C)] 98.2 °F (36.8 °C)  Pulse:  [] 97  Resp:  [16-18] 18  SpO2:  [95 %-100 %] 99 %  BP: (136-163)/(85-94) 140/92     Weight: 92.4 kg (203 lb 11.3 oz)  Body mass index is 39.78 kg/m².    Foot Exam  Right foot cool in temperature, digits 1-3 and 5 dusky and cyanotic with desquamation of tissue noted plantar hallux.  There is severe hypersensitivity with palpation, unable to fully examine digits due to extreme sensitivity in pain            Laboratory:  A1C: No results for input(s): "HGBA1C" in the last 4320 hours.  CBC:   Recent Labs   Lab 06/08/24  0354   WBC 11.08   RBC 4.63   HGB 11.4*   HCT 36.7*      MCV 79*   MCH 24.6*   MCHC 31.1*     CRP:   Recent Labs   Lab 06/07/24 2023   CRP 68.9*     ESR:   Recent Labs   Lab 06/07/24 1940   SEDRATE 66*       Diagnostic Results:  X-Ray: I have reviewed all pertinent results/findings within the past 24 hours.       Assessment/Plan:     Active Diagnoses:    Diagnosis Date Noted POA    PRINCIPAL PROBLEM:  Right foot pain [M79.671] 06/03/2024 Yes    Gangrene of toe of right foot [I96] 06/07/2024 Yes    Discoloration of skin of multiple sites of lower extremity [L81.9] 06/03/2024 Yes    Hypertension [I10] 06/03/2024 Yes     Chronic    Type 2 diabetes mellitus [E11.9] 06/03/2024 Yes     Chronic    HLD (hyperlipidemia) [E78.5] 06/03/2024 Yes     Chronic    H/O: CVA (cerebrovascular accident) [Z86.73] 06/03/2024 Not Applicable     Chronic    Severe obesity (BMI >= 40) [E66.01] 06/03/2024 Yes     Chronic    Tobacco abuse [Z72.0] 06/03/2024 Yes     Chronic      Problems Resolved During this Admission:    Diagnosis Date Noted Date Resolved POA    Gangrene of toe of left foot [I96] 06/07/2024 06/07/2024 Unknown       Discussed this at length with patient surgical " planning.  Absolute smoking cessation. Close outpatient follow up.  Will plan for right transmetatarsal amputation and right tendo-Achilles lengthening.  All questions were answered to patient's satisfaction.  Patient is scheduled to proceed with surgical intervention today 06/08/2024.    Informed Consent and written consent  has been given to the patient. The patient understands the surgery, procedure, risks, alternatives and complications, including but not limited to reaction to medication/anesthetics, bleeding, infection, continuous pain, incomplete pain relief, worse pain, complex regional pain syndrome/RSD, failure to relieve pain, incomplete bone and soft tissue healing, numbness, nerve damage, prolonged or incomplete correction, recurrence, foot/ankle deterioration, new deformity, permanent edema, nerve or vascular damage, blood clots, pulmonary embolism, scarring, instability, limb length inequality, problems with motion and strength, failure of implants/fixation (if applicable), the possible need for more extensive surgery, the possible need for future surgery, and the distant possibility of myocardial infarction, stroke, loss of limb/life. We also discussed the expected benefits and alternatives of the procedure, including the consequences of not proceeding with the surgery, as well as the expected postop course. No guarantees were given nor implied. The procedure has been fully reviewed with the patient and written informed consent has been obtained. We have discussed the perioperative expectations. Having answered these questions and understanding the risks and benefits of the surgery, patient has opted to proceed with the surgery.         Thank you for your consult. I will follow-up with patient. Please contact us if you have any additional questions.    Anitra Chowdary DPM  Podiatry  [facility]

## 2024-06-08 NOTE — ASSESSMENT & PLAN NOTE
Chronic, uncontrolled. Latest blood pressure and vitals reviewed-     Temp:  [98.8 °F (37.1 °C)]   Pulse:  []   Resp:  [16-18]   BP: (136-163)/(85-94)   SpO2:  [95 %-99 %] .   Home meds for hypertension were reviewed and noted below.   Hypertension Medications               lisinopriL (PRINIVIL,ZESTRIL) 40 MG tablet Take 1 tablet (40 mg total) by mouth once daily.     While in the hospital, will manage blood pressure as follows; Continue home antihypertensive regimen    Will utilize p.r.n. blood pressure medication only if patient's blood pressure greater than 160/100 and she develops symptoms such as worsening chest pain or shortness of breath.

## 2024-06-09 LAB
ALBUMIN SERPL BCP-MCNC: 2.8 G/DL (ref 3.5–5.2)
ALP SERPL-CCNC: 76 U/L (ref 55–135)
ALT SERPL W/O P-5'-P-CCNC: 16 U/L (ref 10–44)
ANION GAP SERPL CALC-SCNC: 9 MMOL/L (ref 8–16)
AST SERPL-CCNC: 12 U/L (ref 10–40)
BASOPHILS # BLD AUTO: 0.03 K/UL (ref 0–0.2)
BASOPHILS NFR BLD: 0.3 % (ref 0–1.9)
BILIRUB SERPL-MCNC: 0.3 MG/DL (ref 0.1–1)
BUN SERPL-MCNC: 8 MG/DL (ref 6–20)
CALCIUM SERPL-MCNC: 9.4 MG/DL (ref 8.7–10.5)
CHLORIDE SERPL-SCNC: 103 MMOL/L (ref 95–110)
CO2 SERPL-SCNC: 23 MMOL/L (ref 23–29)
CREAT SERPL-MCNC: 0.8 MG/DL (ref 0.5–1.4)
DIFFERENTIAL METHOD BLD: ABNORMAL
EOSINOPHIL # BLD AUTO: 0.1 K/UL (ref 0–0.5)
EOSINOPHIL NFR BLD: 1 % (ref 0–8)
ERYTHROCYTE [DISTWIDTH] IN BLOOD BY AUTOMATED COUNT: 14.1 % (ref 11.5–14.5)
EST. GFR  (NO RACE VARIABLE): >60 ML/MIN/1.73 M^2
GLUCOSE SERPL-MCNC: 203 MG/DL (ref 70–110)
HCT VFR BLD AUTO: 34.6 % (ref 37–48.5)
HGB BLD-MCNC: 10.6 G/DL (ref 12–16)
IMM GRANULOCYTES # BLD AUTO: 0.06 K/UL (ref 0–0.04)
IMM GRANULOCYTES NFR BLD AUTO: 0.5 % (ref 0–0.5)
LYMPHOCYTES # BLD AUTO: 2.1 K/UL (ref 1–4.8)
LYMPHOCYTES NFR BLD: 18.2 % (ref 18–48)
MCH RBC QN AUTO: 24.4 PG (ref 27–31)
MCHC RBC AUTO-ENTMCNC: 30.6 G/DL (ref 32–36)
MCV RBC AUTO: 80 FL (ref 82–98)
MONOCYTES # BLD AUTO: 0.9 K/UL (ref 0.3–1)
MONOCYTES NFR BLD: 8.1 % (ref 4–15)
NEUTROPHILS # BLD AUTO: 8.1 K/UL (ref 1.8–7.7)
NEUTROPHILS NFR BLD: 71.9 % (ref 38–73)
NRBC BLD-RTO: 0 /100 WBC
PLATELET # BLD AUTO: 356 K/UL (ref 150–450)
PMV BLD AUTO: 8.9 FL (ref 9.2–12.9)
POCT GLUCOSE: 176 MG/DL (ref 70–110)
POCT GLUCOSE: 217 MG/DL (ref 70–110)
POCT GLUCOSE: 265 MG/DL (ref 70–110)
POCT GLUCOSE: 283 MG/DL (ref 70–110)
POTASSIUM SERPL-SCNC: 4.2 MMOL/L (ref 3.5–5.1)
PROT SERPL-MCNC: 7 G/DL (ref 6–8.4)
RBC # BLD AUTO: 4.35 M/UL (ref 4–5.4)
SODIUM SERPL-SCNC: 135 MMOL/L (ref 136–145)
WBC # BLD AUTO: 11.26 K/UL (ref 3.9–12.7)

## 2024-06-09 PROCEDURE — 11000001 HC ACUTE MED/SURG PRIVATE ROOM

## 2024-06-09 PROCEDURE — 63600175 PHARM REV CODE 636 W HCPCS: Performed by: HOSPITALIST

## 2024-06-09 PROCEDURE — 36415 COLL VENOUS BLD VENIPUNCTURE: CPT | Performed by: PODIATRIST

## 2024-06-09 PROCEDURE — 94799 UNLISTED PULMONARY SVC/PX: CPT

## 2024-06-09 PROCEDURE — 80053 COMPREHEN METABOLIC PANEL: CPT | Performed by: PODIATRIST

## 2024-06-09 PROCEDURE — 97116 GAIT TRAINING THERAPY: CPT

## 2024-06-09 PROCEDURE — 25000003 PHARM REV CODE 250: Performed by: INTERNAL MEDICINE

## 2024-06-09 PROCEDURE — 25000003 PHARM REV CODE 250: Performed by: HOSPITALIST

## 2024-06-09 PROCEDURE — 97162 PT EVAL MOD COMPLEX 30 MIN: CPT

## 2024-06-09 PROCEDURE — 85025 COMPLETE CBC W/AUTO DIFF WBC: CPT | Performed by: PODIATRIST

## 2024-06-09 RX ORDER — VARENICLINE TARTRATE 0.5 MG/1
0.5 TABLET, FILM COATED ORAL 2 TIMES DAILY
Status: DISCONTINUED | OUTPATIENT
Start: 2024-06-09 | End: 2024-06-09

## 2024-06-09 RX ORDER — NAPROXEN SODIUM 220 MG/1
81 TABLET, FILM COATED ORAL DAILY
Status: DISCONTINUED | OUTPATIENT
Start: 2024-06-10 | End: 2024-06-11 | Stop reason: HOSPADM

## 2024-06-09 RX ADMIN — HYDROCODONE BITARTRATE AND ACETAMINOPHEN 1 TABLET: 7.5; 325 TABLET ORAL at 12:06

## 2024-06-09 RX ADMIN — HYDROMORPHONE HYDROCHLORIDE 1 MG: 2 INJECTION INTRAMUSCULAR; INTRAVENOUS; SUBCUTANEOUS at 02:06

## 2024-06-09 RX ADMIN — INSULIN ASPART 4 UNITS: 100 INJECTION, SOLUTION INTRAVENOUS; SUBCUTANEOUS at 12:06

## 2024-06-09 RX ADMIN — HYDROMORPHONE HYDROCHLORIDE 1 MG: 2 INJECTION INTRAMUSCULAR; INTRAVENOUS; SUBCUTANEOUS at 08:06

## 2024-06-09 RX ADMIN — HYDROCODONE BITARTRATE AND ACETAMINOPHEN 1 TABLET: 7.5; 325 TABLET ORAL at 06:06

## 2024-06-09 RX ADMIN — INSULIN ASPART 4 UNITS: 100 INJECTION, SOLUTION INTRAVENOUS; SUBCUTANEOUS at 04:06

## 2024-06-09 RX ADMIN — HYDROMORPHONE HYDROCHLORIDE 1 MG: 2 INJECTION INTRAMUSCULAR; INTRAVENOUS; SUBCUTANEOUS at 11:06

## 2024-06-09 RX ADMIN — LISINOPRIL 40 MG: 20 TABLET ORAL at 08:06

## 2024-06-09 RX ADMIN — HYDROMORPHONE HYDROCHLORIDE 1 MG: 2 INJECTION INTRAMUSCULAR; INTRAVENOUS; SUBCUTANEOUS at 10:06

## 2024-06-09 RX ADMIN — VARENICLINE 0.5 MG: 0.5 TABLET, FILM COATED ORAL at 08:06

## 2024-06-09 RX ADMIN — HYDROMORPHONE HYDROCHLORIDE 1 MG: 2 INJECTION INTRAMUSCULAR; INTRAVENOUS; SUBCUTANEOUS at 07:06

## 2024-06-09 RX ADMIN — RIVAROXABAN 2.5 MG: 2.5 TABLET, FILM COATED ORAL at 08:06

## 2024-06-09 RX ADMIN — INSULIN ASPART 3 UNITS: 100 INJECTION, SOLUTION INTRAVENOUS; SUBCUTANEOUS at 08:06

## 2024-06-09 RX ADMIN — HYDROMORPHONE HYDROCHLORIDE 1 MG: 2 INJECTION INTRAMUSCULAR; INTRAVENOUS; SUBCUTANEOUS at 04:06

## 2024-06-09 RX ADMIN — HYDROMORPHONE HYDROCHLORIDE 1 MG: 2 INJECTION INTRAMUSCULAR; INTRAVENOUS; SUBCUTANEOUS at 01:06

## 2024-06-09 NOTE — ASSESSMENT & PLAN NOTE
Body mass index is 39.78 kg/m². Morbid obesity complicates all aspects of disease management from diagnostic modalities to treatment. Weight loss encouraged and health benefits explained to patient.

## 2024-06-09 NOTE — PT/OT/SLP EVAL
"Physical Therapy Evaluation    Patient Name:  Jeanna Helm   MRN:  [id number]    Recommendations:     Discharge Recommendations: Low Intensity Therapy   Discharge Equipment Recommendations: walker, rolling, wheelchair, bedside commode   Barriers to discharge: None    Assessment:     Jeanna Helm is a 44 y.o. female admitted with a medical diagnosis of Right foot pain.  She presents with the following impairments/functional limitations: weakness, gait instability, impaired endurance, impaired balance, decreased lower extremity function, decreased safety awareness, pain, orthopedic precautions, impaired functional mobility, decreased coordination.    Rehab Prognosis: Good; patient would benefit from acute skilled PT services to address these deficits and reach maximum level of function.    Recent Surgery: Procedure(s) (LRB):  AMPUTATION, FOOT, TRANSMETATARSAL (Right)  LENGTHENING, TENDON, ACHILLES (Right) 1 Day Post-Op    Plan:     During this hospitalization, patient to be seen 3 x/week to address the identified rehab impairments via therapeutic exercises, therapeutic activities, gait training and progress toward the following goals:    Plan of Care Expires:  06/23/24    Subjective     Chief Complaint: "I've been having some muscle spasms."   Patient/Family Comments/goals: Get better and return home.   Pain/Comfort:  Pain Rating 1: 6/10  Location - Side 1: Right  Location - Orientation 1: generalized  Location 1: foot  Pain Addressed 1: Cessation of Activity, Distraction, Reposition  Pain Rating Post-Intervention 1: 6/10    Patients cultural, spiritual, Temple conflicts given the current situation: no    Living Environment:    Patient lives alone in a 3rd floor apartment with step access only. Patient will DC to her ex-husbands home which is a single level home with no steps to enter.  Prior to admission, patients level of function was independent, working full time as an [employer] .  Equipment used at " home: none.  DME owned (not currently used): none.  Upon discharge, patient will have assistance from ex-.    Objective:     Communicated with JERAMY [place] prior to session.  Patient found supine with peripheral IV, telemetry, PureWick  upon PT entry to room.    General Precautions: Standard, fall  Orthopedic Precautions:RLE non weight bearing   Braces: N/A  Respiratory Status: Room air    Exams:  Cognitive Exam:  Patient is oriented to Person, Place, Time, and Situation  RLE ROM: WFL except NT at the ankle  RLE Strength: WFL except NT at the ankle  LLE ROM: WFL  LLE Strength: WFL    Functional Mobility:  Bed Mobility:     Supine to Sit: contact guard assistance  Transfers:     Sit to Stand:  contact guard assistance with rolling walker  Bed to Chair: contact guard assistance with  rolling walker  using  Step Transfer  Gait: 5 feet forward and 5 feet backward to chair, CGA with RW      AM-PAC 6 CLICK MOBILITY  Total Score:16       Treatment & Education:    Patient found supine in bed upon PT arrival to room. PT provided education on role of PT, POC and WB restrictions.     Patient completed:     Sup>sit: CGA with use of bed rail to assist with transfer    STS: CGA with RW. Verbal cues and tactile cues for hand placement and maintaining RLE NWB while completing transfer.     Bed>chair tx: Pivot transfer on LLE with RW.     Gait: 5 feet forward from chair and 5 feet backward, CGA with RW. Patient compliant with RLE NWB restrictions. Verbal cues given for RW management and gait sequencing.     Patient left up in chair with all lines intact and call button in reach.    GOALS:   Multidisciplinary Problems       Physical Therapy Goals          Problem: Physical Therapy    Goal Priority Disciplines Outcome Goal Variances Interventions   Physical Therapy Goal     PT, PT/OT                          History:     Past Medical History:   Diagnosis Date    Back pain     Diabetes mellitus     Hypertension     Left knee pain      Miscarriage     x2    Stroke        Past Surgical History:   Procedure Laterality Date    ANTERIOR CRUCIATE LIGAMENT REPAIR Left     AORTOGRAPHY WITH EXTREMITY RUNOFF N/A 6/3/2024    Procedure: AORTOGRAM, WITH EXTREMITY RUNOFF;  Surgeon: Parvez Durbin MD;  Location: [facility] CATH LAB;  Service: Vascular;  Laterality: N/A;  Possible Brachial access    DILATION AND CURETTAGE OF UTERUS      x2    LUMBAR FUSION      TIBIA FRACTURE SURGERY Right        Time Tracking:     PT Received On: 06/09/24  PT Start Time: 0800     PT Stop Time: 0823  PT Total Time (min): 23 min     Billable Minutes: Evaluation 11 and Gait Training 12      06/09/2024

## 2024-06-09 NOTE — PROGRESS NOTES
Patient is sitting in the chair comfortable this morning in good spirits.  Dressing in place.  We will evaluate the TMA site wound dressing removed by podiatry.

## 2024-06-09 NOTE — HOSPITAL COURSE
The patient presented with right foot pain. Due to her vasculitis, amputation recommended. Patient underwent amputation on 6/8. She received perioperative antibiotics. Her pain was controlled. She participated with therapy and deemed safe for home. She is NWB RLE. Home health and DME arranged prior to discharge. She was discharged on 10 day course of clindamycin as recommended by podiatry. Outpatient podiatry and vascular follow-up. Patient seen and examined, stable for discharge.

## 2024-06-09 NOTE — PLAN OF CARE
Problem: Adult Inpatient Plan of Care  Goal: Plan of Care Review  6/9/2024 1806 by Rosa Elena Howard RN  Outcome: Progressing  6/9/2024 1806 by Rosa Elena Howard RN  Outcome: Progressing     Problem: Diabetes Comorbidity  Goal: Blood Glucose Level Within Targeted Range  6/9/2024 1806 by Rosa Elena Howard RN  Outcome: Progressing  6/9/2024 1806 by Rosa Elena Howard RN  Outcome: Progressing     Problem: Pain Acute  Goal: Optimal Pain Control and Function  Outcome: Progressing

## 2024-06-09 NOTE — PROGRESS NOTES
[facility] [address] [place] [address] Medicine  Progress Note    Patient Name: Jeanna Helm  MRN: [id number]  Patient Class: IP- Inpatient   Admission Date: 6/7/2024  Length of Stay: 2 days  Attending Physician: Yamila Geronimo MD  Primary Care Provider: Jarek Crespo MD        Subjective:     Principal Problem:Right foot pain        HPI:  Jeanna Helm is a 44 y.o. female with a PMH  has a past medical history of Back pain, Diabetes mellitus, Hypertension, Left knee pain, Miscarriage, and Stroke. who presented to the ED for further evaluation of worsening pain and discoloration involving the toes on her right foot. Patient was recently hospitalized from 06/02/24 to 06/05/24 for similar complaints following failed outpatient treatment for gout with a past few months and was evaluated by both Podiatry and vascular surgery during that admission. Patient was undergoing treatment of gout following initial presentation back in April and reported no improvement despite mediation compliance. Patient was seen by vascular surgery and underwent diagnostic angiography on 6/3/24 concerning for possible Beurgers disease. 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 Trace 1st through 3rd intermetatarsal bursitis.  Mild edema of the intrinsic foot musculature.  Flexor and extensor tendons are intact. No osteomyelitis. Podiatry evaluated patient and discussed amputation but patient declined. Patient was started on ASA and Xarelto 2.5mg PO in addition to indomethacin and allopurinol at time of hospital discharge with outpatient follow-up. Patient presents again tonight complaining of worsening pain and is now agreeable to amputation.  Podiatry and vascular surgery was consulted by ED staff with plans to bring to OR tomorrow morning for surgical intervention.  At time of bedside assessment, patient is still complaining of 10/10 pain throughout all her toes extending proximally into her foot and requesting  additional pain medications.  All other review of systems negative except those noted above with symptoms and complaints unchanged since previous hospital discharge.  Patient admitted to Hospital Medicine inpatient for continued medical management.       PCP: Jarek Crespo      Overview/Hospital Course:  The patient presented with right foot pain. Due to her vasculitis, amputation recommended. Patient underwent amputation on 6/8    Interval History: f/u gangrene  patient reports pain controlled. Participated with therapy.     Review of Systems  Objective:     Vital Signs (Most Recent):  Temp: 98.2 °F (36.8 °C) (06/09/24 1702)  Pulse: 95 (06/09/24 1702)  Resp: 16 (06/09/24 1802)  BP: (!) 169/87 (06/09/24 1702)  SpO2: 100 % (06/09/24 1702) Vital Signs (24h Range):  Temp:  [97.9 °F (36.6 °C)-99.4 °F (37.4 °C)] 98.2 °F (36.8 °C)  Pulse:  [] 95  Resp:  [13-18] 16  SpO2:  [96 %-100 %] 100 %  BP: (127-169)/(61-88) 169/87     Weight: 92.4 kg (203 lb 11.3 oz)  Body mass index is 39.78 kg/m².    Intake/Output Summary (Last 24 hours) at 6/9/2024 1836  Last data filed at 6/9/2024 0411  Gross per 24 hour   Intake --   Output 400 ml   Net -400 ml         Physical Exam  HENT:      Head: Normocephalic and atraumatic.   Cardiovascular:      Rate and Rhythm: Normal rate and regular rhythm.      Heart sounds: No murmur heard.  Pulmonary:      Effort: Pulmonary effort is normal. No respiratory distress.      Breath sounds: Normal breath sounds. No wheezing.   Abdominal:      General: Bowel sounds are normal. There is no distension.      Palpations: Abdomen is soft.      Tenderness: There is no abdominal tenderness.   Musculoskeletal:         General: No swelling.      Comments: Splint in place   Skin:     General: Skin is warm and dry.   Neurological:      Mental Status: She is alert and oriented to person, place, and time. Mental status is at baseline.             Significant Labs: All pertinent labs within the past 24 hours  have been reviewed.  Recent Lab Results  (Last 5 results in the past 24 hours)        06/09/24  1620   06/09/24  1125   06/09/24  0554   06/09/24  0412   06/08/24 2009        Albumin       2.8         ALP       76         ALT       16         Anion Gap       9         AST       12         Baso #       0.03         Basophil %       0.3         BILIRUBIN TOTAL       0.3  Comment: For infants and newborns, interpretation of results should be based  on gestational age, weight and in agreement with clinical  observations.    Premature Infant recommended reference ranges:  Up to 24 hours.............<8.0 mg/dL  Up to 48 hours............<12.0 mg/dL  3-5 days..................<15.0 mg/dL  6-29 days.................<15.0 mg/dL           BUN       8         Calcium       9.4         Chloride       103         CO2       23         Creatinine       0.8         Differential Method       Automated         eGFR       >60         Eos #       0.1         Eos %       1.0         Glucose       203         Gran # (ANC)       8.1         Gran %       71.9         Hematocrit       34.6         Hemoglobin       10.6         Immature Grans (Abs)       0.06  Comment: Mild elevation in immature granulocytes is non specific and   can be seen in a variety of conditions including stress response,   acute inflammation, trauma and pregnancy. Correlation with other   laboratory and clinical findings is essential.           Immature Granulocytes       0.5         Lymph #       2.1         Lymph %       18.2         MCH       24.4         MCHC       30.6         MCV       80         Mono #       0.9         Mono %       8.1         MPV       8.9         nRBC       0         Platelet Count       356         POCT Glucose 265   217   176     205       Potassium       4.2         PROTEIN TOTAL       7.0         RBC       4.35         RDW       14.1         Sodium       135         WBC       11.26                                Significant Imaging: I  "have reviewed all pertinent imaging results/findings within the past 24 hours.    Assessment/Plan:      * Right foot pain  -due to vasculitis  -s/p TMA  -pain control  -PT eval      Gangrene of toe of right foot  -s/p TMA  -NWB  -PT evaluated, stable for home    Tobacco abuse  -continue Chantix  -discussed smoking cessation    Severe obesity (BMI >= 40)  Body mass index is 39.78 kg/m². Morbid obesity complicates all aspects of disease management from diagnostic modalities to treatment. Weight loss encouraged and health benefits explained to patient.       H/O: CVA (cerebrovascular accident)  Currently at neurological baseline.  Plan:  -continue home medications      HLD (hyperlipidemia)  -Continue home medication        Hypertension  Chronic, uncontrolled. Latest blood pressure and vitals reviewed-     Temp:  [97.9 °F (36.6 °C)-99.4 °F (37.4 °C)]   Pulse:  []   Resp:  [13-18]   BP: (127-169)/(61-88)   SpO2:  [96 %-100 %] .   Home meds for hypertension were reviewed and noted below.   Hypertension Medications               lisinopriL (PRINIVIL,ZESTRIL) 40 MG tablet Take 1 tablet (40 mg total) by mouth once daily.     While in the hospital, will manage blood pressure as follows; continue lisinopril           Type 2 diabetes mellitus  Patient's FSGs are uncontrolled due to hyperglycemia on current medication regimen.  Last A1c reviewed- No results found for: "LABA1C", "HGBA1C"  Most recent fingerstick glucose reviewed-   Recent Labs   Lab 06/08/24 2009 06/09/24  0554 06/09/24  1125 06/09/24  1620   POCTGLUCOSE 205* 176* 217* 265*       Current correctional scale  Low  Titrate as needed  anti-hyperglycemic dose as follows-   Antihyperglycemics (From admission, onward)      Start     Stop Route Frequency Ordered    06/08/24 1122  insulin aspart U-100 pen 0-10 Units         -- SubQ Before meals & nightly PRN 06/08/24 1023              Discoloration of skin of multiple sites of lower extremity  -evaluated by " vascular and podiatry  -s/p TMA  -NWB  -pain control  -PT eval in am        VTE Risk Mitigation (From admission, onward)           Ordered     Reason for No Pharmacological VTE Prophylaxis  Once        Question:  Reasons:  Answer:  Physician Provided (leave comment)  Comment:  pending surgical intervention    06/07/24 2214     IP VTE HIGH RISK PATIENT  Once         06/07/24 2214     Place sequential compression device  Until discontinued         06/07/24 2214                    Discharge Planning   FRED:      Code Status: Full Code   Is the patient medically ready for discharge?:     Reason for patient still in hospital (select all that apply): Patient trending condition and Treatment  Discharge Plan A: Home                  Yamila Geronimo MD  Department of Hospital Medicine   [facility]

## 2024-06-09 NOTE — SUBJECTIVE & OBJECTIVE
Interval History: f/u gangrene toe  s/p TMA discussed smoking cessation. Patient evaluated twice resumed home medications    Review of Systems  Objective:     Vital Signs (Most Recent):  Temp: 99.4 °F (37.4 °C) (06/08/24 1908)  Pulse: 98 (06/08/24 1926)  Resp: 18 (06/08/24 1926)  BP: (!) 158/88 (06/08/24 1908)  SpO2: 96 % (06/08/24 1926) Vital Signs (24h Range):  Temp:  [97.9 °F (36.6 °C)-99.4 °F (37.4 °C)] 99.4 °F (37.4 °C)  Pulse:  [] 98  Resp:  [11-20] 18  SpO2:  [96 %-100 %] 96 %  BP: (126-186)/(76-99) 158/88     Weight: 92.4 kg (203 lb 11.3 oz)  Body mass index is 39.78 kg/m².    Intake/Output Summary (Last 24 hours) at 6/8/2024 2154  Last data filed at 6/8/2024 1619  Gross per 24 hour   Intake 1550 ml   Output 625 ml   Net 925 ml         Physical Exam  HENT:      Head: Normocephalic and atraumatic.   Cardiovascular:      Rate and Rhythm: Normal rate and regular rhythm.      Heart sounds: No murmur heard.  Pulmonary:      Effort: Pulmonary effort is normal. No respiratory distress.      Breath sounds: Normal breath sounds. No wheezing.   Abdominal:      General: Bowel sounds are normal. There is no distension.      Palpations: Abdomen is soft.      Tenderness: There is no abdominal tenderness.   Musculoskeletal:         General: No swelling.      Comments: Right lower extremity splint   Skin:     General: Skin is warm and dry.   Neurological:      Mental Status: She is alert and oriented to person, place, and time. Mental status is at baseline.             Significant Labs: All pertinent labs within the past 24 hours have been reviewed.  Recent Lab Results  (Last 5 results in the past 24 hours)        06/08/24 2009 06/08/24  0923   06/08/24  0920   06/08/24  0838   06/08/24  0354        Albumin         3.1       ALP         79       ALT         16       Anion Gap         10       AST         11       Baso #         0.02       Basophil %         0.2       BILIRUBIN TOTAL         0.3  Comment: For  infants and newborns, interpretation of results should be based  on gestational age, weight and in agreement with clinical  observations.    Premature Infant recommended reference ranges:  Up to 24 hours.............<8.0 mg/dL  Up to 48 hours............<12.0 mg/dL  3-5 days..................<15.0 mg/dL  6-29 days.................<15.0 mg/dL         BUN         9       Calcium         9.8       Chloride         101       CO2         24       Creatinine         0.8       Differential Method         Automated       eGFR         >60       Eos #         0.3       Eos %         2.3       Glucose         163       GRAM STAIN       Rare WBC's                No organisms seen         Gran # (ANC)         7.4       Gran %         66.7       Hematocrit         36.7       Hemoglobin         11.4       Immature Grans (Abs)         0.06  Comment: Mild elevation in immature granulocytes is non specific and   can be seen in a variety of conditions including stress response,   acute inflammation, trauma and pregnancy. Correlation with other   laboratory and clinical findings is essential.         Immature Granulocytes         0.5       Lymph #         2.6       Lymph %         23.4       MCH         24.6       MCHC         31.1       MCV         79       Mono #         0.8       Mono %         6.9       MPV         9.1       nRBC         0       Platelet Count         371       POCT Glucose 205     218           Potassium         4.1       hCG Qualitative, Urine   Negative             PROTEIN TOTAL         7.5        Acceptable   Yes             RBC         4.63       RDW         14.2       Sodium         135       WBC         11.08                              Significant Imaging: I have reviewed all pertinent imaging results/findings within the past 24 hours.

## 2024-06-09 NOTE — SUBJECTIVE & OBJECTIVE
Interval History: f/u gangrene  patient reports pain controlled. Participated with therapy.     Review of Systems  Objective:     Vital Signs (Most Recent):  Temp: 98.2 °F (36.8 °C) (06/09/24 1702)  Pulse: 95 (06/09/24 1702)  Resp: 16 (06/09/24 1802)  BP: (!) 169/87 (06/09/24 1702)  SpO2: 100 % (06/09/24 1702) Vital Signs (24h Range):  Temp:  [97.9 °F (36.6 °C)-99.4 °F (37.4 °C)] 98.2 °F (36.8 °C)  Pulse:  [] 95  Resp:  [13-18] 16  SpO2:  [96 %-100 %] 100 %  BP: (127-169)/(61-88) 169/87     Weight: 92.4 kg (203 lb 11.3 oz)  Body mass index is 39.78 kg/m².    Intake/Output Summary (Last 24 hours) at 6/9/2024 1836  Last data filed at 6/9/2024 0411  Gross per 24 hour   Intake --   Output 400 ml   Net -400 ml         Physical Exam  HENT:      Head: Normocephalic and atraumatic.   Cardiovascular:      Rate and Rhythm: Normal rate and regular rhythm.      Heart sounds: No murmur heard.  Pulmonary:      Effort: Pulmonary effort is normal. No respiratory distress.      Breath sounds: Normal breath sounds. No wheezing.   Abdominal:      General: Bowel sounds are normal. There is no distension.      Palpations: Abdomen is soft.      Tenderness: There is no abdominal tenderness.   Musculoskeletal:         General: No swelling.      Comments: Splint in place   Skin:     General: Skin is warm and dry.   Neurological:      Mental Status: She is alert and oriented to person, place, and time. Mental status is at baseline.             Significant Labs: All pertinent labs within the past 24 hours have been reviewed.  Recent Lab Results  (Last 5 results in the past 24 hours)        06/09/24  1620   06/09/24  1125   06/09/24  0554   06/09/24  0412   06/08/24 2009        Albumin       2.8         ALP       76         ALT       16         Anion Gap       9         AST       12         Baso #       0.03         Basophil %       0.3         BILIRUBIN TOTAL       0.3  Comment: For infants and newborns, interpretation of results  should be based  on gestational age, weight and in agreement with clinical  observations.    Premature Infant recommended reference ranges:  Up to 24 hours.............<8.0 mg/dL  Up to 48 hours............<12.0 mg/dL  3-5 days..................<15.0 mg/dL  6-29 days.................<15.0 mg/dL           BUN       8         Calcium       9.4         Chloride       103         CO2       23         Creatinine       0.8         Differential Method       Automated         eGFR       >60         Eos #       0.1         Eos %       1.0         Glucose       203         Gran # (ANC)       8.1         Gran %       71.9         Hematocrit       34.6         Hemoglobin       10.6         Immature Grans (Abs)       0.06  Comment: Mild elevation in immature granulocytes is non specific and   can be seen in a variety of conditions including stress response,   acute inflammation, trauma and pregnancy. Correlation with other   laboratory and clinical findings is essential.           Immature Granulocytes       0.5         Lymph #       2.1         Lymph %       18.2         MCH       24.4         MCHC       30.6         MCV       80         Mono #       0.9         Mono %       8.1         MPV       8.9         nRBC       0         Platelet Count       356         POCT Glucose 265   217   176     205       Potassium       4.2         PROTEIN TOTAL       7.0         RBC       4.35         RDW       14.1         Sodium       135         WBC       11.26                                Significant Imaging: I have reviewed all pertinent imaging results/findings within the past 24 hours.

## 2024-06-09 NOTE — PROGRESS NOTES
PODIATRIC MEDICINE AND SURGERY      Admission Date: 2024  LOS: 2      SUBJECTIVE  Pt seen at bedside this AM. Pt is s/p  Right Transmetatarsal amputation and Tendoachilles lengthening, DOS 24 . Pt denies overnight events. Denies fever, chills, nausea, or vomiting. Relates pain is well controlled. She is in good spirits.    OBJECTIVE  Temp (24hrs), Av.6 °F (37 °C), Min:97.9 °F (36.6 °C), Max:99.4 °F (37.4 °C)    Vitals:    24 0945 24 1023 24 1140 24 1539   BP: (!) 186/93 (!) 181/99 (!) 162/85 (!) 170/89   Pulse: 90 92 84 92   Resp:  20    Temp: 98 °F (36.7 °C)  97.9 °F (36.6 °C) 98.2 °F (36.8 °C)   TempSrc: Temporal Oral Oral Oral   SpO2: 100% 100% 100% 97%   Weight:       Height:        24 1908 24 1926 24 2221 24 2337   BP: (!) 158/88      Pulse: 100 98     Resp: 16 18 18 18   Temp: 99.4 °F (37.4 °C)      TempSrc: Oral      SpO2: 97% 96%     Weight:       Height:        24 0008 24 0205 24 0411 24 0604   BP: 127/61  (!) 142/79    Pulse: 99  95    Resp: 18 18 18 18   Temp: 98.9 °F (37.2 °C)  98.6 °F (37 °C)    TempSrc: Oral  Oral    SpO2: 96%  99%    Weight:       Height:        24 0705 24 0849 24 1001   BP:  (!) 146/79    Pulse:  102    Resp: 18 13 16   Temp:  98.3 °F (36.8 °C)    TempSrc:      SpO2:  99%    Weight:      Height:              SELECTED RESULTS  Recent Labs     24  1940 24  0354 24  0412   WBC 13.56* 11.08 11.26   HGB 12.9 11.4* 10.6*   HCT 41.2 36.7* 34.6*   MCV 77* 79* 80*    371 356   LYMPH 14.2*  1.9 23.4  2.6 18.2  2.1   MONO 5.8  0.8 6.9  0.8 8.1  0.9   EOS 0.2 0.3 0.1     Recent Labs     244 24   * 135* 135*   K 4.1 4.1 4.2    101 103   CO2 24 24 23   BUN 8 9 8   ALT 15 16 16   AST 12 11 12     Lab Results   Component Value Date     (L) 2024    K 4.2 2024     2024    CO2 23  "06/09/2024     Lab Results   Component Value Date    WBC 11.26 06/09/2024    HGB 10.6 (L) 06/09/2024    HCT 34.6 (L) 06/09/2024    MCV 80 (L) 06/09/2024     06/09/2024      [unfilled]  Lab Results   Component Value Date    SEDRATE 66 (H) 06/07/2024     Lab Results   Component Value Date    CRP 68.9 (H) 06/07/2024     No results found for: "HGBA1C"        ASSESSMENT  PLAN    PT order placed for home needs- strict NWB to RLE  IV antibiotics x 2 days, trend WBC  Ok to plan to DC home, Monday with crutches and PT clearance  Home dispo: Po clindamycin x10 days, PO pain medication  Keep dressing clean, dry, intact.  Do not REMOVE. Do not GET WET.  F/u in my outpatient podiatry clinic : 6/20 @8:45 AM- [facility], Dr. Evuleocha     Report Electronically Signed By:     Anitra Chowdary DPM   Podiatry  [facility]  6/9/2024                     "

## 2024-06-09 NOTE — ASSESSMENT & PLAN NOTE
"Patient's FSGs are controlled on current medication regimen.  Last A1c reviewed- No results found for: "LABA1C", "HGBA1C"  Most recent fingerstick glucose reviewed-   Recent Labs   Lab 06/08/24  0920 06/08/24  2009   POCTGLUCOSE 218* 205*     Current correctional scale  Low  Titrate as needed  anti-hyperglycemic dose as follows-   Antihyperglycemics (From admission, onward)      Start     Stop Route Frequency Ordered    06/08/24 1122  insulin aspart U-100 pen 0-10 Units         -- SubQ Before meals & nightly PRN 06/08/24 1023            "

## 2024-06-09 NOTE — PLAN OF CARE
Initial PT eval completed. Patient CGA with bed mobility, CGA with transfers with RW and CGA with gait x10 feet with RW while maintaining WB restrictions. Recommend low intensity therapy.

## 2024-06-09 NOTE — ASSESSMENT & PLAN NOTE
Chronic, uncontrolled. Latest blood pressure and vitals reviewed-     Temp:  [97.9 °F (36.6 °C)-99.4 °F (37.4 °C)]   Pulse:  []   Resp:  [13-18]   BP: (127-169)/(61-88)   SpO2:  [96 %-100 %] .   Home meds for hypertension were reviewed and noted below.   Hypertension Medications               lisinopriL (PRINIVIL,ZESTRIL) 40 MG tablet Take 1 tablet (40 mg total) by mouth once daily.     While in the hospital, will manage blood pressure as follows; continue lisinopril

## 2024-06-09 NOTE — PROGRESS NOTES
[facility] [address] [place] [address] Medicine  Progress Note    Patient Name: Jeanna Helm  MRN: [id number]  Patient Class: IP- Inpatient   Admission Date: 6/7/2024  Length of Stay: 1 days  Attending Physician: Yamila Geronimo MD  Primary Care Provider: aJrek Crespo MD        Subjective:     Principal Problem:Right foot pain        HPI:  Jeanna Helm is a 44 y.o. female with a PMH  has a past medical history of Back pain, Diabetes mellitus, Hypertension, Left knee pain, Miscarriage, and Stroke. who presented to the ED for further evaluation of worsening pain and discoloration involving the toes on her right foot. Patient was recently hospitalized from 06/02/24 to 06/05/24 for similar complaints following failed outpatient treatment for gout with a past few months and was evaluated by both Podiatry and vascular surgery during that admission. Patient was undergoing treatment of gout following initial presentation back in April and reported no improvement despite mediation compliance. Patient was seen by vascular surgery and underwent diagnostic angiography on 6/3/24 concerning for possible Beurgers disease. 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 Trace 1st through 3rd intermetatarsal bursitis.  Mild edema of the intrinsic foot musculature.  Flexor and extensor tendons are intact. No osteomyelitis. Podiatry evaluated patient and discussed amputation but patient declined. Patient was started on ASA and Xarelto 2.5mg PO in addition to indomethacin and allopurinol at time of hospital discharge with outpatient follow-up. Patient presents again tonight complaining of worsening pain and is now agreeable to amputation.  Podiatry and vascular surgery was consulted by ED staff with plans to bring to OR tomorrow morning for surgical intervention.  At time of bedside assessment, patient is still complaining of 10/10 pain throughout all her toes extending proximally into her foot and requesting  additional pain medications.  All other review of systems negative except those noted above with symptoms and complaints unchanged since previous hospital discharge.  Patient admitted to Hospital Medicine inpatient for continued medical management.       PCP: Jarek Crespo      Overview/Hospital Course:  No notes on file    Interval History: f/u gangrene toe  s/p TMA discussed smoking cessation. Patient evaluated twice resumed home medications    Review of Systems  Objective:     Vital Signs (Most Recent):  Temp: 99.4 °F (37.4 °C) (06/08/24 1908)  Pulse: 98 (06/08/24 1926)  Resp: 18 (06/08/24 1926)  BP: (!) 158/88 (06/08/24 1908)  SpO2: 96 % (06/08/24 1926) Vital Signs (24h Range):  Temp:  [97.9 °F (36.6 °C)-99.4 °F (37.4 °C)] 99.4 °F (37.4 °C)  Pulse:  [] 98  Resp:  [11-20] 18  SpO2:  [96 %-100 %] 96 %  BP: (126-186)/(76-99) 158/88     Weight: 92.4 kg (203 lb 11.3 oz)  Body mass index is 39.78 kg/m².    Intake/Output Summary (Last 24 hours) at 6/8/2024 2154  Last data filed at 6/8/2024 1619  Gross per 24 hour   Intake 1550 ml   Output 625 ml   Net 925 ml         Physical Exam  HENT:      Head: Normocephalic and atraumatic.   Cardiovascular:      Rate and Rhythm: Normal rate and regular rhythm.      Heart sounds: No murmur heard.  Pulmonary:      Effort: Pulmonary effort is normal. No respiratory distress.      Breath sounds: Normal breath sounds. No wheezing.   Abdominal:      General: Bowel sounds are normal. There is no distension.      Palpations: Abdomen is soft.      Tenderness: There is no abdominal tenderness.   Musculoskeletal:         General: No swelling.      Comments: Right lower extremity splint   Skin:     General: Skin is warm and dry.   Neurological:      Mental Status: She is alert and oriented to person, place, and time. Mental status is at baseline.             Significant Labs: All pertinent labs within the past 24 hours have been reviewed.  Recent Lab Results  (Last 5 results in the  past 24 hours)        06/08/24 2009 06/08/24  0923   06/08/24  0920   06/08/24  0838   06/08/24  0354        Albumin         3.1       ALP         79       ALT         16       Anion Gap         10       AST         11       Baso #         0.02       Basophil %         0.2       BILIRUBIN TOTAL         0.3  Comment: For infants and newborns, interpretation of results should be based  on gestational age, weight and in agreement with clinical  observations.    Premature Infant recommended reference ranges:  Up to 24 hours.............<8.0 mg/dL  Up to 48 hours............<12.0 mg/dL  3-5 days..................<15.0 mg/dL  6-29 days.................<15.0 mg/dL         BUN         9       Calcium         9.8       Chloride         101       CO2         24       Creatinine         0.8       Differential Method         Automated       eGFR         >60       Eos #         0.3       Eos %         2.3       Glucose         163       GRAM STAIN       Rare WBC's                No organisms seen         Gran # (ANC)         7.4       Gran %         66.7       Hematocrit         36.7       Hemoglobin         11.4       Immature Grans (Abs)         0.06  Comment: Mild elevation in immature granulocytes is non specific and   can be seen in a variety of conditions including stress response,   acute inflammation, trauma and pregnancy. Correlation with other   laboratory and clinical findings is essential.         Immature Granulocytes         0.5       Lymph #         2.6       Lymph %         23.4       MCH         24.6       MCHC         31.1       MCV         79       Mono #         0.8       Mono %         6.9       MPV         9.1       nRBC         0       Platelet Count         371       POCT Glucose 205     218           Potassium         4.1       hCG Qualitative, Urine   Negative             PROTEIN TOTAL         7.5        Acceptable   Yes             RBC         4.63       RDW         14.2       Sodium   "       135       WBC         11.08                              Significant Imaging: I have reviewed all pertinent imaging results/findings within the past 24 hours.    Assessment/Plan:      * Right foot pain  -due to vasculitis  -s/p TMA  -pain control  -PT eval      Gangrene of toe of right foot  -s/p TMA  -NWB  -PT eval    Tobacco abuse  -resume Chantix  -discussed smoking cessation    Severe obesity (BMI >= 40)  Body mass index is 39.84 kg/m². Morbid obesity complicates all aspects of disease management from diagnostic modalities to treatment. Weight loss encouraged and health benefits explained to patient.       H/O: CVA (cerebrovascular accident)  Currently at neurological baseline.  Plan:  -continue home medications      HLD (hyperlipidemia)  -Continue home medication        Hypertension  Chronic, uncontrolled. Latest blood pressure and vitals reviewed-     Temp:  [97.9 °F (36.6 °C)-99.4 °F (37.4 °C)]   Pulse:  []   Resp:  [11-20]   BP: (126-186)/(76-99)   SpO2:  [96 %-100 %] .   Home meds for hypertension were reviewed and noted below.   Hypertension Medications               lisinopriL (PRINIVIL,ZESTRIL) 40 MG tablet Take 1 tablet (40 mg total) by mouth once daily.     While in the hospital, will manage blood pressure as follows; resume lisinopril           Type 2 diabetes mellitus  Patient's FSGs are controlled on current medication regimen.  Last A1c reviewed- No results found for: "LABA1C", "HGBA1C"  Most recent fingerstick glucose reviewed-   Recent Labs   Lab 06/08/24  0920 06/08/24 2009   POCTGLUCOSE 218* 205*     Current correctional scale  Low  Titrate as needed  anti-hyperglycemic dose as follows-   Antihyperglycemics (From admission, onward)      Start     Stop Route Frequency Ordered    06/08/24 1122  insulin aspart U-100 pen 0-10 Units         -- SubQ Before meals & nightly PRN 06/08/24 1023              Discoloration of skin of multiple sites of lower extremity  -evaluated by vascular " and podiatry  -s/p TMA  -NWB  -pain control  -PT eval in am        VTE Risk Mitigation (From admission, onward)           Ordered     Reason for No Pharmacological VTE Prophylaxis  Once        Question:  Reasons:  Answer:  Physician Provided (leave comment)  Comment:  pending surgical intervention    06/07/24 2214     IP VTE HIGH RISK PATIENT  Once         06/07/24 2214     Place sequential compression device  Until discontinued         06/07/24 2214                    Discharge Planning   FRED:      Code Status: Full Code   Is the patient medically ready for discharge?:     Reason for patient still in hospital (select all that apply): Patient trending condition, Treatment, Consult recommendations, and PT / OT recommendations  Discharge Plan A: Home                  Yamila Geronimo MD  Department of Hospital Medicine   [facility]

## 2024-06-09 NOTE — ASSESSMENT & PLAN NOTE
Chronic, uncontrolled. Latest blood pressure and vitals reviewed-     Temp:  [97.9 °F (36.6 °C)-99.4 °F (37.4 °C)]   Pulse:  []   Resp:  [11-20]   BP: (126-186)/(76-99)   SpO2:  [96 %-100 %] .   Home meds for hypertension were reviewed and noted below.   Hypertension Medications               lisinopriL (PRINIVIL,ZESTRIL) 40 MG tablet Take 1 tablet (40 mg total) by mouth once daily.     While in the hospital, will manage blood pressure as follows; resume lisinopril

## 2024-06-09 NOTE — ASSESSMENT & PLAN NOTE
"Patient's FSGs are uncontrolled due to hyperglycemia on current medication regimen.  Last A1c reviewed- No results found for: "LABA1C", "HGBA1C"  Most recent fingerstick glucose reviewed-   Recent Labs   Lab 06/08/24 2009 06/09/24  0554 06/09/24  1125 06/09/24  1620   POCTGLUCOSE 205* 176* 217* 265*       Current correctional scale  Low  Titrate as needed  anti-hyperglycemic dose as follows-   Antihyperglycemics (From admission, onward)      Start     Stop Route Frequency Ordered    06/08/24 1122  insulin aspart U-100 pen 0-10 Units         -- SubQ Before meals & nightly PRN 06/08/24 1023            "

## 2024-06-10 LAB
ALBUMIN SERPL BCP-MCNC: 2.6 G/DL (ref 3.5–5.2)
ALP SERPL-CCNC: 68 U/L (ref 55–135)
ALT SERPL W/O P-5'-P-CCNC: 17 U/L (ref 10–44)
ANION GAP SERPL CALC-SCNC: 10 MMOL/L (ref 8–16)
AST SERPL-CCNC: 10 U/L (ref 10–40)
BASOPHILS # BLD AUTO: 0.03 K/UL (ref 0–0.2)
BASOPHILS NFR BLD: 0.3 % (ref 0–1.9)
BILIRUB SERPL-MCNC: 0.2 MG/DL (ref 0.1–1)
BUN SERPL-MCNC: 11 MG/DL (ref 6–20)
CALCIUM SERPL-MCNC: 9.4 MG/DL (ref 8.7–10.5)
CHLORIDE SERPL-SCNC: 104 MMOL/L (ref 95–110)
CO2 SERPL-SCNC: 23 MMOL/L (ref 23–29)
CREAT SERPL-MCNC: 0.7 MG/DL (ref 0.5–1.4)
DIFFERENTIAL METHOD BLD: ABNORMAL
EOSINOPHIL # BLD AUTO: 0.3 K/UL (ref 0–0.5)
EOSINOPHIL NFR BLD: 2.5 % (ref 0–8)
ERYTHROCYTE [DISTWIDTH] IN BLOOD BY AUTOMATED COUNT: 14.4 % (ref 11.5–14.5)
EST. GFR  (NO RACE VARIABLE): >60 ML/MIN/1.73 M^2
GLUCOSE SERPL-MCNC: 174 MG/DL (ref 70–110)
HCT VFR BLD AUTO: 31.3 % (ref 37–48.5)
HGB BLD-MCNC: 9.6 G/DL (ref 12–16)
IMM GRANULOCYTES # BLD AUTO: 0.08 K/UL (ref 0–0.04)
IMM GRANULOCYTES NFR BLD AUTO: 0.8 % (ref 0–0.5)
LYMPHOCYTES # BLD AUTO: 2 K/UL (ref 1–4.8)
LYMPHOCYTES NFR BLD: 19.2 % (ref 18–48)
MCH RBC QN AUTO: 24.1 PG (ref 27–31)
MCHC RBC AUTO-ENTMCNC: 30.7 G/DL (ref 32–36)
MCV RBC AUTO: 79 FL (ref 82–98)
MONOCYTES # BLD AUTO: 0.7 K/UL (ref 0.3–1)
MONOCYTES NFR BLD: 6.6 % (ref 4–15)
NEUTROPHILS # BLD AUTO: 7.4 K/UL (ref 1.8–7.7)
NEUTROPHILS NFR BLD: 70.6 % (ref 38–73)
NRBC BLD-RTO: 0 /100 WBC
PLATELET # BLD AUTO: 377 K/UL (ref 150–450)
PMV BLD AUTO: 8.9 FL (ref 9.2–12.9)
POCT GLUCOSE: 152 MG/DL (ref 70–110)
POCT GLUCOSE: 193 MG/DL (ref 70–110)
POCT GLUCOSE: 214 MG/DL (ref 70–110)
POCT GLUCOSE: 234 MG/DL (ref 70–110)
POTASSIUM SERPL-SCNC: 4.2 MMOL/L (ref 3.5–5.1)
PROT SERPL-MCNC: 6.8 G/DL (ref 6–8.4)
RBC # BLD AUTO: 3.98 M/UL (ref 4–5.4)
SODIUM SERPL-SCNC: 137 MMOL/L (ref 136–145)
WBC # BLD AUTO: 10.51 K/UL (ref 3.9–12.7)

## 2024-06-10 PROCEDURE — 25000003 PHARM REV CODE 250: Performed by: INTERNAL MEDICINE

## 2024-06-10 PROCEDURE — 36415 COLL VENOUS BLD VENIPUNCTURE: CPT | Performed by: PODIATRIST

## 2024-06-10 PROCEDURE — 25000003 PHARM REV CODE 250: Performed by: HOSPITALIST

## 2024-06-10 PROCEDURE — 80053 COMPREHEN METABOLIC PANEL: CPT | Performed by: PODIATRIST

## 2024-06-10 PROCEDURE — 85025 COMPLETE CBC W/AUTO DIFF WBC: CPT | Performed by: PODIATRIST

## 2024-06-10 PROCEDURE — 94799 UNLISTED PULMONARY SVC/PX: CPT

## 2024-06-10 PROCEDURE — 63600175 PHARM REV CODE 636 W HCPCS: Performed by: HOSPITALIST

## 2024-06-10 PROCEDURE — 11000001 HC ACUTE MED/SURG PRIVATE ROOM

## 2024-06-10 PROCEDURE — 97110 THERAPEUTIC EXERCISES: CPT | Mod: CQ

## 2024-06-10 PROCEDURE — 97116 GAIT TRAINING THERAPY: CPT | Mod: CQ

## 2024-06-10 RX ADMIN — ASPIRIN 81 MG CHEWABLE TABLET 81 MG: 81 TABLET CHEWABLE at 07:06

## 2024-06-10 RX ADMIN — HYDROCODONE BITARTRATE AND ACETAMINOPHEN 1 TABLET: 7.5; 325 TABLET ORAL at 04:06

## 2024-06-10 RX ADMIN — HYDROMORPHONE HYDROCHLORIDE 1 MG: 2 INJECTION INTRAMUSCULAR; INTRAVENOUS; SUBCUTANEOUS at 07:06

## 2024-06-10 RX ADMIN — HYDROMORPHONE HYDROCHLORIDE 1 MG: 2 INJECTION INTRAMUSCULAR; INTRAVENOUS; SUBCUTANEOUS at 05:06

## 2024-06-10 RX ADMIN — LISINOPRIL 40 MG: 20 TABLET ORAL at 08:06

## 2024-06-10 RX ADMIN — INSULIN ASPART 2 UNITS: 100 INJECTION, SOLUTION INTRAVENOUS; SUBCUTANEOUS at 05:06

## 2024-06-10 RX ADMIN — HYDROMORPHONE HYDROCHLORIDE 1 MG: 2 INJECTION INTRAMUSCULAR; INTRAVENOUS; SUBCUTANEOUS at 10:06

## 2024-06-10 RX ADMIN — HYDROCODONE BITARTRATE AND ACETAMINOPHEN 1 TABLET: 7.5; 325 TABLET ORAL at 09:06

## 2024-06-10 RX ADMIN — RIVAROXABAN 2.5 MG: 2.5 TABLET, FILM COATED ORAL at 07:06

## 2024-06-10 RX ADMIN — INSULIN ASPART 1 UNITS: 100 INJECTION, SOLUTION INTRAVENOUS; SUBCUTANEOUS at 09:06

## 2024-06-10 RX ADMIN — VARENICLINE 0.5 MG: 0.5 TABLET, FILM COATED ORAL at 08:06

## 2024-06-10 RX ADMIN — INSULIN ASPART 4 UNITS: 100 INJECTION, SOLUTION INTRAVENOUS; SUBCUTANEOUS at 11:06

## 2024-06-10 RX ADMIN — VARENICLINE 0.5 MG: 0.5 TABLET, FILM COATED ORAL at 07:06

## 2024-06-10 RX ADMIN — HYDROMORPHONE HYDROCHLORIDE 1 MG: 2 INJECTION INTRAMUSCULAR; INTRAVENOUS; SUBCUTANEOUS at 01:06

## 2024-06-10 RX ADMIN — RIVAROXABAN 2.5 MG: 2.5 TABLET, FILM COATED ORAL at 05:06

## 2024-06-10 RX ADMIN — HYDROMORPHONE HYDROCHLORIDE 1 MG: 2 INJECTION INTRAMUSCULAR; INTRAVENOUS; SUBCUTANEOUS at 04:06

## 2024-06-10 RX ADMIN — HYDROCODONE BITARTRATE AND ACETAMINOPHEN 1 TABLET: 7.5; 325 TABLET ORAL at 02:06

## 2024-06-10 NOTE — PLAN OF CARE
Problem: Adult Inpatient Plan of Care  Goal: Plan of Care Review  Outcome: Progressing  Goal: Patient-Specific Goal (Individualized)  Outcome: Progressing  Goal: Absence of Hospital-Acquired Illness or Injury  Outcome: Progressing  Goal: Optimal Comfort and Wellbeing  Outcome: Progressing  Goal: Readiness for Transition of Care  Outcome: Progressing     Problem: Diabetes Comorbidity  Goal: Blood Glucose Level Within Targeted Range  Outcome: Progressing     Problem: Wound  Goal: Optimal Coping  Outcome: Progressing  Goal: Optimal Functional Ability  Outcome: Progressing  Goal: Absence of Infection Signs and Symptoms  Outcome: Progressing  Goal: Improved Oral Intake  Outcome: Progressing  Goal: Optimal Pain Control and Function  Outcome: Progressing  Goal: Skin Health and Integrity  Outcome: Progressing  Goal: Optimal Wound Healing  Outcome: Progressing     Problem: Pain Acute  Goal: Optimal Pain Control and Function  Outcome: Progressing     Problem: Skin Injury Risk Increased  Goal: Skin Health and Integrity  Outcome: Progressing

## 2024-06-10 NOTE — ASSESSMENT & PLAN NOTE
Chronic, uncontrolled. Latest blood pressure and vitals reviewed-     Temp:  [98.1 °F (36.7 °C)-99.5 °F (37.5 °C)]   Pulse:  [84-99]   Resp:  [16-18]   BP: ()/(57-85)   SpO2:  [95 %-100 %] .   Home meds for hypertension were reviewed and noted below.   Hypertension Medications               lisinopriL (PRINIVIL,ZESTRIL) 40 MG tablet Take 1 tablet (40 mg total) by mouth once daily.     While in the hospital, will manage blood pressure as follows; continue lisinopril

## 2024-06-10 NOTE — PT/OT/SLP PROGRESS
Physical Therapy Treatment    Patient Name:  Jeanna Helm   MRN:  [id number]    Recommendations:     Discharge Recommendations: Low Intensity Therapy  Discharge Equipment Recommendations: walker, rolling, wheelchair, bedside commode  Barriers to discharge: Decreased caregiver support    Assessment:     Jeanna Helm is a 44 y.o. female admitted with a medical diagnosis of Right foot pain.  She presents with the following impairments/functional limitations: weakness, impaired endurance, impaired sensation, gait instability, impaired functional mobility, impaired balance, decreased lower extremity function, pain, impaired skin, edema, orthopedic precautions.    Pt demonstrates improved safety awareness and functional mobility on this date. Today's session focused on functional transfers, ambulation with the rolling walker, and bilateral lower extremity therapeutic  exercises. Overall, pt is motivated and shows good progress towards enhanced functional mobility.    Rehab Prognosis: Good; patient would benefit from acute skilled PT services to address these deficits and reach maximum level of function.    Recent Surgery: Procedure(s) (LRB):  AMPUTATION, FOOT, TRANSMETATARSAL (Right)  LENGTHENING, TENDON, ACHILLES (Right) 2 Days Post-Op    Plan:     During this hospitalization, patient to be seen 3 x/week to address the identified rehab impairments via gait training, therapeutic activities, therapeutic exercises and progress toward the following goals:    Plan of Care Expires:  06/23/24    Subjective     Chief Complaint: tingling along lateral aspect of R thigh/leg  Patient/Family Comments/goals: pt is motivated  Pain/Comfort:  Pain Rating 1: 8/10  Location - Side 1: Right  Location - Orientation 1: generalized  Location 1: foot (leg due to tingling & spasms)  Pain Addressed 1: Reposition, Pre-medicate for activity  Pain Rating Post-Intervention 1: 8/10      Objective:     Communicated with pt's nurseNathan prior to  session.  Patient found supine with peripheral IV, telemetry, PureWick upon PT entry to room.     General Precautions: Standard, fall  Orthopedic Precautions: RLE non weight bearing  Braces: Cervical collar  Respiratory Status: Room air     Functional Mobility:  Bed Mobility:     Rolling Right: stand by assistance  Supine to Sit: contact guard assistance  Pt with use of bed rail  Transfers:     Sit to Stand from EOB: contact guard assistance with rolling walker  Cues for hand placement  Gait: Pt was able to achieve 12' x2 and  demonstrates hop-to pattern while using the rolling walker with contact guard assistance and verbal cueing to ensure safety. Pt instructed to turn to the uninvolved side to reduce risk of falls. Overall, pt demonstrates good adherence to verbal cueing for activity pacing and navigation.   Balance: Fair      AM-PAC 6 CLICK MOBILITY  Turning over in bed (including adjusting bedclothes, sheets and blankets)?: 4  Sitting down on and standing up from a chair with arms (e.g., wheelchair, bedside commode, etc.): 3  Moving from lying on back to sitting on the side of the bed?: 4  Moving to and from a bed to a chair (including a wheelchair)?: 3  Need to walk in hospital room?: 3  Climbing 3-5 steps with a railing?: 1 (NT)  Basic Mobility Total Score: 18       Treatment & Education:  Pt educated on the role of therapy, goals of the session, proper positioning techniques, rolling walker management, and safety awareness techniques. Pt with good understanding.    Pt independently donned shoe on left foot, while sitting at edge of bed, prior to ambulation trial.    Pt was able to perform 2 sets of 10 repetitions of bilateral long arcs quads, bilateral hip flexion, and glute squeezes to promote muscle strengthening and endurance.    Patient left up in chair with all lines intact, call button in reach, pt's nurse notified, and pt's spouse present.    GOALS:   Multidisciplinary Problems       Physical Therapy  Goals          Problem: Physical Therapy    Goal Priority Disciplines Outcome Goal Variances Interventions   Physical Therapy Goal     PT, PT/OT                          Time Tracking:     PT Received On: 06/10/24  PT Start Time: 1135     PT Stop Time: 1158  PT Total Time (min): 23 min     Billable Minutes: Gait Training 10 and Therapeutic Exercise 13    Treatment Type: Treatment  PT/PTA: PTA     Number of PTA visits since last PT visit: 1     06/10/2024

## 2024-06-10 NOTE — ASSESSMENT & PLAN NOTE
"Patient's FSGs are uncontrolled due to hyperglycemia on current medication regimen.  Last A1c reviewed- No results found for: "LABA1C", "HGBA1C"  Most recent fingerstick glucose reviewed-   Recent Labs   Lab 06/09/24  2008 06/10/24  0545 06/10/24  1157 06/10/24  1714   POCTGLUCOSE 283* 234* 214* 193*       Current correctional scale  Low  Titrate as needed  anti-hyperglycemic dose as follows-   Antihyperglycemics (From admission, onward)    Start     Stop Route Frequency Ordered    06/08/24 1122  insulin aspart U-100 pen 0-10 Units         -- SubQ Before meals & nightly PRN 06/08/24 1023          "

## 2024-06-10 NOTE — PLAN OF CARE
06/10/24 1605   Post-Acute Status   Post-Acute Authorization Home Health;HME   HME Status Referrals Sent   Home Health Status Pending Clinical Review   Discharge Delays None known at this time   Discharge Plan   Discharge Plan A Home with family;Home Health   Discharge Plan B Home with family;Home Health     BANDAR met with patient and ex spouse, Emre, at bedside to discuss discharge planning. Pt reports she will discharge to Emre's home; DC address provided: [address] [place] [address]  Pt inquired about level of supervision needed at home. SW advised that therapy is recommending home health but pt does not require 24 supervision/assistance. SW advised that home health can be arranged for discharge. SW explained home health consists of PT/OT/RN visits about 3x week depending on pt's needs. Home health referral pending acceptance by [facility].  BANDAR discussed recommendations for DME: wheelchair, BSC and RW. BANDAR explained insurance will only cover one ambulatory device: W/c vs RW. Pt agreeable to self-pay for RW and will get WC via insurance. [facility] will process pt's orders.     Anticipated DC tomorrow.   SW to follow up.

## 2024-06-10 NOTE — SUBJECTIVE & OBJECTIVE
Interval History: f/u plan home tomorrow. Discussed no further IV pain medication as she will not have IV medication at home. She is to notify us if her pain is not controlled on oral medications so her medications can be adjusted prior to discharge    Review of Systems  Objective:     Vital Signs (Most Recent):  Temp: 98.1 °F (36.7 °C) (06/10/24 1620)  Pulse: 84 (06/10/24 1620)  Resp: 18 (06/10/24 1705)  BP: 128/81 (06/10/24 1620)  SpO2: 99 % (06/10/24 1620) Vital Signs (24h Range):  Temp:  [98.1 °F (36.7 °C)-99.5 °F (37.5 °C)] 98.1 °F (36.7 °C)  Pulse:  [84-99] 84  Resp:  [16-18] 18  SpO2:  [95 %-100 %] 99 %  BP: ()/(57-85) 128/81     Weight: 92.4 kg (203 lb 11.3 oz)  Body mass index is 39.78 kg/m².    Intake/Output Summary (Last 24 hours) at 6/10/2024 1852  Last data filed at 6/10/2024 1424  Gross per 24 hour   Intake 480 ml   Output 401 ml   Net 79 ml         Physical Exam  HENT:      Head: Normocephalic and atraumatic.   Cardiovascular:      Rate and Rhythm: Normal rate and regular rhythm.      Heart sounds: No murmur heard.  Pulmonary:      Effort: Pulmonary effort is normal. No respiratory distress.      Breath sounds: Normal breath sounds. No wheezing.   Abdominal:      General: Bowel sounds are normal. There is no distension.      Palpations: Abdomen is soft.      Tenderness: There is no abdominal tenderness.   Musculoskeletal:         General: No swelling.   Skin:     General: Skin is warm and dry.   Neurological:      Mental Status: She is alert and oriented to person, place, and time. Mental status is at baseline.             Significant Labs: All pertinent labs within the past 24 hours have been reviewed.  Recent Lab Results  (Last 5 results in the past 24 hours)        06/10/24  1714   06/10/24  1157   06/10/24  0545   06/10/24  0442   06/09/24 2008        Albumin       2.6         ALP       68         ALT       17         Anion Gap       10         AST       10         Baso #       0.03          Basophil %       0.3         BILIRUBIN TOTAL       0.2  Comment: For infants and newborns, interpretation of results should be based  on gestational age, weight and in agreement with clinical  observations.    Premature Infant recommended reference ranges:  Up to 24 hours.............<8.0 mg/dL  Up to 48 hours............<12.0 mg/dL  3-5 days..................<15.0 mg/dL  6-29 days.................<15.0 mg/dL           BUN       11         Calcium       9.4         Chloride       104         CO2       23         Creatinine       0.7         Differential Method       Automated         eGFR       >60         Eos #       0.3         Eos %       2.5         Glucose       174         Gran # (ANC)       7.4         Gran %       70.6         Hematocrit       31.3         Hemoglobin       9.6         Immature Grans (Abs)       0.08  Comment: Mild elevation in immature granulocytes is non specific and   can be seen in a variety of conditions including stress response,   acute inflammation, trauma and pregnancy. Correlation with other   laboratory and clinical findings is essential.           Immature Granulocytes       0.8         Lymph #       2.0         Lymph %       19.2         MCH       24.1         MCHC       30.7         MCV       79         Mono #       0.7         Mono %       6.6         MPV       8.9         nRBC       0         Platelet Count       377         POCT Glucose 193   214   234     283       Potassium       4.2         PROTEIN TOTAL       6.8         RBC       3.98         RDW       14.4         Sodium       137         WBC       10.51                                Significant Imaging: I have reviewed all pertinent imaging results/findings within the past 24 hours.

## 2024-06-10 NOTE — PROGRESS NOTES
[facility] [address] [place] [address] Medicine  Progress Note    Patient Name: Jeanna Helm  MRN: [id number]  Patient Class: IP- Inpatient   Admission Date: 6/7/2024  Length of Stay: 3 days  Attending Physician: Yamila Geronimo MD  Primary Care Provider: Jarek Crespo MD        Subjective:     Principal Problem:Right foot pain        HPI:  Jeanna Helm is a 44 y.o. female with a PMH  has a past medical history of Back pain, Diabetes mellitus, Hypertension, Left knee pain, Miscarriage, and Stroke. who presented to the ED for further evaluation of worsening pain and discoloration involving the toes on her right foot. Patient was recently hospitalized from 06/02/24 to 06/05/24 for similar complaints following failed outpatient treatment for gout with a past few months and was evaluated by both Podiatry and vascular surgery during that admission. Patient was undergoing treatment of gout following initial presentation back in April and reported no improvement despite mediation compliance. Patient was seen by vascular surgery and underwent diagnostic angiography on 6/3/24 concerning for possible Beurgers disease. 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 Trace 1st through 3rd intermetatarsal bursitis.  Mild edema of the intrinsic foot musculature.  Flexor and extensor tendons are intact. No osteomyelitis. Podiatry evaluated patient and discussed amputation but patient declined. Patient was started on ASA and Xarelto 2.5mg PO in addition to indomethacin and allopurinol at time of hospital discharge with outpatient follow-up. Patient presents again tonight complaining of worsening pain and is now agreeable to amputation.  Podiatry and vascular surgery was consulted by ED staff with plans to bring to OR tomorrow morning for surgical intervention.  At time of bedside assessment, patient is still complaining of 10/10 pain throughout all her toes extending proximally into her foot and requesting  additional pain medications.  All other review of systems negative except those noted above with symptoms and complaints unchanged since previous hospital discharge.  Patient admitted to Hospital Medicine inpatient for continued medical management.       PCP: Jarek Crespo      Overview/Hospital Course:  The patient presented with right foot pain. Due to her vasculitis, amputation recommended. Patient underwent amputation on 6/8    Interval History: f/u plan home tomorrow. Discussed no further IV pain medication as she will not have IV medication at home. She is to notify us if her pain is not controlled on oral medications so her medications can be adjusted prior to discharge    Review of Systems  Objective:     Vital Signs (Most Recent):  Temp: 98.1 °F (36.7 °C) (06/10/24 1620)  Pulse: 84 (06/10/24 1620)  Resp: 18 (06/10/24 1705)  BP: 128/81 (06/10/24 1620)  SpO2: 99 % (06/10/24 1620) Vital Signs (24h Range):  Temp:  [98.1 °F (36.7 °C)-99.5 °F (37.5 °C)] 98.1 °F (36.7 °C)  Pulse:  [84-99] 84  Resp:  [16-18] 18  SpO2:  [95 %-100 %] 99 %  BP: ()/(57-85) 128/81     Weight: 92.4 kg (203 lb 11.3 oz)  Body mass index is 39.78 kg/m².    Intake/Output Summary (Last 24 hours) at 6/10/2024 1852  Last data filed at 6/10/2024 1424  Gross per 24 hour   Intake 480 ml   Output 401 ml   Net 79 ml         Physical Exam  HENT:      Head: Normocephalic and atraumatic.   Cardiovascular:      Rate and Rhythm: Normal rate and regular rhythm.      Heart sounds: No murmur heard.  Pulmonary:      Effort: Pulmonary effort is normal. No respiratory distress.      Breath sounds: Normal breath sounds. No wheezing.   Abdominal:      General: Bowel sounds are normal. There is no distension.      Palpations: Abdomen is soft.      Tenderness: There is no abdominal tenderness.   Musculoskeletal:         General: No swelling.   Skin:     General: Skin is warm and dry.   Neurological:      Mental Status: She is alert and oriented to  person, place, and time. Mental status is at baseline.             Significant Labs: All pertinent labs within the past 24 hours have been reviewed.  Recent Lab Results  (Last 5 results in the past 24 hours)        06/10/24  1714   06/10/24  1157   06/10/24  0545   06/10/24  0442   06/09/24 2008        Albumin       2.6         ALP       68         ALT       17         Anion Gap       10         AST       10         Baso #       0.03         Basophil %       0.3         BILIRUBIN TOTAL       0.2  Comment: For infants and newborns, interpretation of results should be based  on gestational age, weight and in agreement with clinical  observations.    Premature Infant recommended reference ranges:  Up to 24 hours.............<8.0 mg/dL  Up to 48 hours............<12.0 mg/dL  3-5 days..................<15.0 mg/dL  6-29 days.................<15.0 mg/dL           BUN       11         Calcium       9.4         Chloride       104         CO2       23         Creatinine       0.7         Differential Method       Automated         eGFR       >60         Eos #       0.3         Eos %       2.5         Glucose       174         Gran # (ANC)       7.4         Gran %       70.6         Hematocrit       31.3         Hemoglobin       9.6         Immature Grans (Abs)       0.08  Comment: Mild elevation in immature granulocytes is non specific and   can be seen in a variety of conditions including stress response,   acute inflammation, trauma and pregnancy. Correlation with other   laboratory and clinical findings is essential.           Immature Granulocytes       0.8         Lymph #       2.0         Lymph %       19.2         MCH       24.1         MCHC       30.7         MCV       79         Mono #       0.7         Mono %       6.6         MPV       8.9         nRBC       0         Platelet Count       377         POCT Glucose 193   214   234     283       Potassium       4.2         PROTEIN TOTAL       6.8         RBC        "3.98         RDW       14.4         Sodium       137         WBC       10.51                                Significant Imaging: I have reviewed all pertinent imaging results/findings within the past 24 hours.    Assessment/Plan:      * Right foot pain  -due to vasculitis  -s/p TMA  -pain control  -PT eval      Gangrene of toe of right foot  -s/p TMA  -NWB  -PT evaluated, stable for home    Tobacco abuse  -continue Chantix  -discussed smoking cessation    Severe obesity (BMI >= 40)  Body mass index is 39.78 kg/m². Morbid obesity complicates all aspects of disease management from diagnostic modalities to treatment. Weight loss encouraged and health benefits explained to patient.       H/O: CVA (cerebrovascular accident)  Currently at neurological baseline.  Plan:  -continue home medications      HLD (hyperlipidemia)  -Continue home medication        Hypertension  Chronic, uncontrolled. Latest blood pressure and vitals reviewed-     Temp:  [98.1 °F (36.7 °C)-99.5 °F (37.5 °C)]   Pulse:  [84-99]   Resp:  [16-18]   BP: ()/(57-85)   SpO2:  [95 %-100 %] .   Home meds for hypertension were reviewed and noted below.   Hypertension Medications               lisinopriL (PRINIVIL,ZESTRIL) 40 MG tablet Take 1 tablet (40 mg total) by mouth once daily.     While in the hospital, will manage blood pressure as follows; continue lisinopril           Type 2 diabetes mellitus  Patient's FSGs are uncontrolled due to hyperglycemia on current medication regimen.  Last A1c reviewed- No results found for: "LABA1C", "HGBA1C"  Most recent fingerstick glucose reviewed-   Recent Labs   Lab 06/09/24  2008 06/10/24  0545 06/10/24  1157 06/10/24  1714   POCTGLUCOSE 283* 234* 214* 193*       Current correctional scale  Low  Titrate as needed  anti-hyperglycemic dose as follows-   Antihyperglycemics (From admission, onward)      Start     Stop Route Frequency Ordered    06/08/24 1122  insulin aspart U-100 pen 0-10 Units         -- SubQ Before " meals & nightly PRN 06/08/24 1023              Discoloration of skin of multiple sites of lower extremity  -evaluated by vascular and podiatry  -s/p TMA  -NWB  -pain control  -PT eval in am        VTE Risk Mitigation (From admission, onward)           Ordered     Reason for No Pharmacological VTE Prophylaxis  Once        Question:  Reasons:  Answer:  Physician Provided (leave comment)  Comment:  pending surgical intervention    06/07/24 2214     IP VTE HIGH RISK PATIENT  Once         06/07/24 2214     Place sequential compression device  Until discontinued         06/07/24 2214                    Discharge Planning   FRED:      Code Status: Full Code   Is the patient medically ready for discharge?:     Reason for patient still in hospital (select all that apply): Patient trending condition and Pending disposition  Discharge Plan A: Home with family, Home Health   Discharge Delays: None known at this time              Yamila Geronimo MD  Department of Hospital Medicine   [facility]

## 2024-06-10 NOTE — PROGRESS NOTES
Patient seen and examined she was sleeping comfortably. Dressing intact. Plans to remove dressing in one week. I will arrange for follow up in 2-3 wks in my office. Vascular to sign off.

## 2024-06-11 VITALS
OXYGEN SATURATION: 100 % | HEIGHT: 60 IN | HEART RATE: 89 BPM | SYSTOLIC BLOOD PRESSURE: 132 MMHG | DIASTOLIC BLOOD PRESSURE: 89 MMHG | RESPIRATION RATE: 18 BRPM | WEIGHT: 203.69 LBS | BODY MASS INDEX: 39.99 KG/M2 | TEMPERATURE: 99 F

## 2024-06-11 LAB
BACTERIA SPEC AEROBE CULT: NO GROWTH
POCT GLUCOSE: 175 MG/DL (ref 70–110)
POCT GLUCOSE: 210 MG/DL (ref 70–110)

## 2024-06-11 PROCEDURE — 25000003 PHARM REV CODE 250: Performed by: HOSPITALIST

## 2024-06-11 PROCEDURE — 97530 THERAPEUTIC ACTIVITIES: CPT | Mod: CQ

## 2024-06-11 PROCEDURE — 25000003 PHARM REV CODE 250: Performed by: INTERNAL MEDICINE

## 2024-06-11 RX ORDER — METHOCARBAMOL 500 MG/1
500 TABLET, FILM COATED ORAL 4 TIMES DAILY PRN
Status: DISCONTINUED | OUTPATIENT
Start: 2024-06-11 | End: 2024-06-11

## 2024-06-11 RX ORDER — DEXTROSE 4 G
TABLET,CHEWABLE ORAL
Qty: 1 EACH | Refills: 0 | Status: SHIPPED | OUTPATIENT
Start: 2024-06-11

## 2024-06-11 RX ORDER — CYCLOBENZAPRINE HCL 10 MG
10 TABLET ORAL 3 TIMES DAILY PRN
Qty: 30 TABLET | Refills: 0 | Status: SHIPPED | OUTPATIENT
Start: 2024-06-11 | End: 2024-06-19 | Stop reason: SDUPTHER

## 2024-06-11 RX ORDER — LANCETS 33 GAUGE
EACH MISCELLANEOUS
Qty: 100 EACH | Refills: 0 | Status: SHIPPED | OUTPATIENT
Start: 2024-06-11

## 2024-06-11 RX ORDER — CYCLOBENZAPRINE HCL 10 MG
10 TABLET ORAL ONCE
Status: COMPLETED | OUTPATIENT
Start: 2024-06-11 | End: 2024-06-11

## 2024-06-11 RX ORDER — HYDROCODONE BITARTRATE AND ACETAMINOPHEN 7.5; 325 MG/1; MG/1
1 TABLET ORAL EVERY 6 HOURS PRN
Qty: 28 TABLET | Refills: 0 | Status: SHIPPED | OUTPATIENT
Start: 2024-06-11 | End: 2024-06-20 | Stop reason: SDUPTHER

## 2024-06-11 RX ORDER — CLINDAMYCIN HYDROCHLORIDE 300 MG/1
300 CAPSULE ORAL 3 TIMES DAILY
Qty: 30 CAPSULE | Refills: 0 | Status: SHIPPED | OUTPATIENT
Start: 2024-06-11

## 2024-06-11 RX ADMIN — RIVAROXABAN 2.5 MG: 2.5 TABLET, FILM COATED ORAL at 08:06

## 2024-06-11 RX ADMIN — METHOCARBAMOL 500 MG: 500 TABLET ORAL at 08:06

## 2024-06-11 RX ADMIN — HYDROCODONE BITARTRATE AND ACETAMINOPHEN 1 TABLET: 7.5; 325 TABLET ORAL at 10:06

## 2024-06-11 RX ADMIN — INSULIN ASPART 4 UNITS: 100 INJECTION, SOLUTION INTRAVENOUS; SUBCUTANEOUS at 06:06

## 2024-06-11 RX ADMIN — HYDROCODONE BITARTRATE AND ACETAMINOPHEN 1 TABLET: 7.5; 325 TABLET ORAL at 03:06

## 2024-06-11 RX ADMIN — CYCLOBENZAPRINE HYDROCHLORIDE 10 MG: 10 TABLET, FILM COATED ORAL at 10:06

## 2024-06-11 RX ADMIN — VARENICLINE 0.5 MG: 0.5 TABLET, FILM COATED ORAL at 08:06

## 2024-06-11 RX ADMIN — INSULIN ASPART 2 UNITS: 100 INJECTION, SOLUTION INTRAVENOUS; SUBCUTANEOUS at 01:06

## 2024-06-11 RX ADMIN — ASPIRIN 81 MG CHEWABLE TABLET 81 MG: 81 TABLET CHEWABLE at 08:06

## 2024-06-11 NOTE — DISCHARGE SUMMARY
[facility] - Med Surg 3  [facility] Medicine  Discharge Summary      Patient Name: Jeanna Helm  MRN: [id number]  [facility]: [id number]  Patient Class: IP- Inpatient  Admission Date: 6/7/2024  Hospital Length of Stay: 4 days  Discharge Date and Time:  06/11/2024 5:40 PM  Attending Physician: Yamila Geronimo MD   Discharging Provider: Yamila Geronimo MD  Primary Care Provider: Jarek Crespo MD    Primary Care Team: Networked reference to record PCT     HPI:   Jeanna Helm is a 44 y.o. female with a PMH  has a past medical history of Back pain, Diabetes mellitus, Hypertension, Left knee pain, Miscarriage, and Stroke. who presented to the ED for further evaluation of worsening pain and discoloration involving the toes on her right foot. Patient was recently hospitalized from 06/02/24 to 06/05/24 for similar complaints following failed outpatient treatment for gout with a past few months and was evaluated by both Podiatry and vascular surgery during that admission. Patient was undergoing treatment of gout following initial presentation back in April and reported no improvement despite mediation compliance. Patient was seen by vascular surgery and underwent diagnostic angiography on 6/3/24 concerning for possible Beurgers disease. 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 Trace 1st through 3rd intermetatarsal bursitis.  Mild edema of the intrinsic foot musculature.  Flexor and extensor tendons are intact. No osteomyelitis. Podiatry evaluated patient and discussed amputation but patient declined. Patient was started on ASA and Xarelto 2.5mg PO in addition to indomethacin and allopurinol at time of hospital discharge with outpatient follow-up. Patient presents again tonight complaining of worsening pain and is now agreeable to amputation.  Podiatry and vascular surgery was consulted by ED staff with plans to bring to OR tomorrow morning for surgical intervention.  At time of bedside assessment,  patient is still complaining of 10/10 pain throughout all her toes extending proximally into her foot and requesting additional pain medications.  All other review of systems negative except those noted above with symptoms and complaints unchanged since previous hospital discharge.  Patient admitted to Hospital Medicine inpatient for continued medical management.       PCP: Jarek Crespo      Procedure(s) (LRB):  AMPUTATION, FOOT, TRANSMETATARSAL (Right)  LENGTHENING, TENDON, ACHILLES (Right)      Hospital Course:   The patient presented with right foot pain. Due to her vasculitis, amputation recommended. Patient underwent amputation on 6/8. She received perioperative antibiotics. Her pain was controlled. She participated with therapy and deemed safe for home. She is NWB RLE. Home health and DME arranged prior to discharge. She was discharged on 10 day course of clindamycin as recommended by podiatry. Outpatient podiatry and vascular follow-up. Patient seen and examined, stable for discharge.     Goals of Care Treatment Preferences:  Code Status: Full Code      Consults:   Consults (From admission, onward)          Status Ordering Provider     Inpatient consult to Vascular Surgery  Once        Provider:  Justni Simons III, MD    Acknowledged ANITRA ROSAS     Inpatient consult to Podiatry  Once        Provider:  Anitra Rosas DPM    Completed JERICHO WEST            No new Assessment & Plan notes have been filed under this hospital service since the last note was generated.  Service: Hospital Medicine    Final Active Diagnoses:    Diagnosis Date Noted POA    PRINCIPAL PROBLEM:  Right foot pain [M79.671] 06/03/2024 Yes    Gangrene of toe of right foot [I96] 06/07/2024 Yes    Discoloration of skin of multiple sites of lower extremity [L81.9] 06/03/2024 Yes    Hypertension [I10] 06/03/2024 Yes     Chronic    Type 2 diabetes mellitus [E11.9] 06/03/2024 Yes     Chronic    HLD (hyperlipidemia)  [E78.5] 06/03/2024 Yes     Chronic    H/O: CVA (cerebrovascular accident) [Z86.73] 06/03/2024 Not Applicable     Chronic    Severe obesity (BMI >= 40) [E66.01] 06/03/2024 Yes     Chronic    Tobacco abuse [Z72.0] 06/03/2024 Yes     Chronic      Problems Resolved During this Admission:    Diagnosis Date Noted Date Resolved POA    Gangrene of toe of left foot [I96] 06/07/2024 06/07/2024 Unknown       Discharged Condition: stable    Disposition: Home-Health Care [facility]    Follow Up:   Follow-up Information       [facility] Follow up.    Specialty: DME Provider  Why: WHEELCHAIR PROVIDER  Contact information:  [address]  [facility] [address]  [phone]               [facility].    Specialty: Home Health Services  Why: Home Health  Physical therapy, occupational therapy and home nursing visits  Contact information:  [address] [facility] [address]  [phone]               Parvez Durbin MD Follow up.    Specialty: Vascular Surgery  Contact information:  [address]  [place] [address]  [phone]               [facility] via [employer] (Medicaid) Follow up.    Why: To set up a ride with [employer]:    Call [phone], Monday - Friday, from 7 a.m. - 7 p.m.  Schedule online(opens in new window), also between 7 a.m. and 7 p.m.  If you have to cancel your ride, please do so at least 48 hours in advance.    Call [phone], Monday - Friday, from 7 a.m. - 7 p.m. to ask wheres my ride, if your ride doesnt show up, or to arrange urgent transportation.  Contact information:  To set up a ride with [employer]:    Call [phone], Monday - Friday, from 7 a.m. - 7 p.m.  Schedule online(opens in new window), also between 7 a.m. and 7 p.m.                         Patient Instructions:      WHEELCHAIR FOR HOME USE     Order Specific Question Answer Comments   Hours in W/C per day: 5    Type of Wheelchair: Standard   "  Size(Width): 18"(STD adult)    Leg Support: Swing Away    Lap Belt: Velcro    Accessories: Anti-tippers    Cushion: Basic    Reclining Back No    Height: 5' (1.524 m)    Weight: 92.4 kg (203 lb 11.3 oz)    Does patient have medical equipment at home? none    Length of need (1-99 months): 99    Please check all that apply: The patient requires the use of a w/c for activities of daily living within the Home.    Please check all that apply: Caregiver is capable and willing to operate wheelchair safely.      COMMODE FOR HOME USE     Order Specific Question Answer Comments   Type: Standard    Height: 5' (1.524 m)    Weight: 92.4 kg (203 lb 11.3 oz)    Does patient have medical equipment at home? none    Length of need (1-99 months): 99      WALKER FOR HOME USE     Order Specific Question Answer Comments   Type of Walker: Ramana (4'4"-5'6")    With wheels? Yes    Height: 5' (1.524 m)    Weight: 92.4 kg (203 lb 11.3 oz)    Length of need (1-99 months): 99    Does patient have medical equipment at home? none    Please check all that apply: Patient's condition impairs ambulation.    Please check all that apply: Patient is unable to safely ambulate without equipment.      Ambulatory referral/consult to Home Health   Standing Status: Future   Referral Priority: Routine Referral Type: Home Health   Referral Reason: Specialty Services Required   Requested Specialty: Home Health Services   Number of Visits Requested: 1     Diet diabetic     Keep surgical extremity elevated     Leave dressing on - Keep it clean, dry, and intact until clinic visit     Weight bearing restrictions (specify):   Order Comments: Nonweight bearing Right leg       Significant Diagnostic Studies: Radiology:   Imaging Results              X-Ray Toe 2 or More Views Right (Final result)  Result time 06/07/24 19:08:52      Final result by Claudy Tay MD (06/07/24 19:08:52)                   Impression:      No acute abnormality.      Electronically " signed by: Claudy Tay  Date:    06/07/2024  Time:    19:08               Narrative:    EXAMINATION:  XR TOE 2 OR MORE VIEWS RIGHT    CLINICAL HISTORY:  XR TOE 2 OR MORE VIEWS RIGHT    COMPARISON:  None    FINDINGS:  Multiple radiographic views  were obtained.    No evidence of acute fracture or dislocation.  Bony mineralization is normal.  Soft tissues are unremarkable.                                        Pending Diagnostic Studies:       Procedure Component Value Units Date/Time    Specimen to Pathology, Surgery Other (Podiatry) [[id number]] Collected: 06/08/24 0905    Order Status: Sent Lab Status: In process Updated: 06/10/24 1203    Specimen: Tissue     Specimen to Pathology, Surgery Other (Podiatry) [[id number]] Collected: 06/08/24 0855    Order Status: Sent Lab Status: In process Updated: 06/08/24 0856    Specimen: Tissue            Medications:  Reconciled Home Medications:      Medication List        START taking these medications      clindamycin 300 MG capsule  Commonly known as: CLEOCIN  Take 1 capsule (300 mg total) by mouth 3 (three) times daily.     cyclobenzaprine 10 MG tablet  Commonly known as: FLEXERIL  Take 1 tablet (10 mg total) by mouth 3 (three) times daily as needed for Muscle spasms.     TRUE METRIX GLUCOSE METER Misc  Generic drug: blood-glucose meter  Use as directed to test blood sugar     TRUE METRIX GLUCOSE TEST STRIP Strp  Generic drug: blood sugar diagnostic  Use one strip to test blood sugar before meals     TRUEPLUS LANCETS 33 gauge Misc  Generic drug: lancets  Use to check blood sugar before meals            CONTINUE taking these medications      acetaminophen 325 MG tablet  Commonly known as: TYLENOL  Take 325 mg by mouth every 6 (six) hours as needed for Pain.     allopurinoL 100 MG tablet  Commonly known as: ZYLOPRIM  Take 2 tablets (200 mg total) by mouth once daily.     aspirin 81 MG Chew  Take 1 tablet (81 mg total) by mouth once daily.     glyBURIDE 5 MG  tablet  Commonly known as: DIABETA  Take 1 tablet (5 mg total) by mouth daily with breakfast.     HYDROcodone-acetaminophen 7.5-325 mg per tablet  Commonly known as: NORCO  Take 1 tablet by mouth every 6 (six) hours as needed for Pain.     lisinopriL 40 MG tablet  Commonly known as: PRINIVIL,ZESTRIL  Take 1 tablet (40 mg total) by mouth once daily.     rivaroxaban 2.5 mg Tab  Commonly known as: XARELTO  Take 1 tablet (2.5 mg total) by mouth 2 (two) times daily with meals.     varenicline 0.5 mg (11)- 1 mg (42) tablet  Commonly known as: CHANTIX STARTING MONTH BOX  Take one 0.5mg tab by mouth once daily X3 days,then increase to one 0.5mg tab twice daily X4 days,then increase to one 1mg tab twice daily              Indwelling Lines/Drains at time of discharge:   Lines/Drains/Airways       None                   Time spent on the discharge of patient: 31 minutes         Yamila Geronimo MD  Department of Hospital Medicine  [facility] - Med Surg 3

## 2024-06-11 NOTE — PLAN OF CARE
[facility] - Med Surg 3  Discharge Final Note    Primary Care Provider: Jarek Crespo MD    Expected Discharge Date: 6/11/2024    Final Discharge Note (most recent)       Final Note - 06/11/24 1512          Final Note    Anticipated Discharge Disposition Home-Health Care [facility]     Hospital Resources/Appts/Education Provided Post-Acute resouces added to AVS;Appointments scheduled and added to AVS        Post-Acute Status    Post-Acute Authorization Home Health;HME     HME Status Set-up Complete/Auth obtained     Home Health Status Set-up Complete/Auth obtained     Discharge Delays None known at this time                   Pt discharged home today.   [facility] approved pt's wheelchair. Pt private paid for RW and BSC d/t non coverage by insurance. All DME delivered to pt's bedside.  [facility] notified of DC today; AVS updated.  Pt agreeable to set up with [facility] for PCP; appointment scheduled on AVS: Hospital Follow Up with Jacques Conley, Wednesday Jun 19, 2024 11:00 AM    Important Message from Medicare             Contact Info       [facility]   Specialty: DME Provider    [facility] [address] [facility] [address]   Phone: [phone]       Next Steps: Follow up    Instructions: WHEELCHAIR PROVIDER    [facility]   Specialty: Home Health Services    [address] [facility] [address]   Phone: [phone]       Next Steps: Follow up    Instructions: Home Health  Physical therapy, occupational therapy and home nursing visits    Parvez Durbin MD   Specialty: Vascular Surgery    [address]  [place] [address]   Phone: [phone]       Next Steps: Follow up

## 2024-06-11 NOTE — PLAN OF CARE
06/11/24 1358   Post-Acute Status   Post-Acute Authorization Home Health;HME   HME Status Set-up Complete/Auth obtained   Home Health Status Set-up Complete/Auth obtained   Discharge Delays None known at this time   Discharge Plan   Discharge Plan A Home with family;Home Health   Discharge Plan B Home with family;Home Health     [facility] approved pt's w/c. W/c delivered to bedside. Insurance will not cover RW or BSC. Pt agreeable to private pay cost for RW and BSC ($108). Pt agreeable to paying for cost; [facility] rep to contact patient for payment.   [facility] accepting; AVS updated with contact information.

## 2024-06-11 NOTE — PLAN OF CARE
Problem: Adult Inpatient Plan of Care  Goal: Plan of Care Review  Outcome: Progressing  Goal: Absence of Hospital-Acquired Illness or Injury  Outcome: Progressing  Goal: Readiness for Transition of Care  Outcome: Progressing     Problem: Wound  Goal: Optimal Functional Ability  Outcome: Progressing  Goal: Absence of Infection Signs and Symptoms  Outcome: Progressing  Goal: Skin Health and Integrity  Outcome: Progressing     Problem: Pain Acute  Goal: Optimal Pain Control and Function  Outcome: Progressing

## 2024-06-11 NOTE — PT/OT/SLP PROGRESS
Physical Therapy  Treatment    Jeanna Helm   MRN: [id number]   Admitting Diagnosis: Right foot pain    PT Received On: 06/11/24  PT Start Time: 0730     PT Stop Time: 0755    PT Total Time (min): 25 min       Billable Minutes:  Therapeutic Activity 25    Treatment Type: Treatment  PT/PTA: PTA     Number of PTA visits since last PT visit: 2       General Precautions: Standard, fall  Orthopedic Precautions: RLE non weight bearing  Braces: N/A  Respiratory Status: Room air    Spiritual, Cultural Beliefs, Sikh Practices, Values that Affect Care: no    Subjective:  Communicated with patient's nurse, Nathan, and completed Epic chart review prior to session.  Patient reports she was just about to eat breakfast but is agreeable to get up to chair.     Pain/Comfort  Pain Rating 1: 10/10  Location - Side 1: Right  Location 1: leg  Pain Addressed 1: Other (see comments), Nurse notified (ACTIVITY PACING)  Pain Rating Post-Intervention 1: 10/10    Objective:   Patient found with: peripheral IV, telemetry, PureWick    Supine > sit EOB: Modified Independent (increased time to complete)    Forward scoot towards EOB: Modified Independent    Seated EOB x 5 min total focusing on increased tolerance to upright position, core stability, trunk control and CV endurance.   Maintained self supported sitting balance with SPV  Maintained unsupported sitting balance with  SPV    STS from EOB > RW: SBA (VC for hand placement; 100% compliant with NWB)    Stand pivot T/F to chair w/ RW: SBA (100% compliance to NWB; increased time to complete)    Reviewed AROM TE to BLE including: hip flex/ext, knee flex/ext, ankle PF/DF  To be completed a minimum of 10 reps for each LE in order to promote return of function, strength and ROM.     Educated patient on importance of increased tolerance to upright position and direct impact on CV endurance and strength. Patient encouraged to sit up in chair/ EOB, for a minimum of 2 consecutive hours, 3x per day.  Encouraged patient to perform AROM TE to BLE throughout the day within all available planes of motion. Re enforced importance of utilizing call light to meet needs in room and not attempt to get up without staff assistance. Patient verbalized understanding and agreed to comply.     AM-PAC 6 CLICK MOBILITY  How much help from another person does this patient currently need?   1 = Unable, Total/Dependent Assistance  2 = A lot, Maximum/Moderate Assistance  3 = A little, Minimum/Contact Guard/Supervision  4 = None, Modified [place]/Independent    Turning over in bed (including adjusting bedclothes, sheets and blankets)?: 4  Sitting down on and standing up from a chair with arms (e.g., wheelchair, bedside commode, etc.): 3  Moving from lying on back to sitting on the side of the bed?: 4  Moving to and from a bed to a chair (including a wheelchair)?: 3  Need to walk in hospital room?: 3  Climbing 3-5 steps with a railing?: 1 (NT)  Basic Mobility Total Score: 18    AM-PAC Raw Score CMS G-Code Modifier Level of Impairment Assistance   6 % Total / Unable   7 - 9 CM 80 - 100% Maximal Assist   10 - 14 CL 60 - 80% Moderate Assist   15 - 19 CK 40 - 60% Moderate Assist   20 - 22 CJ 20 - 40% Minimal Assist   23 CI 1-20% SBA / CGA   24 CH 0% Independent/ Mod I     Patient left up in chair with call button in reach and nurse notified.    Assessment:  Jeanna Helm is a 44 y.o. female with a medical diagnosis of Right foot pain and presents with overall decline in functional mobility. Patient would continue to benefit from skilled PT to address functional limitations listed below in order to return to PLOF/decrease caregiver burden.     Rehab identified problem list/impairments: weakness, impaired endurance, impaired sensation, impaired self care skills, impaired functional mobility, gait instability, impaired balance, pain, decreased safety awareness, decreased lower extremity function, decreased ROM, orthopedic  precautions    Rehab potential is fair.    Activity tolerance: Fair    Discharge recommendations: Low Intensity Therapy      Barriers to discharge:      Equipment recommendations: bedside commode, walker, rolling, wheelchair     GOALS:   Multidisciplinary Problems       Physical Therapy Goals          Problem: Physical Therapy    Goal Priority Disciplines Outcome Goal Variances Interventions   Physical Therapy Goal     PT, PT/OT      Description: Patient will be seen a minimal of 3 out of 7 days a week.    LTG to be met by 06/23/24:     Bed mobility: Mod I  Transfers: Mod I with RW  Gait: Mod I with RW x20 feet                        PLAN:    Patient to be seen 3 x/week to address the above listed problems via gait training, therapeutic activities, therapeutic exercises  Plan of Care expires: 06/23/24  Plan of Care reviewed with: patient         06/11/2024

## 2024-06-12 LAB — FUNGUS SPEC CULT: NORMAL

## 2024-06-16 LAB — BACTERIA SPEC ANAEROBE CULT: NORMAL

## 2024-06-17 LAB
FINAL PATHOLOGIC DIAGNOSIS: NORMAL
GROSS: NORMAL
Lab: NORMAL

## 2024-06-18 ENCOUNTER — OFFICE VISIT (OUTPATIENT)
Dept: PODIATRY | Facility: CLINIC | Age: 44
End: 2024-06-18
Payer: MEDICAID

## 2024-06-18 VITALS — BODY MASS INDEX: 39.99 KG/M2 | HEIGHT: 60 IN | WEIGHT: 203.69 LBS

## 2024-06-18 DIAGNOSIS — Z79.01 ANTICOAGULATED: ICD-10-CM

## 2024-06-18 DIAGNOSIS — E11.69 TYPE 2 DIABETES MELLITUS WITH OTHER SPECIFIED COMPLICATION, WITHOUT LONG-TERM CURRENT USE OF INSULIN: ICD-10-CM

## 2024-06-18 DIAGNOSIS — Z89.431 S/P TRANSMETATARSAL AMPUTATION OF FOOT, RIGHT: Primary | ICD-10-CM

## 2024-06-18 PROCEDURE — 4010F ACE/ARB THERAPY RXD/TAKEN: CPT | Mod: CPTII,,, | Performed by: PODIATRIST

## 2024-06-18 PROCEDURE — 1160F RVW MEDS BY RX/DR IN RCRD: CPT | Mod: CPTII,,, | Performed by: PODIATRIST

## 2024-06-18 PROCEDURE — 99213 OFFICE O/P EST LOW 20 MIN: CPT | Mod: PBBFAC | Performed by: PODIATRIST

## 2024-06-18 PROCEDURE — 1159F MED LIST DOCD IN RCRD: CPT | Mod: CPTII,,, | Performed by: PODIATRIST

## 2024-06-18 PROCEDURE — 99999 PR PBB SHADOW E&M-EST. PATIENT-LVL III: CPT | Mod: PBBFAC,,, | Performed by: PODIATRIST

## 2024-06-18 PROCEDURE — 99999PBSHW PR PBB SHADOW TECHNICAL ONLY FILED TO HB: Mod: PBBFAC,,,

## 2024-06-18 PROCEDURE — 99024 POSTOP FOLLOW-UP VISIT: CPT | Mod: ,,, | Performed by: PODIATRIST

## 2024-06-19 ENCOUNTER — OFFICE VISIT (OUTPATIENT)
Dept: PRIMARY CARE CLINIC | Facility: CLINIC | Age: 44
End: 2024-06-19
Payer: MEDICAID

## 2024-06-19 ENCOUNTER — TELEPHONE (OUTPATIENT)
Dept: PRIMARY CARE CLINIC | Facility: CLINIC | Age: 44
End: 2024-06-19

## 2024-06-19 ENCOUNTER — LAB VISIT (OUTPATIENT)
Dept: LAB | Facility: HOSPITAL | Age: 44
End: 2024-06-19
Attending: NURSE PRACTITIONER
Payer: MEDICAID

## 2024-06-19 VITALS
SYSTOLIC BLOOD PRESSURE: 120 MMHG | BODY MASS INDEX: 39.85 KG/M2 | WEIGHT: 203 LBS | HEIGHT: 60 IN | HEART RATE: 95 BPM | DIASTOLIC BLOOD PRESSURE: 86 MMHG | OXYGEN SATURATION: 97 % | TEMPERATURE: 97 F

## 2024-06-19 DIAGNOSIS — E11.69 TYPE 2 DIABETES MELLITUS WITH OTHER SPECIFIED COMPLICATION, UNSPECIFIED WHETHER LONG TERM INSULIN USE: Chronic | ICD-10-CM

## 2024-06-19 DIAGNOSIS — S98.911A: ICD-10-CM

## 2024-06-19 DIAGNOSIS — Z11.4 SCREENING FOR HIV (HUMAN IMMUNODEFICIENCY VIRUS): ICD-10-CM

## 2024-06-19 DIAGNOSIS — Z13.220 ENCOUNTER FOR LIPID SCREENING FOR CARDIOVASCULAR DISEASE: ICD-10-CM

## 2024-06-19 DIAGNOSIS — Z13.1 SCREENING FOR DIABETES MELLITUS: ICD-10-CM

## 2024-06-19 DIAGNOSIS — Z00.00 GENERAL MEDICAL EXAM: Primary | ICD-10-CM

## 2024-06-19 DIAGNOSIS — M54.31 SCIATIC NERVE PAIN, RIGHT: ICD-10-CM

## 2024-06-19 DIAGNOSIS — Z00.00 GENERAL MEDICAL EXAM: ICD-10-CM

## 2024-06-19 DIAGNOSIS — Z11.59 NEED FOR HEPATITIS C SCREENING TEST: ICD-10-CM

## 2024-06-19 DIAGNOSIS — I10 PRIMARY HYPERTENSION: ICD-10-CM

## 2024-06-19 DIAGNOSIS — Z13.6 ENCOUNTER FOR LIPID SCREENING FOR CARDIOVASCULAR DISEASE: ICD-10-CM

## 2024-06-19 LAB
ALBUMIN SERPL BCP-MCNC: 3.5 G/DL (ref 3.5–5.2)
ALP SERPL-CCNC: 80 U/L (ref 55–135)
ALT SERPL W/O P-5'-P-CCNC: 18 U/L (ref 10–44)
ANION GAP SERPL CALC-SCNC: 13 MMOL/L (ref 8–16)
AST SERPL-CCNC: 13 U/L (ref 10–40)
BASOPHILS # BLD AUTO: 0.04 K/UL (ref 0–0.2)
BASOPHILS NFR BLD: 0.3 % (ref 0–1.9)
BILIRUB SERPL-MCNC: 0.3 MG/DL (ref 0.1–1)
BUN SERPL-MCNC: 15 MG/DL (ref 6–20)
CALCIUM SERPL-MCNC: 10.1 MG/DL (ref 8.7–10.5)
CHLORIDE SERPL-SCNC: 101 MMOL/L (ref 95–110)
CHOLEST SERPL-MCNC: 215 MG/DL (ref 120–199)
CHOLEST/HDLC SERPL: 4.5 {RATIO} (ref 2–5)
CO2 SERPL-SCNC: 23 MMOL/L (ref 23–29)
CREAT SERPL-MCNC: 0.8 MG/DL (ref 0.5–1.4)
DIFFERENTIAL METHOD BLD: ABNORMAL
EOSINOPHIL # BLD AUTO: 0.2 K/UL (ref 0–0.5)
EOSINOPHIL NFR BLD: 1.7 % (ref 0–8)
ERYTHROCYTE [DISTWIDTH] IN BLOOD BY AUTOMATED COUNT: 14.7 % (ref 11.5–14.5)
EST. GFR  (NO RACE VARIABLE): >60 ML/MIN/1.73 M^2
ESTIMATED AVG GLUCOSE: 151 MG/DL (ref 68–131)
GLUCOSE SERPL-MCNC: 80 MG/DL (ref 70–110)
GLUCOSE SERPL-MCNC: 99 MG/DL (ref 70–110)
HBA1C MFR BLD: 6.9 % (ref 4–5.6)
HCT VFR BLD AUTO: 37.4 % (ref 37–48.5)
HCV AB SERPL QL IA: NORMAL
HDLC SERPL-MCNC: 48 MG/DL (ref 40–75)
HDLC SERPL: 22.3 % (ref 20–50)
HGB BLD-MCNC: 11.2 G/DL (ref 12–16)
HIV 1+2 AB+HIV1 P24 AG SERPL QL IA: NORMAL
IMM GRANULOCYTES # BLD AUTO: 0.1 K/UL (ref 0–0.04)
IMM GRANULOCYTES NFR BLD AUTO: 0.8 % (ref 0–0.5)
LDLC SERPL CALC-MCNC: 143.8 MG/DL (ref 63–159)
LYMPHOCYTES # BLD AUTO: 2.7 K/UL (ref 1–4.8)
LYMPHOCYTES NFR BLD: 22.5 % (ref 18–48)
MCH RBC QN AUTO: 24.3 PG (ref 27–31)
MCHC RBC AUTO-ENTMCNC: 29.9 G/DL (ref 32–36)
MCV RBC AUTO: 81 FL (ref 82–98)
MONOCYTES # BLD AUTO: 0.7 K/UL (ref 0.3–1)
MONOCYTES NFR BLD: 5.8 % (ref 4–15)
NEUTROPHILS # BLD AUTO: 8.1 K/UL (ref 1.8–7.7)
NEUTROPHILS NFR BLD: 68.9 % (ref 38–73)
NONHDLC SERPL-MCNC: 167 MG/DL
NRBC BLD-RTO: 0 /100 WBC
PLATELET # BLD AUTO: 618 K/UL (ref 150–450)
PMV BLD AUTO: 9 FL (ref 9.2–12.9)
POTASSIUM SERPL-SCNC: 4.1 MMOL/L (ref 3.5–5.1)
PROT SERPL-MCNC: 8.3 G/DL (ref 6–8.4)
RBC # BLD AUTO: 4.6 M/UL (ref 4–5.4)
SODIUM SERPL-SCNC: 137 MMOL/L (ref 136–145)
TRIGL SERPL-MCNC: 116 MG/DL (ref 30–150)
TSH SERPL DL<=0.005 MIU/L-ACNC: 0.72 UIU/ML (ref 0.4–4)
WBC # BLD AUTO: 11.8 K/UL (ref 3.9–12.7)

## 2024-06-19 PROCEDURE — 85025 COMPLETE CBC W/AUTO DIFF WBC: CPT | Performed by: NURSE PRACTITIONER

## 2024-06-19 PROCEDURE — 1159F MED LIST DOCD IN RCRD: CPT | Mod: CPTII,,, | Performed by: NURSE PRACTITIONER

## 2024-06-19 PROCEDURE — 1111F DSCHRG MED/CURRENT MED MERGE: CPT | Mod: CPTII,,, | Performed by: NURSE PRACTITIONER

## 2024-06-19 PROCEDURE — 84443 ASSAY THYROID STIM HORMONE: CPT | Performed by: NURSE PRACTITIONER

## 2024-06-19 PROCEDURE — 99214 OFFICE O/P EST MOD 30 MIN: CPT | Mod: S$PBB,,, | Performed by: NURSE PRACTITIONER

## 2024-06-19 PROCEDURE — 1160F RVW MEDS BY RX/DR IN RCRD: CPT | Mod: CPTII,,, | Performed by: NURSE PRACTITIONER

## 2024-06-19 PROCEDURE — 36415 COLL VENOUS BLD VENIPUNCTURE: CPT | Mod: PN | Performed by: NURSE PRACTITIONER

## 2024-06-19 PROCEDURE — 80053 COMPREHEN METABOLIC PANEL: CPT | Performed by: NURSE PRACTITIONER

## 2024-06-19 PROCEDURE — 3008F BODY MASS INDEX DOCD: CPT | Mod: CPTII,,, | Performed by: NURSE PRACTITIONER

## 2024-06-19 PROCEDURE — 3044F HG A1C LEVEL LT 7.0%: CPT | Mod: CPTII,,, | Performed by: NURSE PRACTITIONER

## 2024-06-19 PROCEDURE — 82962 GLUCOSE BLOOD TEST: CPT | Mod: PBBFAC,PN | Performed by: NURSE PRACTITIONER

## 2024-06-19 PROCEDURE — 3079F DIAST BP 80-89 MM HG: CPT | Mod: CPTII,,, | Performed by: NURSE PRACTITIONER

## 2024-06-19 PROCEDURE — 86803 HEPATITIS C AB TEST: CPT | Performed by: NURSE PRACTITIONER

## 2024-06-19 PROCEDURE — 99999 PR PBB SHADOW E&M-EST. PATIENT-LVL IV: CPT | Mod: PBBFAC,,, | Performed by: NURSE PRACTITIONER

## 2024-06-19 PROCEDURE — 99999PBSHW POCT GLUCOSE, HAND-HELD DEVICE: Mod: PBBFAC,,,

## 2024-06-19 PROCEDURE — 3074F SYST BP LT 130 MM HG: CPT | Mod: CPTII,,, | Performed by: NURSE PRACTITIONER

## 2024-06-19 PROCEDURE — 4010F ACE/ARB THERAPY RXD/TAKEN: CPT | Mod: CPTII,,, | Performed by: NURSE PRACTITIONER

## 2024-06-19 PROCEDURE — 80061 LIPID PANEL: CPT | Performed by: NURSE PRACTITIONER

## 2024-06-19 PROCEDURE — 87389 HIV-1 AG W/HIV-1&-2 AB AG IA: CPT | Performed by: NURSE PRACTITIONER

## 2024-06-19 PROCEDURE — 83036 HEMOGLOBIN GLYCOSYLATED A1C: CPT | Performed by: NURSE PRACTITIONER

## 2024-06-19 PROCEDURE — 99214 OFFICE O/P EST MOD 30 MIN: CPT | Mod: PBBFAC,PN | Performed by: NURSE PRACTITIONER

## 2024-06-19 RX ORDER — IBUPROFEN 800 MG/1
800 TABLET ORAL 3 TIMES DAILY
Qty: 21 TABLET | Refills: 0 | Status: SHIPPED | OUTPATIENT
Start: 2024-06-19 | End: 2024-06-19 | Stop reason: SDUPTHER

## 2024-06-19 RX ORDER — CYCLOBENZAPRINE HCL 10 MG
10 TABLET ORAL 3 TIMES DAILY PRN
Qty: 30 TABLET | Refills: 0 | Status: SHIPPED | OUTPATIENT
Start: 2024-06-19 | End: 2024-06-29

## 2024-06-19 RX ORDER — IBUPROFEN 800 MG/1
800 TABLET ORAL 3 TIMES DAILY
Qty: 21 TABLET | Refills: 0 | Status: SHIPPED | OUTPATIENT
Start: 2024-06-19 | End: 2024-06-28 | Stop reason: ALTCHOICE

## 2024-06-19 RX ORDER — CYCLOBENZAPRINE HCL 10 MG
10 TABLET ORAL 3 TIMES DAILY PRN
Qty: 30 TABLET | Refills: 0 | Status: SHIPPED | OUTPATIENT
Start: 2024-06-19 | End: 2024-06-19

## 2024-06-19 RX ORDER — CYCLOBENZAPRINE HCL 10 MG
10 TABLET ORAL 3 TIMES DAILY PRN
Qty: 30 TABLET | Refills: 0 | Status: SHIPPED | OUTPATIENT
Start: 2024-06-19 | End: 2024-06-19 | Stop reason: SDUPTHER

## 2024-06-19 NOTE — TELEPHONE ENCOUNTER
Pt was notified to take ibu 800 for pain, pt was advised that she should follow up with pharmacy for Flexeril, pt voiced understanding.     ----- Message from Martha Hernandes sent at 6/19/2024  1:23 PM CDT -----  Contact: [phone]  Pt is stating that prescription is not ready to filled yet and it will take a few days. Pt is complaining stating that she needs something asap for her pain    Please call pt and advise

## 2024-06-20 ENCOUNTER — OFFICE VISIT (OUTPATIENT)
Dept: PODIATRY | Facility: CLINIC | Age: 44
End: 2024-06-20
Payer: MEDICAID

## 2024-06-20 VITALS — WEIGHT: 203.06 LBS | HEIGHT: 60 IN | BODY MASS INDEX: 39.87 KG/M2

## 2024-06-20 DIAGNOSIS — Z89.431 S/P TRANSMETATARSAL AMPUTATION OF FOOT, RIGHT: Primary | ICD-10-CM

## 2024-06-20 PROCEDURE — 99213 OFFICE O/P EST LOW 20 MIN: CPT | Mod: PBBFAC | Performed by: PODIATRIST

## 2024-06-20 PROCEDURE — 3044F HG A1C LEVEL LT 7.0%: CPT | Mod: CPTII,,, | Performed by: PODIATRIST

## 2024-06-20 PROCEDURE — 99999 PR PBB SHADOW E&M-EST. PATIENT-LVL III: CPT | Mod: PBBFAC,,, | Performed by: PODIATRIST

## 2024-06-20 PROCEDURE — 99024 POSTOP FOLLOW-UP VISIT: CPT | Mod: ,,, | Performed by: PODIATRIST

## 2024-06-20 PROCEDURE — 1159F MED LIST DOCD IN RCRD: CPT | Mod: CPTII,,, | Performed by: PODIATRIST

## 2024-06-20 PROCEDURE — 4010F ACE/ARB THERAPY RXD/TAKEN: CPT | Mod: CPTII,,, | Performed by: PODIATRIST

## 2024-06-20 RX ORDER — HYDROCODONE BITARTRATE AND ACETAMINOPHEN 7.5; 325 MG/1; MG/1
1 TABLET ORAL EVERY 6 HOURS PRN
Qty: 28 TABLET | Refills: 0 | Status: SHIPPED | OUTPATIENT
Start: 2024-06-20

## 2024-06-20 NOTE — PROGRESS NOTES
PODIATRIC MEDICINE AND SURGERY      Chief Complaint   Patient presents with    Post-op Evaluation     1.5 week s/p, TMA, right foot, DOS: 06/08/24, rates pain 6/10, had to get splint removed twice due to rubbing, pt states that she is out of pain meds, diabetic, wears dressing         SUBJECTIVE   Jeanna Helm is a 44 y.o. female status post R TMA/SHEREE , DOS 6/8/24. Pt is appx 3 week(s) post operatively. Pain is well controlled with pain medications. Pt has been NWB.  She had issues with splint causing pain, so she removed splint. Presents today in light dressing. States  has kept dressing clean/dry; Pt denies fever, chills, nausea, and/or vomiting. Pt has no new complaint(s).     OBJECTIVE   Vitals:    06/20/24 0843   Weight: 92.1 kg (203 lb 0.7 oz)   Height: 5' (1.524 m)   PainSc:   6       Neurovascular status - vascular status grossly intact.   Surgical incision well coapted with sutures  Pins - N/A   Erythema - none   Edema - mild  Warmth - no increase in skin temp from prox to distally b/l   TTP - mild at the incision site consistent with po course  ROM - good, pain free   Dehiscence- none  Drainage- none    Radiographs reviewed:   -  post op.    ASSESSMENT  1. Surgery follow-up examination    PLAN  - Weight bearing status - nwb  - Ambulatory aids -knee scootr  - Dressing - Dressing change was performed. Pt instructed to keep clean, dry, intact. Do not get wet. BOOT placed.  - Follow up - RTC as scheduled     Future Appointments   Date Time Provider Department Center   6/25/2024  8:00 AM Brian Greenwood NP [address]   6/28/2024  4:00 PM Liz Roa MD [facility] [place]   7/1/2024 10:15 AM Anitra Chowdary DPM [facility] POD [place]   7/8/2024 10:30 AM Parvez Durbin MD  [facility]   7/22/2024  9:00 AM Pteer Amor Jr., DPM [facility] POD [place]   8/6/2024  9:20 AM Liz Roa MD [facility]       Report Electronically Signed By:     Anitra Chowdary DPM    Podiatry  [facility]  6/20/2024

## 2024-06-25 ENCOUNTER — OFFICE VISIT (OUTPATIENT)
Dept: HOME HEALTH SERVICES | Facility: CLINIC | Age: 44
End: 2024-06-25
Payer: MEDICAID

## 2024-06-25 VITALS
OXYGEN SATURATION: 100 % | SYSTOLIC BLOOD PRESSURE: 158 MMHG | HEART RATE: 83 BPM | DIASTOLIC BLOOD PRESSURE: 92 MMHG | RESPIRATION RATE: 18 BRPM

## 2024-06-25 DIAGNOSIS — Z09 HOSPITAL DISCHARGE FOLLOW-UP: Primary | ICD-10-CM

## 2024-06-25 DIAGNOSIS — Z89.431 STATUS POST TRANSMETATARSAL AMPUTATION OF RIGHT FOOT: ICD-10-CM

## 2024-06-25 DIAGNOSIS — I10 HYPERTENSION, UNSPECIFIED TYPE: Chronic | ICD-10-CM

## 2024-06-25 DIAGNOSIS — I96 GANGRENE OF TOE OF RIGHT FOOT: ICD-10-CM

## 2024-06-25 DIAGNOSIS — E78.5 HYPERLIPIDEMIA, UNSPECIFIED HYPERLIPIDEMIA TYPE: Chronic | ICD-10-CM

## 2024-06-25 PROCEDURE — 3044F HG A1C LEVEL LT 7.0%: CPT | Mod: CPTII,S$GLB,,

## 2024-06-25 PROCEDURE — 3080F DIAST BP >= 90 MM HG: CPT | Mod: CPTII,S$GLB,,

## 2024-06-25 PROCEDURE — 4010F ACE/ARB THERAPY RXD/TAKEN: CPT | Mod: CPTII,S$GLB,,

## 2024-06-25 PROCEDURE — 1111F DSCHRG MED/CURRENT MED MERGE: CPT | Mod: CPTII,S$GLB,,

## 2024-06-25 PROCEDURE — 99350 HOME/RES VST EST HIGH MDM 60: CPT | Mod: S$GLB,,,

## 2024-06-25 PROCEDURE — 1160F RVW MEDS BY RX/DR IN RCRD: CPT | Mod: CPTII,S$GLB,,

## 2024-06-25 PROCEDURE — 1159F MED LIST DOCD IN RCRD: CPT | Mod: CPTII,S$GLB,,

## 2024-06-25 PROCEDURE — 3077F SYST BP >= 140 MM HG: CPT | Mod: CPTII,S$GLB,,

## 2024-06-25 NOTE — PROGRESS NOTES
[facility]  Transitional Care Management (TCM) Home Visit    Encounter Provider: Brian Greenwood   PCP: Liz Roa MD  Consult Requested By: Jacque Read  Admit Date: 6/7/24   IP Discharge Date: 6/11/24  Hospital Length of Stay:RRHLOS@ days  Days since discharge (from IP or SNF): 14   [facility] Contact Note: 6/7  Hospital Diagnosis: Hypertension, unspecified type [I10];Hyperlipidemia, unspecified hyperlipidemia type [E78.5]     HISTORY OF PRESENT ILLNESS      Patient ID: Jeanna Helm is a 44 y.o. female was recently admitted to the hospital, this is their TCM encounter.    Hospital Course Synopsis:  6/2-6/5 admission:  45 y/o female presents to ED for further evaluation of worsening pain and discoloration involving the toes in her right foot.   Labs: Sed rate 25, CRP 55.8  BC: NGTD   Vascular surgery consulted: Dr. Parvez Durbin recommends start indomethacin, continue allopurinol. States there is a remote possibility this is embolic, if podiatry team does not feel this is gouty flare we could consider diagnostic angiography. Dr. 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   Podiatry consulted, no concerns for infectious etiology s/p MRI per Dr. 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 Trace 1st through 3rd intermetatarsal bursitis.  Mild edema of the intrinsic foot musculature.  Flexor and extensor tendons are intact. No osteomyelitis.  PRN pain medications   ECHO: normal systolic function with ejection fraction of 60 - 65%. There is normal diastolic function. Pulmonary Artery: The estimated pulmonary artery systolic pressure is 23 mmHg.  The patient was seen and examined today and determined to be stable for discharge. Will f/u with CVT outpatient in 2 weeks. Ambulatory referral to smoking cessation and discharged with prescription for chantix per the recommendation of vascular  team. Patient educated on importance of smoking cessation for risk reduction.   Patient discharged with ambulatory referral to internal medicine as she states she does not have a PCP for close follow up. TCC order placed to aid patient in establishing care with PCP to manage co-morbid conditions. Patient discharged with 4 days worth of pain medication, until she can follow up with a PCP for continued pain management if needed. Patient to follow up with vascular surgeon on 6/19/24, number to office provided on discharge paperwork and patient educated. Patient discharged with prescription for xarelto 2.5mg PO and ASA daily per recommendations of Dr. Parvez Durbin. Will follow up with podiatry as needed if symptoms worsen. Allopurinol dosage increased to 200mg due to continued elevated uric acid level.     6/7-6/11 admission:  The patient presented with right foot pain. Due to her vasculitis, amputation recommended. Patient underwent amputation on 6/8. She received perioperative antibiotics. Her pain was controlled. She participated with therapy and deemed safe for home. She is NWB RLE. Home health and DME arranged prior to discharge. She was discharged on 10 day course of clindamycin as recommended by podiatry. Outpatient podiatry and vascular follow-up. Patient seen and examined, stable for discharge.     Doing well since discharge. Pain well controlled with current regimen. Currently NWB. Dressing and boot in place. [facility] and PT/OT working with patient. Currently living with a friend since discharge. Reports compliance with medications. Denies any further complaints or concerns. Follow up with PCP 6/28, podiatry 7/1 and vascular surgery 7/8.   DECISION MAKING TODAY       Assessment & Plan:  1. Hospital discharge follow-up    2. Gangrene of toe of right foot  Assessment & Plan:  S/P R TMA/SHEREE 6/8  Currently NWB  F/U with podiatry      3. Status post transmetatarsal amputation of right foot  Assessment & Plan:  S/P  R TMA/SHEREE 6/8  Pain controlled with current medication  NWB, boot in place  F/U with podiatry      4. Hypertension, unspecified type  Assessment & Plan:  Currently slightly elevated during visit  Continue current medication  Monitor bp and keep log  F/U with PCP    Orders:  -     [facility]    5. Hyperlipidemia, unspecified hyperlipidemia type  Assessment & Plan:  Continue current medication    Orders:  -     Ambulatory referral[facility]consult to [facility]         Medication List on Discharge:     Medication List            Accurate as of June 25, 2024 11:59 PM. If you have any questions, ask your nurse or doctor.                CONTINUE taking these medications      acetaminophen 325 MG tablet  Commonly known as: TYLENOL  Take 325 mg by mouth every 6 (six) hours as needed for Pain.     allopurinoL 100 MG tablet  Commonly known as: ZYLOPRIM  Take 2 tablets (200 mg total) by mouth once daily.     aspirin 81 MG Chew  Take 1 tablet (81 mg total) by mouth once daily.     clindamycin 300 MG capsule  Commonly known as: CLEOCIN  Take 1 capsule (300 mg total) by mouth 3 (three) times daily.     cyclobenzaprine 10 MG tablet  Commonly known as: FLEXERIL  Take 1 tablet (10 mg total) by mouth 3 (three) times daily as needed for Muscle spasms.     glyBURIDE 5 MG tablet  Commonly known as: DIABETA  Take 1 tablet (5 mg total) by mouth daily with breakfast.     HYDROcodone-acetaminophen 7.5-325 mg per tablet  Commonly known as: NORCO  Take 1 tablet by mouth every 6 (six) hours as needed for Pain.     ibuprofen 800 MG tablet  Commonly known as: ADVIL,MOTRIN  Take 1 tablet (800 mg total) by mouth 3 (three) times daily. for 7 days     lisinopriL 40 MG tablet  Commonly known as: PRINIVIL,ZESTRIL  Take 1 tablet (40 mg total) by mouth once daily.     rivaroxaban 2.5 mg Tab  Commonly known as: XARELTO  Take 1 tablet (2.5 mg total) by mouth 2 (two) times daily with meals.     TRUE  METRIX GLUCOSE METER Misc  Generic drug: blood-glucose meter  Use as directed to test blood sugar     TRUE METRIX GLUCOSE TEST STRIP Strp  Generic drug: blood sugar diagnostic  Use one strip to test blood sugar before meals     TRUEPLUS LANCETS 33 gauge Misc  Generic drug: lancets  Use to check blood sugar before meals              Medication Reconciliation:  Were medications changed on discharge? Yes  Were medications in the home? Yes  Is the patient taking the medications as directed? Yes  Does the patient understand the medications and changes? Yes  Does updated med list accurately reflects meds patient is currently taking? Yes    ENVIRONMENT OF CARE   Family and/or Caregiver present at visit?  No  Name of Caregiver: n/a  History provided by: patient    Advance Care Planning   Advanced Care Planning Status:  Patient has had an ACP conversation  Living Will: No  Power of : No  [place]: No    Does Caregiver have HCPoA: No  Changes today: none  Is patient hospice appropriate: No  (If needed, use PPS <30 or FAST score >7)  Was referral to hospice placed: No     Impression upon entering the home:  Physical Dwelling: single family home   Appearance of home environment: cleaniness: clean, walking pathways: clear, lighting: adequate, and home structure: sound structure  Functional Status: independent  Mobility:  NWB right leg  Nutritional access: adequate intake and access  Home Health: Yes, HH Agency [facility]    DME/Supplies: wheelchair     Diagnostic tests reviewed/disposition: I have reviewed all completed as well as pending diagnostic tests at the time of discharge.  Disease/illness education: Take all medication as prescribed. Activity as tolerated. Keep all upcoming appts.   Establishment or re-establishment of referral orders for community resources: No other necessary community resources.   Discussion with other health care providers: No discussion with other health care providers necessary.   Does patient  have a PCP at [place]? Yes   Repatriation plan with PCP? follow-up with PCP within 90d   Does patient have an ostomy (ileostomy, colostomy, suprapubic catheter, nephrostomy tube, tracheostomy, PEG tube, pleurex catheter, cholecystostomy, etc)? No  Were BPAs reviewed? Yes    Social History     Socioeconomic History    Marital status:    Tobacco Use    Smoking status: Former     Current packs/day: 0.00     Average packs/day: 1 pack/day for 20.0 years (20.0 ttl pk-yrs)     Types: Cigarettes     Quit date:      Years since quittin.4    Smokeless tobacco: Former   Substance and Sexual Activity    Alcohol use: No    Drug use: No    Sexual activity: Not Currently     Social Determinants of Health     Financial Resource Strain: High Risk (2024)    Overall Financial Resource Strain (CARDIA)     Difficulty of Paying Living Expenses: Hard   Food Insecurity: Food Insecurity Present (2024)    Hunger Vital Sign     Worried About Running Out of Food in the Last Year: Sometimes true     Ran Out of Food in the Last Year: Sometimes true   Physical Activity: Unknown (2024)    Exercise Vital Sign     Days of Exercise per Week: Patient declined   Stress: Stress Concern Present (2024)    [employer] [place] [employer] - Occupational Stress Questionnaire     Feeling of Stress : Very much   Housing Stability: High Risk (2024)    Housing Stability Vital Sign     Unable to Pay for Housing in the Last Year: Yes       OBJECTIVE:     Vital Signs:  Vitals:    24 1020   BP: (!) 158/92   Pulse: 83   Resp: 18       Review of Systems   Constitutional: Negative.    HENT: Negative.     Eyes: Negative.    Respiratory: Negative.  Negative for chest tightness.    Cardiovascular: Negative.  Negative for leg swelling.   Gastrointestinal: Negative.    Endocrine: Negative.    Genitourinary: Negative.    Musculoskeletal:  Positive for gait problem.   Skin: Negative.    Allergic/Immunologic: Negative.     Hematological: Negative.    Psychiatric/Behavioral: Negative.  Negative for agitation.    All other systems reviewed and are negative.      Physical Exam:  Physical Exam  Vitals reviewed.   Constitutional:       General: She is not in acute distress.     Appearance: Normal appearance. She is well-developed.   HENT:      Head: Normocephalic and atraumatic.      Nose: Nose normal.      Mouth/Throat:      Mouth: Mucous membranes are dry.      Pharynx: Oropharynx is clear.   Eyes:      Pupils: Pupils are equal, round, and reactive to light.   Cardiovascular:      Rate and Rhythm: Normal rate and regular rhythm.      Pulses: Normal pulses.      Heart sounds: Normal heart sounds.   Pulmonary:      Effort: Pulmonary effort is normal.      Breath sounds: Normal breath sounds.   Abdominal:      General: Bowel sounds are normal.      Palpations: Abdomen is soft.   Musculoskeletal:         General: Signs of injury present. Normal range of motion.      Cervical back: Normal range of motion and neck supple.      Comments: Right TMA   Skin:     General: Skin is warm and dry.   Neurological:      General: No focal deficit present.      Mental Status: She is alert and oriented to person, place, and time. Mental status is at baseline.   Psychiatric:         Mood and Affect: Mood normal.         Behavior: Behavior normal.         Thought Content: Thought content normal.         Judgment: Judgment normal.         INSTRUCTIONS FOR PATIENT:   - Continue all medications, treatments and therapies as ordered.   - Follow all instructions, recommendations as discussed.  - Maintain Safety Precautions at all times.  - Attend all medical appointments as scheduled.  - For worsening symptoms: call Primary Care Physician or Nurse Practitioner.  - For emergencies, call 911 or immediately report to the nearest emergency room.   Scheduled Follow-up, Appts Reviewed with Modifications if Needed: Yes  Future Appointments   Date Time Provider  [facility]   6/28/2024  4:00 PM Liz Roa MD [facility] [place]   7/1/2024 10:15 AM Anitra Chowdary DPM [facility] P[facility] [place]   7/8/2024 10:30 AM Parvez Durbin MD [facility]   8/6/2024  9:20 AM Liz Roa MD [facility] [place]       Signature: Brian Greenwood NP    Transition of Care Visit:  I have reviewed and updated the history and problem list.  I have reconciled the medication list.  I have discussed the hospitalization and current medical issues, prognosis and plans with the patient/family.

## 2024-06-26 DIAGNOSIS — Z12.31 OTHER SCREENING MAMMOGRAM: ICD-10-CM

## 2024-06-26 DIAGNOSIS — E11.9 TYPE 2 DIABETES MELLITUS WITHOUT COMPLICATION: ICD-10-CM

## 2024-06-26 NOTE — ASSESSMENT & PLAN NOTE
S/P R TMA/SHEREE 6/8  Pain controlled with current medication  NWB, boot in place  F/U with podiatry

## 2024-06-26 NOTE — ASSESSMENT & PLAN NOTE
Currently slightly elevated during visit  Continue current medication  Monitor bp and keep log  F/U with PCP

## 2024-06-26 NOTE — PROGRESS NOTES
Subjective:       Patient ID: Jeanna Helm is a 44 y.o. female.    Chief Complaint: Establish Care and Wound Care (Pt right foot all toes are cut off pt had surgery June 17th )      History of Present Illness:   Jeanna Helm 44 y.o. female presents today with for hospital followup. She was had surgery June 8 th on amputation foot transmetatarsals. She has a history of diabetes with nueropathy complications and  hypertension. She states that wound care. Pt is requesting to establish care here. Home health has not been set up for patient but will be set up if needed. Pt is complaining of right sided sciatic nerve pain. PT and OT referral will be sent as needed basis. Patient states she is using wheel chair and moving around ok at this time. Pt has been seen by podiatry yeserday and her wound dressing was changed. Pt is complaining of pain 5/10 due to splint. (Splint ajusted during clinic visit).       [facility] - Emergency Dept.  Hospital Medicine  History & Physical     Patient Name: Jeanna Helm  MRN: [id number]  Patient Class: IP- Inpatient  Admission Date: 6/7/2024  Attending Physician: Fabián Rubio MD   Primary Care Provider: Jarek Crespo MD           Patient information was obtained from patient, past medical records, and ER records.      Subjective:      Principal Problem:Right foot pain     Chief Complaint:        Chief Complaint   Patient presents with    Toe Pain       Pt states she is having toe pain in all of the toes on her right foot. Pt states the pain has been going on for 2 months. Pt states she was recently discharged.          HPI: Jeanna Helm is a 44 y.o. female with a PMH  has a past medical history of Back pain, Diabetes mellitus, Hypertension, Left knee pain, Miscarriage, and Stroke. who presented to the ED for further evaluation of worsening pain and discoloration involving the toes on her right foot. Patient was recently hospitalized from 06/02/24 to 06/05/24 for similar  complaints following failed outpatient treatment for gout with a past few months and was evaluated by both Podiatry and vascular surgery during that admission. Patient was undergoing treatment of gout following initial presentation back in April and reported no improvement despite mediation compliance. Patient was seen by vascular surgery and underwent diagnostic angiography on 6/3/24 concerning for possible Beurgers disease. 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 Trace 1st through 3rd intermetatarsal bursitis.  Mild edema of the intrinsic foot musculature.  Flexor and extensor tendons are intact. No osteomyelitis. Podiatry evaluated patient and discussed amputation but patient declined. Patient was started on ASA and Xarelto 2.5mg PO in addition to indomethacin and allopurinol at time of hospital discharge with outpatient follow-up. Patient presents again tonight complaining of worsening pain and is now agreeable to amputation.  Podiatry and vascular surgery was consulted by ED staff with plans to bring to OR tomorrow morning for surgical intervention.  At time of bedside assessment, patient is still complaining of 10/10 pain throughout all her toes extending proximally into her foot and requesting additional pain medications.  All other review of systems negative except those noted above with symptoms and complaints unchanged since previous hospital discharge.  Patient admitted to Hospital Medicine inpatient for continued medical management.        PCP: Jarek Crespo  [facility]     Wound Care     Additional comments: Pt right foot all toes are cut off pt had surgery   June 17th           Last edited by Braulio Canales CMA on 6/19/2024 11:05 AM.    Transitional Care Note    Family and/or Caretaker present at visit?  No.  Diagnostic tests reviewed/disposition: No diagnosic tests pending after this hospitalization.  Disease/illness education Diabetes and hypertension  Home health/community  services discussion/referrals: Referral for home health sent at visit. Pt does have wound care that has previosuly been set up..   Establishment or re-establishment of referral orders for community resources: No other necessary community resources.   Discussion with other health care providers: No discussion with other health care providers necessary.       Past Medical History:   Diagnosis Date    Back pain     Diabetes mellitus     Hypertension     Left knee pain     Miscarriage     x2    Stroke      Family History   Problem Relation Name Age of Onset    Hypertension Mother      Heart disease Mother      Fibromyalgia Mother      Diabetes Father       Social History     Socioeconomic History    Marital status:    Tobacco Use    Smoking status: Former     Current packs/day: 0.00     Average packs/day: 1 pack/day for 20.0 years (20.0 ttl pk-yrs)     Types: Cigarettes     Quit date:      Years since quittin.4    Smokeless tobacco: Former   Substance and Sexual Activity    Alcohol use: No    Drug use: No    Sexual activity: Not Currently     Social Determinants of Health     Financial Resource Strain: High Risk (2024)    Overall Financial Resource Strain (CARDIA)     Difficulty of Paying Living Expenses: Hard   Food Insecurity: Food Insecurity Present (2024)    Hunger Vital Sign     Worried About Running Out of Food in the Last Year: Sometimes true     Ran Out of Food in the Last Year: Sometimes true   Physical Activity: Unknown (2024)    Exercise Vital Sign     Days of Exercise per Week: Patient declined   Stress: Stress Concern Present (2024)    [employer] - Occupational Stress Questionnaire     Feeling of Stress : Very much   Housing Stability: High Risk (2024)    Housing Stability Vital Sign     Unable to Pay for Housing in the Last Year: Yes     Outpatient Encounter Medications as of 2024   Medication Sig Dispense  Refill    acetaminophen (TYLENOL) 325 MG tablet Take 325 mg by mouth every 6 (six) hours as needed for Pain.      allopurinoL (ZYLOPRIM) 100 MG tablet Take 2 tablets (200 mg total) by mouth once daily. 10 tablet 0    aspirin 81 MG Chew Take 1 tablet (81 mg total) by mouth once daily. 30 tablet 0    blood sugar diagnostic Strp Use one strip to test blood sugar before meals 100 strip 0    blood-glucose meter Misc Use as directed to test blood sugar 1 each 0    clindamycin (CLEOCIN) 300 MG capsule Take 1 capsule (300 mg total) by mouth 3 (three) times daily. 30 capsule 0    glyBURIDE (DIABETA) 5 MG tablet Take 1 tablet (5 mg total) by mouth daily with breakfast. 90 tablet 3    lancets (LITE TOUCH LANCETS) 33 gauge Misc Use to check blood sugar before meals 100 each 0    lisinopriL (PRINIVIL,ZESTRIL) 40 MG tablet Take 1 tablet (40 mg total) by mouth once daily. 30 tablet 0    rivaroxaban (XARELTO) 2.5 mg Tab Take 1 tablet (2.5 mg total) by mouth 2 (two) times daily with meals. 60 tablet 0    cyclobenzaprine (FLEXERIL) 10 MG tablet Take 1 tablet (10 mg total) by mouth 3 (three) times daily as needed for Muscle spasms. 30 tablet 0    ibuprofen (ADVIL,MOTRIN) 800 MG tablet Take 1 tablet (800 mg total) by mouth 3 (three) times daily. for 7 days 21 tablet 0    [DISCONTINUED] cyclobenzaprine (FLEXERIL) 10 MG tablet Take 1 tablet (10 mg total) by mouth 3 (three) times daily as needed for Muscle spasms. (Patient not taking: Reported on 6/19/2024) 30 tablet 0    [DISCONTINUED] cyclobenzaprine (FLEXERIL) 10 MG tablet Take 1 tablet (10 mg total) by mouth 3 (three) times daily as needed for Muscle spasms. 30 tablet 0    [DISCONTINUED] cyclobenzaprine (FLEXERIL) 10 MG tablet Take 1 tablet (10 mg total) by mouth 3 (three) times daily as needed for Muscle spasms. 30 tablet 0    [DISCONTINUED] HYDROcodone-acetaminophen (NORCO) 7.5-325 mg per tablet Take 1 tablet by mouth every 6 (six) hours as needed for Pain. 28 tablet  0    [DISCONTINUED] ibuprofen (ADVIL,MOTRIN) 800 MG tablet Take 1 tablet (800 mg total) by mouth 3 (three) times daily. for 7 days 21 tablet 0    [DISCONTINUED] varenicline (CHANTIX STARTING MONTH BOX) 0.5 mg (11)- 1 mg (42) tablet Take one 0.5mg tab by mouth once daily X3 days,then increase to one 0.5mg tab twice daily X4 days,then increase to one 1mg tab twice daily 1 each 0     No facility-administered encounter medications on file as of 6/19/2024.       Review of Systems   Constitutional:  Negative for activity change and appetite change.   HENT:  Negative for congestion.    Eyes:  Negative for discharge.   Respiratory:  Negative for apnea and shortness of breath.    Cardiovascular:  Negative for chest pain.   Genitourinary:  Negative for difficulty urinating and flank pain.   Musculoskeletal:  Positive for arthralgias and gait problem.   Neurological:  Positive for weakness and numbness. Negative for headaches.   Psychiatric/Behavioral:  Negative for behavioral problems.      Objective:      /86   Pulse 95   Temp 97.3 °F (36.3 °C) (Temporal)   Ht 5' (1.524 m)   Wt 92.1 kg (203 lb)   LMP 06/03/2024 (Exact Date)   SpO2 97%   BMI 39.65 kg/m²   Physical Exam  Constitutional:       Appearance: Normal appearance. She is normal weight.   HENT:      Head: Normocephalic and atraumatic.      Right Ear: Tympanic membrane normal.      Left Ear: Tympanic membrane normal.      Nose: Nose normal.      Mouth/Throat:      Mouth: Mucous membranes are moist.      Pharynx: Oropharynx is clear.   Eyes:      Extraocular Movements: Extraocular movements intact.      Conjunctiva/sclera: Conjunctivae normal.      Pupils: Pupils are equal, round, and reactive to light.   Cardiovascular:      Rate and Rhythm: Normal rate and regular rhythm.      Pulses: Normal pulses.      Heart sounds: Normal heart sounds.   Pulmonary:      Effort: Pulmonary effort is normal.      Breath sounds: Normal breath sounds.   Abdominal:       General: Abdomen is flat. Bowel sounds are normal.      Palpations: Abdomen is soft.   Musculoskeletal:      Cervical back: Normal range of motion.      Right lower leg: Edema present.      Comments: Right metatarsal amputation dressing intact, casting sticking patient in the skin, causing pain, dressing adjusted, patient tolerates adjustment.   Skin:     General: Skin is warm and dry.   Neurological:      Mental Status: She is alert and oriented to person, place, and time. Mental status is at baseline.      Gait: Gait abnormal.   Psychiatric:         Mood and Affect: Mood normal.       Results for orders placed or performed in visit on 06/19/24   POCT Glucose, Hand-Held Device   Result Value Ref Range    POC Glucose 99 70 - 110 MG/DL     Assessment:       1. General medical exam    2. Encounter for lipid screening for cardiovascular disease    3. Screening for HIV (human immunodeficiency virus)    4. Need for hepatitis C screening test    5. Amputation of right foot, initial encounter    6. Primary hypertension    7. Type 2 diabetes mellitus with other specified complication, unspecified whether long term insulin use    8. Screening for diabetes mellitus    9. Sciatic nerve pain, right        Plan:   General medical exam  -     TSH; Future; Expected date: 06/19/2024  -     CBC Auto Differential; Future; Expected date: 06/19/2024    Encounter for lipid screening for cardiovascular disease  -     Lipid Panel; Future; Expected date: 06/19/2024  -     Comprehensive Metabolic Panel; Future; Expected date: 06/19/2024    Screening for HIV (human immunodeficiency virus)  -     HIV 1/2 Ag/Ab (4th Gen); Future; Expected date: 06/19/2024    Need for hepatitis C screening test  -     Hepatitis C Antibody; Future; Expected date: 06/19/2024    Amputation of right foot, initial encounter    Primary hypertension    Type 2 diabetes mellitus with other specified complication, unspecified whether long term insulin use  -      Hemoglobin A1C; Future; Expected date: 06/19/2024  -     POCT Glucose, Hand-Held Device    Screening for diabetes mellitus  -     Microalbumin/creatinine urine ratio    Sciatic nerve pain, right  -     Discontinue: cyclobenzaprine (FLEXERIL) 10 MG tablet; Take 1 tablet (10 mg total) by mouth 3 (three) times daily as needed for Muscle spasms.  Dispense: 30 tablet; Refill: 0  -     Discontinue: ibuprofen (ADVIL,MOTRIN) 800 MG tablet; Take 1 tablet (800 mg total) by mouth 3 (three) times daily. for 7 days  Dispense: 21 tablet; Refill: 0  -     Discontinue: cyclobenzaprine (FLEXERIL) 10 MG tablet; Take 1 tablet (10 mg total) by mouth 3 (three) times daily as needed for Muscle spasms.  Dispense: 30 tablet; Refill: 0  -     ibuprofen (ADVIL,MOTRIN) 800 MG tablet; Take 1 tablet (800 mg total) by mouth 3 (three) times daily. for 7 days  Dispense: 21 tablet; Refill: 0  -     cyclobenzaprine (FLEXERIL) 10 MG tablet; Take 1 tablet (10 mg total) by mouth 3 (three) times daily as needed for Muscle spasms.  Dispense: 30 tablet; Refill: 0             [facility] [address]  [place] [address]  Office [phone]  Fax [phone]

## 2024-06-28 ENCOUNTER — OFFICE VISIT (OUTPATIENT)
Dept: PRIMARY CARE CLINIC | Facility: CLINIC | Age: 44
End: 2024-06-28
Payer: MEDICAID

## 2024-06-28 DIAGNOSIS — M79.2 NEUROPATHIC PAIN: ICD-10-CM

## 2024-06-28 DIAGNOSIS — I10 ESSENTIAL HYPERTENSION: ICD-10-CM

## 2024-06-28 DIAGNOSIS — E78.2 MIXED HYPERLIPIDEMIA: ICD-10-CM

## 2024-06-28 DIAGNOSIS — M54.31 SCIATIC NERVE PAIN, RIGHT: Primary | ICD-10-CM

## 2024-06-28 DIAGNOSIS — Z89.431 STATUS POST TRANSMETATARSAL AMPUTATION OF RIGHT FOOT: ICD-10-CM

## 2024-06-28 DIAGNOSIS — G47.09 SECONDARY INSOMNIA: ICD-10-CM

## 2024-06-28 RX ORDER — MELOXICAM 15 MG/1
15 TABLET ORAL DAILY PRN
Qty: 30 TABLET | Refills: 0 | Status: SHIPPED | OUTPATIENT
Start: 2024-06-28 | End: 2024-07-28

## 2024-06-28 RX ORDER — ATORVASTATIN CALCIUM 20 MG/1
20 TABLET, FILM COATED ORAL NIGHTLY
Qty: 90 TABLET | Refills: 3 | Status: SHIPPED | OUTPATIENT
Start: 2024-06-28 | End: 2025-06-28

## 2024-06-29 NOTE — PATIENT INSTRUCTIONS
"Diabetes and Diet   The Basics   Written by the doctors and editors at [facility]   Why is diet important in diabetes? -- Diet is important because it is part of diabetes treatment. Many people need to change what they eat and how much they eat to help treat their diabetes. It is important for people to treat their diabetes so that they:  Keep their blood sugar at or near a normal level  Prevent long-term problems, such as heart or kidney problems, that can happen in people with diabetes  Changing your diet can also help treat obesity, high blood pressure, and high cholesterol. These conditions can affect people with diabetes and can lead to future problems, such as heart attacks or strokes.  Who will work with me to change my diet? -- Your doctor or nurse will work with you to make a food plan to change your diet. They might also recommend that you work with a "dietitian." A dietitian is an expert on food and eating.  Do I need to eat at the same times every day? -- When and how often you should eat depends, in part, on the diabetes medicines you take. For example:  People who take about the same amount of insulin at the same time each day (called a "fixed regimen") should eat meals at the same times. This is also true for people who take pills that increase insulin levels, such as sulfonylureas. Eating meals at the same time every day helps prevent low blood sugar.  People who adjust the dose and timing of their insulin each day (called a "flexible regimen") do not always have to eat meals at the same time. That's because they can time their insulin dose for before they plan to eat, and also adjust the dose for how much they plan to eat.  People who take medicines that don't usually cause low blood sugar, such as metformin, don't have to eat meals at the same time every day.  What do I need to think about when planning what to eat? -- Our bodies break down the food we eat into small pieces called carbohydrates, " "proteins, and fats.  When planning what to eat, people with diabetes need to think about:  Carbohydrates (or "carbs") - Carbohydrates, which are sugars that our bodies use for energy, can raise a person's blood sugar level. Your doctor, nurse, or dietitian will tell you how many carbohydrates you should eat at each meal or snack. Foods that have carbohydrates include:  Bread, pasta, and rice  Vegetables and fruits  Dairy foods  Foods and drinks with added sugar  It is best to get your carbohydrates from fruits, vegetables, whole grains, and low-fat milk. It is best to avoid drinks with added sugar, like soda, juices, and sports drinks.   Protein - Your doctor, nurse, or dietitian will tell you how much protein you should eat each day. It is best to eat lean meats, fish, eggs, beans, peas, soy products, nuts, and seeds.  Fats - The type of fat you eat is more important than the amount of fat. "Saturated" and "trans" fats can increase your risk for heart problems, like a heart attack.  Foods that have saturated fats include meat, butter, cheese, and ice cream.  Foods that have trans fats include processed food with "partially hydrogenated oils" on the ingredient list. This may include fried foods, store bought cookies, muffins, pies, and cakes.  "Monounsaturated" and "polyunsaturated" fats are better for you. Foods with these types of fat include fish, avocado, olive oil, and nuts.  Calories - People need to eat a certain amount of calories each day to keep their weight the same. People who are overweight and want to lose weight need to eat fewer calories each day.  Fiber - Eating foods with a lot of fiber can help control a person's blood sugar level. Foods that have a lot of fiber include apples, green beans, peas, beans, lentils, nuts, oatmeal, and whole grains.  Salt - People who have high blood pressure should not eat foods that contain a lot of salt (also called sodium). People with high blood pressure should " also eat healthy foods, such as fruits, vegetables, and low-fat dairy foods.  Alcohol - Having more than 1 drink (for women) or 2 drinks (for men) a day can raise blood sugar levels. Also, drinks that have fruit juice or soda in them can raise blood sugar levels.  What can I do if I need to lose weight? -- If you need to lose weight, you can:  Exercise - Try to get at least 30 minutes of physical activity a day, most days of the week. Even gentle exercise, like walking, is good for your health. Some people with diabetes need to change their medicine dose before they exercise. They might also need to check their blood sugar levels before and after exercising.  Eat fewer calories - Your doctor, nurse, or dietitian can tell you how many calories you should eat each day in order to lose weight.  If you are worried about your weight, size, or shape, talk with your doctor, nurse, or dietitian. They can help you make changes to improve your health.  Can I eat the same foods as my family? -- Yes. You do not need to eat special foods if you have diabetes. You and your family can eat the same foods. Changing your diet is mostly about eating healthy foods and not eating too much.  What are the other parts of diabetes treatment? -- Besides changing your diet, the other parts of diabetes treatment are:  Exercise  Medicines  Some people with diabetes need to learn how to match their diet and exercise with their medicine dose. For example, people who use insulin might need to choose the dose of insulin they give themselves. To choose their dose, they need to think about:  What they plan to eat at the next meal  How much exercise they plan to do  What their blood sugar level is  If the diet and exercise do not match the medicine dose, a person's blood sugar level can get too low or too high. Blood sugar levels that are too low or too high can cause problems.  All topics are updated as new evidence becomes available and our peer  review process is complete.  This topic retrieved from [employer] on: Sep 21, 2021.  Topic 39049 Version 7.0  Release: 29.4.2 - C29.263  © 2021 UpToDate, Inc. and/or its affiliates. All rights reserved.  Consumer Information Use and Disclaimer   This information is not specific medical advice and does not replace information you receive from your health care provider. This is only a brief summary of general information. It does NOT include all information about conditions, illnesses, injuries, tests, procedures, treatments, therapies, discharge instructions or life-style choices that may apply to you. You must talk with your health care provider for complete information about your health and treatment options. This information should not be used to decide whether or not to accept your health care provider's advice, instructions or recommendations. Only your health care provider has the knowledge and training to provide advice that is right for you. The use of this information is governed by the [employer] End User License Agreement, available at https://www.[employer]/en/solutions/S[employer]/about/dwight.The use of [employer] content is governed by the [employer] Terms of Use. ©2021 UpToDate, Inc. All rights reserved.  Copyright  Chronic Pain Discharge Instructions   About this topic   Pain can be an unpleasant feeling that happens in any part of the body. It can be mild or very bad. Pain may come and go or you may feel it all of the time. It may be dull, sharp, or throbbing. When you are in pain, you may not feel hungry. Pain can cause upset stomach and throwing up. You may also feel nervous or have problems sleeping.  Pain is most often a warning that something is wrong. You may have had surgery or an injury. Pain may be from health problems such as migraine or cancer. Acute pain lasts for only a short time and then goes away. Chronic pain lasts for a long time and most often does not go away completely. Chronic pain  may disrupt your daily activities. How a doctor treats chronic pain depends on things like the kind of pain, how bad it is, and what is causing the pain.

## 2024-06-29 NOTE — PROGRESS NOTES
The patient location is: [place] at home  Visit type: Virtual visit with synchronous audio and video  Total time spent with patient:30 mins  This includes face to face time and non-face to face time preparing to see the patient (eg, review of tests), obtaining and/or reviewing separately obtained history, documenting clinical information in the electronic or other health record, independently interpreting results and communicating results to the patient/family/caregiver, or care coordinator.   Each patient to whom he or she provides medical services by telemedicine is:  (1) informed of the relationship between the physician and patient and the respective role of any other health care provider with respect to management of the patient; and (2) notified that he or she may decline to receive medical services by telemedicine and may withdraw from such care at any time.    Subjective:       Patient ID: Jeanna Helm is a 44 y.o. female.    Chief Complaint: Follow-up and Insomnia (Pain)    Sitting in a wheelchair  History of Present Illness:   Jeanna Helm 44 y.o. female presents today with Follow-up and Insomnia (Pain)    Jeanna Rosss allergies, medications, history, and problem list were updated as appropriate.   Past Medical History:   Diagnosis Date    Back pain     Diabetes mellitus     Hypertension     Left knee pain     Miscarriage     x2    Stroke      Family History   Problem Relation Name Age of Onset    Hypertension Mother      Heart disease Mother      Fibromyalgia Mother      Diabetes Father       Social History     Socioeconomic History    Marital status:    Tobacco Use    Smoking status: Former     Current packs/day: 0.00     Average packs/day: 1 pack/day for 20.0 years (20.0 ttl pk-yrs)     Types: Cigarettes     Quit date:      Years since quittin.4    Smokeless tobacco: Former   Substance and Sexual Activity    Alcohol use: No    Drug use: No    Sexual activity: Not Currently      Social Determinants of Health     Financial Resource Strain: High Risk (6/19/2024)    Overall Financial Resource Strain (CARDIA)     Difficulty of Paying Living Expenses: Hard   Food Insecurity: Food Insecurity Present (6/19/2024)    Hunger Vital Sign     Worried About Running Out of Food in the Last Year: Sometimes true     Ran Out of Food in the Last Year: Sometimes true   Physical Activity: Unknown (6/19/2024)    Exercise Vital Sign     Days of Exercise per Week: Patient declined   Stress: Stress Concern Present (6/19/2024)    [employer] - Occupational Stress Questionnaire     Feeling of Stress : Very much   Housing Stability: High Risk (6/19/2024)    Housing Stability Vital Sign     Unable to Pay for Housing in the Last Year: Yes     Outpatient Encounter Medications as of 6/28/2024   Medication Sig Dispense Refill    acetaminophen (TYLENOL) 325 MG tablet Take 325 mg by mouth every 6 (six) hours as needed for Pain.      allopurinoL (ZYLOPRIM) 100 MG tablet Take 2 tablets (200 mg total) by mouth once daily. 10 tablet 0    aspirin 81 MG Chew Take 1 tablet (81 mg total) by mouth once daily. 30 tablet 0    atorvastatin (LIPITOR) 20 MG tablet Take 1 tablet (20 mg total) by mouth every evening. 90 tablet 3    blood sugar diagnostic Strp Use one strip to test blood sugar before meals 100 strip 0    blood-glucose meter Misc Use as directed to test blood sugar 1 each 0    clindamycin (CLEOCIN) 300 MG capsule Take 1 capsule (300 mg total) by mouth 3 (three) times daily. 30 capsule 0    cyclobenzaprine (FLEXERIL) 10 MG tablet Take 1 tablet (10 mg total) by mouth 3 (three) times daily as needed for Muscle spasms. 30 tablet 0    glyBURIDE (DIABETA) 5 MG tablet Take 1 tablet (5 mg total) by mouth daily with breakfast. 90 tablet 3    HYDROcodone-acetaminophen (NORCO) 7.5-325 mg per tablet Take 1 tablet by mouth every 6 (six) hours as needed for Pain. 28 tablet 0    lancets (LITE TOUCH LANCETS)  33 gauge Misc Use to check blood sugar before meals 100 each 0    lisinopriL (PRINIVIL,ZESTRIL) 40 MG tablet Take 1 tablet (40 mg total) by mouth once daily. 30 tablet 0    meloxicam (MOBIC) 15 MG tablet Take 1 tablet (15 mg total) by mouth daily as needed for Pain (sciatica). 30 tablet 0    rivaroxaban (XARELTO) 2.5 mg Tab Take 1 tablet (2.5 mg total) by mouth 2 (two) times daily with meals. 60 tablet 0    [DISCONTINUED] ibuprofen (ADVIL,MOTRIN) 800 MG tablet Take 1 tablet (800 mg total) by mouth 3 (three) times daily. for 7 days 21 tablet 0    [DISCONTINUED] varenicline (CHANTIX STARTING MONTH BOX) 0.5 mg (11)- 1 mg (42) tablet Take one 0.5mg tab by mouth once daily X3 days,then increase to one 0.5mg tab twice daily X4 days,then increase to one 1mg tab twice daily 1 each 0     No facility-administered encounter medications on file as of 6/28/2024.       Review of Systems   Constitutional:  Positive for activity change. Negative for unexpected weight change.   HENT:  Negative for hearing loss, rhinorrhea and trouble swallowing.    Eyes:  Negative for discharge and visual disturbance.   Respiratory:  Negative for chest tightness and wheezing.    Cardiovascular:  Negative for chest pain and palpitations.   Gastrointestinal:  Negative for blood in stool, constipation, diarrhea and vomiting.   Endocrine: Negative for polydipsia and polyuria.   Genitourinary:  Negative for difficulty urinating, dysuria, hematuria and menstrual problem.   Musculoskeletal:  Positive for arthralgias. Negative for joint swelling and neck pain.   Neurological:  Negative for weakness and headaches.   Psychiatric/Behavioral:  Positive for dysphoric mood. Negative for confusion.      Objective:      LMP 06/03/2024 (Exact Date)   Physical Exam      CONSTITUTIONAL: No apparent distress. Appears comfortable. Does not appear acutely ill or septic. Appears adequately hydrated.  CARDIOVASCULAR: No perioral cyanosis  PULMONARY: Breathing unlabored. No  retractions Chest expansion grossly normal.  PSYCHIATRIC: Alert and conversant and grossly oriented.    Results for orders placed or performed in visit on 06/19/24   Hepatitis C Antibody   Result Value Ref Range    Hepatitis C Ab Non-reactive Non-reactive   HIV 1/2 Ag/Ab (4th Gen)   Result Value Ref Range    HIV 1/2 Ag/Ab Non-reactive Non-reactive   TSH   Result Value Ref Range    TSH 0.718 0.400 - 4.000 uIU/mL   Hemoglobin A1C   Result Value Ref Range    Hemoglobin A1C 6.9 (H) 4.0 - 5.6 %    Estimated Avg Glucose 151 (H) 68 - 131 mg/dL   Lipid Panel   Result Value Ref Range    Cholesterol 215 (H) 120 - 199 mg/dL    Triglycerides 116 30 - 150 mg/dL    HDL 48 40 - 75 mg/dL    LDL Cholesterol 143.8 63.0 - 159.0 mg/dL    HDL/Cholesterol Ratio 22.3 20.0 - 50.0 %    Total Cholesterol/HDL Ratio 4.5 2.0 - 5.0    Non-HDL Cholesterol 167 mg/dL   Comprehensive Metabolic Panel   Result Value Ref Range    Sodium 137 136 - 145 mmol/L    Potassium 4.1 3.5 - 5.1 mmol/L    Chloride 101 95 - 110 mmol/L    CO2 23 23 - 29 mmol/L    Glucose 80 70 - 110 mg/dL    BUN 15 6 - 20 mg/dL    Creatinine 0.8 0.5 - 1.4 mg/dL    Calcium 10.1 8.7 - 10.5 mg/dL    Total Protein 8.3 6.0 - 8.4 g/dL    Albumin 3.5 3.5 - 5.2 g/dL    Total Bilirubin 0.3 0.1 - 1.0 mg/dL    Alkaline Phosphatase 80 55 - 135 U/L    AST 13 10 - 40 U/L    ALT 18 10 - 44 U/L    eGFR >60.0 >60 mL/min/1.73 m^2    Anion Gap 13 8 - 16 mmol/L   CBC Auto Differential   Result Value Ref Range    WBC 11.80 3.90 - 12.70 K/uL    RBC 4.60 4.00 - 5.40 M/uL    Hemoglobin 11.2 (L) 12.0 - 16.0 g/dL    Hematocrit 37.4 37.0 - 48.5 %    MCV 81 (L) 82 - 98 fL    MCH 24.3 (L) 27.0 - 31.0 pg    MCHC 29.9 (L) 32.0 - 36.0 g/dL    RDW 14.7 (H) 11.5 - 14.5 %    Platelets 618 (H) 150 - 450 K/uL    MPV 9.0 (L) 9.2 - 12.9 fL    Immature Granulocytes 0.8 (H) 0.0 - 0.5 %    Gran # (ANC) 8.1 (H) 1.8 - 7.7 K/uL    Immature Grans (Abs) 0.10 (H) 0.00 - 0.04 K/uL    Lymph # 2.7 1.0 - 4.8 K/uL    Mono # 0.7 0.3 -  1.0 K/uL    Eos # 0.2 0.0 - 0.5 K/uL    Baso # 0.04 0.00 - 0.20 K/uL    nRBC 0 0 /100 WBC    Gran % 68.9 38.0 - 73.0 %    Lymph % 22.5 18.0 - 48.0 %    Mono % 5.8 4.0 - 15.0 %    Eosinophil % 1.7 0.0 - 8.0 %    Basophil % 0.3 0.0 - 1.9 %    Differential Method Automated      Assessment:       1. Sciatic nerve pain, right    2. Mixed hyperlipidemia    3. Neuropathic pain    4. Status post transmetatarsal amputation of right foot    5. Essential hypertension    6. Secondary insomnia        Plan:   1. Sciatic nerve pain, right  Comments:  exacerbation of chronic sxs, worse since she has been sitting a lot following the amputation. Conservative therapy-avoid sitting on the right and NSAID.  Orders:  -     meloxicam (MOBIC) 15 MG tablet; Take 1 tablet (15 mg total) by mouth daily as needed for Pain (sciatica).  Dispense: 30 tablet; Refill: 0    2. Mixed hyperlipidemia  Overview:  The ASCVD Risk score ([place] DK, et al., 2019) failed to calculate for the following reasons:    The patient has a prior MI or stroke diagnosis     Orders:  -     atorvastatin (LIPITOR) 20 MG tablet; Take 1 tablet (20 mg total) by mouth every evening.  Dispense: 90 tablet; Refill: 3    3. Neuropathic pain  Comments:  acute on chronic pain to legs,  Titrate elavil up to 100mg    4. Status post transmetatarsal amputation of right foot  Comments:  06/08/24    5. Essential hypertension  Comments:  uncontrolled, RTC for BP check    6. Secondary insomnia  Comments:  due to med condition, elavil 25mg not helpful. Advised to go up by 25 one night at a time. If you get to 100mg, notify clinic.        I have reviewed all of the patient's clinical history available in care everywhere and Epic and have utilized this in my evaluation and management recommendations today.      Treatment options and alternatives were discussed with the patient. Patient was given ample time to ask questions. All questions were answered. Voices understanding and acceptance of  this advice. Will call back if any further questions or concerns.         Portions of the record may have been created with voice recognition software. Occasional wrong-word or sound-a-like substitutions may have occurred due to the inherent limitations of voice recognition software. Read the chart carefully and recognize, using context, where substitutions have occurred.               Liz Roa MD  [facility],

## 2024-07-01 ENCOUNTER — OFFICE VISIT (OUTPATIENT)
Dept: PODIATRY | Facility: CLINIC | Age: 44
End: 2024-07-01
Payer: COMMERCIAL

## 2024-07-01 VITALS — WEIGHT: 203.06 LBS | BODY MASS INDEX: 39.87 KG/M2 | HEIGHT: 60 IN

## 2024-07-01 DIAGNOSIS — Z89.431 S/P TRANSMETATARSAL AMPUTATION OF FOOT, RIGHT: Primary | ICD-10-CM

## 2024-07-01 PROCEDURE — 3044F HG A1C LEVEL LT 7.0%: CPT | Mod: CPTII,S$GLB,, | Performed by: PODIATRIST

## 2024-07-01 PROCEDURE — 99213 OFFICE O/P EST LOW 20 MIN: CPT | Mod: PBBFAC | Performed by: PODIATRIST

## 2024-07-01 PROCEDURE — 4010F ACE/ARB THERAPY RXD/TAKEN: CPT | Mod: CPTII,S$GLB,, | Performed by: PODIATRIST

## 2024-07-01 PROCEDURE — 99999 PR PBB SHADOW E&M-EST. PATIENT-LVL III: CPT | Mod: PBBFAC,,, | Performed by: PODIATRIST

## 2024-07-01 PROCEDURE — 99024 POSTOP FOLLOW-UP VISIT: CPT | Mod: S$GLB,,, | Performed by: PODIATRIST

## 2024-07-01 PROCEDURE — 1159F MED LIST DOCD IN RCRD: CPT | Mod: CPTII,S$GLB,, | Performed by: PODIATRIST

## 2024-07-01 NOTE — PROGRESS NOTES
PODIATRIC MEDICINE AND SURGERY      Chief Complaint   Patient presents with    Post-op Evaluation     3 week s/p, TMA, right foot, DOS: 06/08/24, rates pain.., diabetic, wears dressing and boot         SUBJECTIVE   Jeanna Helm is a 44 y.o. female status post R TMA/SHEREE , DOS 6/8/24. Pt is appx 3 week(s) post operatively. Pain is well controlled with pain medications. Pt has been NWB.  States  has kept dressing clean/dry; Pt denies fever, chills, nausea, and/or vomiting. Pt has no new complaint(s).     OBJECTIVE   Vitals:    07/01/24 1011   Weight: 92.1 kg (203 lb 0.7 oz)   Height: 5' (1.524 m)       Neurovascular status - vascular status grossly intact.   Surgical incision well coapted with sutures  Pins - N/A   Erythema - none   Edema - mild  Warmth - no increase in skin temp from prox to distally b/l   TTP - mild at the incision site consistent with po course  ROM - good, pain free   Dehiscence- none  Drainage- none    Radiographs reviewed:   -  post op.    ASSESSMENT  1. Surgery follow-up examination    PLAN  - Weight bearing status - nwb  - Ambulatory aids -knee scootr  - Dressing - Dressing change was performed. Partial suture removal performed. Pt instructed to keep clean, dry, intact. Do not get wet. BOOT placed.  - Follow up - RTC as scheduled     Future Appointments   Date Time Provider Department Center   7/8/2024 10:30 AM Pravez Durbin MD [facility]   7/9/2024 10:20 AM [facility]2 [facility] [place]   8/6/2024  9:20 AM Liz Roa MD [facility]       Report Electronically Signed By:     Anitra Chowdary DPM   Podiatry  [facility]  7/1/2024

## 2024-07-02 ENCOUNTER — PATIENT MESSAGE (OUTPATIENT)
Dept: PRIMARY CARE CLINIC | Facility: CLINIC | Age: 44
End: 2024-07-02
Payer: MEDICAID

## 2024-07-08 ENCOUNTER — OFFICE VISIT (OUTPATIENT)
Dept: VASCULAR SURGERY | Facility: CLINIC | Age: 44
End: 2024-07-08
Payer: MEDICAID

## 2024-07-08 DIAGNOSIS — I96 GANGRENE OF TOE OF RIGHT FOOT: Primary | ICD-10-CM

## 2024-07-08 DIAGNOSIS — E78.5 HYPERLIPIDEMIA, UNSPECIFIED HYPERLIPIDEMIA TYPE: Chronic | ICD-10-CM

## 2024-07-08 DIAGNOSIS — I10 HYPERTENSION, UNSPECIFIED TYPE: Chronic | ICD-10-CM

## 2024-07-08 DIAGNOSIS — Z86.73 H/O: CVA (CEREBROVASCULAR ACCIDENT): Chronic | ICD-10-CM

## 2024-07-08 PROCEDURE — 1111F DSCHRG MED/CURRENT MED MERGE: CPT | Mod: CPTII,,, | Performed by: STUDENT IN AN ORGANIZED HEALTH CARE EDUCATION/TRAINING PROGRAM

## 2024-07-08 PROCEDURE — 99999 PR PBB SHADOW E&M-EST. PATIENT-LVL III: CPT | Mod: PBBFAC,,, | Performed by: STUDENT IN AN ORGANIZED HEALTH CARE EDUCATION/TRAINING PROGRAM

## 2024-07-08 PROCEDURE — 3044F HG A1C LEVEL LT 7.0%: CPT | Mod: CPTII,,, | Performed by: STUDENT IN AN ORGANIZED HEALTH CARE EDUCATION/TRAINING PROGRAM

## 2024-07-08 PROCEDURE — 1160F RVW MEDS BY RX/DR IN RCRD: CPT | Mod: CPTII,,, | Performed by: STUDENT IN AN ORGANIZED HEALTH CARE EDUCATION/TRAINING PROGRAM

## 2024-07-08 PROCEDURE — 99213 OFFICE O/P EST LOW 20 MIN: CPT | Mod: PBBFAC | Performed by: STUDENT IN AN ORGANIZED HEALTH CARE EDUCATION/TRAINING PROGRAM

## 2024-07-08 PROCEDURE — 99214 OFFICE O/P EST MOD 30 MIN: CPT | Mod: S$PBB,,, | Performed by: STUDENT IN AN ORGANIZED HEALTH CARE EDUCATION/TRAINING PROGRAM

## 2024-07-08 PROCEDURE — 4010F ACE/ARB THERAPY RXD/TAKEN: CPT | Mod: CPTII,,, | Performed by: STUDENT IN AN ORGANIZED HEALTH CARE EDUCATION/TRAINING PROGRAM

## 2024-07-08 PROCEDURE — 1159F MED LIST DOCD IN RCRD: CPT | Mod: CPTII,,, | Performed by: STUDENT IN AN ORGANIZED HEALTH CARE EDUCATION/TRAINING PROGRAM

## 2024-07-08 RX ORDER — NAPROXEN SODIUM 220 MG/1
81 TABLET, FILM COATED ORAL DAILY
Qty: 90 TABLET | Refills: 2 | Status: SHIPPED | OUTPATIENT
Start: 2024-07-08 | End: 2025-07-08

## 2024-07-08 NOTE — ASSESSMENT & PLAN NOTE
S/p right TMA. She is ok to stop taking her xarelto and continue on aspirin and statin therapy. She is to return In 6 months for wound check and at that time will obtain carotid duplex. She requested referral to cardiology to establish care given her significant family hx of heart disaese

## 2024-07-08 NOTE — PROGRESS NOTES
Parvez Durbin    OFFICE NOTE    DATE OF VISIT: 2024  PATIENT NAME: Jeanna Helm  : 1980  MRN: [id number]  PRIMARY CARE PHYSICIAN: Liz Roa MD  CARDIOLOGIST:   REFERRING PROVIDER: No ref. provider found    CHIEF COMPLAINT   Chief Complaint   Patient presents with    Wound Check     Follow up of right toe amputations.        HISTORY OF PRESENT ILLNESS:  Jeanna Helm is a 44 y.o. 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 She underwent diagnostic angiogram then tma of the right foot has been seen by podiatry last week she is doing well her pain is much better. She is non weight bearing status currently. She is seeing podiatry tomorrow for eval and possible stitch removal. She is concerned because she has a significant family hx of heart disease and stroke.    ALLERGIES:  Review of patient's allergies indicates:   Allergen Reactions    Neurontin [gabapentin] Itching and Blisters    Codeine Rash       PAST MEDICAL HISTORY:  Past Medical History:   Diagnosis Date    Back pain     Diabetes mellitus     Hypertension     Left knee pain     Miscarriage     x2    Stroke        PAST SURGICAL HISTORY:  Past Surgical History:   Procedure Laterality Date    ANTERIOR CRUCIATE LIGAMENT REPAIR Left     AORTOGRAPHY WITH EXTREMITY RUNOFF N/A 6/3/2024    Procedure: AORTOGRAM, WITH EXTREMITY RUNOFF;  Surgeon: Parvez Durbin MD;  Location: [facility] CATH LAB;  Service: Vascular;  Laterality: N/A;  Possible Brachial access    DILATION AND CURETTAGE OF UTERUS      x2    FOOT AMPUTATION THROUGH METATARSAL Right 2024    Procedure: AMPUTATION, FOOT, TRANSMETATARSAL;  Surgeon: Anitra Chowdary DPM;  Location: [facility] OR;  Service: Podiatry;  Laterality: Right;    LENGTHENING OF ACHILLES TENDON Right 2024    Procedure: LENGTHENING, TENDON, ACHILLES;  Surgeon: Anitra Chowdary DPM;  Location: [facility] OR;  Service: Podiatry;  Laterality:  ----- Message from Niecy Bell MD sent at 1/9/2024  1:35 PM CST -----  Please let Tamika know that other testing is normal so recommend repeating alkaline phosphatase level in 6 months.    Right;    LUMBAR FUSION      TIBIA FRACTURE SURGERY Right        SOCIAL HISTORY:   Social History     Tobacco Use    Smoking status: Former     Current packs/day: 0.00     Average packs/day: 1 pack/day for 20.0 years (20.0 ttl pk-yrs)     Types: Cigarettes     Quit date:      Years since quittin.5    Smokeless tobacco: Former   Substance Use Topics    Alcohol use: No    Drug use: No       FAMILY HISTORY:  Family History   Problem Relation Name Age of Onset    Hypertension Mother      Heart disease Mother      Fibromyalgia Mother      Diabetes Father         REVIEW OF SYSTEMS:  ROS  10 pt ros otherwise negative    PHYSICAL EXAM:  There were no vitals filed for this visit.   Physical Exam      Vss  Gen nad alert oriented  Cv rrr  Lung ctab  Abd soft nt nd  Right foot tma healing well no drainage, no skin  necrosis    VASCULAR LAB STUDIES:  None    ASSESSMENT AND PLAN:  Gangrene of toe of right foot  S/p right TMA. She is ok to stop taking her xarelto and continue on aspirin and statin therapy. She is to return In 6 months for wound check and at that time will obtain carotid duplex. She requested referral to cardiology to establish care given her significant family hx of heart disaese      Jeanna was seen today for wound check.    Diagnoses and all orders for this visit:    Gangrene of toe of right foot    Hyperlipidemia, unspecified hyperlipidemia type    Hypertension, unspecified type    H/O: CVA (cerebrovascular accident)    Other orders  -     aspirin 81 MG Chew; Take 1 tablet (81 mg total) by mouth once daily.        Follow up in about 6 months (around 2025).        Parvez Durbin  [facility]  Vascular Surgery  [phone] (Clinic Number)

## 2024-07-09 ENCOUNTER — OFFICE VISIT (OUTPATIENT)
Dept: PODIATRY | Facility: CLINIC | Age: 44
End: 2024-07-09
Payer: MEDICAID

## 2024-07-09 ENCOUNTER — PATIENT MESSAGE (OUTPATIENT)
Dept: ADMINISTRATIVE | Facility: HOSPITAL | Age: 44
End: 2024-07-09
Payer: MEDICAID

## 2024-07-09 VITALS — BODY MASS INDEX: 39.87 KG/M2 | WEIGHT: 203.06 LBS | HEIGHT: 60 IN

## 2024-07-09 DIAGNOSIS — Z89.431 S/P TRANSMETATARSAL AMPUTATION OF FOOT, RIGHT: Primary | ICD-10-CM

## 2024-07-09 PROCEDURE — 4010F ACE/ARB THERAPY RXD/TAKEN: CPT | Mod: CPTII,,, | Performed by: PODIATRIST

## 2024-07-09 PROCEDURE — 99999 PR PBB SHADOW E&M-EST. PATIENT-LVL III: CPT | Mod: PBBFAC,,, | Performed by: PODIATRIST

## 2024-07-09 PROCEDURE — 1159F MED LIST DOCD IN RCRD: CPT | Mod: CPTII,,, | Performed by: PODIATRIST

## 2024-07-09 PROCEDURE — 99024 POSTOP FOLLOW-UP VISIT: CPT | Mod: ,,, | Performed by: PODIATRIST

## 2024-07-09 PROCEDURE — 99213 OFFICE O/P EST LOW 20 MIN: CPT | Mod: PBBFAC,PO | Performed by: PODIATRIST

## 2024-07-09 PROCEDURE — 3044F HG A1C LEVEL LT 7.0%: CPT | Mod: CPTII,,, | Performed by: PODIATRIST

## 2024-07-09 RX ORDER — SEMAGLUTIDE 0.68 MG/ML
INJECTION, SOLUTION SUBCUTANEOUS
COMMUNITY
Start: 2024-07-05

## 2024-07-09 RX ORDER — TRAZODONE HYDROCHLORIDE 50 MG/1
50 TABLET ORAL NIGHTLY
COMMUNITY
Start: 2024-07-03

## 2024-07-09 NOTE — PROGRESS NOTES
PODIATRIC MEDICINE AND SURGERY      Chief Complaint   Patient presents with    Post-op Evaluation     4 week s/p, TMA, right foot, DOS: 06/08/24, suture removal, rates pain 3/10, diabetic, wears dressing and boot         SUBJECTIVE   Jeanna Helm is a 44 y.o. female status post R TMA/SHEREE , DOS 6/8/24. Pt is appx 4 week(s) post operatively. Pain is well controlled with pain medications. Pt has been NWB.  States  has kept dressing clean/dry; Pt denies fever, chills, nausea, and/or vomiting. Pt has no new complaint(s).     OBJECTIVE   Vitals:    07/09/24 1011   Weight: 92.1 kg (203 lb 0.7 oz)   Height: 5' (1.524 m)   PainSc:   3       Neurovascular status - vascular status grossly intact.   Surgical incision well coapted with sutures  Pins - N/A   Erythema - none   Edema - mild  Warmth - no increase in skin temp from prox to distally b/l   TTP - mild at the incision site consistent with po course  ROM - good, pain free   Dehiscence- none  Drainage- none    Radiographs reviewed:   -  post op.    ASSESSMENT  1. Surgery follow-up examination    PLAN  - Weight bearing status - nwb  - Ambulatory aids -knee scootr  - Dressing - Dressing change was performed. Suture removal. Ok to ambulate in BOOT.  - Follow up - RTC as scheduled     Future Appointments   Date Time Provider Department Center   7/16/2024  9:00 AM NURSE, DC [facility]   7/16/2024 10:00 AM SPECIMEN LAB, [facility] SPECL[facility] [place]   8/6/2024  9:20 AM Liz Roa MD [facility]       Report Electronically Signed By:     Anitra Chowdary DPM   Podiatry  [facility]  7/9/2024

## 2024-07-11 DIAGNOSIS — E11.9 TYPE 2 DIABETES MELLITUS WITHOUT COMPLICATION, UNSPECIFIED WHETHER LONG TERM INSULIN USE: ICD-10-CM

## 2024-07-24 ENCOUNTER — OFFICE VISIT (OUTPATIENT)
Dept: PODIATRY | Facility: CLINIC | Age: 44
End: 2024-07-24
Payer: MEDICAID

## 2024-07-24 VITALS — WEIGHT: 203.06 LBS | HEIGHT: 60 IN | BODY MASS INDEX: 39.87 KG/M2

## 2024-07-24 DIAGNOSIS — Z89.431 S/P TRANSMETATARSAL AMPUTATION OF FOOT, RIGHT: Primary | ICD-10-CM

## 2024-07-24 DIAGNOSIS — E11.42 DM TYPE 2 WITH DIABETIC PERIPHERAL NEUROPATHY: ICD-10-CM

## 2024-07-24 DIAGNOSIS — Z79.01 ANTICOAGULATED: ICD-10-CM

## 2024-07-24 DIAGNOSIS — E11.52 TYPE 2 DIABETES MELLITUS WITH DIABETIC PERIPHERAL ANGIOPATHY WITH GANGRENE, UNSPECIFIED WHETHER LONG TERM INSULIN USE: Chronic | ICD-10-CM

## 2024-07-24 PROCEDURE — 1159F MED LIST DOCD IN RCRD: CPT | Mod: CPTII,,, | Performed by: PODIATRIST

## 2024-07-24 PROCEDURE — 3044F HG A1C LEVEL LT 7.0%: CPT | Mod: CPTII,,, | Performed by: PODIATRIST

## 2024-07-24 PROCEDURE — 99024 POSTOP FOLLOW-UP VISIT: CPT | Mod: ,,, | Performed by: PODIATRIST

## 2024-07-24 PROCEDURE — 4010F ACE/ARB THERAPY RXD/TAKEN: CPT | Mod: CPTII,,, | Performed by: PODIATRIST

## 2024-07-24 PROCEDURE — 99214 OFFICE O/P EST MOD 30 MIN: CPT | Mod: PBBFAC | Performed by: PODIATRIST

## 2024-07-24 PROCEDURE — 99999 PR PBB SHADOW E&M-EST. PATIENT-LVL IV: CPT | Mod: PBBFAC,,, | Performed by: PODIATRIST

## 2024-07-24 NOTE — PATIENT INSTRUCTIONS
Forefoot filler for transmetatarsal amputation    [facility]  [address] [place] [address]  Phone: [phone]      [facility]  [address]  [place] [address]   Phone: [phone]  Fax: [phone]

## 2024-07-24 NOTE — PROGRESS NOTES
PODIATRIC MEDICINE AND SURGERY      Chief Complaint   Patient presents with    Post-op Evaluation     2 week f/u for 6 week s/p, pt rates pain 0/10 , pt healing well just a little discoloration, pt is diabetic, pt last seen pcp Liz Roa MD  6/28/24         SUBJECTIVE   Jeanna Helm is a 44 y.o. female status post R TMA/SHEREE , DOS 6/8/24. Pt  has been WBAT. Pt denies fever, chills, nausea, and/or vomiting. Pt has no new complaint(s).     OBJECTIVE   Vitals:    07/24/24 1027   Weight: 92.1 kg (203 lb 0.7 oz)   Height: 5' (1.524 m)   PainSc: 0-No pain       Neurovascular status - vascular status grossly intact.   Surgical incision well coapted  Pins - N/A   Erythema - none   Edema - mild  Warmth - no increase in skin temp from prox to distally b/l   TTP - mild at the incision site consistent with po course  ROM - good, pain free   Dehiscence- none  Drainage- none    Radiographs reviewed:   -  post op.    ASSESSMENT  S/P transmetatarsal amputation of foot, right  -     ORTHOTIC DEVICE (DME)  -     DIABETIC SHOES FOR HOME USE    Anticoagulated    Type 2 diabetes mellitus with diabetic peripheral angiopathy with gangrene, unspecified whether long term insulin use  -     DIABETIC SHOES FOR HOME USE    DM type 2 with diabetic peripheral neuropathy  -     DIABETIC SHOES FOR HOME USE       PLAN  - Weight bearing status - WBAT   - forefoot filler ordered, DM shoes  -F/u in 4 months for at risk foot eval    Future Appointments   Date Time Provider Department Center   8/6/2024  9:20 AM Liz Roa MD [facility]       Report Electronically Signed By:     Anitra Chowdary DPM   Podiatry  [facility]  7/25/2024

## 2024-08-28 ENCOUNTER — PATIENT MESSAGE (OUTPATIENT)
Dept: PODIATRY | Facility: CLINIC | Age: 44
End: 2024-08-28
Payer: MEDICAID

## 2024-08-29 RX ORDER — PREGABALIN 75 MG/1
75 CAPSULE ORAL DAILY
Qty: 30 CAPSULE | Refills: 6 | Status: SHIPPED | OUTPATIENT
Start: 2024-08-29 | End: 2025-02-27

## 2024-09-03 ENCOUNTER — PATIENT MESSAGE (OUTPATIENT)
Dept: PODIATRY | Facility: CLINIC | Age: 44
End: 2024-09-03
Payer: MEDICAID

## 2024-09-03 DIAGNOSIS — M79.671 RIGHT FOOT PAIN: Primary | ICD-10-CM

## 2024-09-05 ENCOUNTER — OFFICE VISIT (OUTPATIENT)
Dept: PODIATRY | Facility: CLINIC | Age: 44
End: 2024-09-05
Payer: MEDICAID

## 2024-09-05 VITALS — BODY MASS INDEX: 39.87 KG/M2 | WEIGHT: 203.06 LBS | HEIGHT: 60 IN

## 2024-09-05 DIAGNOSIS — S86.011A STRAIN OF ACHILLES TENDON, RIGHT, INITIAL ENCOUNTER: Primary | ICD-10-CM

## 2024-09-05 PROCEDURE — 99213 OFFICE O/P EST LOW 20 MIN: CPT | Mod: PBBFAC | Performed by: PODIATRIST

## 2024-09-05 PROCEDURE — 99999 PR PBB SHADOW E&M-EST. PATIENT-LVL III: CPT | Mod: PBBFAC,,, | Performed by: PODIATRIST

## 2024-09-05 RX ORDER — DICLOFENAC SODIUM 75 MG/1
75 TABLET, DELAYED RELEASE ORAL 2 TIMES DAILY
Qty: 20 TABLET | Refills: 0 | Status: SHIPPED | OUTPATIENT
Start: 2024-09-05 | End: 2024-09-15

## 2024-09-05 NOTE — PROGRESS NOTES
Podiatry Department    Patient ID: Jeanna Helm is a 44 y.o. female.    Chief Complaint: Foot Injury (C/o foot injury to previous surgical site, right foot, x-rays today, she states she has pain to the back of her right ankle,rates pain 7/10 , diabetic, wears shoes and socks)    History of Present Illness    CHIEF COMPLAINT:  Patient presents today for follow up on foot injury.    FOOT INJURY AND CURRENT SYMPTOMS:  She reports a foot injury that occurred while turning around in a hotel shower, resulting in a sudden fall. She denies any prior issues with the shower or tub. She describes pain in the back of her Achilles tendon and notes a bump or lump after falling. She is experiencing difficulty putting pressure on the affected foot, limping, and has trouble getting up from bed. She reports pain in her calf and has been walking on it since the incident occurred on Friday.    TREATMENT HISTORY:  She initially started with a regular boot. As her condition improved, she progressed to a smaller boot, and finally transitioned to a surgical shoe. She previously used heat for symptom relief but has not used it for this current injury.      ROS:  Musculoskeletal: +muscle pain, +joint pain            Physical Exam    Musculoskeletal: Tenderness at Achilles insertion site. Negative Donnelly test. No edema. No bruising.           Diagnoses:  1. Strain of Achilles tendon, right, initial encounter    Other orders  -     diclofenac (VOLTAREN) 75 MG EC tablet; Take 1 tablet (75 mg total) by mouth 2 (two) times daily. for 10 days  Dispense: 20 tablet; Refill: 0        Assessment & Plan    - Patient likely strained Achilles tendon   - No edema or bruising noted  - Typical recovery time for Achilles strain is 3-4 weeks    FOOT INJURY:  - Explained that icing is beneficial for reducing inflammation in recent injuries.  - Discussed that heat is not recommended for tendon injury as it can increase inflammation.  - Patient to wear  boot at all times except when sleeping.  - Patient to ice the affected area for the rest of the week (until Friday).  - Patient to avoid putting pressure on the injured foot.  - X-ray scheduled for foot injury.           No future appointments.     This note was generated with the assistance of ambient listening technology. Verbal consent was obtained by the patient and accompanying visitor(s) for the recording of patient appointment to facilitate this note. I attest to having reviewed and edited the generated note for accuracy, though some syntax or spelling errors may persist. Please contact the author of this note for any clarification.      Report Electronically Signed By:     Anitra Chowdary DPM   Podiatry  [facility]  9/5/2024

## 2025-01-08 DIAGNOSIS — E11.9 TYPE 2 DIABETES MELLITUS WITHOUT COMPLICATION: ICD-10-CM

## 2025-03-10 ENCOUNTER — PATIENT OUTREACH (OUTPATIENT)
Dept: ADMINISTRATIVE | Facility: HOSPITAL | Age: 45
End: 2025-03-10
Payer: MEDICAID

## 2025-03-17 DIAGNOSIS — E11.42 DM TYPE 2 WITH DIABETIC PERIPHERAL NEUROPATHY: Primary | ICD-10-CM

## 2025-03-18 RX ORDER — PREGABALIN 75 MG/1
CAPSULE ORAL
Qty: 30 CAPSULE | Refills: 1 | Status: SHIPPED | OUTPATIENT
Start: 2025-03-18

## 2025-04-16 ENCOUNTER — PATIENT OUTREACH (OUTPATIENT)
Dept: ADMINISTRATIVE | Facility: HOSPITAL | Age: 45
End: 2025-04-16
Payer: MEDICAID

## 2025-04-23 ENCOUNTER — PATIENT MESSAGE (OUTPATIENT)
Dept: ADMINISTRATIVE | Facility: HOSPITAL | Age: 45
End: 2025-04-23
Payer: MEDICAID

## 2025-05-02 ENCOUNTER — PATIENT OUTREACH (OUTPATIENT)
Dept: ADMINISTRATIVE | Facility: HOSPITAL | Age: 45
End: 2025-05-02
Payer: MEDICAID

## 2025-05-18 DIAGNOSIS — E11.42 DM TYPE 2 WITH DIABETIC PERIPHERAL NEUROPATHY: ICD-10-CM

## 2025-05-19 RX ORDER — PREGABALIN 75 MG/1
CAPSULE ORAL
Qty: 30 CAPSULE | Refills: 1 | Status: SHIPPED | OUTPATIENT
Start: 2025-05-19

## 2025-07-02 DIAGNOSIS — E11.9 TYPE 2 DIABETES MELLITUS WITHOUT COMPLICATION: ICD-10-CM

## 2025-07-02 DIAGNOSIS — E11.9 TYPE 2 DIABETES MELLITUS: ICD-10-CM

## 2025-07-07 ENCOUNTER — PATIENT MESSAGE (OUTPATIENT)
Dept: ADMINISTRATIVE | Facility: HOSPITAL | Age: 45
End: 2025-07-07
Payer: MEDICAID

## 2025-07-10 ENCOUNTER — PATIENT MESSAGE (OUTPATIENT)
Dept: PRIMARY CARE CLINIC | Facility: CLINIC | Age: 45
End: 2025-07-10
Payer: MEDICAID

## 2025-07-10 DIAGNOSIS — E78.2 MIXED HYPERLIPIDEMIA: ICD-10-CM

## 2025-07-10 RX ORDER — ATORVASTATIN CALCIUM 20 MG/1
20 TABLET, FILM COATED ORAL NIGHTLY
Qty: 90 TABLET | Refills: 3 | Status: SHIPPED | OUTPATIENT
Start: 2025-07-10 | End: 2026-07-10
